# Patient Record
Sex: FEMALE | Race: WHITE | NOT HISPANIC OR LATINO
[De-identification: names, ages, dates, MRNs, and addresses within clinical notes are randomized per-mention and may not be internally consistent; named-entity substitution may affect disease eponyms.]

---

## 2017-02-01 ENCOUNTER — APPOINTMENT (OUTPATIENT)
Dept: UROGYNECOLOGY | Facility: CLINIC | Age: 82
End: 2017-02-01

## 2017-02-22 ENCOUNTER — APPOINTMENT (OUTPATIENT)
Dept: UROGYNECOLOGY | Facility: CLINIC | Age: 82
End: 2017-02-22

## 2017-03-09 ENCOUNTER — APPOINTMENT (OUTPATIENT)
Dept: OTOLARYNGOLOGY | Facility: CLINIC | Age: 82
End: 2017-03-09

## 2017-03-09 VITALS
BODY MASS INDEX: 29.27 KG/M2 | DIASTOLIC BLOOD PRESSURE: 62 MMHG | HEART RATE: 68 BPM | HEIGHT: 61 IN | WEIGHT: 155 LBS | SYSTOLIC BLOOD PRESSURE: 136 MMHG

## 2017-04-05 ENCOUNTER — RESULT REVIEW (OUTPATIENT)
Age: 82
End: 2017-04-05

## 2017-04-25 ENCOUNTER — APPOINTMENT (OUTPATIENT)
Dept: UROGYNECOLOGY | Facility: CLINIC | Age: 82
End: 2017-04-25

## 2017-06-20 ENCOUNTER — APPOINTMENT (OUTPATIENT)
Dept: UROGYNECOLOGY | Facility: CLINIC | Age: 82
End: 2017-06-20

## 2017-08-01 ENCOUNTER — APPOINTMENT (OUTPATIENT)
Dept: UROGYNECOLOGY | Facility: CLINIC | Age: 82
End: 2017-08-01
Payer: MEDICARE

## 2017-08-01 PROCEDURE — 99213 OFFICE O/P EST LOW 20 MIN: CPT

## 2017-09-05 ENCOUNTER — APPOINTMENT (OUTPATIENT)
Dept: UROGYNECOLOGY | Facility: CLINIC | Age: 82
End: 2017-09-05
Payer: MEDICARE

## 2017-09-05 ENCOUNTER — MESSAGE (OUTPATIENT)
Age: 82
End: 2017-09-05

## 2017-09-05 PROCEDURE — 99213 OFFICE O/P EST LOW 20 MIN: CPT

## 2017-09-07 ENCOUNTER — APPOINTMENT (OUTPATIENT)
Dept: UROGYNECOLOGY | Facility: CLINIC | Age: 82
End: 2017-09-07
Payer: MEDICARE

## 2017-09-07 PROCEDURE — A4562: CPT

## 2017-09-07 PROCEDURE — 99213 OFFICE O/P EST LOW 20 MIN: CPT

## 2017-09-18 ENCOUNTER — APPOINTMENT (OUTPATIENT)
Dept: UROGYNECOLOGY | Facility: CLINIC | Age: 82
End: 2017-09-18

## 2017-09-26 ENCOUNTER — APPOINTMENT (OUTPATIENT)
Dept: UROGYNECOLOGY | Facility: CLINIC | Age: 82
End: 2017-09-26

## 2017-10-17 ENCOUNTER — APPOINTMENT (OUTPATIENT)
Dept: UROGYNECOLOGY | Facility: CLINIC | Age: 82
End: 2017-10-17
Payer: MEDICARE

## 2017-10-17 PROCEDURE — 99213 OFFICE O/P EST LOW 20 MIN: CPT

## 2017-10-17 RX ORDER — ESTRADIOL 0.1 MG/G
0.1 CREAM VAGINAL
Qty: 1 | Refills: 3 | Status: ACTIVE | COMMUNITY
Start: 2017-10-17 | End: 1900-01-01

## 2017-10-18 ENCOUNTER — APPOINTMENT (OUTPATIENT)
Dept: OTOLARYNGOLOGY | Facility: CLINIC | Age: 82
End: 2017-10-18
Payer: MEDICARE

## 2017-10-18 VITALS
BODY MASS INDEX: 29.27 KG/M2 | HEART RATE: 71 BPM | WEIGHT: 155 LBS | DIASTOLIC BLOOD PRESSURE: 42 MMHG | SYSTOLIC BLOOD PRESSURE: 108 MMHG | HEIGHT: 61 IN

## 2017-10-18 PROCEDURE — 69210 REMOVE IMPACTED EAR WAX UNI: CPT

## 2017-10-18 PROCEDURE — 99213 OFFICE O/P EST LOW 20 MIN: CPT | Mod: 25

## 2017-10-30 ENCOUNTER — APPOINTMENT (OUTPATIENT)
Dept: UROGYNECOLOGY | Facility: CLINIC | Age: 82
End: 2017-10-30
Payer: MEDICARE

## 2017-10-30 PROCEDURE — 99213 OFFICE O/P EST LOW 20 MIN: CPT

## 2017-12-11 ENCOUNTER — APPOINTMENT (OUTPATIENT)
Dept: UROGYNECOLOGY | Facility: CLINIC | Age: 82
End: 2017-12-11
Payer: MEDICARE

## 2017-12-11 PROCEDURE — 99213 OFFICE O/P EST LOW 20 MIN: CPT

## 2018-01-25 ENCOUNTER — APPOINTMENT (OUTPATIENT)
Dept: UROGYNECOLOGY | Facility: CLINIC | Age: 83
End: 2018-01-25
Payer: MEDICARE

## 2018-01-25 PROCEDURE — 99213 OFFICE O/P EST LOW 20 MIN: CPT

## 2018-02-12 ENCOUNTER — APPOINTMENT (OUTPATIENT)
Dept: PHARMACY | Facility: CLINIC | Age: 83
End: 2018-02-12
Payer: SELF-PAY

## 2018-02-12 ENCOUNTER — APPOINTMENT (OUTPATIENT)
Dept: OTOLARYNGOLOGY | Facility: CLINIC | Age: 83
End: 2018-02-12
Payer: MEDICARE

## 2018-02-12 VITALS
DIASTOLIC BLOOD PRESSURE: 49 MMHG | SYSTOLIC BLOOD PRESSURE: 124 MMHG | HEART RATE: 64 BPM | BODY MASS INDEX: 29.27 KG/M2 | HEIGHT: 61 IN | WEIGHT: 155 LBS

## 2018-02-12 PROCEDURE — V5255G: CUSTOM | Mod: LT

## 2018-02-12 PROCEDURE — 69210 REMOVE IMPACTED EAR WAX UNI: CPT

## 2018-02-12 PROCEDURE — 99213 OFFICE O/P EST LOW 20 MIN: CPT | Mod: 25

## 2018-02-27 ENCOUNTER — APPOINTMENT (OUTPATIENT)
Dept: PHARMACY | Facility: CLINIC | Age: 83
End: 2018-02-27
Payer: SELF-PAY

## 2018-02-27 PROCEDURE — V5241: CPT | Mod: LT,NC

## 2018-03-05 ENCOUNTER — APPOINTMENT (OUTPATIENT)
Dept: PHARMACY | Facility: CLINIC | Age: 83
End: 2018-03-05
Payer: SELF-PAY

## 2018-03-05 PROCEDURE — V5299A: CUSTOM | Mod: NC

## 2018-03-23 ENCOUNTER — APPOINTMENT (OUTPATIENT)
Dept: UROGYNECOLOGY | Facility: CLINIC | Age: 83
End: 2018-03-23
Payer: MEDICARE

## 2018-03-23 PROCEDURE — 99213 OFFICE O/P EST LOW 20 MIN: CPT

## 2018-04-10 ENCOUNTER — APPOINTMENT (OUTPATIENT)
Dept: PHARMACY | Facility: CLINIC | Age: 83
End: 2018-04-10
Payer: SELF-PAY

## 2018-04-10 PROCEDURE — V5299A: CUSTOM | Mod: NC

## 2018-04-27 ENCOUNTER — APPOINTMENT (OUTPATIENT)
Dept: PHARMACY | Facility: CLINIC | Age: 83
End: 2018-04-27

## 2018-05-01 ENCOUNTER — APPOINTMENT (OUTPATIENT)
Dept: PHARMACY | Facility: CLINIC | Age: 83
End: 2018-05-01
Payer: SELF-PAY

## 2018-05-01 PROCEDURE — V5299A: CUSTOM | Mod: NC

## 2018-05-04 ENCOUNTER — APPOINTMENT (OUTPATIENT)
Dept: UROGYNECOLOGY | Facility: CLINIC | Age: 83
End: 2018-05-04
Payer: MEDICARE

## 2018-05-04 PROCEDURE — 99213 OFFICE O/P EST LOW 20 MIN: CPT

## 2018-06-15 ENCOUNTER — APPOINTMENT (OUTPATIENT)
Dept: UROGYNECOLOGY | Facility: CLINIC | Age: 83
End: 2018-06-15
Payer: MEDICARE

## 2018-06-15 PROCEDURE — 99213 OFFICE O/P EST LOW 20 MIN: CPT | Mod: GC

## 2018-07-05 ENCOUNTER — APPOINTMENT (OUTPATIENT)
Dept: UROGYNECOLOGY | Facility: CLINIC | Age: 83
End: 2018-07-05
Payer: MEDICARE

## 2018-07-05 PROCEDURE — 99213 OFFICE O/P EST LOW 20 MIN: CPT

## 2018-08-10 ENCOUNTER — APPOINTMENT (OUTPATIENT)
Dept: UROGYNECOLOGY | Facility: CLINIC | Age: 83
End: 2018-08-10
Payer: MEDICARE

## 2018-08-10 PROCEDURE — 99213 OFFICE O/P EST LOW 20 MIN: CPT

## 2018-09-07 ENCOUNTER — APPOINTMENT (OUTPATIENT)
Dept: UROGYNECOLOGY | Facility: CLINIC | Age: 83
End: 2018-09-07
Payer: MEDICARE

## 2018-09-07 PROCEDURE — 99213 OFFICE O/P EST LOW 20 MIN: CPT

## 2018-10-12 ENCOUNTER — APPOINTMENT (OUTPATIENT)
Dept: UROGYNECOLOGY | Facility: CLINIC | Age: 83
End: 2018-10-12
Payer: MEDICARE

## 2018-10-12 PROCEDURE — 99213 OFFICE O/P EST LOW 20 MIN: CPT

## 2018-11-16 ENCOUNTER — APPOINTMENT (OUTPATIENT)
Dept: UROGYNECOLOGY | Facility: CLINIC | Age: 83
End: 2018-11-16
Payer: MEDICARE

## 2018-11-16 PROCEDURE — 99213 OFFICE O/P EST LOW 20 MIN: CPT

## 2018-12-27 ENCOUNTER — APPOINTMENT (OUTPATIENT)
Dept: UROGYNECOLOGY | Facility: CLINIC | Age: 83
End: 2018-12-27
Payer: MEDICARE

## 2018-12-27 PROCEDURE — 99213 OFFICE O/P EST LOW 20 MIN: CPT

## 2019-02-11 ENCOUNTER — APPOINTMENT (OUTPATIENT)
Dept: UROGYNECOLOGY | Facility: CLINIC | Age: 84
End: 2019-02-11
Payer: MEDICARE

## 2019-02-11 PROCEDURE — 99213 OFFICE O/P EST LOW 20 MIN: CPT

## 2019-02-11 NOTE — DISCUSSION/SUMMARY
[FreeTextEntry1] : Pt will RTO in 4 weeks or sooner if needed. Pt agrees to call office with any problems/concerns.

## 2019-02-11 NOTE — PROCEDURE
[Erythema] : erythema noted [Discharge] : there is vaginal discharge [Pessary Washed] : washed [H2O2] : H2O2 [Mild] : mild bleeding [Resolved w/pressure] : was resolved by applying pressure [Refit] : refit is not needed [Erosion] : no evidence of erosion [Infection] : no evidence of infection [FreeTextEntry1] : GH 3 1/2 and donut #5 [de-identified] : prolapse noted past pessary.  Tandem pessary flipped with stem leaning against posterior wall [de-identified] : no evidence of fistula or erosions. [de-identified] : posterior vaginal wall [de-identified] : copious amount of pink stained leukorrhea [de-identified] : with removal [FreeTextEntry3] : Estrace applied at visit. continue Estrace at home [FreeTextEntry8] : routine samm-care

## 2019-02-11 NOTE — PHYSICAL EXAM
[No Acute Distress] : in no acute distress [Well developed] : well developed [Well Nourished] : ~L well nourished [Good Hygeine] : demonstrates good hygeine [Oriented x3] : oriented to person, place, and time [Normal Memory] : ~T memory was ~L unimpaired [Normal Mood/Affect] : mood and affect are normal [Warm and Dry] : was warm and dry to touch [Labia Majora] : were normal [Labia Minora] : were normal [Normal Appearance] : general appearance was normal [Atrophy] : atrophy [Normal] : normal [Anxiety] : patient is not anxious [Tenderness] : ~T no ~M abdominal tenderness observed [Distended] : not distended [de-identified] : ambulates with walker [FreeTextEntry4] : no evidence of fistula

## 2019-02-21 ENCOUNTER — APPOINTMENT (OUTPATIENT)
Dept: OTOLARYNGOLOGY | Facility: CLINIC | Age: 84
End: 2019-02-21
Payer: MEDICARE

## 2019-02-21 VITALS
DIASTOLIC BLOOD PRESSURE: 57 MMHG | HEART RATE: 64 BPM | HEIGHT: 61 IN | SYSTOLIC BLOOD PRESSURE: 95 MMHG | WEIGHT: 155 LBS | BODY MASS INDEX: 29.27 KG/M2

## 2019-02-21 DIAGNOSIS — H65.493 OTHER CHRONIC NONSUPPURATIVE OTITIS MEDIA, BILATERAL: ICD-10-CM

## 2019-02-21 PROCEDURE — 99214 OFFICE O/P EST MOD 30 MIN: CPT | Mod: 25

## 2019-02-21 PROCEDURE — 69210 REMOVE IMPACTED EAR WAX UNI: CPT

## 2019-02-21 RX ORDER — MIRTAZAPINE 15 MG/1
15 TABLET, FILM COATED ORAL
Qty: 30 | Refills: 0 | Status: ACTIVE | COMMUNITY
Start: 2018-11-15

## 2019-02-21 RX ORDER — SIMVASTATIN 10 MG/1
10 TABLET, FILM COATED ORAL
Qty: 30 | Refills: 0 | Status: ACTIVE | COMMUNITY
Start: 2018-10-13

## 2019-02-21 RX ORDER — OFLOXACIN OTIC 3 MG/ML
0.3 SOLUTION AURICULAR (OTIC) TWICE DAILY
Qty: 1 | Refills: 5 | Status: ACTIVE | COMMUNITY
Start: 2019-02-21 | End: 1900-01-01

## 2019-02-21 RX ORDER — ISOSORBIDE MONONITRATE 60 MG/1
60 TABLET, EXTENDED RELEASE ORAL
Qty: 90 | Refills: 0 | Status: ACTIVE | COMMUNITY
Start: 2018-10-29

## 2019-02-21 RX ORDER — DOXYCYCLINE HYCLATE 100 MG/1
100 TABLET ORAL
Qty: 14 | Refills: 0 | Status: ACTIVE | COMMUNITY
Start: 2018-11-29

## 2019-02-21 RX ORDER — OMEPRAZOLE 20 MG/1
20 CAPSULE, DELAYED RELEASE ORAL
Qty: 90 | Refills: 0 | Status: ACTIVE | COMMUNITY
Start: 2018-10-01

## 2019-02-21 RX ORDER — LIDOCAINE 5% 700 MG/1
5 PATCH TOPICAL
Qty: 30 | Refills: 0 | Status: ACTIVE | COMMUNITY
Start: 2018-11-09

## 2019-02-21 RX ORDER — DICLOFENAC SODIUM 10 MG/G
1 GEL TOPICAL
Qty: 100 | Refills: 0 | Status: ACTIVE | COMMUNITY
Start: 2018-11-07

## 2019-02-21 NOTE — ASSESSMENT
[FreeTextEntry1] : 89 y/o female w/ bilateral cerumen- routine debridement and L ear drainage\par Drops were applied in her L ear\par \par Will continue drops at home for days and do not use q-tips\par \par bilateral sensorineural hearing loss-cleared for hearing aids\par \par Will f/u 4-6 months or PRN

## 2019-02-21 NOTE — REASON FOR VISIT
[Subsequent Evaluation] : a subsequent evaluation for [Hearing Loss] : hearing loss [Family Member] : family member [FreeTextEntry2] : ear issues

## 2019-02-21 NOTE — PHYSICAL EXAM
[] : septum deviated bilaterally [Midline] : trachea located in midline position [de-identified] : wax in b/l [de-identified] : L TM inflammed, small perforation in b/l ears [Normal] : orientation to person, place, and time: normal

## 2019-02-21 NOTE — HISTORY OF PRESENT ILLNESS
[No] : patient does not have a  history of radiation therapy [Presbycusis] : presbycusis [Eustachian Tube Dysfunction] : eustachian tube dysfunction [Hearing Loss] : hearing loss [Otorrhea] : otorrhea [None] : No risk factors have been identified. [de-identified] : 89 y/o female w/ hx of chronic bilateral hearing loss which requires a hearing aid she is here today for L ear drainage that started this morning. She states pus is coming out of the L ear. She is using hearing aides b/l. Pt has no ear pain, hearing loss, tinnitus, vertigo, nasal congestion, nasal discharge, epistaxis, sinus infections, facial pain, facial pressure, throat pain, dysphagia or fevers\par \par  [Tinnitus] : no tinnitus [Anxiety] : no anxiety [Dizziness] : no dizziness [Headache] : no headache [Otalgia] : no otalgia [Vertigo] : no vertigo [Recurrent Otitis Media] : no recurrent otitis media [Otitis Media with Effusion] : no otitis media with effusion [Congenital Ear Malformation] : no congenital ear malformation [Meniere Disease] : no Meniere disease [Otosclerosis] : no otosclerosis [Perilymphatic Fistula] : no perilymphatic fistula [Hypertension] : no hypertension [Loud Noise Exposure] : no history of loud noise exposure [Smoking] : no smoking [Early Onset Hearing Loss] : no early onset hearing loss [Stroke] : no stroke [Facial Pain] : no facial pain [Facial Pressure] : no facial pressure [Nasal Congestion] : no nasal congestion [Ear Pain] : no ear pain [Ear Pressure] : no ear pressure [Diplopia] : no diplopia [Ear Fullness] : no ear fullness [Allergic Rhinitis] : no allergic rhinitis [Environmental Allergies] : no environmental allergies [Seasonal Allergies] : no seasonal allergies [Environmental Allergens] : no environmental allergens [Adenoidectomy] : no adenoidectomy [Allergies] : no allergies [Asthma] : no asthma [Neck Mass] : no neck mass [Neck Pain] : no neck pain [Chills] : no chills [Cold Intolerance] : no cold intolerance [Cough] : no cough [Fatigue] : no fatigue [Heat Intolerance] : no heat intolerance [Hyperthyroidism] : no hyperthyroidism [Sialadenitis] : no sialadenitis [Hodgkin Disease] : no hodgkin disease [Non-Hodgkin Lymphoma] : no non-hodgkin lymphoma [Tobacco Use] : no tobacco use [Alcohol Use] : no alcohol use [Graves Disease] : no graves disease [Thyroid Cancer] : no thyroid cancer

## 2019-03-14 ENCOUNTER — APPOINTMENT (OUTPATIENT)
Dept: UROGYNECOLOGY | Facility: CLINIC | Age: 84
End: 2019-03-14
Payer: MEDICARE

## 2019-03-14 PROCEDURE — 99213 OFFICE O/P EST LOW 20 MIN: CPT

## 2019-03-19 NOTE — PHYSICAL EXAM
[No Acute Distress] : in no acute distress [Well developed] : well developed [Well Nourished] : ~L well nourished [Good Hygeine] : demonstrates good hygeine [Oriented x3] : oriented to person, place, and time [Normal Memory] : ~T memory was ~L unimpaired [Normal Mood/Affect] : mood and affect are normal [Warm and Dry] : was warm and dry to touch [Labia Majora] : were normal [Labia Minora] : were normal [Normal Appearance] : general appearance was normal [Atrophy] : atrophy [Normal] : normal [Tenderness] : ~T no ~M abdominal tenderness observed [Anxiety] : patient is not anxious [Distended] : not distended [FreeTextEntry4] : no evidence of fistula [de-identified] : ambulates with walker

## 2019-03-19 NOTE — HISTORY OF PRESENT ILLNESS
[FreeTextEntry1] : Pt presents to office for f/u of prolapse. reports feeling prolapse past pessary.

## 2019-03-19 NOTE — PROCEDURE
[Erythema] : erythema noted [Discharge] : there is vaginal discharge [Pessary Washed] : washed [H2O2] : H2O2 [Mild] : mild bleeding [Resolved w/pressure] : was resolved by applying pressure [Refit] : refit is not needed [Infection] : no evidence of infection [Erosion] : no evidence of erosion [de-identified] : no evidence of fistula or erosions. [FreeTextEntry1] : GH 3 1/2 and donut #5 [de-identified] : prolapse noted past pessary.  Tandem pessary flipped with stem leaning against posterior wall [de-identified] : posterior vaginal wall [de-identified] : copious amount of pink stained leukorrhea [de-identified] : with removal [FreeTextEntry8] : routine samm-care [FreeTextEntry3] : Estrace applied at visit. continue Estrace at home

## 2019-04-12 ENCOUNTER — APPOINTMENT (OUTPATIENT)
Dept: UROGYNECOLOGY | Facility: CLINIC | Age: 84
End: 2019-04-12
Payer: MEDICARE

## 2019-04-12 PROCEDURE — 99213 OFFICE O/P EST LOW 20 MIN: CPT

## 2019-04-12 NOTE — PHYSICAL EXAM
[Well developed] : well developed [No Acute Distress] : in no acute distress [Well Nourished] : ~L well nourished [Normal Mood/Affect] : mood and affect are normal [Normal Memory] : ~T memory was ~L unimpaired [Good Hygeine] : demonstrates good hygeine [Oriented x3] : oriented to person, place, and time [Anxiety] : patient is not anxious [Distended] : not distended [Tenderness] : ~T no ~M abdominal tenderness observed [Warm and Dry] : was warm and dry to touch [Labia Majora] : were normal [Labia Minora] : were normal [Normal Appearance] : general appearance was normal [Atrophy] : atrophy [Normal] : normal [de-identified] : ambulates with walker [FreeTextEntry4] : no evidence of fistula

## 2019-04-12 NOTE — PROCEDURE
[Refit] : refit is not needed [Erosion] : no evidence of erosion [Erythema] : erythema noted [Discharge] : there is vaginal discharge [Infection] : no evidence of infection [Pessary Washed] : washed [H2O2] : H2O2 [Mild] : mild bleeding [Resolved w/pressure] : was resolved by applying pressure [FreeTextEntry1] : GH 3 1/2 and donut #5 [de-identified] : prolapse noted past pessary.  Tandem pessary flipped with stem leaning against posterior wall [de-identified] : no evidence of fistula or erosions. [de-identified] : posterior vaginal wall [de-identified] : copious amount of pink stained leukorrhea [de-identified] : with removal [FreeTextEntry3] : Estrace applied at visit. continue Estrace at home [FreeTextEntry8] : routine samm-care

## 2019-05-17 ENCOUNTER — APPOINTMENT (OUTPATIENT)
Dept: UROGYNECOLOGY | Facility: CLINIC | Age: 84
End: 2019-05-17
Payer: MEDICARE

## 2019-05-17 DIAGNOSIS — N89.9 NONINFLAMMATORY DISORDER OF VAGINA, UNSPECIFIED: ICD-10-CM

## 2019-05-17 PROCEDURE — 99213 OFFICE O/P EST LOW 20 MIN: CPT

## 2019-05-23 NOTE — PROCEDURE
[Erythema] : erythema noted [Discharge] : there is vaginal discharge [Pessary Washed] : washed [H2O2] : H2O2 [Mild] : mild bleeding [Resolved w/pressure] : was resolved by applying pressure [Refit] : refit is not needed [Erosion] : no evidence of erosion [Infection] : no evidence of infection [FreeTextEntry1] : GH 3 1/2 and donut #5 [de-identified] : prolapse noted past pessary.  Tandem pessary flipped with stem leaning against posterior wall [de-identified] : no evidence of fistula or erosions. [de-identified] : posterior vaginal wall [de-identified] : copious amount of pink stained leukorrhea [de-identified] : with removal [FreeTextEntry3] : Estrace applied at visit. continue Estrace at home [FreeTextEntry8] : routine samm-care

## 2019-05-23 NOTE — PHYSICAL EXAM
[No Acute Distress] : in no acute distress [Well developed] : well developed [Well Nourished] : ~L well nourished [Good Hygeine] : demonstrates good hygeine [Oriented x3] : oriented to person, place, and time [Normal Memory] : ~T memory was ~L unimpaired [Normal Mood/Affect] : mood and affect are normal [Warm and Dry] : was warm and dry to touch [Labia Majora] : were normal [Labia Minora] : were normal [Normal Appearance] : general appearance was normal [Atrophy] : atrophy [Normal] : normal [Anxiety] : patient is not anxious [Tenderness] : ~T no ~M abdominal tenderness observed [Distended] : not distended [de-identified] : ambulates with walker [FreeTextEntry4] : no evidence of fistula

## 2019-06-21 ENCOUNTER — APPOINTMENT (OUTPATIENT)
Dept: UROGYNECOLOGY | Facility: CLINIC | Age: 84
End: 2019-06-21
Payer: MEDICARE

## 2019-06-21 PROCEDURE — 99213 OFFICE O/P EST LOW 20 MIN: CPT

## 2019-06-27 NOTE — PHYSICAL EXAM
[No Acute Distress] : in no acute distress [Well developed] : well developed [Well Nourished] : ~L well nourished [Good Hygeine] : demonstrates good hygeine [Normal Memory] : ~T memory was ~L unimpaired [Oriented x3] : oriented to person, place, and time [Normal Mood/Affect] : mood and affect are normal [Warm and Dry] : was warm and dry to touch [Labia Majora] : were normal [Labia Minora] : were normal [Normal Appearance] : general appearance was normal [Atrophy] : atrophy [Normal] : normal [Tenderness] : ~T no ~M abdominal tenderness observed [Anxiety] : patient is not anxious [Distended] : not distended [de-identified] : ambulates with walker [FreeTextEntry4] : no evidence of fistula

## 2019-06-27 NOTE — PROCEDURE
[Discharge] : there is vaginal discharge [Erythema] : erythema noted [Pessary Washed] : washed [H2O2] : H2O2 [Mild] : mild bleeding [Resolved w/pressure] : was resolved by applying pressure [Erosion] : no evidence of erosion [Refit] : refit is not needed [FreeTextEntry1] : GH 3 1/2 and donut #5 [Infection] : no evidence of infection [de-identified] : posterior vaginal wall [de-identified] : no evidence of fistula or erosions. [de-identified] : prolapse noted past pessary.  Tandem pessary flipped with stem leaning against posterior wall [de-identified] : with removal [FreeTextEntry3] : Estrace applied at visit. continue Estrace at home [de-identified] : copious amount of pink stained leukorrhea [FreeTextEntry8] : routine samm-care

## 2019-08-02 ENCOUNTER — APPOINTMENT (OUTPATIENT)
Dept: UROGYNECOLOGY | Facility: CLINIC | Age: 84
End: 2019-08-02
Payer: MEDICARE

## 2019-08-02 PROCEDURE — 99213 OFFICE O/P EST LOW 20 MIN: CPT

## 2019-08-02 NOTE — PHYSICAL EXAM
[No Acute Distress] : in no acute distress [Well developed] : well developed [Well Nourished] : ~L well nourished [Good Hygeine] : demonstrates good hygeine [Oriented x3] : oriented to person, place, and time [Normal Memory] : ~T memory was ~L unimpaired [Normal Mood/Affect] : mood and affect are normal [Warm and Dry] : was warm and dry to touch [Labia Majora] : were normal [Labia Minora] : were normal [Normal Appearance] : general appearance was normal [Atrophy] : atrophy [Normal] : normal [Anxiety] : patient is not anxious [Tenderness] : ~T no ~M abdominal tenderness observed [Distended] : not distended [FreeTextEntry4] : no evidence of fistula [de-identified] : ambulates with walker

## 2019-08-02 NOTE — PROCEDURE
[Discharge] : there is vaginal discharge [Erythema] : erythema noted [Pessary Washed] : washed [H2O2] : H2O2 [Resolved w/pressure] : was resolved by applying pressure [Mild] : mild bleeding [Refit] : refit is not needed [Erosion] : no evidence of erosion [FreeTextEntry1] : GH 3 1/2 and donut #5 [Infection] : no evidence of infection [de-identified] : posterior vaginal wall [de-identified] : no evidence of fistula or erosions. [de-identified] : prolapse noted past pessary.  Tandem pessary flipped with stem leaning against posterior wall [de-identified] : copious amount of pink stained leukorrhea [de-identified] : with removal [FreeTextEntry3] : Estrace applied at visit. continue Estrace at home [FreeTextEntry8] : routine samm-care

## 2019-09-06 ENCOUNTER — APPOINTMENT (OUTPATIENT)
Dept: UROGYNECOLOGY | Facility: CLINIC | Age: 84
End: 2019-09-06
Payer: MEDICARE

## 2019-09-06 PROCEDURE — 99213 OFFICE O/P EST LOW 20 MIN: CPT

## 2019-09-10 NOTE — PROCEDURE
[Erythema] : erythema noted [Discharge] : there is vaginal discharge [Pessary Washed] : washed [H2O2] : H2O2 [Mild] : mild bleeding [Resolved w/pressure] : was resolved by applying pressure [Refit] : refit is not needed [Erosion] : no evidence of erosion [Infection] : no evidence of infection [FreeTextEntry1] : GH 3 1/2 and donut #5 [de-identified] : prolapse noted past pessary.  Tandem pessary flipped with stem leaning against posterior wall [de-identified] : no evidence of fistula or erosions. [de-identified] : copious amount of pink stained leukorrhea [de-identified] : posterior vaginal wall [de-identified] : with removal [FreeTextEntry3] : Estrace applied at visit. continue Estrace at home [FreeTextEntry8] : routine samm-care

## 2019-10-04 ENCOUNTER — APPOINTMENT (OUTPATIENT)
Dept: UROGYNECOLOGY | Facility: CLINIC | Age: 84
End: 2019-10-04
Payer: MEDICARE

## 2019-10-04 DIAGNOSIS — N39.3 STRESS INCONTINENCE (FEMALE) (MALE): ICD-10-CM

## 2019-10-04 PROCEDURE — A4562: CPT

## 2019-10-04 PROCEDURE — 99213 OFFICE O/P EST LOW 20 MIN: CPT

## 2019-11-06 NOTE — PHYSICAL EXAM
[No Acute Distress] : in no acute distress [Well developed] : well developed [Well Nourished] : ~L well nourished [Good Hygeine] : demonstrates good hygeine [Oriented x3] : oriented to person, place, and time [Normal Memory] : ~T memory was ~L unimpaired [Normal Mood/Affect] : mood and affect are normal [Warm and Dry] : was warm and dry to touch [Labia Majora] : were normal [Labia Minora] : were normal [Normal Appearance] : general appearance was normal [Normal] : normal [Atrophy] : atrophy [Anxiety] : patient is not anxious [Tenderness] : ~T no ~M abdominal tenderness observed [Distended] : not distended [de-identified] : ambulates with walker [FreeTextEntry4] : no evidence of fistula

## 2019-11-06 NOTE — HISTORY OF PRESENT ILLNESS
[FreeTextEntry1] : Pt presents to office for f/u of prolapse.  Denies any issues with pessary.   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

## 2019-11-06 NOTE — PROCEDURE
[Erythema] : erythema noted [Discharge] : there is vaginal discharge [Pessary Washed] : washed [H2O2] : H2O2 [Mild] : mild bleeding [Resolved w/pressure] : was resolved by applying pressure [Refit] : refit is not needed [Erosion] : no evidence of erosion [Infection] : no evidence of infection [FreeTextEntry1] : GH 3 1/2 and donut #4 [de-identified] : prolapse noted past pessary.  Tandem pessary flipped with stem leaning against posterior wall [de-identified] : no evidence of fistula or erosions. [de-identified] : posterior vaginal wall [de-identified] : copious amount of pink stained leukorrhea [de-identified] : with removal [FreeTextEntry3] : Estrace applied at visit. continue Estrace at home [FreeTextEntry8] : routine samm-care

## 2019-11-08 ENCOUNTER — APPOINTMENT (OUTPATIENT)
Dept: UROGYNECOLOGY | Facility: CLINIC | Age: 84
End: 2019-11-08
Payer: MEDICARE

## 2019-11-08 PROCEDURE — 99213 OFFICE O/P EST LOW 20 MIN: CPT

## 2019-11-12 NOTE — PHYSICAL EXAM
[Well developed] : well developed [No Acute Distress] : in no acute distress [Well Nourished] : ~L well nourished [Oriented x3] : oriented to person, place, and time [Normal Memory] : ~T memory was ~L unimpaired [Good Hygeine] : demonstrates good hygeine [Normal Mood/Affect] : mood and affect are normal [Warm and Dry] : was warm and dry to touch [Labia Majora] : were normal [Normal Appearance] : general appearance was normal [Labia Minora] : were normal [Atrophy] : atrophy [Normal] : normal [Anxiety] : patient is not anxious [de-identified] : ambulates with walker [Distended] : not distended [Tenderness] : ~T no ~M abdominal tenderness observed [FreeTextEntry4] : no evidence of fistula

## 2019-11-12 NOTE — PROCEDURE
[Discharge] : there is vaginal discharge [Erythema] : erythema noted [Pessary Washed] : washed [H2O2] : H2O2 [Mild] : mild bleeding [Resolved w/pressure] : was resolved by applying pressure [Erosion] : no evidence of erosion [Refit] : refit is not needed [Infection] : no evidence of infection [FreeTextEntry1] : GH 3 1/2 and donut #4 [de-identified] : prolapse noted past pessary.  Tandem pessary flipped with stem leaning against posterior wall [de-identified] : no evidence of fistula or erosions. [de-identified] : posterior vaginal wall [de-identified] : with removal [de-identified] : copious amount of pink stained leukorrhea [FreeTextEntry8] : routine samm-care [FreeTextEntry3] : Estrace applied at visit. continue Estrace at home

## 2019-12-13 ENCOUNTER — APPOINTMENT (OUTPATIENT)
Dept: UROGYNECOLOGY | Facility: CLINIC | Age: 84
End: 2019-12-13
Payer: MEDICARE

## 2019-12-13 PROCEDURE — 99213 OFFICE O/P EST LOW 20 MIN: CPT

## 2019-12-16 NOTE — PROCEDURE
[Discharge] : there is vaginal discharge [Erythema] : erythema noted [Pessary Washed] : washed [Resolved w/pressure] : was resolved by applying pressure [H2O2] : H2O2 [Mild] : mild bleeding [Refit] : refit is not needed [Erosion] : no evidence of erosion [FreeTextEntry1] : GH 3 1/2 and donut #4 [Infection] : no evidence of infection [de-identified] : prolapse noted past pessary.  Tandem pessary flipped with stem leaning against posterior wall [de-identified] : no evidence of fistula or erosions. [de-identified] : with removal [de-identified] : posterior vaginal wall [de-identified] : copious amount of pink stained leukorrhea [FreeTextEntry3] : Estrace applied at visit. continue Estrace at home [FreeTextEntry8] : routine samm-care

## 2019-12-16 NOTE — PHYSICAL EXAM
[No Acute Distress] : in no acute distress [Well Nourished] : ~L well nourished [Well developed] : well developed [Good Hygeine] : demonstrates good hygeine [Oriented x3] : oriented to person, place, and time [Normal Memory] : ~T memory was ~L unimpaired [Normal Mood/Affect] : mood and affect are normal [Warm and Dry] : was warm and dry to touch [Labia Majora] : were normal [Labia Minora] : were normal [Normal Appearance] : general appearance was normal [Atrophy] : atrophy [Normal] : normal [Tenderness] : ~T no ~M abdominal tenderness observed [Anxiety] : patient is not anxious [Distended] : not distended [de-identified] : ambulates with walker [FreeTextEntry4] : no evidence of fistula

## 2020-01-16 ENCOUNTER — APPOINTMENT (OUTPATIENT)
Dept: UROGYNECOLOGY | Facility: CLINIC | Age: 85
End: 2020-01-16
Payer: MEDICARE

## 2020-01-16 PROCEDURE — 99213 OFFICE O/P EST LOW 20 MIN: CPT

## 2020-01-18 NOTE — PHYSICAL EXAM
[No Acute Distress] : in no acute distress [Well developed] : well developed [Good Hygeine] : demonstrates good hygeine [Well Nourished] : ~L well nourished [Oriented x3] : oriented to person, place, and time [Normal Memory] : ~T memory was ~L unimpaired [Normal Mood/Affect] : mood and affect are normal [Warm and Dry] : was warm and dry to touch [Labia Majora] : were normal [Labia Minora] : were normal [Normal Appearance] : general appearance was normal [Atrophy] : atrophy [Normal] : normal [Anxiety] : patient is not anxious [Tenderness] : ~T no ~M abdominal tenderness observed [Distended] : not distended [de-identified] : ambulates with walker [FreeTextEntry4] : no evidence of fistula

## 2020-02-06 ENCOUNTER — APPOINTMENT (OUTPATIENT)
Dept: OPHTHALMOLOGY | Facility: CLINIC | Age: 85
End: 2020-02-06
Payer: MEDICARE

## 2020-02-06 ENCOUNTER — NON-APPOINTMENT (OUTPATIENT)
Age: 85
End: 2020-02-06

## 2020-02-06 PROCEDURE — 92004 COMPRE OPH EXAM NEW PT 1/>: CPT

## 2020-02-06 PROCEDURE — 92286 ANT SGM IMG I&R SPECLR MIC: CPT

## 2020-02-06 PROCEDURE — 92015 DETERMINE REFRACTIVE STATE: CPT

## 2020-02-20 ENCOUNTER — APPOINTMENT (OUTPATIENT)
Dept: UROGYNECOLOGY | Facility: CLINIC | Age: 85
End: 2020-02-20

## 2020-02-27 ENCOUNTER — APPOINTMENT (OUTPATIENT)
Dept: UROGYNECOLOGY | Facility: CLINIC | Age: 85
End: 2020-02-27
Payer: MEDICARE

## 2020-02-27 PROCEDURE — 99213 OFFICE O/P EST LOW 20 MIN: CPT

## 2020-03-02 NOTE — PHYSICAL EXAM
[No Acute Distress] : in no acute distress [Well developed] : well developed [Well Nourished] : ~L well nourished [Good Hygeine] : demonstrates good hygeine [Oriented x3] : oriented to person, place, and time [Normal Memory] : ~T memory was ~L unimpaired [Normal Mood/Affect] : mood and affect are normal [Warm and Dry] : was warm and dry to touch [Labia Majora] : were normal [Labia Minora] : were normal [Normal Appearance] : general appearance was normal [Normal] : normal [Atrophy] : atrophy [Anxiety] : patient is not anxious [Tenderness] : ~T no ~M abdominal tenderness observed [de-identified] : ambulates with walker [Distended] : not distended [FreeTextEntry4] : no evidence of fistula

## 2020-03-02 NOTE — DISCUSSION/SUMMARY
[FreeTextEntry1] : PVR today was 120 ml.  Pessary reinserted with improved support of cystocele.  She was advised to stop Flomax as retention was likely due to bladder outlet obstruction.  Pt advised to call office if she has urinary retention again.  Otherwise she will RTO in 1 month for f/u of prolapse or sooner if needed.  Pt and daughter agree to call office with any problems/concerns.

## 2020-03-02 NOTE — HISTORY OF PRESENT ILLNESS
[FreeTextEntry1] : Pt presents to office for f/u of prolapse. She reported urinary retention during the day yesterday but at night she was able to void a large amount.  Pt has a tandem pessary in, which usually has some prolapse past pessary.    However they were not able to RTO 2 weeks ago as scheduled due to hospitalization for rectal bleeding.  When she was hospitalized she also had moments of urinary retention, so she was placed on flomax.  She is currently taking the flomax. \par \par

## 2020-03-02 NOTE — PROCEDURE
[Erythema] : erythema noted [Discharge] : there is vaginal discharge [Pessary Washed] : washed [H2O2] : H2O2 [Mild] : mild bleeding [Resolved w/pressure] : was resolved by applying pressure [Refit] : refit is not needed [Erosion] : no evidence of erosion [Infection] : no evidence of infection [FreeTextEntry1] : GH 3 1/2 and donut #4 [de-identified] : no evidence of fistula or erosions. [de-identified] : prolapse noted past pessary.  Tandem pessary flipped with stem leaning against posterior wall [de-identified] : posterior vaginal wall [de-identified] : with removal [de-identified] : copious amount of pink stained leukorrhea [FreeTextEntry8] : routine samm-care [FreeTextEntry3] : Estrace applied at visit. continue Estrace at home

## 2020-03-27 ENCOUNTER — APPOINTMENT (OUTPATIENT)
Dept: UROGYNECOLOGY | Facility: CLINIC | Age: 85
End: 2020-03-27

## 2020-04-20 ENCOUNTER — APPOINTMENT (OUTPATIENT)
Dept: UROGYNECOLOGY | Facility: CLINIC | Age: 85
End: 2020-04-20
Payer: MEDICARE

## 2020-04-20 DIAGNOSIS — R30.0 DYSURIA: ICD-10-CM

## 2020-04-20 PROCEDURE — 99213 OFFICE O/P EST LOW 20 MIN: CPT

## 2020-04-20 NOTE — PHYSICAL EXAM
[No Acute Distress] : in no acute distress [Well developed] : well developed [Well Nourished] : ~L well nourished [Good Hygeine] : demonstrates good hygeine [Oriented x3] : oriented to person, place, and time [Normal Memory] : ~T memory was ~L unimpaired [Normal Mood/Affect] : mood and affect are normal [Warm and Dry] : was warm and dry to touch [Labia Majora] : were normal [Labia Minora] : were normal [Normal Appearance] : general appearance was normal [Atrophy] : atrophy [Normal] : normal [Anxiety] : patient is not anxious [Tenderness] : ~T no ~M abdominal tenderness observed [Distended] : not distended [de-identified] : ambulates with walker [FreeTextEntry4] : no evidence of fistula

## 2020-04-20 NOTE — PROCEDURE
[Erythema] : erythema noted [Discharge] : there is vaginal discharge [Pessary Washed] : washed [H2O2] : H2O2 [Mild] : mild bleeding [Resolved w/pressure] : was resolved by applying pressure [Erosion] : no evidence of erosion [Refit] : refit is not needed [Infection] : no evidence of infection [FreeTextEntry1] : GH 3 1/2 and donut #4 [de-identified] : no evidence of fistula or erosions. [de-identified] : posterior vaginal wall [de-identified] : prolapse noted past pessary.  Tandem pessary flipped with stem leaning against posterior wall [FreeTextEntry3] : Estrace applied at visit. continue Estrace at home [de-identified] : copious amount of pink stained leukorrhea [FreeTextEntry8] : routine samm-care [de-identified] : with removal

## 2020-06-04 ENCOUNTER — APPOINTMENT (OUTPATIENT)
Dept: UROGYNECOLOGY | Facility: CLINIC | Age: 85
End: 2020-06-04
Payer: MEDICARE

## 2020-06-04 VITALS — TEMPERATURE: 97.6 F

## 2020-06-04 PROCEDURE — 99213 OFFICE O/P EST LOW 20 MIN: CPT

## 2020-06-04 NOTE — PHYSICAL EXAM
[No Acute Distress] : in no acute distress [Well developed] : well developed [Well Nourished] : ~L well nourished [Good Hygeine] : demonstrates good hygeine [Oriented x3] : oriented to person, place, and time [Normal Memory] : ~T memory was ~L unimpaired [Normal Mood/Affect] : mood and affect are normal [Anxiety] : patient is not anxious [Tenderness] : ~T no ~M abdominal tenderness observed [Distended] : not distended [Warm and Dry] : was warm and dry to touch [Labia Majora] : were normal [Labia Minora] : were normal [Normal Appearance] : general appearance was normal [Atrophy] : atrophy [Normal] : normal [de-identified] : ambulates with walker [FreeTextEntry4] : no evidence of fistula

## 2020-06-04 NOTE — PROCEDURE
[Refit] : refit is not needed [Erosion] : no evidence of erosion [Erythema] : erythema noted [Discharge] : there is vaginal discharge [Infection] : no evidence of infection [Pessary Washed] : washed [H2O2] : H2O2 [Mild] : mild bleeding [Resolved w/pressure] : was resolved by applying pressure [FreeTextEntry1] : GH 3 1/2 and donut #4 [de-identified] : no evidence of fistula or erosions. [de-identified] : posterior vaginal wall [de-identified] : prolapse noted past pessary.  Tandem pessary flipped with stem leaning against posterior wall [de-identified] : copious amount of pink stained leukorrhea [de-identified] : with removal [FreeTextEntry3] : Estrace applied at visit. continue Estrace at home [FreeTextEntry8] : routine samm-care

## 2020-07-14 ENCOUNTER — APPOINTMENT (OUTPATIENT)
Dept: PHARMACY | Facility: CLINIC | Age: 85
End: 2020-07-14
Payer: SELF-PAY

## 2020-07-14 PROCEDURE — V5267D: CUSTOM

## 2020-08-06 ENCOUNTER — APPOINTMENT (OUTPATIENT)
Dept: UROGYNECOLOGY | Facility: CLINIC | Age: 85
End: 2020-08-06
Payer: MEDICARE

## 2020-08-06 VITALS — HEART RATE: 65 BPM | DIASTOLIC BLOOD PRESSURE: 60 MMHG | TEMPERATURE: 97.2 F | SYSTOLIC BLOOD PRESSURE: 100 MMHG

## 2020-08-06 PROCEDURE — 99213 OFFICE O/P EST LOW 20 MIN: CPT

## 2020-08-10 NOTE — HISTORY OF PRESENT ILLNESS
[FreeTextEntry1] : Pt presents to office for f/u of prolapse.  Denies any issues with pessary.   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  \par \par Reports recent hospitalization due to hyponatremia. concurrently she was treated for UTI, but asymptomatic.

## 2020-08-10 NOTE — PHYSICAL EXAM
[Well developed] : well developed [No Acute Distress] : in no acute distress [Well Nourished] : ~L well nourished [Good Hygeine] : demonstrates good hygeine [Oriented x3] : oriented to person, place, and time [Normal Memory] : ~T memory was ~L unimpaired [Normal Mood/Affect] : mood and affect are normal [Warm and Dry] : was warm and dry to touch [Labia Majora] : were normal [Labia Minora] : were normal [Normal Appearance] : general appearance was normal [Atrophy] : atrophy [Normal] : normal [Anxiety] : patient is not anxious [Tenderness] : ~T no ~M abdominal tenderness observed [Distended] : not distended [de-identified] : ambulates with walker [FreeTextEntry4] : no evidence of fistula

## 2020-08-10 NOTE — PROCEDURE
[Erythema] : erythema noted [Discharge] : there is vaginal discharge [Pessary Washed] : washed [Mild] : mild bleeding [H2O2] : H2O2 [Resolved w/pressure] : was resolved by applying pressure [Refit] : refit is not needed [Infection] : no evidence of infection [Erosion] : no evidence of erosion [FreeTextEntry1] : GH 3 1/2 and donut #4 [de-identified] : posterior vaginal wall [de-identified] : prolapse noted past pessary.  Tandem pessary flipped with stem leaning against posterior wall [de-identified] : no evidence of fistula or erosions. [FreeTextEntry3] : Estrace applied at visit. continue Estrace at home [de-identified] : with removal [de-identified] : copious amount of pink stained leukorrhea [FreeTextEntry8] : routine samm-care

## 2020-08-10 NOTE — DISCUSSION/SUMMARY
[FreeTextEntry1] : Regarding UTI, unsure if the clean catch urine sample was a contaminated sample as she is not a good candidate for a clean catch.  Since she is asymptomatic at this time, there is no further workup needed.  \par \par Pt will RTO in 4 weeks or sooner if needed. Pt agrees to call office with any problems/concerns.

## 2020-09-15 ENCOUNTER — APPOINTMENT (OUTPATIENT)
Dept: UROGYNECOLOGY | Facility: CLINIC | Age: 85
End: 2020-09-15
Payer: MEDICARE

## 2020-09-15 DIAGNOSIS — N81.2 INCOMPLETE UTEROVAGINAL PROLAPSE: ICD-10-CM

## 2020-09-15 PROCEDURE — 99213 OFFICE O/P EST LOW 20 MIN: CPT

## 2020-09-15 PROCEDURE — A4562: CPT

## 2020-09-16 ENCOUNTER — APPOINTMENT (OUTPATIENT)
Dept: OTOLARYNGOLOGY | Facility: CLINIC | Age: 85
End: 2020-09-16
Payer: MEDICARE

## 2020-09-16 VITALS — HEART RATE: 81 BPM

## 2020-09-16 VITALS
HEIGHT: 61 IN | DIASTOLIC BLOOD PRESSURE: 82 MMHG | WEIGHT: 155 LBS | HEART RATE: 82 BPM | SYSTOLIC BLOOD PRESSURE: 176 MMHG | TEMPERATURE: 97.5 F | BODY MASS INDEX: 29.27 KG/M2

## 2020-09-16 DIAGNOSIS — H72.03 CENTRAL PERFORATION OF TYMPANIC MEMBRANE, BILATERAL: ICD-10-CM

## 2020-09-16 DIAGNOSIS — H61.23 IMPACTED CERUMEN, BILATERAL: ICD-10-CM

## 2020-09-16 DIAGNOSIS — H90.3 SENSORINEURAL HEARING LOSS, BILATERAL: ICD-10-CM

## 2020-09-16 PROCEDURE — 99213 OFFICE O/P EST LOW 20 MIN: CPT | Mod: 25

## 2020-09-16 PROCEDURE — 69210 REMOVE IMPACTED EAR WAX UNI: CPT

## 2020-09-16 RX ORDER — OMEPRAZOLE 40 MG/1
40 CAPSULE, DELAYED RELEASE ORAL
Qty: 30 | Refills: 0 | Status: ACTIVE | COMMUNITY
Start: 2020-09-07

## 2020-09-16 RX ORDER — FUROSEMIDE 20 MG/1
20 TABLET ORAL
Qty: 3 | Refills: 0 | Status: ACTIVE | COMMUNITY
Start: 2020-08-21

## 2020-09-16 RX ORDER — AMMONIUM LACTATE 12 %
12 CREAM (GRAM) TOPICAL
Qty: 385 | Refills: 0 | Status: ACTIVE | COMMUNITY
Start: 2020-09-04

## 2020-09-16 NOTE — PHYSICAL EXAM
[] : septum deviated bilaterally [Midline] : trachea located in midline position [de-identified] : wax in b/l [Normal] : no masses and lesions seen, face is symmetric [de-identified] : left TM perf

## 2020-09-16 NOTE — HISTORY OF PRESENT ILLNESS
[No] : patient does not have a  history of radiation therapy [Hearing Loss] : hearing loss [Otorrhea] : otorrhea [Presbycusis] : presbycusis [Eustachian Tube Dysfunction] : eustachian tube dysfunction [None] : No risk factors have been identified. 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 Otherwise feeling well. She is using hearing aides b/l. Pt has no ear pain, hearing loss, tinnitus, vertigo, nasal congestion, nasal discharge, epistaxis, sinus infections, facial pain, facial pressure, throat pain, dysphagia or fevers\par \par  [Tinnitus] : no tinnitus [Headache] : no headache [Anxiety] : no anxiety [Dizziness] : no dizziness [Recurrent Otitis Media] : no recurrent otitis media [Otalgia] : no otalgia [Vertigo] : no vertigo [Otitis Media with Effusion] : no otitis media with effusion [Congenital Ear Malformation] : no congenital ear malformation [Meniere Disease] : no Meniere disease [Otosclerosis] : no otosclerosis [Perilymphatic Fistula] : no perilymphatic fistula [Hypertension] : no hypertension [Loud Noise Exposure] : no history of loud noise exposure [Smoking] : no smoking [Stroke] : no stroke [Early Onset Hearing Loss] : no early onset hearing loss [Facial Pain] : no facial pain [Facial Pressure] : no facial pressure [Ear Pain] : no ear pain [Nasal Congestion] : no nasal congestion [Ear Pressure] : no ear pressure [Ear Fullness] : no ear fullness [Diplopia] : no diplopia [Allergic Rhinitis] : no allergic rhinitis [Environmental Allergies] : no environmental allergies [Seasonal Allergies] : no seasonal allergies [Environmental Allergens] : no environmental allergens [Allergies] : no allergies [Asthma] : no asthma [Adenoidectomy] : no adenoidectomy [Neck Pain] : no neck pain [Neck Mass] : no neck mass [Chills] : no chills [Cold Intolerance] : no cold intolerance [Cough] : no cough [Fatigue] : no fatigue [Heat Intolerance] : no heat intolerance [Sialadenitis] : no sialadenitis [Hyperthyroidism] : no hyperthyroidism [Non-Hodgkin Lymphoma] : no non-hodgkin lymphoma [Hodgkin Disease] : no hodgkin disease [Tobacco Use] : no tobacco use [Alcohol Use] : no alcohol use [Thyroid Cancer] : no thyroid cancer [Graves Disease] : no graves disease

## 2020-09-16 NOTE — ASSESSMENT
[FreeTextEntry1] : Wax:\par -Cerumen is removed from the right and left  ear canal with a curette and suction.\par \par -Routine debridement suggested to keep the ears free of wax.\par \par Bilateral SNHL:\par -cleared for hearing aids\par \par f/u 6 months or prn

## 2020-09-18 NOTE — PROCEDURE
[Erythema] : erythema noted [Discharge] : there is vaginal discharge [Pessary Inserted] : inserted [H2O2] : H2O2 [Mild] : mild bleeding [Resolved w/pressure] : was resolved by applying pressure [Refit] : refit is not needed [Erosion] : no evidence of erosion [Infection] : no evidence of infection [FreeTextEntry1] : GH 3 1/2 and donut #4 [de-identified] : prolapse noted past pessary.  Tandem pessary flipped with stem leaning against posterior wall [de-identified] : no evidence of fistula or erosions. [de-identified] : posterior vaginal wall [de-identified] : copious amount of pink stained leukorrhea [de-identified] : with removal [FreeTextEntry3] : Estrace applied at visit. continue Estrace at home [FreeTextEntry8] : routine samm-care

## 2020-09-18 NOTE — HISTORY OF PRESENT ILLNESS
[FreeTextEntry1] : Pt presents to office for f/u of prolapse.  Denies any issues with pessary.   Reports h/o KAMLA, which is unchanged from last visit.

## 2020-09-18 NOTE — PHYSICAL EXAM
[No Acute Distress] : in no acute distress [Well developed] : well developed [Well Nourished] : ~L well nourished [Good Hygeine] : demonstrates good hygeine [Oriented x3] : oriented to person, place, and time [Normal Memory] : ~T memory was ~L unimpaired [Normal Mood/Affect] : mood and affect are normal [Warm and Dry] : was warm and dry to touch [Labia Majora] : were normal [Labia Minora] : were normal [Normal Appearance] : general appearance was normal [Atrophy] : atrophy [Normal] : normal [Anxiety] : patient is not anxious [Tenderness] : ~T no ~M abdominal tenderness observed [Distended] : not distended [de-identified] : ambulates with walker [FreeTextEntry4] : no evidence of fistula

## 2020-09-18 NOTE — DISCUSSION/SUMMARY
[FreeTextEntry1] : Pt doing well with pessary.\par \par Pt will RTO in 4 weeks or sooner if needed. Pt agrees to call office with any problems/concerns.

## 2020-10-21 ENCOUNTER — NON-APPOINTMENT (OUTPATIENT)
Age: 85
End: 2020-10-21

## 2020-10-23 ENCOUNTER — APPOINTMENT (OUTPATIENT)
Dept: UROGYNECOLOGY | Facility: CLINIC | Age: 85
End: 2020-10-23

## 2020-10-28 ENCOUNTER — NON-APPOINTMENT (OUTPATIENT)
Age: 85
End: 2020-10-28

## 2020-10-30 ENCOUNTER — NON-APPOINTMENT (OUTPATIENT)
Age: 85
End: 2020-10-30

## 2020-11-11 ENCOUNTER — APPOINTMENT (OUTPATIENT)
Dept: UROGYNECOLOGY | Facility: CLINIC | Age: 85
End: 2020-11-11
Payer: MEDICARE

## 2020-11-11 DIAGNOSIS — N89.8 OTHER SPECIFIED NONINFLAMMATORY DISORDERS OF VAGINA: ICD-10-CM

## 2020-11-11 DIAGNOSIS — N95.2 POSTMENOPAUSAL ATROPHIC VAGINITIS: ICD-10-CM

## 2020-11-11 DIAGNOSIS — N81.11 CYSTOCELE, MIDLINE: ICD-10-CM

## 2020-11-11 DIAGNOSIS — R32 UNSPECIFIED URINARY INCONTINENCE: ICD-10-CM

## 2020-11-11 PROCEDURE — 99213 OFFICE O/P EST LOW 20 MIN: CPT | Mod: 25

## 2020-11-11 NOTE — HISTORY OF PRESENT ILLNESS
[FreeTextEntry1] : PT haven't been to the office since 9/15/2020.  \par Pt was hospitalized in late September for urinary retention and had harding catheter placed.\par She was retaining about 800mL.  Treated for urinary infection.\par Pt in rehab for ambulation.  Was seen by urologist and have harding catheter removed.  She is wetting her adult briefs daily.  Unsure if still have urinary retention.  She remains abx at this time (unsure what abx).\par Pessary have not been changed or cleaned.  Denies any issues with the pessary but concern as pt have hx. of irritations.  Moving BM with some fecal incontinence.  Using Estradiol but not recently as pt is still in rehab.

## 2020-11-11 NOTE — PHYSICAL EXAM
[No Acute Distress] : in no acute distress [Well developed] : well developed [Well Nourished] : ~L well nourished [Good Hygeine] : demonstrates good hygeine [Oriented x3] : oriented to person, place, and time [Normal Memory] : ~T memory was ~L unimpaired [Normal Mood/Affect] : mood and affect are normal [Soft, Nontender] : the abdomen was soft and nontender [Warm and Dry] : was warm and dry to touch [Vulvar Atrophy] : vulvar atrophy [Atrophy] : atrophy [Cystocele] : a cystocele [Uterine Prolapse] : uterine prolapse [Discharge] : a  ~M vaginal discharge was present [Moderate] : moderate [White] : white [No Bleeding] : there was no active vaginal bleeding [Soft] :  the cervix was soft [de-identified] : Gait slow but steady with rolling walker. [de-identified] : bulge visible at vaginal opening.

## 2020-11-11 NOTE — PROCEDURE
[Straight Catheterization] : insertion of a straight catheter [Urinary Retention] : urinary retention [Patient] : the patient [Other: ___] : [unfilled] [___ Fr Straight Tip] : a [unfilled] in [place] straight tip catheter [Clear] : clear [No Complications] : no complications [Tolerated Well] : the patient tolerated the procedure well [Good Fit] : fits well [Discharge] : there is vaginal discharge [Pessary Inserted] : inserted [Pessary Washed] : washed [H2O] : H2O [None] : no bleeding [Medication Review] : Medicaiton Review: Patient verbalizes understanding of risks and benefits [Erythema] : no erythema [Infection] : no evidence of infection [de-identified] : PVR 30mL [FreeTextEntry9] : Urethra cleared, catheter passed.  No CVAT. [FreeTextEntry1] : Tandem Gellhorn LS # 3 1/2. and donut # 4. [FreeTextEntry3] : Estradiol cream [FreeTextEntry8] : Reinforced daily pericare.

## 2020-11-11 NOTE — DISCUSSION/SUMMARY
[FreeTextEntry1] : PVR via cath = 30mL.\par PT will finish course of abx she was Rx by her provider.\par Follow up in 4 weeks for pessary care/pelvic prolapse.\par If pt have any problems/concern to call office.  PT and daughter aware and agrees.

## 2021-02-13 ENCOUNTER — INPATIENT (INPATIENT)
Facility: HOSPITAL | Age: 86
LOS: 17 days | Discharge: NURSING, REHAB OR LONG-TERM CARE | DRG: 871 | End: 2021-03-03
Attending: HOSPITALIST | Admitting: HOSPITALIST
Payer: MEDICARE

## 2021-02-13 VITALS — RESPIRATION RATE: 19 BRPM | HEART RATE: 58 BPM | OXYGEN SATURATION: 97 % | TEMPERATURE: 92 F | WEIGHT: 130.07 LBS

## 2021-02-13 LAB
ALBUMIN SERPL ELPH-MCNC: 3.8 G/DL — SIGNIFICANT CHANGE UP (ref 3.3–5)
ALP SERPL-CCNC: 93 U/L — SIGNIFICANT CHANGE UP (ref 40–120)
ALT FLD-CCNC: 11 U/L — SIGNIFICANT CHANGE UP (ref 10–45)
ANION GAP SERPL CALC-SCNC: 19 MMOL/L — HIGH (ref 5–17)
ANION GAP SERPL CALC-SCNC: 19 MMOL/L — HIGH (ref 5–17)
APTT BLD: 19.7 SEC — LOW (ref 27.5–35.5)
AST SERPL-CCNC: 59 U/L — HIGH (ref 10–40)
BASOPHILS # BLD AUTO: 0.03 K/UL — SIGNIFICANT CHANGE UP (ref 0–0.2)
BASOPHILS # BLD AUTO: 0.04 K/UL — SIGNIFICANT CHANGE UP (ref 0–0.2)
BASOPHILS NFR BLD AUTO: 0.2 % — SIGNIFICANT CHANGE UP (ref 0–2)
BASOPHILS NFR BLD AUTO: 0.2 % — SIGNIFICANT CHANGE UP (ref 0–2)
BILIRUB SERPL-MCNC: 0.3 MG/DL — SIGNIFICANT CHANGE UP (ref 0.2–1.2)
BLD GP AB SCN SERPL QL: NEGATIVE — SIGNIFICANT CHANGE UP
BUN SERPL-MCNC: 18 MG/DL — SIGNIFICANT CHANGE UP (ref 7–23)
BUN SERPL-MCNC: 22 MG/DL — SIGNIFICANT CHANGE UP (ref 7–23)
CALCIUM SERPL-MCNC: 8.9 MG/DL — SIGNIFICANT CHANGE UP (ref 8.4–10.5)
CALCIUM SERPL-MCNC: 9.4 MG/DL — SIGNIFICANT CHANGE UP (ref 8.4–10.5)
CHLORIDE SERPL-SCNC: 96 MMOL/L — SIGNIFICANT CHANGE UP (ref 96–108)
CHLORIDE SERPL-SCNC: 97 MMOL/L — SIGNIFICANT CHANGE UP (ref 96–108)
CO2 SERPL-SCNC: 18 MMOL/L — LOW (ref 22–31)
CO2 SERPL-SCNC: 20 MMOL/L — LOW (ref 22–31)
CREAT SERPL-MCNC: 1.25 MG/DL — SIGNIFICANT CHANGE UP (ref 0.5–1.3)
CREAT SERPL-MCNC: 1.7 MG/DL — HIGH (ref 0.5–1.3)
EOSINOPHIL # BLD AUTO: 0.01 K/UL — SIGNIFICANT CHANGE UP (ref 0–0.5)
EOSINOPHIL # BLD AUTO: 0.02 K/UL — SIGNIFICANT CHANGE UP (ref 0–0.5)
EOSINOPHIL NFR BLD AUTO: 0 % — SIGNIFICANT CHANGE UP (ref 0–6)
EOSINOPHIL NFR BLD AUTO: 0.1 % — SIGNIFICANT CHANGE UP (ref 0–6)
GAS PNL BLDV: SIGNIFICANT CHANGE UP
GAS PNL BLDV: SIGNIFICANT CHANGE UP
GLUCOSE SERPL-MCNC: 166 MG/DL — HIGH (ref 70–99)
GLUCOSE SERPL-MCNC: 208 MG/DL — HIGH (ref 70–99)
HCT VFR BLD CALC: 36.7 % — SIGNIFICANT CHANGE UP (ref 34.5–45)
HCT VFR BLD CALC: 41.8 % — SIGNIFICANT CHANGE UP (ref 34.5–45)
HGB BLD-MCNC: 11.9 G/DL — SIGNIFICANT CHANGE UP (ref 11.5–15.5)
HGB BLD-MCNC: 13.6 G/DL — SIGNIFICANT CHANGE UP (ref 11.5–15.5)
IMM GRANULOCYTES NFR BLD AUTO: 0.5 % — SIGNIFICANT CHANGE UP (ref 0–1.5)
IMM GRANULOCYTES NFR BLD AUTO: 0.6 % — SIGNIFICANT CHANGE UP (ref 0–1.5)
INR BLD: 1.01 RATIO — SIGNIFICANT CHANGE UP (ref 0.88–1.16)
LYMPHOCYTES # BLD AUTO: 1.05 K/UL — SIGNIFICANT CHANGE UP (ref 1–3.3)
LYMPHOCYTES # BLD AUTO: 12 % — LOW (ref 13–44)
LYMPHOCYTES # BLD AUTO: 2.02 K/UL — SIGNIFICANT CHANGE UP (ref 1–3.3)
LYMPHOCYTES # BLD AUTO: 4.2 % — LOW (ref 13–44)
MAGNESIUM SERPL-MCNC: 2.1 MG/DL — SIGNIFICANT CHANGE UP (ref 1.6–2.6)
MCHC RBC-ENTMCNC: 32.4 GM/DL — SIGNIFICANT CHANGE UP (ref 32–36)
MCHC RBC-ENTMCNC: 32.5 GM/DL — SIGNIFICANT CHANGE UP (ref 32–36)
MCHC RBC-ENTMCNC: 32.5 PG — SIGNIFICANT CHANGE UP (ref 27–34)
MCHC RBC-ENTMCNC: 32.5 PG — SIGNIFICANT CHANGE UP (ref 27–34)
MCV RBC AUTO: 100.3 FL — HIGH (ref 80–100)
MCV RBC AUTO: 99.8 FL — SIGNIFICANT CHANGE UP (ref 80–100)
MONOCYTES # BLD AUTO: 0.68 K/UL — SIGNIFICANT CHANGE UP (ref 0–0.9)
MONOCYTES # BLD AUTO: 1.41 K/UL — HIGH (ref 0–0.9)
MONOCYTES NFR BLD AUTO: 4 % — SIGNIFICANT CHANGE UP (ref 2–14)
MONOCYTES NFR BLD AUTO: 5.7 % — SIGNIFICANT CHANGE UP (ref 2–14)
NEUTROPHILS # BLD AUTO: 13.96 K/UL — HIGH (ref 1.8–7.4)
NEUTROPHILS # BLD AUTO: 22.06 K/UL — HIGH (ref 1.8–7.4)
NEUTROPHILS NFR BLD AUTO: 83.2 % — HIGH (ref 43–77)
NEUTROPHILS NFR BLD AUTO: 89.3 % — HIGH (ref 43–77)
NRBC # BLD: 0 /100 WBCS — SIGNIFICANT CHANGE UP (ref 0–0)
NRBC # BLD: 0 /100 WBCS — SIGNIFICANT CHANGE UP (ref 0–0)
OB PNL STL: POSITIVE
PHOSPHATE SERPL-MCNC: 3.9 MG/DL — SIGNIFICANT CHANGE UP (ref 2.5–4.5)
PLATELET # BLD AUTO: 246 K/UL — SIGNIFICANT CHANGE UP (ref 150–400)
PLATELET # BLD AUTO: 303 K/UL — SIGNIFICANT CHANGE UP (ref 150–400)
POTASSIUM SERPL-MCNC: 3.5 MMOL/L — SIGNIFICANT CHANGE UP (ref 3.5–5.3)
POTASSIUM SERPL-MCNC: 5.9 MMOL/L — HIGH (ref 3.5–5.3)
POTASSIUM SERPL-SCNC: 3.5 MMOL/L — SIGNIFICANT CHANGE UP (ref 3.5–5.3)
POTASSIUM SERPL-SCNC: 5.9 MMOL/L — HIGH (ref 3.5–5.3)
PROT SERPL-MCNC: 8 G/DL — SIGNIFICANT CHANGE UP (ref 6–8.3)
PROTHROM AB SERPL-ACNC: 12.1 SEC — SIGNIFICANT CHANGE UP (ref 10.6–13.6)
RBC # BLD: 3.66 M/UL — LOW (ref 3.8–5.2)
RBC # BLD: 4.19 M/UL — SIGNIFICANT CHANGE UP (ref 3.8–5.2)
RBC # FLD: 14 % — SIGNIFICANT CHANGE UP (ref 10.3–14.5)
RBC # FLD: 14.4 % — SIGNIFICANT CHANGE UP (ref 10.3–14.5)
RH IG SCN BLD-IMP: NEGATIVE — SIGNIFICANT CHANGE UP
SODIUM SERPL-SCNC: 134 MMOL/L — LOW (ref 135–145)
SODIUM SERPL-SCNC: 135 MMOL/L — SIGNIFICANT CHANGE UP (ref 135–145)
WBC # BLD: 16.8 K/UL — HIGH (ref 3.8–10.5)
WBC # BLD: 24.73 K/UL — HIGH (ref 3.8–10.5)
WBC # FLD AUTO: 16.8 K/UL — HIGH (ref 3.8–10.5)
WBC # FLD AUTO: 24.73 K/UL — HIGH (ref 3.8–10.5)

## 2021-02-13 PROCEDURE — 74177 CT ABD & PELVIS W/CONTRAST: CPT | Mod: 26,MA

## 2021-02-13 PROCEDURE — 93010 ELECTROCARDIOGRAM REPORT: CPT

## 2021-02-13 PROCEDURE — 99285 EMERGENCY DEPT VISIT HI MDM: CPT | Mod: CS,GC

## 2021-02-13 RX ORDER — PANTOPRAZOLE SODIUM 20 MG/1
80 TABLET, DELAYED RELEASE ORAL ONCE
Refills: 0 | Status: COMPLETED | OUTPATIENT
Start: 2021-02-13 | End: 2021-02-13

## 2021-02-13 RX ORDER — SODIUM CHLORIDE 9 MG/ML
500 INJECTION INTRAMUSCULAR; INTRAVENOUS; SUBCUTANEOUS ONCE
Refills: 0 | Status: COMPLETED | OUTPATIENT
Start: 2021-02-13 | End: 2021-02-13

## 2021-02-13 RX ADMIN — SODIUM CHLORIDE 500 MILLILITER(S): 9 INJECTION INTRAMUSCULAR; INTRAVENOUS; SUBCUTANEOUS at 23:00

## 2021-02-13 RX ADMIN — PANTOPRAZOLE SODIUM 80 MILLIGRAM(S): 20 TABLET, DELAYED RELEASE ORAL at 20:14

## 2021-02-13 NOTE — ED PROVIDER NOTE - PHYSICAL EXAMINATION
Gen: NAD, AOx3, able to make needs known, non-toxic  Head: NCAT  HEENT: EOMI, oral mucosa moist, normal conjunctiva  Lung: CTAB, no respiratory distress, no wheezes/rhonchi/rales B/L, speaking in full sentences  CV: RRR, no murmurs  Abd: non distended, soft, mildly diffusely tender  MSK: no visible deformities  Neuro: Appears non focal  Skin: Warm, well perfused  Psych: normal affect

## 2021-02-13 NOTE — ED PROVIDER NOTE - ATTENDING CONTRIBUTION TO CARE
89 yo female transfer from [facility] 2/2 GI bleed, hypotensive en route.  Here high 90s over 40s, HR 60s, no acute distress, rectal with normal colored stool and small amount of watery blood.  On protonix and ASA baseline per records.  Will check labs, type and screen, admit for lower GI bleeding eval.

## 2021-02-13 NOTE — ED ADULT NURSE NOTE - NSIMPLEMENTINTERV_GEN_ALL_ED
Implemented All Fall with Harm Risk Interventions:  [place] to call system. Call bell, personal items and telephone within reach. Instruct patient to call for assistance. Room bathroom lighting operational. Non-slip footwear when patient is off stretcher. Physically safe environment: no spills, clutter or unnecessary equipment. Stretcher in lowest position, wheels locked, appropriate side rails in place. Provide visual cue, wrist band, yellow gown, etc. Monitor gait and stability. Monitor for mental status changes and reorient to person, place, and time. Review medications for side effects contributing to fall risk. Reinforce activity limits and safety measures with patient and family. Provide visual clues: red socks.

## 2021-02-13 NOTE — ED PROVIDER NOTE - INTERPRETATION
EKG reviewed for rate, rhythm, axis, intervals and segments, including QRS morphology, P wave appearance T wave appearance, VT interval, and QT interval.  I find the EKG to be unremarkable in all of these regards except as follows: sinus antonio, RBBB

## 2021-02-13 NOTE — ED PROVIDER NOTE - OBJECTIVE STATEMENT
89 y/o F w/ PMH HTN, HLD, depression, chronic UTIs, encephalopathy, recent C. diff (completed course of abx) presenting w/ GI bleed. Green room C. Pt brought in from [facility]. HPI obtained from pt and EMS. Per EMS, they were called as pt had episode of bloody bowel movement and vomiting. Pt reportedly was hypotensive to 70s on EMS arrival, received 1L IVF by EMS. Staff reported pt normally alert and conversive, but appeared more confused today. Pt does endorse blood in stool, vomiting, and abd pain. Denies fevers, chills, headache, dizziness, blurred vision, chest pain, cough, shortness of breath, urinary symptoms, MSK pain, rash.

## 2021-02-13 NOTE — ED PROVIDER NOTE - PROGRESS NOTE DETAILS
Dr. Edvin Oconnor, PGY-3: rpt lactate not clearing. WBC uptrending. Spoke w/ surgery to eval for ischemic colitis. Abx ordered. Will email GI to see pt in AM. Dr. Edvin Oconnor, PGY-3: seen by surg, no indication for surg at this time and family reportedly not interested in surgery. Accepted to hospitalist.

## 2021-02-13 NOTE — ED ADULT NURSE NOTE - OBJECTIVE STATEMENT
89yr old female w/ pmhx of GERD on protonix, metabolic encephalopathy, enterocolitis,, CAD (on ASA), and anemia BIBEMS from [facility] c/o GI bleed and hypotension. Per EMS staff at [facility] center stated pt was displaying AMS and had a GI bleed, pt is usually talkative at baseline, but at [facility] pt was awake but not answering them,. Per EMS while in route to [facility] pt had 2 episodes of emesis and hypotensive  received 4mg of zofran. Upon arrival pt is A&oX2 and c/o abd pain upon palpation. Pts BP is 99/43, she is sating at 99% on RA with a HR of 56. She is hypothermic with a rectal temp of 95.3. warm blankets applied on pt. watery red tinged blood noted upon rectal exam.  Pt assessed by MD, bed lowered and locked. will reassess

## 2021-02-14 DIAGNOSIS — A41.9 SEPSIS, UNSPECIFIED ORGANISM: ICD-10-CM

## 2021-02-14 DIAGNOSIS — F33.9 MAJOR DEPRESSIVE DISORDER, RECURRENT, UNSPECIFIED: ICD-10-CM

## 2021-02-14 DIAGNOSIS — K92.2 GASTROINTESTINAL HEMORRHAGE, UNSPECIFIED: ICD-10-CM

## 2021-02-14 DIAGNOSIS — M79.89 OTHER SPECIFIED SOFT TISSUE DISORDERS: ICD-10-CM

## 2021-02-14 DIAGNOSIS — K52.9 NONINFECTIVE GASTROENTERITIS AND COLITIS, UNSPECIFIED: ICD-10-CM

## 2021-02-14 DIAGNOSIS — Z98.890 OTHER SPECIFIED POSTPROCEDURAL STATES: Chronic | ICD-10-CM

## 2021-02-14 DIAGNOSIS — U07.1 COVID-19: ICD-10-CM

## 2021-02-14 DIAGNOSIS — R33.9 RETENTION OF URINE, UNSPECIFIED: ICD-10-CM

## 2021-02-14 DIAGNOSIS — G93.41 METABOLIC ENCEPHALOPATHY: ICD-10-CM

## 2021-02-14 DIAGNOSIS — N17.9 ACUTE KIDNEY FAILURE, UNSPECIFIED: ICD-10-CM

## 2021-02-14 DIAGNOSIS — I25.10 ATHEROSCLEROTIC HEART DISEASE OF NATIVE CORONARY ARTERY WITHOUT ANGINA PECTORIS: ICD-10-CM

## 2021-02-14 DIAGNOSIS — Z29.9 ENCOUNTER FOR PROPHYLACTIC MEASURES, UNSPECIFIED: ICD-10-CM

## 2021-02-14 LAB
A1C WITH ESTIMATED AVERAGE GLUCOSE RESULT: 5.2 % — SIGNIFICANT CHANGE UP (ref 4–5.6)
ALBUMIN SERPL ELPH-MCNC: 3.3 G/DL — SIGNIFICANT CHANGE UP (ref 3.3–5)
ALP SERPL-CCNC: 75 U/L — SIGNIFICANT CHANGE UP (ref 40–120)
ALT FLD-CCNC: 10 U/L — SIGNIFICANT CHANGE UP (ref 10–45)
ANION GAP SERPL CALC-SCNC: 18 MMOL/L — HIGH (ref 5–17)
ANION GAP SERPL CALC-SCNC: 21 MMOL/L — HIGH (ref 5–17)
APPEARANCE UR: ABNORMAL
AST SERPL-CCNC: 23 U/L — SIGNIFICANT CHANGE UP (ref 10–40)
BACTERIA # UR AUTO: ABNORMAL
BASE EXCESS BLDV CALC-SCNC: -4.5 MMOL/L — LOW (ref -2–2)
BASE EXCESS BLDV CALC-SCNC: -5.2 MMOL/L — LOW (ref -2–2)
BASE EXCESS BLDV CALC-SCNC: -5.4 MMOL/L — LOW (ref -2–2)
BILIRUB SERPL-MCNC: 0.4 MG/DL — SIGNIFICANT CHANGE UP (ref 0.2–1.2)
BILIRUB UR-MCNC: ABNORMAL
BUN SERPL-MCNC: 27 MG/DL — HIGH (ref 7–23)
BUN SERPL-MCNC: 29 MG/DL — HIGH (ref 7–23)
C DIFF GDH STL QL: NEGATIVE — SIGNIFICANT CHANGE UP
C DIFF GDH STL QL: SIGNIFICANT CHANGE UP
CA-I SERPL-SCNC: 1.18 MMOL/L — SIGNIFICANT CHANGE UP (ref 1.12–1.3)
CA-I SERPL-SCNC: 1.23 MMOL/L — SIGNIFICANT CHANGE UP (ref 1.12–1.3)
CALCIUM SERPL-MCNC: 9.3 MG/DL — SIGNIFICANT CHANGE UP (ref 8.4–10.5)
CALCIUM SERPL-MCNC: 9.4 MG/DL — SIGNIFICANT CHANGE UP (ref 8.4–10.5)
CHLORIDE BLDV-SCNC: 102 MMOL/L — SIGNIFICANT CHANGE UP (ref 96–108)
CHLORIDE BLDV-SCNC: 104 MMOL/L — SIGNIFICANT CHANGE UP (ref 96–108)
CHLORIDE SERPL-SCNC: 98 MMOL/L — SIGNIFICANT CHANGE UP (ref 96–108)
CHLORIDE SERPL-SCNC: 99 MMOL/L — SIGNIFICANT CHANGE UP (ref 96–108)
CO2 BLDV-SCNC: 21 MMOL/L — LOW (ref 22–30)
CO2 BLDV-SCNC: 23 MMOL/L — SIGNIFICANT CHANGE UP (ref 22–30)
CO2 BLDV-SCNC: 23 MMOL/L — SIGNIFICANT CHANGE UP (ref 22–30)
CO2 SERPL-SCNC: 18 MMOL/L — LOW (ref 22–31)
CO2 SERPL-SCNC: 18 MMOL/L — LOW (ref 22–31)
COLOR SPEC: ABNORMAL
CREAT ?TM UR-MCNC: 58 MG/DL — SIGNIFICANT CHANGE UP
CREAT SERPL-MCNC: 2.48 MG/DL — HIGH (ref 0.5–1.3)
CREAT SERPL-MCNC: 2.59 MG/DL — HIGH (ref 0.5–1.3)
DIFF PNL FLD: ABNORMAL
EPI CELLS # UR: 6 /HPF — HIGH
ESTIMATED AVERAGE GLUCOSE: 103 MG/DL — SIGNIFICANT CHANGE UP (ref 68–114)
GAS PNL BLDV: 136 MMOL/L — SIGNIFICANT CHANGE UP (ref 135–145)
GAS PNL BLDV: 136 MMOL/L — SIGNIFICANT CHANGE UP (ref 135–145)
GAS PNL BLDV: SIGNIFICANT CHANGE UP
GLUCOSE BLDV-MCNC: 138 MG/DL — HIGH (ref 70–99)
GLUCOSE BLDV-MCNC: 146 MG/DL — HIGH (ref 70–99)
GLUCOSE SERPL-MCNC: 135 MG/DL — HIGH (ref 70–99)
GLUCOSE SERPL-MCNC: 143 MG/DL — HIGH (ref 70–99)
GLUCOSE UR QL: NEGATIVE — SIGNIFICANT CHANGE UP
HCO3 BLDV-SCNC: 20 MMOL/L — LOW (ref 21–29)
HCO3 BLDV-SCNC: 22 MMOL/L — SIGNIFICANT CHANGE UP (ref 21–29)
HCO3 BLDV-SCNC: 22 MMOL/L — SIGNIFICANT CHANGE UP (ref 21–29)
HCT VFR BLD CALC: 39.9 % — SIGNIFICANT CHANGE UP (ref 34.5–45)
HCT VFR BLD CALC: 40.3 % — SIGNIFICANT CHANGE UP (ref 34.5–45)
HCT VFR BLDA CALC: 40 % — SIGNIFICANT CHANGE UP (ref 39–50)
HCT VFR BLDA CALC: 41 % — SIGNIFICANT CHANGE UP (ref 39–50)
HGB BLD CALC-MCNC: 13.1 G/DL — SIGNIFICANT CHANGE UP (ref 11.5–15.5)
HGB BLD CALC-MCNC: 13.3 G/DL — SIGNIFICANT CHANGE UP (ref 11.5–15.5)
HGB BLD-MCNC: 12.6 G/DL — SIGNIFICANT CHANGE UP (ref 11.5–15.5)
HGB BLD-MCNC: 12.9 G/DL — SIGNIFICANT CHANGE UP (ref 11.5–15.5)
HYALINE CASTS # UR AUTO: 9 /LPF — HIGH (ref 0–2)
KETONES UR-MCNC: NEGATIVE — SIGNIFICANT CHANGE UP
LACTATE BLDV-MCNC: 5.2 MMOL/L — CRITICAL HIGH (ref 0.7–2)
LACTATE BLDV-MCNC: 5.2 MMOL/L — CRITICAL HIGH (ref 0.7–2)
LACTATE BLDV-MCNC: 5.6 MMOL/L — CRITICAL HIGH (ref 0.7–2)
LACTATE BLDV-MCNC: 7.6 MMOL/L — CRITICAL HIGH (ref 0.7–2)
LEUKOCYTE ESTERASE UR-ACNC: ABNORMAL
MAGNESIUM SERPL-MCNC: 1.8 MG/DL — SIGNIFICANT CHANGE UP (ref 1.6–2.6)
MAGNESIUM SERPL-MCNC: 1.9 MG/DL — SIGNIFICANT CHANGE UP (ref 1.6–2.6)
MCHC RBC-ENTMCNC: 31.3 GM/DL — LOW (ref 32–36)
MCHC RBC-ENTMCNC: 31.7 PG — SIGNIFICANT CHANGE UP (ref 27–34)
MCHC RBC-ENTMCNC: 32.3 GM/DL — SIGNIFICANT CHANGE UP (ref 32–36)
MCHC RBC-ENTMCNC: 32.3 PG — SIGNIFICANT CHANGE UP (ref 27–34)
MCV RBC AUTO: 101.5 FL — HIGH (ref 80–100)
MCV RBC AUTO: 99.8 FL — SIGNIFICANT CHANGE UP (ref 80–100)
NITRITE UR-MCNC: POSITIVE
NRBC # BLD: 0 /100 WBCS — SIGNIFICANT CHANGE UP (ref 0–0)
NRBC # BLD: 0 /100 WBCS — SIGNIFICANT CHANGE UP (ref 0–0)
OTHER CELLS CSF MANUAL: 4 ML/DL — LOW (ref 18–22)
OTHER CELLS CSF MANUAL: 7 ML/DL — LOW (ref 18–22)
PCO2 BLDV: 40 MMHG — SIGNIFICANT CHANGE UP (ref 35–50)
PCO2 BLDV: 47 MMHG — SIGNIFICANT CHANGE UP (ref 35–50)
PCO2 BLDV: 50 MMHG — SIGNIFICANT CHANGE UP (ref 35–50)
PH BLDV: 7.26 — LOW (ref 7.35–7.45)
PH BLDV: 7.29 — LOW (ref 7.35–7.45)
PH BLDV: 7.32 — LOW (ref 7.35–7.45)
PH UR: 6.5 — SIGNIFICANT CHANGE UP (ref 5–8)
PHOSPHATE SERPL-MCNC: 3.4 MG/DL — SIGNIFICANT CHANGE UP (ref 2.5–4.5)
PHOSPHATE SERPL-MCNC: 3.9 MG/DL — SIGNIFICANT CHANGE UP (ref 2.5–4.5)
PLATELET # BLD AUTO: 313 K/UL — SIGNIFICANT CHANGE UP (ref 150–400)
PLATELET # BLD AUTO: 326 K/UL — SIGNIFICANT CHANGE UP (ref 150–400)
PO2 BLDV: 21 MMHG — LOW (ref 25–45)
PO2 BLDV: 22 MMHG — LOW (ref 25–45)
PO2 BLDV: 28 MMHG — SIGNIFICANT CHANGE UP (ref 25–45)
POTASSIUM BLDV-SCNC: 3.5 MMOL/L — SIGNIFICANT CHANGE UP (ref 3.5–5.3)
POTASSIUM BLDV-SCNC: 4.2 MMOL/L — SIGNIFICANT CHANGE UP (ref 3.5–5.3)
POTASSIUM SERPL-MCNC: 3.7 MMOL/L — SIGNIFICANT CHANGE UP (ref 3.5–5.3)
POTASSIUM SERPL-MCNC: 4.5 MMOL/L — SIGNIFICANT CHANGE UP (ref 3.5–5.3)
POTASSIUM SERPL-SCNC: 3.7 MMOL/L — SIGNIFICANT CHANGE UP (ref 3.5–5.3)
POTASSIUM SERPL-SCNC: 4.5 MMOL/L — SIGNIFICANT CHANGE UP (ref 3.5–5.3)
PROT SERPL-MCNC: 6.5 G/DL — SIGNIFICANT CHANGE UP (ref 6–8.3)
PROT UR-MCNC: 100 — SIGNIFICANT CHANGE UP
RBC # BLD: 3.97 M/UL — SIGNIFICANT CHANGE UP (ref 3.8–5.2)
RBC # BLD: 4 M/UL — SIGNIFICANT CHANGE UP (ref 3.8–5.2)
RBC # FLD: 14.6 % — HIGH (ref 10.3–14.5)
RBC # FLD: 14.6 % — HIGH (ref 10.3–14.5)
RBC CASTS # UR COMP ASSIST: 9 /HPF — HIGH (ref 0–4)
SAO2 % BLDV: 22 % — LOW (ref 67–88)
SAO2 % BLDV: 24 % — LOW (ref 67–88)
SAO2 % BLDV: 39 % — LOW (ref 67–88)
SARS-COV-2 IGG SERPL QL IA: POSITIVE
SARS-COV-2 IGM SERPL IA-ACNC: 6.92 INDEX — HIGH
SARS-COV-2 RNA SPEC QL NAA+PROBE: DETECTED
SODIUM SERPL-SCNC: 135 MMOL/L — SIGNIFICANT CHANGE UP (ref 135–145)
SODIUM SERPL-SCNC: 137 MMOL/L — SIGNIFICANT CHANGE UP (ref 135–145)
SODIUM UR-SCNC: <35 MMOL/L — SIGNIFICANT CHANGE UP
SP GR SPEC: >1.05 — SIGNIFICANT CHANGE UP (ref 1.01–1.02)
TROPONIN T, HIGH SENSITIVITY RESULT: 45 NG/L — SIGNIFICANT CHANGE UP (ref 0–51)
UROBILINOGEN FLD QL: ABNORMAL
WBC # BLD: 26.64 K/UL — HIGH (ref 3.8–10.5)
WBC # BLD: 32.04 K/UL — HIGH (ref 3.8–10.5)
WBC # FLD AUTO: 26.64 K/UL — HIGH (ref 3.8–10.5)
WBC # FLD AUTO: 32.04 K/UL — HIGH (ref 3.8–10.5)
WBC UR QL: 622 /HPF — HIGH (ref 0–5)

## 2021-02-14 PROCEDURE — 99223 1ST HOSP IP/OBS HIGH 75: CPT | Mod: CS,GC

## 2021-02-14 PROCEDURE — 74018 RADEX ABDOMEN 1 VIEW: CPT | Mod: 26

## 2021-02-14 PROCEDURE — 71045 X-RAY EXAM CHEST 1 VIEW: CPT | Mod: 26

## 2021-02-14 PROCEDURE — 99223 1ST HOSP IP/OBS HIGH 75: CPT | Mod: GC

## 2021-02-14 PROCEDURE — 99221 1ST HOSP IP/OBS SF/LOW 40: CPT

## 2021-02-14 RX ORDER — METRONIDAZOLE 500 MG
500 TABLET ORAL EVERY 8 HOURS
Refills: 0 | Status: DISCONTINUED | OUTPATIENT
Start: 2021-02-14 | End: 2021-02-19

## 2021-02-14 RX ORDER — CHOLECALCIFEROL (VITAMIN D3) 125 MCG
1 CAPSULE ORAL
Qty: 0 | Refills: 0 | DISCHARGE

## 2021-02-14 RX ORDER — ASPIRIN/CALCIUM CARB/MAGNESIUM 324 MG
1 TABLET ORAL
Qty: 0 | Refills: 0 | DISCHARGE

## 2021-02-14 RX ORDER — VANCOMYCIN HCL 1 G
125 VIAL (EA) INTRAVENOUS EVERY 6 HOURS
Refills: 0 | Status: DISCONTINUED | OUTPATIENT
Start: 2021-02-14 | End: 2021-02-14

## 2021-02-14 RX ORDER — PIPERACILLIN AND TAZOBACTAM 4; .5 G/20ML; G/20ML
3.38 INJECTION, POWDER, LYOPHILIZED, FOR SOLUTION INTRAVENOUS EVERY 12 HOURS
Refills: 0 | Status: DISCONTINUED | OUTPATIENT
Start: 2021-02-14 | End: 2021-02-19

## 2021-02-14 RX ORDER — BETHANECHOL CHLORIDE 25 MG
1 TABLET ORAL
Qty: 0 | Refills: 0 | DISCHARGE

## 2021-02-14 RX ORDER — MIRTAZAPINE 45 MG/1
15 TABLET, ORALLY DISINTEGRATING ORAL AT BEDTIME
Refills: 0 | Status: DISCONTINUED | OUTPATIENT
Start: 2021-02-14 | End: 2021-03-03

## 2021-02-14 RX ORDER — SODIUM CHLORIDE 9 MG/ML
1000 INJECTION, SOLUTION INTRAVENOUS
Refills: 0 | Status: DISCONTINUED | OUTPATIENT
Start: 2021-02-14 | End: 2021-02-15

## 2021-02-14 RX ORDER — ACETAMINOPHEN 500 MG
2 TABLET ORAL
Qty: 0 | Refills: 0 | DISCHARGE

## 2021-02-14 RX ORDER — MIRTAZAPINE 45 MG/1
1 TABLET, ORALLY DISINTEGRATING ORAL
Qty: 0 | Refills: 0 | DISCHARGE

## 2021-02-14 RX ORDER — PANTOPRAZOLE SODIUM 20 MG/1
1 TABLET, DELAYED RELEASE ORAL
Qty: 0 | Refills: 0 | DISCHARGE

## 2021-02-14 RX ORDER — LACTOBACILLUS ACIDOPHILUS 100MM CELL
0 CAPSULE ORAL
Qty: 0 | Refills: 0 | DISCHARGE

## 2021-02-14 RX ORDER — METRONIDAZOLE 500 MG
500 TABLET ORAL ONCE
Refills: 0 | Status: COMPLETED | OUTPATIENT
Start: 2021-02-14 | End: 2021-02-14

## 2021-02-14 RX ORDER — ONDANSETRON 8 MG/1
4 TABLET, FILM COATED ORAL ONCE
Refills: 0 | Status: COMPLETED | OUTPATIENT
Start: 2021-02-14 | End: 2021-02-14

## 2021-02-14 RX ORDER — ASPIRIN/CALCIUM CARB/MAGNESIUM 324 MG
81 TABLET ORAL DAILY
Refills: 0 | Status: DISCONTINUED | OUTPATIENT
Start: 2021-02-14 | End: 2021-02-14

## 2021-02-14 RX ORDER — PREGABALIN 225 MG/1
1 CAPSULE ORAL
Qty: 0 | Refills: 0 | DISCHARGE

## 2021-02-14 RX ORDER — ONDANSETRON 8 MG/1
1 TABLET, FILM COATED ORAL
Qty: 0 | Refills: 0 | DISCHARGE

## 2021-02-14 RX ORDER — PREGABALIN 225 MG/1
1000 CAPSULE ORAL DAILY
Refills: 0 | Status: DISCONTINUED | OUTPATIENT
Start: 2021-02-14 | End: 2021-03-03

## 2021-02-14 RX ORDER — SODIUM CHLORIDE 9 MG/ML
500 INJECTION INTRAMUSCULAR; INTRAVENOUS; SUBCUTANEOUS ONCE
Refills: 0 | Status: COMPLETED | OUTPATIENT
Start: 2021-02-14 | End: 2021-02-14

## 2021-02-14 RX ORDER — PANTOPRAZOLE SODIUM 20 MG/1
40 TABLET, DELAYED RELEASE ORAL
Refills: 0 | Status: DISCONTINUED | OUTPATIENT
Start: 2021-02-14 | End: 2021-02-16

## 2021-02-14 RX ORDER — ACETAMINOPHEN 500 MG
1000 TABLET ORAL ONCE
Refills: 0 | Status: COMPLETED | OUTPATIENT
Start: 2021-02-14 | End: 2021-02-14

## 2021-02-14 RX ORDER — BETHANECHOL CHLORIDE 25 MG
10 TABLET ORAL THREE TIMES A DAY
Refills: 0 | Status: DISCONTINUED | OUTPATIENT
Start: 2021-02-14 | End: 2021-03-03

## 2021-02-14 RX ORDER — VANCOMYCIN HCL 1 G
500 VIAL (EA) INTRAVENOUS EVERY 6 HOURS
Refills: 0 | Status: DISCONTINUED | OUTPATIENT
Start: 2021-02-14 | End: 2021-02-19

## 2021-02-14 RX ORDER — ACETAMINOPHEN 500 MG
650 TABLET ORAL EVERY 12 HOURS
Refills: 0 | Status: DISCONTINUED | OUTPATIENT
Start: 2021-02-14 | End: 2021-02-23

## 2021-02-14 RX ORDER — PHENAZOPYRIDINE HCL 100 MG
1 TABLET ORAL
Qty: 0 | Refills: 0 | DISCHARGE

## 2021-02-14 RX ORDER — CHOLECALCIFEROL (VITAMIN D3) 125 MCG
1000 CAPSULE ORAL DAILY
Refills: 0 | Status: DISCONTINUED | OUTPATIENT
Start: 2021-02-14 | End: 2021-03-03

## 2021-02-14 RX ORDER — SODIUM CHLORIDE 9 MG/ML
1000 INJECTION INTRAMUSCULAR; INTRAVENOUS; SUBCUTANEOUS ONCE
Refills: 0 | Status: DISCONTINUED | OUTPATIENT
Start: 2021-02-14 | End: 2021-02-14

## 2021-02-14 RX ORDER — PIPERACILLIN AND TAZOBACTAM 4; .5 G/20ML; G/20ML
3.38 INJECTION, POWDER, LYOPHILIZED, FOR SOLUTION INTRAVENOUS ONCE
Refills: 0 | Status: COMPLETED | OUTPATIENT
Start: 2021-02-14 | End: 2021-02-14

## 2021-02-14 RX ORDER — SODIUM CHLORIDE 9 MG/ML
1000 INJECTION, SOLUTION INTRAVENOUS ONCE
Refills: 0 | Status: COMPLETED | OUTPATIENT
Start: 2021-02-14 | End: 2021-02-14

## 2021-02-14 RX ORDER — ONDANSETRON 8 MG/1
4 TABLET, FILM COATED ORAL EVERY 8 HOURS
Refills: 0 | Status: DISCONTINUED | OUTPATIENT
Start: 2021-02-14 | End: 2021-03-03

## 2021-02-14 RX ADMIN — ONDANSETRON 4 MILLIGRAM(S): 8 TABLET, FILM COATED ORAL at 04:29

## 2021-02-14 RX ADMIN — SODIUM CHLORIDE 60 MILLILITER(S): 9 INJECTION, SOLUTION INTRAVENOUS at 17:40

## 2021-02-14 RX ADMIN — Medication 125 MILLIGRAM(S): at 14:26

## 2021-02-14 RX ADMIN — SODIUM CHLORIDE 60 MILLILITER(S): 9 INJECTION, SOLUTION INTRAVENOUS at 23:00

## 2021-02-14 RX ADMIN — Medication 10 MILLIGRAM(S): at 22:47

## 2021-02-14 RX ADMIN — PANTOPRAZOLE SODIUM 40 MILLIGRAM(S): 20 TABLET, DELAYED RELEASE ORAL at 17:54

## 2021-02-14 RX ADMIN — Medication 400 MILLIGRAM(S): at 04:29

## 2021-02-14 RX ADMIN — SODIUM CHLORIDE 75 MILLILITER(S): 9 INJECTION, SOLUTION INTRAVENOUS at 14:26

## 2021-02-14 RX ADMIN — Medication 500 MILLIGRAM(S): at 18:21

## 2021-02-14 RX ADMIN — Medication 100 MILLIGRAM(S): at 02:47

## 2021-02-14 RX ADMIN — Medication 500 MILLIGRAM(S): at 22:47

## 2021-02-14 RX ADMIN — PIPERACILLIN AND TAZOBACTAM 25 GRAM(S): 4; .5 INJECTION, POWDER, LYOPHILIZED, FOR SOLUTION INTRAVENOUS at 17:54

## 2021-02-14 RX ADMIN — PIPERACILLIN AND TAZOBACTAM 25 GRAM(S): 4; .5 INJECTION, POWDER, LYOPHILIZED, FOR SOLUTION INTRAVENOUS at 09:41

## 2021-02-14 RX ADMIN — Medication 100 MILLIGRAM(S): at 22:44

## 2021-02-14 RX ADMIN — SODIUM CHLORIDE 1000 MILLILITER(S): 9 INJECTION, SOLUTION INTRAVENOUS at 14:26

## 2021-02-14 RX ADMIN — Medication 100 MILLIGRAM(S): at 14:25

## 2021-02-14 RX ADMIN — MIRTAZAPINE 15 MILLIGRAM(S): 45 TABLET, ORALLY DISINTEGRATING ORAL at 22:49

## 2021-02-14 RX ADMIN — SODIUM CHLORIDE 2000 MILLILITER(S): 9 INJECTION INTRAMUSCULAR; INTRAVENOUS; SUBCUTANEOUS at 03:19

## 2021-02-14 RX ADMIN — PIPERACILLIN AND TAZOBACTAM 200 GRAM(S): 4; .5 INJECTION, POWDER, LYOPHILIZED, FOR SOLUTION INTRAVENOUS at 01:49

## 2021-02-14 NOTE — H&P ADULT - PROBLEM SELECTOR PLAN 4
Likely d/t sepsis/infection Likely d/t sepsis/infection  - Continue to monitor  - Bedside swallow once okay to start diet Likely d/t sepsis/infection  - Continue to monitor  - Bedside swallow, start diet once cleared by GI

## 2021-02-14 NOTE — GOALS OF CARE CONVERSATION - ADVANCED CARE PLANNING - CONVERSATION DETAILS
Discussion regarding goals of care was done with health care proxy, daughter Stephanie Palm.  HCP wishes for code status to be DNR/DNI. HCP does not wish for aggressive measures including vasopressors for hypotension and blood transfusions.  Discussion completed by medicine team: Ramses Fofana, PGY1, and Alan Al, PGY2.

## 2021-02-14 NOTE — H&P ADULT - NSICDXPASTMEDICALHX_GEN_ALL_CORE_FT
PAST MEDICAL HISTORY:  CAD in native artery     COVID-19     Depression     Dyslipidemia     Hypertension     Hyponatremia     S/P Hip Replacement     Urinary tract infection      PAST MEDICAL HISTORY:  CAD in native artery     COVID-19     Depression     Dyslipidemia     Hypertension     Hyponatremia     Urinary tract infection

## 2021-02-14 NOTE — RAPID RESPONSE TEAM SUMMARY - NSSITUATIONBACKGROUNDRRT_GEN_ALL_CORE
89 yo F with extensive comorbid admitted for severe sepsis 2/2 colitis- unclear if cdiff colitis as she was recently completed treatment for Cdiff colitis. RRT called for hypotension, BP 80/50s patient with noticeable lethargy. Pt s/p 3L IVF boluses prior to RRT. My concern was for septic shock, UTI vs Colitis. UA sent, lactate noted to be 5.3 (after 3L). An additional 1 L NS bolus administered. BP improved to systolic 100's with improvement in mental status. KO2 placed to facilitate administration of oral vancomycin. Code status/GOC clarified with HCP- DNR/DNI no pressors. RRT ended as vitals stabilized. Recommend ID consult to tailor abx. Consider palliative consult for symptom management if condition continues to worsen.

## 2021-02-14 NOTE — H&P ADULT - PROBLEM SELECTOR PLAN 3
Cr 1.70, unclear baseline. Likely pre-renal secondary to septic shock  - s/p 2L IVF. Give another 1L bolus NS  - check FENa Cr 1.70, unclear baseline. Likely pre-renal, ATN secondary to septic shock  - s/p 2L IVF. Give another 1L bolus NS  - check FENa  - bladder scan and renal u/s to r/o post-renal

## 2021-02-14 NOTE — H&P ADULT - PROBLEM SELECTOR PLAN 7
Hx of urinary retention and chronic UTI  - c/w home bethanechol, pyridium  - bladder scan q8h Hx of urinary retention and chronic UTI  - c/w home bethanechol. Hold pyridium  - bladder scan q8h

## 2021-02-14 NOTE — H&P ADULT - ATTENDING COMMENTS
pt seen and examined.  above plan discussed on rounds today.  In addition,    89F w/ PMH of CAD s/p PCI, Chronic UTI's, MDD, HTN, HLD, [place] (hearing aids), dysphagia, COVID19 infection (2 months ago), and recent hospitalization for C. diff colitis (~2/7/21) presenting from [facility] after episode of vomiting and bloody bowel movement on 2/13. She is a/w Severe sepsis present on admission, JERONIMO with ATN, Anion-gap metabolic acidosis due to lactic acidosis, Metabolic encephalopathy, Acute blood loss anemia due to presumed lower GI bleeding in the setting of Colitis.    Severe sepsis present on admission  - due to presumed colitis  - start Zosyn for now  - given h/o C.Diff will need to rule that out as well. Can c/w Fagyl IV but once able to take PO would switch over to PO Vanco.    JERONIMO with ATN, Anion-gap metabolic acidosis due to lactic acidosis   -Due to sepsis and hypotension  - c/w IV fluids   - monitor i/os    Metabolic encephalopathy  - due to sepsis and ongoing infective process    Acute blood loss anemia due to presumed lower GI bleeding in the setting of Colitis  - likely due to lower GI bleed  - will empirically continue Protonix IV for now  - GI eval appreciated

## 2021-02-14 NOTE — H&P ADULT - NSHPREVIEWOFSYSTEMS_GEN_ALL_CORE
Unable to obtain ROS  Per chart review, negative for f/c, HA, dizziness, blurred vision, CP, cough, SOB, urinary symptoms, MSK pain, rash. Unable to obtain ROS due to cognitive impairment from acute illness  Per chart review, negative for f/c, HA, dizziness, blurred vision, CP, cough, SOB, urinary symptoms, MSK pain, rash.

## 2021-02-14 NOTE — H&P ADULT - NSHPPHYSICALEXAM_GEN_ALL_CORE
Vital Signs Last 24 Hrs  T(C): 36.5 (14 Feb 2021 05:48), Max: 38.3 (14 Feb 2021 05:16)  T(F): 97.7 (14 Feb 2021 05:48), Max: 101 (14 Feb 2021 05:16)  HR: 110 (14 Feb 2021 05:48) (56 - 120)  BP: 106/71 (14 Feb 2021 05:48) (85/48 - 129/89)  BP(mean): 55 (13 Feb 2021 21:19) (55 - 61)  RR: 18 (14 Feb 2021 05:48) (12 - 32)  SpO2: 96% (14 Feb 2021 05:48) (96% - 100%)    Physical Exam:  Gen: Lethargic, awakens to verbal/tactile stimuli, non-verbal, well-developed, NAD  HEENT: NCAT, PERRL, EOMI, clear conjunctiva, no scleral icterus  Neck: Supple, no JVD, no LAD  CV: tachycardic, S1S2, no m/r/g  Resp: CTAB, normal respiratory effort  Abd: Soft, NT, ND, normal bowel sounds  Ext: mild R leg swelling, no clubbing or cyanosis  Neuro: responds to some questions with head nodding/shaking, follows some commands, difficult to assess orientation, AOx0, CN2-12 grossly intact, POLK  Skin: warm, perfused Vital Signs Last 24 Hrs  T(C): 36.5 (14 Feb 2021 05:48), Max: 38.3 (14 Feb 2021 05:16)  T(F): 97.7 (14 Feb 2021 05:48), Max: 101 (14 Feb 2021 05:16)  HR: 110 (14 Feb 2021 05:48) (56 - 120)  BP: 106/71 (14 Feb 2021 05:48) (85/48 - 129/89)  BP(mean): 55 (13 Feb 2021 21:19) (55 - 61)  RR: 18 (14 Feb 2021 05:48) (12 - 32)  SpO2: 96% (14 Feb 2021 05:48) (96% - 100%)    Physical Exam:  Gen: Lethargic, awakens to verbal/tactile stimuli, non-verbal, well-developed, NAD  HEENT: NCAT, PERRL, EOMI, clear conjunctiva, no scleral icterus  Neck: Supple, no JVD, no LAD  CV: tachycardic, S1S2, no m/r/g  Resp: CTAB, normal respiratory effort  Abd: Soft, tender in RLQ, ND  Ext: mild R leg swelling, no clubbing or cyanosis  Neuro: responds to some questions with head nodding/shaking, follows some commands, difficult to assess orientation, AOx0, CN2-12 grossly intact, POLK  Skin: warm, perfused

## 2021-02-14 NOTE — H&P ADULT - PROBLEM SELECTOR PLAN 2
Likely secondary to colitis iso recent C. diff vs. UTI vs. COVID.   - Check C diff, GI PCR, UA  - F/u BCx  - C/w Zosyn, Flagyl  - s/p total 2L IVF  - F/u repeat VBG lactate Likely secondary to colitis iso recent C. diff vs. UTI vs. COVID. Lactate elevated to 5.7.  - Check C diff, GI PCR, UA  - F/u BCx  - C/w Zosyn, Flagyl  - s/p total 2L IVF. Give another 1L NS bolus and trend lactate. Likely secondary to colitis iso recent C. diff vs. COVID. HAGMA with elevated lactate to 5.7.  - Check C diff, GI PCR, UA  - F/u BCx  - C/w Zosyn, Flagyl  - s/p total 2L IVF. Give another 1L LR bolus and maintenance LR, trend lactate. Likely secondary to colitis iso recent C. diff vs. COVID. HAGMA with elevated lactate to 5.7.  - Check C diff, GI PCR, UA  - F/u BCx  - C/w Zosyn to cover GNR, anaerobes (GI pathogen)  - C/w Flagyl IV 500mg BID for now for possible recurrent C diff colitis; transition to PO vancomycin once bedside swallow is done  - s/p total 2L IVF. Give another 1L LR bolus and maintenance LR, trend lactate.

## 2021-02-14 NOTE — PROVIDER CONTACT NOTE (CRITICAL VALUE NOTIFICATION) - RECOMMENDATIONS
continue iv fluids LR at 60 ml/hr and iv antibiotics
started on iv zosyn,iv flagyl
will continue monitor

## 2021-02-14 NOTE — H&P ADULT - NSHPLABSRESULTS_GEN_ALL_CORE
LABS: Personally reviewed labs, imaging, and ECG                          11.9   24.73 )-----------( 303      ( 13 Feb 2021 22:45 )             36.7       02-13    135  |  96  |  22  ----------------------------<  208<H>  3.5   |  20<L>  |  1.70<H>    Ca    9.4      13 Feb 2021 22:45  Phos  3.9     02-13  Mg     2.1     02-13    TPro  8.0  /  Alb  3.8  /  TBili  0.3  /  DBili  x   /  AST  59<H>  /  ALT  11  /  AlkPhos  93  02-13       LIVER FUNCTIONS - ( 13 Feb 2021 20:16 )  Alb: 3.8 g/dL / Pro: 8.0 g/dL / ALK PHOS: 93 U/L / ALT: 11 U/L / AST: 59 U/L / GGT: x                        PT/INR - ( 13 Feb 2021 20:16 )   PT: 12.1 sec;   INR: 1.01 ratio         PTT - ( 13 Feb 2021 20:16 )  PTT:19.7 sec    Lactate Trend    Blood Gas Profile - Venous (02.14.21 @ 01:01)   pH, Venous: 7.28   pCO2, Venous: 51 mmHg   pO2, Venous: 49 mmHg   HCO3, Venous: 23 mmol/L   Base Excess, Venous: -3.5 mmol/L   Oxygen Saturation, Venous: 75 %   Total CO2, Venous: 25 mmol/L   Blood Gas Source Venous: Venous    Blood Gas Venous - Lactate: 4.3 mmoL/L (02.14.21 @ 01:01)       RADIOLOGY & ADDITIONAL TESTS:   < from: CT Abdomen and Pelvis w/ IV Cont (02.13.21 @ 22:34) >    IMPRESSION:    1. No evidence of active GI bleed noting that portion of the distal rectum is not included in the field-of-view and cannot be evaluated.    2. Suspected diffuse colonic wall thickening, most pronounced in the descending and rectosigmoid colon, suspicious for colitis.    3. Focus of air within the bladder. Recommend correlation for recent interpretation.    < end of copied text >

## 2021-02-14 NOTE — H&P ADULT - PROBLEM SELECTOR PLAN 1
Likely LGIB given normal colored stool with blood-tinged water. Possibly diverticulosis as noted on CT a/p vs. ischemic colitis given hx of CAD vs. infectious colitis iso recent C diff colitis.  - S/p Pantoprazole 80mg IVP, c/w ? given possibility of brisk UGIB  - GI consulted, f/u recs  - Hgb stable although downtrending. Monitor CBC.  - Active T&S. Obtain consent for blood  - NPO for now Likely LGIB given normal colored stool with blood-tinged water. Possibly diverticulosis as noted on CT a/p vs. ischemic colitis given hx of CAD vs. infectious colitis iso recent C diff colitis.  - S/p Pantoprazole 80mg IVP, c/w 40mg IV BID given possibility of brisk UGIB  - GI consulted, f/u recs  - Hgb stable although downtrending. Monitor CBC.  - Active T&S. Obtain consent for blood  - NPO for now  - Check C diff, GI PCR

## 2021-02-14 NOTE — ED ADULT NURSE REASSESSMENT NOTE - NS ED NURSE REASSESS COMMENT FT1
Pt turned, changed and repositioned, some skin breakdown noted on the inner groin regions, barrier cream applied to affected areas. Pt resting comfortably in bed vahe warmer still in use, VSS, Pt in NAD.

## 2021-02-14 NOTE — H&P ADULT - PROBLEM SELECTOR PLAN 10
DVT ppx: on hold iso GIB  Diet: NPO for now  Code: DNR/DNI per HCP, MOLST to be filled out DVT ppx: on hold iso GIB  Diet: NPO for now  Code: DNR/DNI per HCP, MOLST to be filled out/obtained from rehab center

## 2021-02-14 NOTE — CONSULT NOTE ADULT - SUBJECTIVE AND OBJECTIVE BOX
Chief Complaint:  Patient is a 89y old  Female who presents with a chief complaint of     HPI: 89F w/ PMH HTN, HLD, depression, chronic UTIs, recent C. diff (completed course of abx) presents with abdominal pain. Per chart review, daughter states she had recently finished a course of oral vancomycin for c-diff after hospital stay and that earlier today she had an episode of emesis and blood in the stool. Patient reportedly hypotensive to 70s with EMS however normotensive on arrival.     Allergies:  flu vaccine (Anaphylaxis)  No Known Drug Allergies      Home Medications:    Hospital Medications:      PMHX/PSHX:  S/P Hip Replacement    Dyslipidemia    Depression    Hyponatremia    Hypertension        Family history:      Social History:     ROS:     General:  No weight loss, fevers, chills, night sweats, fatigue  Eyes:  No vision changes, no yellowing of eyes   ENT:  No throat pain, runny nose  CV:  No chest pain, palpitations  Resp:  No SOB, cough, wheezing  GI:  See HPI  :  No burning with urination, no hematuria   Muscle:  No muscle pain, weakness  Neuro:  No numbness/tingling, memory problems  Psych:  No fatigue, insomnia, mood problems  Heme:  No easy bruisability  Skin:  No rash, itching       PHYSICAL EXAM:     GENERAL:  Appears stated age, well-groomed, well-nourished, no distress  HEENT:  NC/AT,  conjunctivae clear and pink,  no JVD  CHEST:  Full & symmetric excursion, no increased effort, breath sounds clear  HEART:  Regular rhythm, S1, S2, no murmur/rub/S3/S4, no abdominal bruit, no edema  ABDOMEN:  Soft, non-tender, non-distended, normoactive bowel sounds,  no masses ,  EXTREMITIES:  no cyanosis,clubbing or edema  SKIN:  No rash/erythema/ecchymoses/petechiae/wounds/abscess/warm/dry  NEURO:  Alert, oriented    Vital Signs:  Vital Signs Last 24 Hrs  T(C): 36.5 (14 Feb 2021 05:48), Max: 38.3 (14 Feb 2021 05:16)  T(F): 97.7 (14 Feb 2021 05:48), Max: 101 (14 Feb 2021 05:16)  HR: 110 (14 Feb 2021 05:48) (56 - 120)  BP: 106/71 (14 Feb 2021 05:48) (85/48 - 129/89)  BP(mean): 55 (13 Feb 2021 21:19) (55 - 61)  RR: 18 (14 Feb 2021 05:48) (12 - 32)  SpO2: 96% (14 Feb 2021 05:48) (96% - 100%)  Daily     Daily     LABS:                        11.9   24.73 )-----------( 303      ( 13 Feb 2021 22:45 )             36.7     02-13    135  |  96  |  22  ----------------------------<  208<H>  3.5   |  20<L>  |  1.70<H>    Ca    9.4      13 Feb 2021 22:45  Phos  3.9     02-13  Mg     2.1     02-13    TPro  8.0  /  Alb  3.8  /  TBili  0.3  /  DBili  x   /  AST  59<H>  /  ALT  11  /  AlkPhos  93  02-13    LIVER FUNCTIONS - ( 13 Feb 2021 20:16 )  Alb: 3.8 g/dL / Pro: 8.0 g/dL / ALK PHOS: 93 U/L / ALT: 11 U/L / AST: 59 U/L / GGT: x           PT/INR - ( 13 Feb 2021 20:16 )   PT: 12.1 sec;   INR: 1.01 ratio         PTT - ( 13 Feb 2021 20:16 )  PTT:19.7 sec        Imaging:  < from: CT Abdomen and Pelvis w/ IV Cont (02.13.21 @ 22:34) >  FINDINGS:  Evaluation of solid organs is limited without intravenous contrast.    LOWER CHEST: Bibasilar dependent atelectasis. Calcifications of the aortic valve and mitral annulus. Atherosclerotic calcifications of the coronary arteries.    LIVER: Calcified granuloma in the dome of the liver.  BILE DUCTS: Normal caliber.  GALLBLADDER: Within normal limits.  SPLEEN: Within normal limits for age.  PANCREAS: Within normal limits.  ADRENALS: Within normal limits.  KIDNEYS/URETERS: No renal stones or hydronephrosis. Exophytic left renal cyst measures 1.3 cm. Bilateral subcentimeter hypodensities too small to characterize.    BLADDER: Minimally distended. Trace air in the bladder.  REPRODUCTIVE ORGANS: Pessary ring. Fibroid uterus.    BOWEL: No bowel obstruction. Appendix is normal. Mild colonic diverticulosis. Suspected diffuse colonic wall thickening, most pronounced in the descending and rectosigmoid region.  PERITONEUM: No ascites.  VESSELS: Atherosclerotic changes. No extravasation of contrast identified to suggest active GI bleed noting that a small portion of the distal rectum is not included in the field of view.  RETROPERITONEUM/LYMPH NODES: No lymphadenopathy.  ABDOMINAL WALL: Small fat-containing umbilical hernia.  BONES: Degenerative changes. 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 L3 vertebral body hemangioma.    IMPRESSION:    1. No evidence of active GI bleed noting that portion of the distal rectum is not included in the field-of-view and cannot be evaluated.    2. Suspected diffuse colonic wall thickening, most pronounced in the descending and rectosigmoid colon, suspicious for colitis.    3. Focus of air within the bladder. Recommend correlation for recent interpretation.    < end of copied text >             Chief Complaint:  Patient is a 89y old  Female who presents with a chief complaint of     HPI: 89F w/ PMH HTN, HLD, depression, chronic UTIs, recent C. diff (completed course of abx) presents with abdominal pain. Per chart review, daughter states she had recently finished a course of oral vancomycin for c-diff after hospital stay at [facility] (completed 12/10) and that earlier on day of presentation she had an episode of emesis (nonbloody) and blood in the stool. Patient reportedly hypotensive to 70s with EMS however normotensive on arrival.     Patient opens eyes to her name and verbal stimuli however immediately closes them again and does not respond to questions.    Allergies:  flu vaccine (Anaphylaxis)  No Known Drug Allergies      Home Medications:    Hospital Medications:      PMHX/PSHX:  S/P Hip Replacement    Dyslipidemia    Depression    Hyponatremia    Hypertension        Family history:      Social History: admitted from living facility    ROS: unable to obtain      PHYSICAL EXAM:     GENERAL: appears chronically ill, no distress  HEENT:  NC/AT,  conjunctivae clear and pink  CHEST:  Full & symmetric excursion, no increased effort  HEART:  Regular rate  ABDOMEN:  Soft, nondistended, appears tender most on left side of abdomen and center, appears nontender on right, no guarding  EXTREMITIES:  no cyanosis,clubbing or edema  SKIN:  No rash/erythema  NEURO:  AOx0    Vital Signs:  Vital Signs Last 24 Hrs  T(C): 36.5 (14 Feb 2021 05:48), Max: 38.3 (14 Feb 2021 05:16)  T(F): 97.7 (14 Feb 2021 05:48), Max: 101 (14 Feb 2021 05:16)  HR: 110 (14 Feb 2021 05:48) (56 - 120)  BP: 106/71 (14 Feb 2021 05:48) (85/48 - 129/89)  BP(mean): 55 (13 Feb 2021 21:19) (55 - 61)  RR: 18 (14 Feb 2021 05:48) (12 - 32)  SpO2: 96% (14 Feb 2021 05:48) (96% - 100%)  Daily     Daily     LABS:                        11.9   24.73 )-----------( 303      ( 13 Feb 2021 22:45 )             36.7     02-13    135  |  96  |  22  ----------------------------<  208<H>  3.5   |  20<L>  |  1.70<H>    Ca    9.4      13 Feb 2021 22:45  Phos  3.9     02-13  Mg     2.1     02-13    TPro  8.0  /  Alb  3.8  /  TBili  0.3  /  DBili  x   /  AST  59<H>  /  ALT  11  /  AlkPhos  93  02-13    LIVER FUNCTIONS - ( 13 Feb 2021 20:16 )  Alb: 3.8 g/dL / Pro: 8.0 g/dL / ALK PHOS: 93 U/L / ALT: 11 U/L / AST: 59 U/L / GGT: x           PT/INR - ( 13 Feb 2021 20:16 )   PT: 12.1 sec;   INR: 1.01 ratio         PTT - ( 13 Feb 2021 20:16 )  PTT:19.7 sec        Imaging:  < from: CT Abdomen and Pelvis w/ IV Cont (02.13.21 @ 22:34) >  FINDINGS:  Evaluation of solid organs is limited without intravenous contrast.    LOWER CHEST: Bibasilar dependent atelectasis. Calcifications of the aortic valve and mitral annulus. Atherosclerotic calcifications of the coronary arteries.    LIVER: Calcified granuloma in the dome of the liver.  BILE DUCTS: Normal caliber.  GALLBLADDER: Within normal limits.  SPLEEN: Within normal limits for age.  PANCREAS: Within normal limits.  ADRENALS: Within normal limits.  KIDNEYS/URETERS: No renal stones or hydronephrosis. Exophytic left renal cyst measures 1.3 cm. Bilateral subcentimeter hypodensities too small to characterize.    BLADDER: Minimally distended. Trace air in the bladder.  REPRODUCTIVE ORGANS: Pessary ring. Fibroid uterus.    BOWEL: No bowel obstruction. Appendix is normal. Mild colonic diverticulosis. Suspected diffuse colonic wall thickening, most pronounced in the descending and rectosigmoid region.  PERITONEUM: No ascites.  VESSELS: Atherosclerotic changes. No extravasation of contrast identified to suggest active GI bleed noting that a small portion of the distal rectum is not included in the field of view.  RETROPERITONEUM/LYMPH NODES: No lymphadenopathy.  ABDOMINAL WALL: Small fat-containing umbilical hernia.  BONES: Degenerative changes. 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 L3 vertebral body hemangioma.    IMPRESSION:    1. No evidence of active GI bleed noting that portion of the distal rectum is not included in the field-of-view and cannot be evaluated.    2. Suspected diffuse colonic wall thickening, most pronounced in the descending and rectosigmoid colon, suspicious for colitis.    3. Focus of air within the bladder. Recommend correlation for recent interpretation.    < end of copied text >

## 2021-02-14 NOTE — H&P ADULT - PROBLEM SELECTOR PLAN 8
CLINICAL PHARMACY: STATIN THERAPY REVIEW    Identified care gap per [employer] statin use in persons with diabetes and adherence     Per [facility]    Medication: Simvastatin 10mg  Last 3 fill dates: 2019  Day supply: 30  Refills remainin months  Directions: 1 tab po hs  Insurance billed: Jose  Prescribing Provider: Alberto Cabral NP    Medication: Metformin 500mg  Last 3 fill dates: 2020  Day supply: 90  Refills remainin  Directions: 1 tab bid with food  Insurance billed: [employer]  Prescribing Provider: Alberto Cabral NP      Attempting to reach patient. Left message asking for return call to toll free [phone] option 7. Calling to determine if patient is still to be on statin therapy. Patient last filled and picked up statin 2019. There is a new Rx on file at pharmacy that was sent on 2020 and I have requested them to process refill for the patient to .        [facility], toll free: [phone], option 7 Hx of multiple CAD s/p 1 stent  - c/w ASA 81mg daily  - c/w Isosorbide ER 60mg daily Hx of multiple CAD s/p 1 stent  - hold ASA 81mg daily and Isosorbide ER 60mg daily iso GIB and hypotension  - hold antihypertensive enalapril iso hypotesions and JERONIMO

## 2021-02-14 NOTE — PROVIDER CONTACT NOTE (OTHER) - RECOMMENDATIONS
Pt has a hx of COVID-19 in January, readmitted with no symptoms of COVID.  COVID-19 Isolation discontinued as per guidelines.

## 2021-02-14 NOTE — H&P ADULT - PROBLEM SELECTOR PLAN 5
Previous infection 2 months ago.  - Monitor on continuous pulse ox Previous infection 2 months ago.  - CTM

## 2021-02-14 NOTE — H&P ADULT - ASSESSMENT
89F w/ PMH of CAD s/p PCI, Chronic UTI's, MDD, HTN, HLD, [place] (hearing aids), dysphagia, prior COVID19 infection, and recent hospitalization for C. diff colitis (~2/7/21) presenting from rehab center for bloody bowel movement, likely LGIB secondary to diverticulosis vs. ischemic colitis.  Also found to be in septic shock, likely secondary to colitis iso recent C. diff vs. UTI.  COVID +, asymptomatic and without hypoxia. 89F w/ PMH of CAD s/p PCI, Chronic UTI's, MDD, HTN, HLD, [place] (hearing aids), dysphagia, prior COVID19 infection, and recent hospitalization for C. diff colitis (~2/7/21) presenting from rehab center for bloody bowel movement, likely LGIB secondary to diverticulosis vs. infectious colitis vs. ischemic colitis.  Also found to be in septic shock, likely secondary to colitis iso recent C. diff.  COVID +, asymptomatic and without hypoxia.

## 2021-02-14 NOTE — CONSULT NOTE ADULT - SUBJECTIVE AND OBJECTIVE BOX
[facility] General Surgery Consultation     Patient is a 89y old  Female who presents with a chief complaint of abdominal pain    HPI:  89 y/o F w/ PMH HTN, HLD, depression, chronic UTIs, encephalopathy, recent C. diff (completed course of abx) presents with abdominal pain of unknown etiology and timeline. The patient is currently unable to discuss the current situation or answer questions directly regarding symptoms She did report that she has the urge to defecate and also has abdominal pain. She otherwise cannot describe any positive or negative symptoms including fever/chills,nausea/vomiting, change in bowel habits. Her daughter was reached by telephone but does not live with her as she lives in a AL facility. She did mention, however, that she had recently finished a course of oral vancomycin for c-diff after hospital stay.    PAST MEDICAL & SURGICAL HISTORY:  S/P Hip Replacement  Dyslipidemia  Depression  Hyponatremia  Hypertension    FAMILY HISTORY:  Not able to be assessed as patient unable to participate in a history    SOCIAL HISTORY:  Not able to be assessed as patient unable to participate in a history    Allergies  flu vaccine (Anaphylaxis)    Intolerances        Vital Signs Last 24 Hrs  T(C): 37 (14 Feb 2021 02:24), Max: 37 (14 Feb 2021 02:24)  T(F): 98.6 (14 Feb 2021 02:24), Max: 98.6 (14 Feb 2021 02:24)  HR: 102 (14 Feb 2021 02:24) (56 - 102)  BP: 114/61 (14 Feb 2021 02:24) (85/48 - 115/46)  BP(mean): 55 (13 Feb 2021 21:19) (55 - 61)  RR: 19 (14 Feb 2021 02:24) (12 - 24)  SpO2: 98% (14 Feb 2021 02:24) (96% - 100%)  Daily     Daily     Examination  General: lying in bed, in mild distress, confused and repeating words; not oriented  Head: Atraumatic, EOMI  Resp: Breathing comfortably on RA  CV: Normal sinus rhythm  Abd: soft, focal tenderness to palpation in the LLQ, nontender elsewhere, no rebound, no guarding, no masses  Ext: ROMIx4, motor strength intact x 4                          11.9   24.73 )-----------( 303      ( 13 Feb 2021 22:45 )             36.7     02-13    135  |  96  |  22  ----------------------------<  208<H>  3.5   |  20<L>  |  1.70<H>    Ca    9.4      13 Feb 2021 22:45  Phos  3.9     02-13  Mg     2.1     02-13    TPro  8.0  /  Alb  3.8  /  TBili  0.3  /  DBili  x   /  AST  59<H>  /  ALT  11  /  AlkPhos  93  02-13    PT/INR - ( 13 Feb 2021 20:16 )   PT: 12.1 sec;   INR: 1.01 ratio         PTT - ( 13 Feb 2021 20:16 )  PTT:19.7 sec      Radiographic Findings:   < from: CT Abdomen and Pelvis w/ IV Cont (02.13.21 @ 22:34) >  FINDINGS:  Evaluation of solid organs is limited without intravenous contrast.    LOWER CHEST: Bibasilar dependent atelectasis. Calcifications of the aortic valve and mitral annulus. Atherosclerotic calcifications of the coronary arteries.    LIVER: Calcified granuloma in the dome of the liver.  BILE DUCTS: Normal caliber.  GALLBLADDER: Within normal limits.  SPLEEN: Within normal limits for age.  PANCREAS: Within normal limits.  ADRENALS: Within normal limits.  KIDNEYS/URETERS: No renal stones or hydronephrosis. Exophytic left renal cyst measures 1.3 cm. Bilateral subcentimeter hypodensities too small to characterize.    BLADDER: Minimally distended. Trace air in the bladder.  REPRODUCTIVE ORGANS: Pessary ring. Fibroid uterus.    BOWEL: No bowel obstruction. Appendix is normal. Mild colonic diverticulosis. Suspected diffuse colonic wall thickening, most pronounced in the descending and rectosigmoid region.  PERITONEUM: No ascites.  VESSELS: Atherosclerotic changes. No extravasation of contrast identified to suggest active GI bleed noting that a small portion of the distal rectum is not included in the field of view.  RETROPERITONEUM/LYMPH NODES: No lymphadenopathy.  ABDOMINAL WALL: Small fat-containing umbilical hernia.  BONES: Degenerative changes. 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 L3 vertebral body hemangioma.    IMPRESSION:    1. No evidence of active GI bleed noting that portion of the distal rectum is not included in the field-of-view and cannot be evaluated.    2. Suspected diffuse colonic wall thickening, most pronounced in the descending and rectosigmoid colon, suspicious for colitis.    3. Focus of air within the bladder. Recommend correlation for recent interpretation.      < end of copied text >      Assessment:   89 y/o F w/ PMH HTN, HLD, depression, chronic UTIs, encephalopathy, recent C. diff (completed course of abx) presents with leukocytosis and abdominal pain, with signs of sepsis and a CT significant for diffuse colitis of unknown origin, especially in the rectosigmoid region.    Plan:  - patient unable to engage in meaningful conversation about the problem or possible solutions; daughter was called to clarify GOC and discuss potential options  - at this time after extensive discussion about the risks and benefits of surgery,  the daughter does not want to pursue surgery as an option, but would like to continue maximal medical therapy  - recommend medicine and GI consultation for management of colitis, possibly infectious given recent bout of c-dif (now off abx), also possible ischemic colitis-> continue supportive care      Discussed with Dr. Maurice Montes  ACS surgery  [phone]             [facility] General Surgery Consultation     Patient is a 89y old  Female who presents with a chief complaint of abdominal pain    HPI:  89 y/o F w/ PMH HTN, HLD, depression, chronic UTIs, encephalopathy, recent C. diff (completed course of abx) presents with abdominal pain of unknown etiology and timeline. The patient is currently unable to discuss the current situation or answer questions directly regarding symptoms She did report that she has the urge to defecate and also has abdominal pain. She otherwise cannot describe any positive or negative symptoms including fever/chills,nausea/vomiting, change in bowel habits. Her daughter was reached by telephone but does not live with her as she lives in a AL facility. She did mention, however, that she had recently finished a course of oral vancomycin for c-diff after hospital stay and that earlier today she had an episode of emesis and blood in the stool.    PAST MEDICAL & SURGICAL HISTORY:  S/P Hip Replacement  Dyslipidemia  Depression  Hyponatremia  Hypertension    FAMILY HISTORY:  Not able to be assessed as patient unable to participate in a history    SOCIAL HISTORY:  Not able to be assessed as patient unable to participate in a history    Allergies  flu vaccine (Anaphylaxis)    Intolerances        Vital Signs Last 24 Hrs  T(C): 37 (14 Feb 2021 02:24), Max: 37 (14 Feb 2021 02:24)  T(F): 98.6 (14 Feb 2021 02:24), Max: 98.6 (14 Feb 2021 02:24)  HR: 102 (14 Feb 2021 02:24) (56 - 102)  BP: 114/61 (14 Feb 2021 02:24) (85/48 - 115/46)  BP(mean): 55 (13 Feb 2021 21:19) (55 - 61)  RR: 19 (14 Feb 2021 02:24) (12 - 24)  SpO2: 98% (14 Feb 2021 02:24) (96% - 100%)  Daily     Daily     Examination  General: lying in bed, in mild distress, confused and repeating words; not oriented  Head: Atraumatic, EOMI  Resp: Breathing comfortably on RA  CV: Normal sinus rhythm  Abd: soft, focal tenderness to palpation in the LLQ, nontender elsewhere, no rebound, no guarding, no masses  Ext: ROMIx4, motor strength intact x 4                          11.9   24.73 )-----------( 303      ( 13 Feb 2021 22:45 )             36.7     02-13    135  |  96  |  22  ----------------------------<  208<H>  3.5   |  20<L>  |  1.70<H>    Ca    9.4      13 Feb 2021 22:45  Phos  3.9     02-13  Mg     2.1     02-13    TPro  8.0  /  Alb  3.8  /  TBili  0.3  /  DBili  x   /  AST  59<H>  /  ALT  11  /  AlkPhos  93  02-13    PT/INR - ( 13 Feb 2021 20:16 )   PT: 12.1 sec;   INR: 1.01 ratio         PTT - ( 13 Feb 2021 20:16 )  PTT:19.7 sec      Radiographic Findings:   < from: CT Abdomen and Pelvis w/ IV Cont (02.13.21 @ 22:34) >  FINDINGS:  Evaluation of solid organs is limited without intravenous contrast.    LOWER CHEST: Bibasilar dependent atelectasis. Calcifications of the aortic valve and mitral annulus. Atherosclerotic calcifications of the coronary arteries.    LIVER: Calcified granuloma in the dome of the liver.  BILE DUCTS: Normal caliber.  GALLBLADDER: Within normal limits.  SPLEEN: Within normal limits for age.  PANCREAS: Within normal limits.  ADRENALS: Within normal limits.  KIDNEYS/URETERS: No renal stones or hydronephrosis. Exophytic left renal cyst measures 1.3 cm. Bilateral subcentimeter hypodensities too small to characterize.    BLADDER: Minimally distended. Trace air in the bladder.  REPRODUCTIVE ORGANS: Pessary ring. Fibroid uterus.    BOWEL: No bowel obstruction. Appendix is normal. Mild colonic diverticulosis. Suspected diffuse colonic wall thickening, most pronounced in the descending and rectosigmoid region.  PERITONEUM: No ascites.  VESSELS: Atherosclerotic changes. No extravasation of contrast identified to suggest active GI bleed noting that a small portion of the distal rectum is not included in the field of view.  RETROPERITONEUM/LYMPH NODES: No lymphadenopathy.  ABDOMINAL WALL: Small fat-containing umbilical hernia.  BONES: Degenerative changes. 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 L3 vertebral body hemangioma.    IMPRESSION:    1. No evidence of active GI bleed noting that portion of the distal rectum is not included in the field-of-view and cannot be evaluated.    2. Suspected diffuse colonic wall thickening, most pronounced in the descending and rectosigmoid colon, suspicious for colitis.    3. Focus of air within the bladder. Recommend correlation for recent interpretation.      < end of copied text >      Assessment:   89 y/o F w/ PMH HTN, HLD, depression, chronic UTIs, encephalopathy, recent C. diff (completed course of abx) presents with leukocytosis and abdominal pain, with signs of sepsis and a CT significant for diffuse colitis of unknown origin, especially in the rectosigmoid region.    Plan:  - patient unable to engage in meaningful conversation about the problem or possible solutions; daughter was called to clarify GOC and discuss potential options  - at this time after extensive discussion about the risks and benefits of surgery,  the daughter does not want to pursue surgery as an option, but would like to continue maximal medical therapy  - recommend medicine and GI consultation for management of colitis, possibly infectious given recent bout of c-dif (now off abx), also possible ischemic colitis-> continue supportive care      Discussed with Dr. Maurice Montes  ACS surgery  [phone]

## 2021-02-14 NOTE — CONSULT NOTE ADULT - ASSESSMENT
89F w/ PMH of CAD s/p PCI, Chronic UTI's, MDD, HTN, HLD, [place] (hearing aids), dysphagia, COVID19 infection (2 months ago, s/p dexa, remdesivir, convalescent plasma, not intubated), and recent hospitalization for C. diff colitis (~2/7/21) presenting from [facility] after episode of vomiting and bloody bowel movement on 2/13.     #PENDING RECS. PLEASE WAIT FOR FINAL RECS AFTER DISCUSSION WITH ATTENDING#      #Sepsis, fever, leukocytosis:  - DDx: severe C.diff vs UTI vs bacteremia  - Follow up BCx  - c/w Zosyn    #Severe c.diff  - Increase PO vancomycin to 500mg q6h  - Can DC Flagyl as Zosyn can cover    #COVID-19:  - Positive PCR again  - Follow up COVID IgG  - Isolation per [facility] protocol  - no more treatment for covid 89F w/ PMH of CAD s/p PCI, Chronic UTI's, MDD, HTN, HLD, [place] (hearing aids), dysphagia, COVID19 infection (2 months ago, s/p dexa, remdesivir, convalescent plasma, not intubated), and recent hospitalization for C. diff colitis (~2/7/21) presenting from [facility] after episode of vomiting and bloody bowel movement on 2/13.     #PENDING RECS. PLEASE WAIT FOR FINAL RECS AFTER DISCUSSION WITH ATTENDING#      #Sepsis, fever, leukocytosis:  - DDx: severe C.diff vs UTI vs bacteremia  - Follow up BCx  - c/w Zosyn    #Severe c.diff  - However, C.diff here is negative, I am not sure if this is a good sample so I will repeat C.diff stool again  - Could this due to treatment already?  - Increase PO vancomycin to 500mg q6h  - Can DC Flagyl as Zosyn is already on board    #COVID-19:  - Positive PCR again  - Follow up COVID IgG  - Isolation per [facility] protocol  - no more treatment for covid 89F w/ PMH of CAD s/p PCI, Chronic UTI's, MDD, HTN, HLD, [place] (hearing aids), dysphagia, COVID19 infection (2 months ago, s/p dexa, remdesivir, convalescent plasma, not intubated), and recent hospitalization for C. diff colitis (~2/7/21) presenting from [facility] after episode of vomiting and bloody bowel movement on 2/13.     #PENDING RECS. PLEASE WAIT FOR FINAL RECS AFTER DISCUSSION WITH ATTENDING#    #Sepsis, fever, leukocytosis:  - DDx: severe C.diff vs UTI vs bacteremia  - Follow up BCx  - Send UCx  - c/w Zosyn    #Severe c.diff  - However, C.diff here is negative, I am not sure if this is a good sample so I will repeat C.diff stool again  - Could this due to treatment already?  - On exam and CT, not consistent with toxic megacolon  - Increase PO vancomycin to 500mg q6h  - Can DC Flagyl as Zosyn is already on board    #COVID-19:  - Positive PCR again  - Follow up COVID IgG  - Isolation per [facility] protocol  - no more treatment for covid    Corona Avery MD, PGY4   ID fellow  Pager: [phone]  After 5pm/weekends call [phone]   89F w/ PMH of CAD s/p PCI, Chronic UTI's, MDD, HTN, HLD, [place] (hearing aids), dysphagia, COVID19 infection (2 months ago, s/p dexa, remdesivir, convalescent plasma, not intubated), and recent hospitalization for C. diff colitis (~2/7/21) presenting from [facility] after episode of vomiting and bloody bowel movement on 2/13.     #Sepsis, fever, leukocytosis:   - DDx: severe C.diff vs UTI vs bacteremia  - Follow up BCx  - Send UCx  - c/w Zosyn  - Repeat lactate, IVF    #Severe c.diff  - However, C.diff here is negative, I am not sure if this is a good sample so I will repeat C.diff stool again  - Could this due to treatment already?  - On exam and CT, not consistent with toxic megacolon  - Increase PO vancomycin to 500mg q6h  - c/w Flagyl IV  - If C.diff neg, decrease PO vanc back to 125mg and can dc flagyl  - She should move to isolation room for C.diff    #COVID-19: likely remnant, not a new infection  - Positive PCR again  - Follow up COVID IgG, if COVID IgG positive, stop airbone isolation    Corona Avery MD, PGY4   ID fellow  Pager: [phone]  After 5pm/weekends call [phone]    d/w Dr. Ventura  Recs conveyed to primary team

## 2021-02-14 NOTE — CONSULT NOTE ADULT - ASSESSMENT
89F w/ PMH HTN, HLD, depression, chronic UTIs, recent C. diff (completed course of abx) presents with abdominal pain, episode of bloody Bm found to have left sided colitis on imaging.     Impression:  #Colitis - left sided on imaging. ddx includes ischemic vs infectious. Less likely inflammatory. Recently treated for cdiff. Also noted to be hypotensive with EMS.   #Hematochezia - with colitis as above. other ddx for lgib includes diverticulosis vs angioectasia vs malignancy    Recommendation:  - GI PCR  - supportive care with IVF and avoid hypotension  - cdiff PCR likely to still be positive, tx per primary team  - hgb stable, no need for endoscopic evaluation at this time 89F w/ PMH HTN, HLD, depression, chronic UTIs, recent C. diff (completed course of abx) presents with abdominal pain, episode of bloody Bm found to have left sided colitis on imaging.     Impression:  #Colitis - left sided on imaging. ddx includes ischemic vs infectious. Less likely inflammatory. Recently treated for cdiff. Also noted to be hypotensive with EMS.   #Hematochezia - with colitis as above. other ddx for lgib includes diverticulosis vs angioectasia vs malignancy    Recommendation:  - GI PCR  - supportive care with IVF and avoid hypotension  - cdiff PCR likely to still be positive, tx per primary team  - hgb stable, no need for endoscopic evaluation at this time  - rest of care per primary team      Ena Bolaños PGY-4  Gastroenterology Fellow  Pager #[phone] (SULMA) or [phone] (NS)  Available on [employer] Teams.  Please contact on-call GI fellow via answering service ([phone]) after 5pm and before 8am, and on weekends.            Cimzia Pregnancy And Lactation Text: This medication crosses the placenta but can be considered safe in certain situations. Cimzia may be excreted in breast milk.

## 2021-02-14 NOTE — H&P ADULT - HISTORY OF PRESENT ILLNESS
89F w/ PMH of CAD s/p PCI, Chronic UTI's, MDD, HTN, HLD, [place] (hearing aids), dysphagia, COVID19 infection (2 months ago), and recent hospitalization for C. diff colitis (~2/7/21) presenting from [facility] after episode of vomiting and bloody bowel movement on 2/13.  HPI obtained from chart and from daughter (HCP) as patient does not respond verbally. Staff also reported patient appeared confused on 2/13, when normally more alert and conversive.  Upon EMS arrival, SBP 70s and received 1L IVF.  Per daughter, Patient was hospitalized around 2/7/21 for C. diff colitis at [facility] and given IV vancomycin x 6 days, then transitioned to PO vancomycin upon discharge to rehab (last dose was 2/10/21).  Per daughter, no prior history of GI bleed.  Regarding her prior COVID19 infection, daughter reports she was diagnosed with "mild" case of COVID and hospitalized ~ 2 months ago and was treated with remdesivir, dexamethasone, convalescent plasma, and AC. Did not require ventilation. She tested negative twice prior to discharge to rehab at the end of January.    In ED: Initial BP was 90s/40s, received 500mL NS x2. BP now 100/70s. Hypothermic 95.3 rectally, then later febrile to 101, s/p 1g of IV tylenol.  HR 50s -> 120s, now 110s. RR 19-32, sating % on RA. S/p Pantoprazole 80mg IVP, Zosyn x1, Metronidazole 500mg IV x1, Ondansetron 4mg. Rectal exam normal colored stool with small amount of watery blood.

## 2021-02-15 DIAGNOSIS — A41.9 SEPSIS, UNSPECIFIED ORGANISM: ICD-10-CM

## 2021-02-15 LAB
ANION GAP SERPL CALC-SCNC: 17 MMOL/L — SIGNIFICANT CHANGE UP (ref 5–17)
BASE EXCESS BLDV CALC-SCNC: -2.6 MMOL/L — LOW (ref -2–2)
BUN SERPL-MCNC: 34 MG/DL — HIGH (ref 7–23)
CALCIUM SERPL-MCNC: 9.1 MG/DL — SIGNIFICANT CHANGE UP (ref 8.4–10.5)
CHLORIDE SERPL-SCNC: 100 MMOL/L — SIGNIFICANT CHANGE UP (ref 96–108)
CO2 BLDV-SCNC: 24 MMOL/L — SIGNIFICANT CHANGE UP (ref 22–30)
CO2 SERPL-SCNC: 19 MMOL/L — LOW (ref 22–31)
CREAT SERPL-MCNC: 2.47 MG/DL — HIGH (ref 0.5–1.3)
CULTURE RESULTS: SIGNIFICANT CHANGE UP
GAS PNL BLDV: SIGNIFICANT CHANGE UP
GLUCOSE SERPL-MCNC: 67 MG/DL — LOW (ref 70–99)
HCO3 BLDV-SCNC: 23 MMOL/L — SIGNIFICANT CHANGE UP (ref 21–29)
HCT VFR BLD CALC: 33.1 % — LOW (ref 34.5–45)
HGB BLD-MCNC: 10.5 G/DL — LOW (ref 11.5–15.5)
LACTATE BLDV-MCNC: 3.1 MMOL/L — HIGH (ref 0.7–2)
MAGNESIUM SERPL-MCNC: 1.8 MG/DL — SIGNIFICANT CHANGE UP (ref 1.6–2.6)
MCHC RBC-ENTMCNC: 31.7 GM/DL — LOW (ref 32–36)
MCHC RBC-ENTMCNC: 32.1 PG — SIGNIFICANT CHANGE UP (ref 27–34)
MCV RBC AUTO: 101.2 FL — HIGH (ref 80–100)
NRBC # BLD: 0 /100 WBCS — SIGNIFICANT CHANGE UP (ref 0–0)
PCO2 BLDV: 45 MMHG — SIGNIFICANT CHANGE UP (ref 35–50)
PH BLDV: 7.33 — LOW (ref 7.35–7.45)
PHOSPHATE SERPL-MCNC: 4.4 MG/DL — SIGNIFICANT CHANGE UP (ref 2.5–4.5)
PLATELET # BLD AUTO: 251 K/UL — SIGNIFICANT CHANGE UP (ref 150–400)
PO2 BLDV: 41 MMHG — SIGNIFICANT CHANGE UP (ref 25–45)
POTASSIUM SERPL-MCNC: 4.6 MMOL/L — SIGNIFICANT CHANGE UP (ref 3.5–5.3)
POTASSIUM SERPL-SCNC: 4.6 MMOL/L — SIGNIFICANT CHANGE UP (ref 3.5–5.3)
RBC # BLD: 3.27 M/UL — LOW (ref 3.8–5.2)
RBC # FLD: 15.1 % — HIGH (ref 10.3–14.5)
SAO2 % BLDV: 68 % — SIGNIFICANT CHANGE UP (ref 67–88)
SODIUM SERPL-SCNC: 136 MMOL/L — SIGNIFICANT CHANGE UP (ref 135–145)
SPECIMEN SOURCE: SIGNIFICANT CHANGE UP
WBC # BLD: 31.37 K/UL — HIGH (ref 3.8–10.5)
WBC # FLD AUTO: 31.37 K/UL — HIGH (ref 3.8–10.5)

## 2021-02-15 PROCEDURE — 99233 SBSQ HOSP IP/OBS HIGH 50: CPT | Mod: GC

## 2021-02-15 PROCEDURE — 99232 SBSQ HOSP IP/OBS MODERATE 35: CPT | Mod: CS

## 2021-02-15 RX ORDER — NYSTATIN CREAM 100000 [USP'U]/G
1 CREAM TOPICAL
Refills: 0 | Status: DISCONTINUED | OUTPATIENT
Start: 2021-02-15 | End: 2021-03-03

## 2021-02-15 RX ORDER — SODIUM CHLORIDE 9 MG/ML
1000 INJECTION, SOLUTION INTRAVENOUS
Refills: 0 | Status: DISCONTINUED | OUTPATIENT
Start: 2021-02-15 | End: 2021-02-15

## 2021-02-15 RX ORDER — ACETAMINOPHEN 500 MG
650 TABLET ORAL ONCE
Refills: 0 | Status: COMPLETED | OUTPATIENT
Start: 2021-02-15 | End: 2021-02-15

## 2021-02-15 RX ADMIN — PREGABALIN 1000 MICROGRAM(S): 225 CAPSULE ORAL at 12:32

## 2021-02-15 RX ADMIN — PANTOPRAZOLE SODIUM 40 MILLIGRAM(S): 20 TABLET, DELAYED RELEASE ORAL at 04:29

## 2021-02-15 RX ADMIN — Medication 100 MILLIGRAM(S): at 13:03

## 2021-02-15 RX ADMIN — Medication 100 MILLIGRAM(S): at 04:27

## 2021-02-15 RX ADMIN — PIPERACILLIN AND TAZOBACTAM 25 GRAM(S): 4; .5 INJECTION, POWDER, LYOPHILIZED, FOR SOLUTION INTRAVENOUS at 18:15

## 2021-02-15 RX ADMIN — Medication 500 MILLIGRAM(S): at 22:46

## 2021-02-15 RX ADMIN — PANTOPRAZOLE SODIUM 40 MILLIGRAM(S): 20 TABLET, DELAYED RELEASE ORAL at 18:16

## 2021-02-15 RX ADMIN — Medication 500 MILLIGRAM(S): at 04:29

## 2021-02-15 RX ADMIN — Medication 500 MILLIGRAM(S): at 12:33

## 2021-02-15 RX ADMIN — Medication 10 MILLIGRAM(S): at 12:33

## 2021-02-15 RX ADMIN — Medication 100 MILLIGRAM(S): at 22:01

## 2021-02-15 RX ADMIN — Medication 1000 UNIT(S): at 12:32

## 2021-02-15 RX ADMIN — NYSTATIN CREAM 1 APPLICATION(S): 100000 CREAM TOPICAL at 20:57

## 2021-02-15 RX ADMIN — SODIUM CHLORIDE 60 MILLILITER(S): 9 INJECTION, SOLUTION INTRAVENOUS at 18:16

## 2021-02-15 RX ADMIN — Medication 500 MILLIGRAM(S): at 18:16

## 2021-02-15 RX ADMIN — MIRTAZAPINE 15 MILLIGRAM(S): 45 TABLET, ORALLY DISINTEGRATING ORAL at 20:57

## 2021-02-15 RX ADMIN — PIPERACILLIN AND TAZOBACTAM 25 GRAM(S): 4; .5 INJECTION, POWDER, LYOPHILIZED, FOR SOLUTION INTRAVENOUS at 04:27

## 2021-02-15 RX ADMIN — Medication 650 MILLIGRAM(S): at 12:32

## 2021-02-15 RX ADMIN — Medication 10 MILLIGRAM(S): at 04:29

## 2021-02-15 RX ADMIN — Medication 10 MILLIGRAM(S): at 20:57

## 2021-02-15 NOTE — PROGRESS NOTE ADULT - PROBLEM SELECTOR PLAN 2
Likely secondary to colitis iso recent C. diff vs. COVID. HAGMA with elevated lactate to 5.7.  - Check C diff, GI PCR, UA  - F/u BCx  - C/w Zosyn to cover GNR, anaerobes (GI pathogen)  - C/w Flagyl IV 500mg BID for now for possible recurrent C diff colitis; transition to PO vancomycin once bedside swallow is done  - s/p total 2L IVF. Give another 1L LR bolus and maintenance LR, trend lactate. Likely LGIB given normal colored stool with blood-tinged water. Possibly diverticulosis as noted on CT a/p vs. ischemic colitis given hx of CAD vs. infectious colitis iso recent C diff colitis.  - S/p Pantoprazole 80mg IVP, c/w 40mg IV BID given possibility of brisk UGIB  - GI consulted, f/u recs  - Hgb stable although downtrending. Monitor CBC.  - Active T&S. Obtain consent for blood  - NPO for now  - Check C diff, GI PCR Cr 1.70, unclear baseline. Likely pre-renal, ATN secondary to septic shock  - FENa c/w pre-renal  - bladder scan and renal u/s to r/o post-renal Likely pre-renal, ATN secondary to septic shock. Unclear baseline Cr.   - FENa c/w pre-renal  - renal u/s to r/o post-renal

## 2021-02-15 NOTE — PROVIDER CONTACT NOTE (OTHER) - ACTION/TREATMENT ORDERED:
MD aware, continue to monitor, straight cth, no rectal temp at this time, send GIPCR to lab. MD aware, continue to monitor, straight cth pt, no rectal temp at this time since hx of bloody stools, send GIPCR to lab.

## 2021-02-15 NOTE — PROGRESS NOTE ADULT - PROBLEM SELECTOR PLAN 5
Previous infection 2 months ago.  - CTM Likely LGIB given normal colored stool with blood-tinged water. Possibly diverticulosis as noted on CT a/p vs. ischemic colitis given hx of CAD vs. infectious colitis iso recent C diff colitis.  - no further episodes  - c/w 40mg IV BID given possibility of brisk UGIB  - Hgb stable although downtrending. Monitor CBC.  - no blood transfuison

## 2021-02-15 NOTE — PROGRESS NOTE ADULT - PROBLEM SELECTOR PLAN 9
- c/w mirtazapine 15mg QHS DVT ppx: on hold iso GIB  Diet: NPO for now, NGT placed  Code: DNR/DNI per HCP, MOLST to be filled out/obtained from rehab center

## 2021-02-15 NOTE — PROGRESS NOTE ADULT - PROBLEM SELECTOR PLAN 6
R leg swelling compared to L  - RLE duplex ordered  - DVT ppx on hold iso GIB Previous infection 2 months ago.  - per ID, likely remnant of previous infection  - Sating well on RA

## 2021-02-15 NOTE — PROGRESS NOTE ADULT - ASSESSMENT
89F w/ PMH of CAD s/p PCI, Chronic UTI's, MDD, HTN, HLD, [place] (hearing aids), dysphagia, prior COVID19 infection, and recent hospitalization for C. diff colitis (~2/7/21) presenting from rehab center for bloody bowel movement, likely LGIB secondary to diverticulosis vs. infectious colitis vs. ischemic colitis.  Also found to be in septic shock, likely secondary to colitis iso recent C. diff.  COVID +, asymptomatic and without hypoxia.

## 2021-02-15 NOTE — PROVIDER CONTACT NOTE (OTHER) - RECOMMENDATIONS
Notify MD, continue to monitor, straight cth, rectal temp, send GI PCR? Notify MD, continue to monitor, straight cth, rectal temp, send GI PCR or provider to ?

## 2021-02-15 NOTE — PROGRESS NOTE ADULT - PROBLEM SELECTOR PLAN 7
Hx of urinary retention and chronic UTI  - c/w home bethanechol. Hold pyridium  - bladder scan q8h Hx of multiple CAD s/p 1 stent  - hold ASA 81mg daily and Isosorbide ER 60mg daily iso GIB and hypotension  - hold antihypertensive enalapril iso hypotesions and JERONIMO

## 2021-02-15 NOTE — PROGRESS NOTE ADULT - PROBLEM SELECTOR PLAN 8
Hx of multiple CAD s/p 1 stent  - hold ASA 81mg daily and Isosorbide ER 60mg daily iso GIB and hypotension  - hold antihypertensive enalapril iso hypotesions and JERONIMO - c/w mirtazapine 15mg QHS

## 2021-02-15 NOTE — PROGRESS NOTE ADULT - PROBLEM SELECTOR PLAN 1
Likely LGIB given normal colored stool with blood-tinged water. Possibly diverticulosis as noted on CT a/p vs. ischemic colitis given hx of CAD vs. infectious colitis iso recent C diff colitis.  - S/p Pantoprazole 80mg IVP, c/w 40mg IV BID given possibility of brisk UGIB  - GI consulted, f/u recs  - Hgb stable although downtrending. Monitor CBC.  - Active T&S. Obtain consent for blood  - NPO for now  - Check C diff, GI PCR Likely secondary to colitis iso recent C. diff vs. COVID. HAGMA with elevated lactate to 5.7.  - Check C diff, GI PCR, UA  - F/u BCx  - C/w Zosyn to cover GNR, anaerobes (GI pathogen)  - C/w Flagyl IV 500mg BID for now for possible recurrent C diff colitis; transition to PO vancomycin once bedside swallow is done  - s/p total 2L IVF. Give another 1L LR bolus and maintenance LR, trend lactate. Likely secondary to colitis possibly C. diff vs. UTI. Lactate improving to 3.1. Metabolic acidosis improved.  - 1st C. diff negative, but high suspicion per ID. F/u 2nd C. diff sent. GI PCR negative.  - UA positive. F/u UCx.  - C/w Vancomycin 500mg q6h for suspected C diff. C/w Zosyn, Flagyl IV 500mg BID  - switch to D5 + NS  - trend lactate Likely secondary to colitis possibly C. diff vs. UTI. Lactate improving to 3.1. Metabolic acidosis improved. Continues to have watery diarrhea.  - 1st C. diff negative, but high suspicion per ID. F/u 2nd C. diff sent. GI PCR negative.  - UA positive. F/u UCx.  - C/w Vancomycin 500mg q6h for suspected C diff. C/w Zosyn, Flagyl IV 500mg BID  - switch to D5 + NS  - trend lactate  - F/u ID recs Likely secondary to colitis possibly C. diff vs. UTI. Lactate improving to 3.1. Metabolic acidosis improved. Continues to have watery diarrhea. BP more stable.  - 1st C. diff negative, but high suspicion per ID. F/u 2nd C. diff sent. GI PCR negative.  - UA positive. F/u UCx.  - C/w Vancomycin 500mg q6h for suspected C diff. C/w Zosyn, Flagyl IV 500mg BID  - switch to D5 + NS  - trend lactate  - F/u ID recs

## 2021-02-15 NOTE — PROGRESS NOTE ADULT - SUBJECTIVE AND OBJECTIVE BOX
Ramses Fofana, PGY1  Pager [phone]    INCOMPLETE NOTE - IN PROGRESS    Patient is a 89y old  Female who presents with a chief complaint of GI Bleed, Vomiting (14 Feb 2021 15:20)      SUBJECTIVE/INTERVAL EVENTS: Patient seen and examined at bedside.    MEDICATIONS  (STANDING):  bethanechol 10 milliGRAM(s) Oral three times a day  cholecalciferol 1000 Unit(s) Oral daily  cyanocobalamin 1000 MICROGram(s) Oral daily  lactated ringers. 1000 milliLiter(s) (75 mL/Hr) IV Continuous <Continuous>  lactated ringers. 1000 milliLiter(s) (60 mL/Hr) IV Continuous <Continuous>  metroNIDAZOLE  IVPB 500 milliGRAM(s) IV Intermittent every 8 hours  mirtazapine 15 milliGRAM(s) Oral at bedtime  pantoprazole  Injectable 40 milliGRAM(s) IV Push two times a day  piperacillin/tazobactam IVPB.. 3.375 Gram(s) IV Intermittent every 12 hours  vancomycin    Solution 500 milliGRAM(s) Oral every 6 hours    MEDICATIONS  (PRN):  acetaminophen   Tablet .. 650 milliGRAM(s) Oral every 12 hours PRN Mild Pain (1 - 3), Moderate Pain (4 - 6)  ondansetron    Tablet 4 milliGRAM(s) Oral every 8 hours PRN Nausea and/or Vomiting      VITAL SIGNS:  T(F): 99.9 (02-15-21 @ 04:22), Max: 99.9 (02-15-21 @ 04:22)  HR: 107 (02-15-21 @ 04:22) (107 - 120)  BP: 116/70 (02-15-21 @ 04:22) (70/50 - 127/76)  RR: 18 (02-15-21 @ 04:22) (18 - 19)  SpO2: 100% (02-15-21 @ 04:22) (95% - 100%)    I&O's Summary    14 Feb 2021 07:01  -  15 Feb 2021 06:58  --------------------------------------------------------  IN: 2200 mL / OUT: 550 mL / NET: 1650 mL      Daily     Daily     PHYSICAL EXAM:  Gen: Alert, NAD  HEENT: NCAT, conjunctiva clear, sclera anicteric, no erythema or exudates in the oropharynx, mmm  Neck: Supple, no JVD  CV: RRR, S1S2, no m/r/g  Resp: CTAB, normal respiratory effort  Abd: Soft, nontender, nondistended, normal bowel sounds  Ext: no edema, no clubbing or cyanosis  Neuro: AOx3, CN2-12 grossly intact, POLK  SKIN: warm, perfused    LABS:                        12.6   32.04 )-----------( 313      ( 14 Feb 2021 13:51 )             40.3     Hgb Trend: 12.6<--, 12.9<--, 11.9<--, 13.6<--  02-14    135  |  99  |  29<H>  ----------------------------<  143<H>  4.5   |  18<L>  |  2.59<H>    Ca    9.3      14 Feb 2021 13:59  Phos  3.9     02-14  Mg     1.9     02-14    TPro  6.5  /  Alb  3.3  /  TBili  0.4  /  DBili  x   /  AST  23  /  ALT  10  /  AlkPhos  75  02-14    Creatinine Trend: 2.59<--, 2.48<--, 1.70<--, 1.25<--  LIVER FUNCTIONS - ( 14 Feb 2021 13:59 )  Alb: 3.3 g/dL / Pro: 6.5 g/dL / ALK PHOS: 75 U/L / ALT: 10 U/L / AST: 23 U/L / GGT: x           PT/INR - ( 13 Feb 2021 20:16 )   PT: 12.1 sec;   INR: 1.01 ratio         PTT - ( 13 Feb 2021 20:16 )  PTT:19.7 sec      Urinalysis Basic - ( 14 Feb 2021 13:51 )    Color: Dark Yellow / Appearance: Slightly Turbid / SG: >1.050 / pH: x  Gluc: x / Ketone: Negative  / Bili: Small / Urobili: 3 mg/dL   Blood: x / Protein: 100 / Nitrite: Positive   Leuk Esterase: Large / RBC: 9 /hpf /  /HPF   Sq Epi: x / Non Sq Epi: 6 /hpf / Bacteria: Few        CAPILLARY BLOOD GLUCOSE      POCT Blood Glucose.: 122 mg/dL (14 Feb 2021 13:28)      RADIOLOGY & ADDITIONAL TESTS: Reviewed    Imaging Personally Reviewed:    Consultant(s) Notes Reviewed:      Care Discussed with Consultants/Other Providers:   Ramses Pinedaon, PGY1  Pager [phone]      Patient is a 89y old  Female who presents with a chief complaint of GI Bleed, Vomiting (14 Feb 2021 15:20)      SUBJECTIVE/INTERVAL EVENTS:  - Had further episodes of diarrhea.  Stool sample sent. No further bleeding.  - Mental status improved, more conversive  - retaining urine this AM, required straight cath - 600cc output    MEDICATIONS  (STANDING):  bethanechol 10 milliGRAM(s) Oral three times a day  cholecalciferol 1000 Unit(s) Oral daily  cyanocobalamin 1000 MICROGram(s) Oral daily  lactated ringers. 1000 milliLiter(s) (75 mL/Hr) IV Continuous <Continuous>  lactated ringers. 1000 milliLiter(s) (60 mL/Hr) IV Continuous <Continuous>  metroNIDAZOLE  IVPB 500 milliGRAM(s) IV Intermittent every 8 hours  mirtazapine 15 milliGRAM(s) Oral at bedtime  pantoprazole  Injectable 40 milliGRAM(s) IV Push two times a day  piperacillin/tazobactam IVPB.. 3.375 Gram(s) IV Intermittent every 12 hours  vancomycin    Solution 500 milliGRAM(s) Oral every 6 hours    MEDICATIONS  (PRN):  acetaminophen   Tablet .. 650 milliGRAM(s) Oral every 12 hours PRN Mild Pain (1 - 3), Moderate Pain (4 - 6)  ondansetron    Tablet 4 milliGRAM(s) Oral every 8 hours PRN Nausea and/or Vomiting      VITAL SIGNS:  T(F): 99.9 (02-15-21 @ 04:22), Max: 99.9 (02-15-21 @ 04:22)  HR: 107 (02-15-21 @ 04:22) (107 - 120)  BP: 116/70 (02-15-21 @ 04:22) (70/50 - 127/76)  RR: 18 (02-15-21 @ 04:22) (18 - 19)  SpO2: 100% (02-15-21 @ 04:22) (95% - 100%)    I&O's Summary    14 Feb 2021 07:01  -  15 Feb 2021 06:58  --------------------------------------------------------  IN: 2200 mL / OUT: 550 mL / NET: 1650 mL      Daily     Daily     PHYSICAL EXAM:  Gen: Lethargic, awake, verbal, well-developed, NAD  HEENT: NCAT, PERRL, EOMI, clear conjunctiva, no scleral icterus  Neck: Supple, no JVD, no LAD  CV: tachycardic, S1S2, no m/r/g  Resp: CTAB, normal respiratory effort  Abd: Soft, tender on Left side and LLQ, ND  Ext: mild R leg swelling, no clubbing or cyanosis  Neuro: responds to questions, AOx2, CN2-12 grossly intact, POLK  Skin: warm, perfused    LABS:                        12.6   32.04 )-----------( 313      ( 14 Feb 2021 13:51 )             40.3     Hgb Trend: 12.6<--, 12.9<--, 11.9<--, 13.6<--  02-14    135  |  99  |  29<H>  ----------------------------<  143<H>  4.5   |  18<L>  |  2.59<H>    Ca    9.3      14 Feb 2021 13:59  Phos  3.9     02-14  Mg     1.9     02-14    TPro  6.5  /  Alb  3.3  /  TBili  0.4  /  DBili  x   /  AST  23  /  ALT  10  /  AlkPhos  75  02-14    Creatinine Trend: 2.59<--, 2.48<--, 1.70<--, 1.25<--  LIVER FUNCTIONS - ( 14 Feb 2021 13:59 )  Alb: 3.3 g/dL / Pro: 6.5 g/dL / ALK PHOS: 75 U/L / ALT: 10 U/L / AST: 23 U/L / GGT: x           PT/INR - ( 13 Feb 2021 20:16 )   PT: 12.1 sec;   INR: 1.01 ratio         PTT - ( 13 Feb 2021 20:16 )  PTT:19.7 sec      Urinalysis Basic - ( 14 Feb 2021 13:51 )    Color: Dark Yellow / Appearance: Slightly Turbid / SG: >1.050 / pH: x  Gluc: x / Ketone: Negative  / Bili: Small / Urobili: 3 mg/dL   Blood: x / Protein: 100 / Nitrite: Positive   Leuk Esterase: Large / RBC: 9 /hpf /  /HPF   Sq Epi: x / Non Sq Epi: 6 /hpf / Bacteria: Few        CAPILLARY BLOOD GLUCOSE      POCT Blood Glucose.: 122 mg/dL (14 Feb 2021 13:28)      RADIOLOGY & ADDITIONAL TESTS: Reviewed    Imaging Personally Reviewed:    Consultant(s) Notes Reviewed:      Care Discussed with Consultants/Other Providers:   Ramses Pinedaon, PGY1  Pager [phone]      Patient is a 89y old  Female who presents with a chief complaint of GI Bleed, Vomiting (14 Feb 2021 15:20)      SUBJECTIVE/INTERVAL EVENTS:  - Had further episodes of diarrhea.  Stool sample sent. No further bleeding.  - Mental status improved, more conversive  - retaining urine this AM, required straight cath - 600cc output    MEDICATIONS  (STANDING):  bethanechol 10 milliGRAM(s) Oral three times a day  cholecalciferol 1000 Unit(s) Oral daily  cyanocobalamin 1000 MICROGram(s) Oral daily  lactated ringers. 1000 milliLiter(s) (75 mL/Hr) IV Continuous <Continuous>  lactated ringers. 1000 milliLiter(s) (60 mL/Hr) IV Continuous <Continuous>  metroNIDAZOLE  IVPB 500 milliGRAM(s) IV Intermittent every 8 hours  mirtazapine 15 milliGRAM(s) Oral at bedtime  pantoprazole  Injectable 40 milliGRAM(s) IV Push two times a day  piperacillin/tazobactam IVPB.. 3.375 Gram(s) IV Intermittent every 12 hours  vancomycin    Solution 500 milliGRAM(s) Oral every 6 hours    MEDICATIONS  (PRN):  acetaminophen   Tablet .. 650 milliGRAM(s) Oral every 12 hours PRN Mild Pain (1 - 3), Moderate Pain (4 - 6)  ondansetron    Tablet 4 milliGRAM(s) Oral every 8 hours PRN Nausea and/or Vomiting      VITAL SIGNS:  T(F): 99.9 (02-15-21 @ 04:22), Max: 99.9 (02-15-21 @ 04:22)  HR: 107 (02-15-21 @ 04:22) (107 - 120)  BP: 116/70 (02-15-21 @ 04:22) (70/50 - 127/76)  RR: 18 (02-15-21 @ 04:22) (18 - 19)  SpO2: 100% (02-15-21 @ 04:22) (95% - 100%)    I&O's Summary    14 Feb 2021 07:01  -  15 Feb 2021 06:58  --------------------------------------------------------  IN: 2200 mL / OUT: 550 mL / NET: 1650 mL      Daily     Daily     PHYSICAL EXAM:  Gen: Lethargic, awake, verbal, well-developed, NAD  HEENT: NCAT, PERRL, EOMI, clear conjunctiva, no scleral icterus  Neck: Supple, no JVD, no LAD  CV: tachycardic, S1S2, no m/r/g  Resp: CTAB, normal respiratory effort  Abd: Soft, tender on Left side and LLQ, ND  Ext: no leg swelling, no clubbing or cyanosis  Neuro: responds to questions, AOx2, CN2-12 grossly intact, POLK  Skin: warm, perfused    LABS:                        12.6   32.04 )-----------( 313      ( 14 Feb 2021 13:51 )             40.3     Hgb Trend: 12.6<--, 12.9<--, 11.9<--, 13.6<--  02-14    135  |  99  |  29<H>  ----------------------------<  143<H>  4.5   |  18<L>  |  2.59<H>    Ca    9.3      14 Feb 2021 13:59  Phos  3.9     02-14  Mg     1.9     02-14    TPro  6.5  /  Alb  3.3  /  TBili  0.4  /  DBili  x   /  AST  23  /  ALT  10  /  AlkPhos  75  02-14    Creatinine Trend: 2.59<--, 2.48<--, 1.70<--, 1.25<--  LIVER FUNCTIONS - ( 14 Feb 2021 13:59 )  Alb: 3.3 g/dL / Pro: 6.5 g/dL / ALK PHOS: 75 U/L / ALT: 10 U/L / AST: 23 U/L / GGT: x           PT/INR - ( 13 Feb 2021 20:16 )   PT: 12.1 sec;   INR: 1.01 ratio         PTT - ( 13 Feb 2021 20:16 )  PTT:19.7 sec      Urinalysis Basic - ( 14 Feb 2021 13:51 )    Color: Dark Yellow / Appearance: Slightly Turbid / SG: >1.050 / pH: x  Gluc: x / Ketone: Negative  / Bili: Small / Urobili: 3 mg/dL   Blood: x / Protein: 100 / Nitrite: Positive   Leuk Esterase: Large / RBC: 9 /hpf /  /HPF   Sq Epi: x / Non Sq Epi: 6 /hpf / Bacteria: Few        CAPILLARY BLOOD GLUCOSE      POCT Blood Glucose.: 122 mg/dL (14 Feb 2021 13:28)      RADIOLOGY & ADDITIONAL TESTS: Reviewed    Imaging Personally Reviewed:    Consultant(s) Notes Reviewed:      Care Discussed with Consultants/Other Providers:

## 2021-02-15 NOTE — PROGRESS NOTE ADULT - PROBLEM SELECTOR PLAN 3
Cr 1.70, unclear baseline. Likely pre-renal, ATN secondary to septic shock  - s/p 2L IVF. Give another 1L bolus NS  - check FENa  - bladder scan and renal u/s to r/o post-renal Likely d/t sepsis/infection  - Continue to monitor  - Bedside swallow, start diet once cleared by GI Likely d/t sepsis/infection  - improving  - NGT placed 2/14  - attempt bedside swallow today

## 2021-02-15 NOTE — PROVIDER CONTACT NOTE (OTHER) - ASSESSMENT
AO0 nonverbal lethargic, 'no urine output, temp 99.9 axillary, 1 BM overnight pinkish brown liquid  GIPCR sent with what could be collected AO0 nonverbal lethargic, no urine output bladder scan showed 347ml retained, temp 99.9 axillary, 1 BM overnight pinkish brown liquid no formed stool  GIPCR sent with what could be collected

## 2021-02-15 NOTE — PROGRESS NOTE ADULT - ASSESSMENT
89F w/ PMH of CAD s/p PCI, Chronic UTI's, MDD, HTN, HLD, [place] (hearing aids), dysphagia, COVID19 infection (2 months ago, s/p dexa, remdesivir, convalescent plasma, not intubated), and recent hospitalization for C. diff colitis (~2/7/21) presenting from [facility] after episode of vomiting and bloody bowel movement on 2/13.     unclear if this still c diff vs ischemic bowel  vs microperforation vs other   pt seems to have a UTI as well  Pt was seen by surgery    would treat for c diff, UTI and bowel event  likely NOT acute covid  ab positve , Will d/w infection prevention to see if we can d/c isolation    follow lactate  await cultures .   BP is better today

## 2021-02-15 NOTE — PROGRESS NOTE ADULT - SUBJECTIVE AND OBJECTIVE BOX
Patient is a 89y old  Female who presents with a chief complaint of GI Bleed, Vomiting (15 Feb 2021 06:58)    Being followed by ID for        Interval history:  pt unable to give history  No other acute events      ROS:  No cough,SOB,CP  No N/V/D  No abd pain  No urinary complaints  No HA  No joint or limb pain  No other complaints    PAST MEDICAL & SURGICAL HISTORY:  COVID-19    Urinary tract infection    CAD in native artery    Dyslipidemia    Depression    Hyponatremia    Hypertension    History of repair of hip fracture      Allergies    flu vaccine (Anaphylaxis)  No Known Drug Allergies    Intolerances      Antimicrobials:    metroNIDAZOLE  IVPB 500 milliGRAM(s) IV Intermittent every 8 hours  piperacillin/tazobactam IVPB.. 3.375 Gram(s) IV Intermittent every 12 hours  vancomycin    Solution 500 milliGRAM(s) Oral every 6 hours    MEDICATIONS  (STANDING):  acetaminophen   Tablet .. 650 milliGRAM(s) Oral once  bethanechol 10 milliGRAM(s) Oral three times a day  cholecalciferol 1000 Unit(s) Oral daily  cyanocobalamin 1000 MICROGram(s) Oral daily  dextrose 5% + lactated ringers. 1000 milliLiter(s) (60 mL/Hr) IV Continuous <Continuous>  metroNIDAZOLE  IVPB 500 milliGRAM(s) IV Intermittent every 8 hours  mirtazapine 15 milliGRAM(s) Oral at bedtime  pantoprazole  Injectable 40 milliGRAM(s) IV Push two times a day  piperacillin/tazobactam IVPB.. 3.375 Gram(s) IV Intermittent every 12 hours  vancomycin    Solution 500 milliGRAM(s) Oral every 6 hours    MEDICATIONS  (PRN):  acetaminophen   Tablet .. 650 milliGRAM(s) Oral every 12 hours PRN Mild Pain (1 - 3), Moderate Pain (4 - 6)  ondansetron    Tablet 4 milliGRAM(s) Oral every 8 hours PRN Nausea and/or Vomiting      Vital Signs Last 24 Hrs  T(C): 37.7 (02-15-21 @ 04:22), Max: 37.7 (02-15-21 @ 04:22)  T(F): 99.9 (02-15-21 @ 04:22), Max: 99.9 (02-15-21 @ 04:22)  HR: 107 (02-15-21 @ 04:22) (107 - 120)  BP: 116/70 (02-15-21 @ 04:22) (70/50 - 127/76)  BP(mean): --  RR: 18 (02-15-21 @ 04:22) (18 - 19)  SpO2: 100% (02-15-21 @ 04:22) (97% - 100%)    Physical Exam:    Constitutional well preserved,comfortable,pleasant    HEENT PERRLA EOMI,No pallor or icterus    No oral exudate or erythema    Neck supple no JVD or LN    Chest Good AE,CTA    CVS RRR S1 S2 WNl No murmur or rub or gallop    Abd soft BS normal No tenderness no masses    Ext No cyanosis clubbing or edema    IV site no erythema tenderness or discharge    Joints no swelling or LOM    CNS AAO X 3 no focal    Lab Data:                          12.6   32.04 )-----------( 313      ( 14 Feb 2021 13:51 )             40.3       02-15    136  |  100  |  34<H>  ----------------------------<  67<L>  4.6   |  19<L>  |  2.47<H>    Ca    9.1      15 Feb 2021 06:40  Phos  4.4     02-15  Mg     1.8     02-15    TPro  6.5  /  Alb  3.3  /  TBili  0.4  /  DBili  x   /  AST  23  /  ALT  10  /  AlkPhos  75  02-14      Urinalysis Basic - ( 14 Feb 2021 13:51 )    Color: Dark Yellow / Appearance: Slightly Turbid / SG: >1.050 / pH: x  Gluc: x / Ketone: Negative  / Bili: Small / Urobili: 3 mg/dL   Blood: x / Protein: 100 / Nitrite: Positive   Leuk Esterase: Large / RBC: 9 /hpf /  /HPF   Sq Epi: x / Non Sq Epi: 6 /hpf / Bacteria: Few        .Blood Blood-Peripheral  02-14-21   No growth to date.  --  --          Clostridium difficile GDH Interpretation: Negative for toxigenic C. Difficile.  This specimen is negative for C.  Difficile glutamate dehydrogenase (GDH) antigen and negative for C.  Difficile Toxins A & B, by EIA.  GDH is a highly sensitive screening  marker for C. Difficile that is produced in large amounts by all C.  Difficile strains, both toxigenic and nontoxigenic.  This assay has not  been validated as a test of cure.  Repeat testing during the same episode  of diarrhea is of limited value and is discouraged.  The results of this  assay should always be interpreted in conjunction with pateint's clinical  history. (02-14-21 @ 12:35)          WBC Count: 32.04 (02-14-21 @ 13:51)  WBC Count: 26.64 (02-14-21 @ 11:11)  WBC Count: 24.73 (02-13-21 @ 22:45)  WBC Count: 16.80 (02-13-21 @ 20:15)                  Ordinal Scale: score :   1) Death  2) Hospitalized, on invasive mechanical ventilation or ECMO  3) Hospitalized, on non-invasive ventilation or high flow oxygen devices  4) Hospitalized, requiring low flow(<11l/min) supplemental oxygen  5) Hospitalized, not requiring supplemental oxygen - requiring ongoing medical care(COVID-19 related or otherwise)  6) Hospitalized, not requiring supplemental oxygen - no longer requires ongoing medical care(other than per protocol RDV administration)  7) Not hospitalized     Patient is a 89y old  Female who presents with a chief complaint of GI Bleed, Vomiting (15 Feb 2021 06:58)    Being followed by ID for        Interval history:  pt unable to give history as non verbal  had bowel movement , not bloody today  No other acute events        PAST MEDICAL & SURGICAL HISTORY:  COVID-19    Urinary tract infection    CAD in native artery    Dyslipidemia    Depression    Hyponatremia    Hypertension    History of repair of hip fracture      Allergies    flu vaccine (Anaphylaxis)  No Known Drug Allergies    Intolerances      Antimicrobials:    metroNIDAZOLE  IVPB 500 milliGRAM(s) IV Intermittent every 8 hours  piperacillin/tazobactam IVPB.. 3.375 Gram(s) IV Intermittent every 12 hours  vancomycin    Solution 500 milliGRAM(s) Oral every 6 hours    MEDICATIONS  (STANDING):  acetaminophen   Tablet .. 650 milliGRAM(s) Oral once  bethanechol 10 milliGRAM(s) Oral three times a day  cholecalciferol 1000 Unit(s) Oral daily  cyanocobalamin 1000 MICROGram(s) Oral daily  dextrose 5% + lactated ringers. 1000 milliLiter(s) (60 mL/Hr) IV Continuous <Continuous>  metroNIDAZOLE  IVPB 500 milliGRAM(s) IV Intermittent every 8 hours  mirtazapine 15 milliGRAM(s) Oral at bedtime  pantoprazole  Injectable 40 milliGRAM(s) IV Push two times a day  piperacillin/tazobactam IVPB.. 3.375 Gram(s) IV Intermittent every 12 hours  vancomycin    Solution 500 milliGRAM(s) Oral every 6 hours    MEDICATIONS  (PRN):  acetaminophen   Tablet .. 650 milliGRAM(s) Oral every 12 hours PRN Mild Pain (1 - 3), Moderate Pain (4 - 6)  ondansetron    Tablet 4 milliGRAM(s) Oral every 8 hours PRN Nausea and/or Vomiting      Vital Signs Last 24 Hrs  T(C): 37.7 (02-15-21 @ 04:22), Max: 37.7 (02-15-21 @ 04:22)  T(F): 99.9 (02-15-21 @ 04:22), Max: 99.9 (02-15-21 @ 04:22)  HR: 107 (02-15-21 @ 04:22) (107 - 120)  BP: 116/70 (02-15-21 @ 04:22) (70/50 - 127/76)  BP(mean): --  RR: 18 (02-15-21 @ 04:22) (18 - 19)  SpO2: 100% (02-15-21 @ 04:22) (97% - 100%)    Physical Exam:    Constitutional  resting quietly    HEENT PERRLA EOMI,No pallor or icterus    No oral exudate or erythema    Neck supple no JVD or LN    Chest Good AE,CTA    CVS  S1 S2     Abd soft  tender , but not as much as yesterday    Ext No cyanosis clubbing or edema    IV site no erythema tenderness or discharge      Lab Data:                          12.6   32.04 )-----------( 313      ( 14 Feb 2021 13:51 )             40.3       02-15    136  |  100  |  34<H>  ----------------------------<  67<L>  4.6   |  19<L>  |  2.47<H>    Ca    9.1      15 Feb 2021 06:40  Phos  4.4     02-15  Mg     1.8     02-15    TPro  6.5  /  Alb  3.3  /  TBili  0.4  /  DBili  x   /  AST  23  /  ALT  10  /  AlkPhos  75  02-14      Urinalysis Basic - ( 14 Feb 2021 13:51 )    Color: Dark Yellow / Appearance: Slightly Turbid / SG: >1.050 / pH: x  Gluc: x / Ketone: Negative  / Bili: Small / Urobili: 3 mg/dL   Blood: x / Protein: 100 / Nitrite: Positive   Leuk Esterase: Large / RBC: 9 /hpf /  /HPF   Sq Epi: x / Non Sq Epi: 6 /hpf / Bacteria: Few        .Blood Blood-Peripheral  02-14-21   No growth to date.  --  --          Clostridium difficile GDH Interpretation: Negative for toxigenic C. Difficile.  This specimen is negative for C.  Difficile glutamate dehydrogenase (GDH) antigen and negative for C.  Difficile Toxins A & B, by EIA.  GDH is a highly sensitive screening  marker for C. Difficile that is produced in large amounts by all C.  Difficile strains, both toxigenic and nontoxigenic.  This assay has not  been validated as a test of cure.  Repeat testing during the same episode  of diarrhea is of limited value and is discouraged.  The results of this  assay should always be interpreted in conjunction with pateint's clinical  history. (02-14-21 @ 12:35)      COVID-19  Antibody - for prior infection screening (02.14.21 @ 13:34)    COVID-19 IgG Antibody Index: 6.92: Abbott CMIA  Measures Nucleocapsid  Negative Result    <= 1.39 Index  Positive Result      >= 1.40 Index Index    COVID-19 IgG Antibody Interpretation: Positive: This test has not been reviewed by the FDA by the standard review  process. It has been authorized for emergency use by the FDA. [employer] has validated this test to be accurate.  Negative results do not rule out SARS-CoV-2 infection, particularly in  those who have been in recent contact with the virus. Follow-up testing  with a molecular diagnostic test should be considered to rule out  infection in these individuals.  Results from antibody testing should not be used as thesole basis to  diagnose or exclude SARS-CoV-2 infection, or to inform infection status.  Positive results may rarely be due to past or present infection with  non-SARS-CoV-2 coronavirus strains, such as coronavirus HKU1, NL63, OC43,  or 229E. [employer], through extensive validation  testing, has confirmed that this risk is minimal with this test.        WBC Count: 32.04 (02-14-21 @ 13:51)  WBC Count: 26.64 (02-14-21 @ 11:11)  WBC Count: 24.73 (02-13-21 @ 22:45)  WBC Count: 16.80 (02-13-21 @ 20:15)        < from: CT Abdomen and Pelvis w/ IV Cont (02.13.21 @ 22:34) >    EXAM:  CT ABDOMEN AND PELVIS IC                            PROCEDURE DATE:  02/13/2021            INTERPRETATION:  CLINICAL INFORMATION: Rectal bleeding.    COMPARISON: CT abdomen pelvis 5/6/2011    PROCEDURE:  CT of the Abdomen and Pelvis was performed with and without intravenous contrast.  Precontrast, Arterial and Delayed phases were performed.  Intravenous contrast: 90 ml Omnipaque 350. 10 ml discarded.  Oral contrast: None.  Sagittal and coronal reformats were performed.    FINDINGS:  Evaluation of solid organs is limited without intravenous contrast.    LOWER CHEST: Bibasilar dependent atelectasis. Calcifications of the aortic valve and mitral annulus. Atherosclerotic calcifications of the coronary arteries.    LIVER: Calcified granuloma in the dome of the liver.  BILE DUCTS: Normal caliber.  GALLBLADDER: Within normal limits.  SPLEEN: Within normal limits for age.  PANCREAS: Within normal limits.  ADRENALS: Within normal limits.  KIDNEYS/URETERS: No renal stones or hydronephrosis. Exophytic left renal cyst measures 1.3 cm. Bilateral subcentimeter hypodensities too small to characterize.    BLADDER: Minimally distended. Trace air in the bladder.  REPRODUCTIVE ORGANS: Pessary ring. Fibroid uterus.    BOWEL: No bowel obstruction. Appendix is normal. Mild colonic diverticulosis. Suspected diffuse colonic wall thickening, most pronounced in the descending and rectosigmoid region.  PERITONEUM: No ascites.  VESSELS: Atherosclerotic changes. No extravasation of contrast identified to suggest active GI bleed noting that a small portion of the distal rectum is not included in the field of view.  RETROPERITONEUM/LYMPH NODES: No lymphadenopathy.  ABDOMINAL WALL: Small fat-containing umbilical hernia.  BONES: Degenerative changes. 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 L3 vertebral body hemangioma.    IMPRESSION:    1. No evidence of active GI bleed noting that portion of the distal rectum is not included in the field-of-view and cannot be evaluated.    2. Suspected diffuse colonic wall thickening, most pronounced in the descending and rectosigmoid colon, suspicious for colitis.    3. Focus of air within the bladder. Recommend correlation for recent interpretation.            < end of copied text >

## 2021-02-15 NOTE — PROGRESS NOTE ADULT - PROBLEM SELECTOR PLAN 4
Likely d/t sepsis/infection  - Continue to monitor  - Bedside swallow, start diet once cleared by GI Likely LGIB given normal colored stool with blood-tinged water. Possibly diverticulosis as noted on CT a/p vs. ischemic colitis given hx of CAD vs. infectious colitis iso recent C diff colitis.  - S/p Pantoprazole 80mg IVP, c/w 40mg IV BID given possibility of brisk UGIB  - GI consulted, f/u recs  - Hgb stable although downtrending. Monitor CBC.  - Active T&S. Obtain consent for blood  - NPO for now  - Check C diff, GI PCR Hx of urinary retention and chronic UTI  - retaining. PVD 400s on repeat bladder scan. Loera placed.  - c/w home bethanechol. Hold pyridium

## 2021-02-15 NOTE — PROGRESS NOTE ADULT - PROBLEM SELECTOR PLAN 10
DVT ppx: on hold iso GIB  Diet: NPO for now  Code: DNR/DNI per HCP, MOLST to be filled out/obtained from rehab center

## 2021-02-16 ENCOUNTER — TRANSCRIPTION ENCOUNTER (OUTPATIENT)
Age: 86
End: 2021-02-16

## 2021-02-16 DIAGNOSIS — I82.4Z1 ACUTE EMBOLISM AND THROMBOSIS OF UNSPECIFIED DEEP VEINS OF RIGHT DISTAL LOWER EXTREMITY: ICD-10-CM

## 2021-02-16 DIAGNOSIS — K92.2 GASTROINTESTINAL HEMORRHAGE, UNSPECIFIED: ICD-10-CM

## 2021-02-16 DIAGNOSIS — Z51.5 ENCOUNTER FOR PALLIATIVE CARE: ICD-10-CM

## 2021-02-16 DIAGNOSIS — R13.10 DYSPHAGIA, UNSPECIFIED: ICD-10-CM

## 2021-02-16 DIAGNOSIS — A41.9 SEPSIS, UNSPECIFIED ORGANISM: ICD-10-CM

## 2021-02-16 DIAGNOSIS — Z71.89 OTHER SPECIFIED COUNSELING: ICD-10-CM

## 2021-02-16 LAB
ANION GAP SERPL CALC-SCNC: 15 MMOL/L — SIGNIFICANT CHANGE UP (ref 5–17)
BASE EXCESS BLDV CALC-SCNC: -2.6 MMOL/L — LOW (ref -2–2)
BUN SERPL-MCNC: 32 MG/DL — HIGH (ref 7–23)
CALCIUM SERPL-MCNC: 8.5 MG/DL — SIGNIFICANT CHANGE UP (ref 8.4–10.5)
CHLORIDE SERPL-SCNC: 100 MMOL/L — SIGNIFICANT CHANGE UP (ref 96–108)
CO2 BLDV-SCNC: 24 MMOL/L — SIGNIFICANT CHANGE UP (ref 22–30)
CO2 SERPL-SCNC: 20 MMOL/L — LOW (ref 22–31)
CREAT SERPL-MCNC: 2.03 MG/DL — HIGH (ref 0.5–1.3)
CULTURE RESULTS: SIGNIFICANT CHANGE UP
GAS PNL BLDV: SIGNIFICANT CHANGE UP
GLUCOSE SERPL-MCNC: 61 MG/DL — LOW (ref 70–99)
HCO3 BLDV-SCNC: 23 MMOL/L — SIGNIFICANT CHANGE UP (ref 21–29)
HCT VFR BLD CALC: 31.7 % — LOW (ref 34.5–45)
HGB BLD-MCNC: 10.2 G/DL — LOW (ref 11.5–15.5)
LACTATE BLDV-MCNC: 3.5 MMOL/L — HIGH (ref 0.7–2)
MAGNESIUM SERPL-MCNC: 1.8 MG/DL — SIGNIFICANT CHANGE UP (ref 1.6–2.6)
MCHC RBC-ENTMCNC: 32.2 GM/DL — SIGNIFICANT CHANGE UP (ref 32–36)
MCHC RBC-ENTMCNC: 32.4 PG — SIGNIFICANT CHANGE UP (ref 27–34)
MCV RBC AUTO: 100.6 FL — HIGH (ref 80–100)
NRBC # BLD: 0 /100 WBCS — SIGNIFICANT CHANGE UP (ref 0–0)
PCO2 BLDV: 43 MMHG — SIGNIFICANT CHANGE UP (ref 35–50)
PH BLDV: 7.34 — LOW (ref 7.35–7.45)
PHOSPHATE SERPL-MCNC: 3.2 MG/DL — SIGNIFICANT CHANGE UP (ref 2.5–4.5)
PLATELET # BLD AUTO: 230 K/UL — SIGNIFICANT CHANGE UP (ref 150–400)
PO2 BLDV: 54 MMHG — HIGH (ref 25–45)
POTASSIUM SERPL-MCNC: 3.7 MMOL/L — SIGNIFICANT CHANGE UP (ref 3.5–5.3)
POTASSIUM SERPL-SCNC: 3.7 MMOL/L — SIGNIFICANT CHANGE UP (ref 3.5–5.3)
RBC # BLD: 3.15 M/UL — LOW (ref 3.8–5.2)
RBC # FLD: 15.2 % — HIGH (ref 10.3–14.5)
SAO2 % BLDV: 81 % — SIGNIFICANT CHANGE UP (ref 67–88)
SODIUM SERPL-SCNC: 135 MMOL/L — SIGNIFICANT CHANGE UP (ref 135–145)
SPECIMEN SOURCE: SIGNIFICANT CHANGE UP
WBC # BLD: 24.71 K/UL — HIGH (ref 3.8–10.5)
WBC # FLD AUTO: 24.71 K/UL — HIGH (ref 3.8–10.5)

## 2021-02-16 PROCEDURE — 93970 EXTREMITY STUDY: CPT | Mod: 26

## 2021-02-16 PROCEDURE — 99233 SBSQ HOSP IP/OBS HIGH 50: CPT | Mod: GC

## 2021-02-16 PROCEDURE — 99232 SBSQ HOSP IP/OBS MODERATE 35: CPT | Mod: CS

## 2021-02-16 PROCEDURE — 99223 1ST HOSP IP/OBS HIGH 75: CPT

## 2021-02-16 PROCEDURE — 76770 US EXAM ABDO BACK WALL COMP: CPT | Mod: 26

## 2021-02-16 RX ORDER — ASCORBIC ACID 60 MG
500 TABLET,CHEWABLE ORAL DAILY
Refills: 0 | Status: DISCONTINUED | OUTPATIENT
Start: 2021-02-16 | End: 2021-03-03

## 2021-02-16 RX ORDER — SODIUM CHLORIDE 9 MG/ML
1000 INJECTION, SOLUTION INTRAVENOUS
Refills: 0 | Status: DISCONTINUED | OUTPATIENT
Start: 2021-02-16 | End: 2021-02-19

## 2021-02-16 RX ORDER — HEPARIN SODIUM 5000 [USP'U]/ML
5000 INJECTION INTRAVENOUS; SUBCUTANEOUS EVERY 8 HOURS
Refills: 0 | Status: DISCONTINUED | OUTPATIENT
Start: 2021-02-16 | End: 2021-03-03

## 2021-02-16 RX ORDER — SODIUM CHLORIDE 9 MG/ML
1000 INJECTION INTRAMUSCULAR; INTRAVENOUS; SUBCUTANEOUS
Refills: 0 | Status: DISCONTINUED | OUTPATIENT
Start: 2021-02-16 | End: 2021-02-16

## 2021-02-16 RX ADMIN — HEPARIN SODIUM 5000 UNIT(S): 5000 INJECTION INTRAVENOUS; SUBCUTANEOUS at 21:46

## 2021-02-16 RX ADMIN — Medication 500 MILLIGRAM(S): at 22:00

## 2021-02-16 RX ADMIN — Medication 500 MILLIGRAM(S): at 12:53

## 2021-02-16 RX ADMIN — Medication 100 MILLIGRAM(S): at 21:46

## 2021-02-16 RX ADMIN — MIRTAZAPINE 15 MILLIGRAM(S): 45 TABLET, ORALLY DISINTEGRATING ORAL at 21:46

## 2021-02-16 RX ADMIN — HEPARIN SODIUM 5000 UNIT(S): 5000 INJECTION INTRAVENOUS; SUBCUTANEOUS at 16:48

## 2021-02-16 RX ADMIN — Medication 1000 UNIT(S): at 12:54

## 2021-02-16 RX ADMIN — PANTOPRAZOLE SODIUM 40 MILLIGRAM(S): 20 TABLET, DELAYED RELEASE ORAL at 05:04

## 2021-02-16 RX ADMIN — Medication 100 MILLIGRAM(S): at 12:51

## 2021-02-16 RX ADMIN — Medication 100 MILLIGRAM(S): at 05:03

## 2021-02-16 RX ADMIN — Medication 500 MILLIGRAM(S): at 05:04

## 2021-02-16 RX ADMIN — Medication 500 MILLIGRAM(S): at 18:10

## 2021-02-16 RX ADMIN — Medication 10 MILLIGRAM(S): at 05:04

## 2021-02-16 RX ADMIN — PIPERACILLIN AND TAZOBACTAM 25 GRAM(S): 4; .5 INJECTION, POWDER, LYOPHILIZED, FOR SOLUTION INTRAVENOUS at 05:03

## 2021-02-16 RX ADMIN — NYSTATIN CREAM 1 APPLICATION(S): 100000 CREAM TOPICAL at 05:04

## 2021-02-16 RX ADMIN — PREGABALIN 1000 MICROGRAM(S): 225 CAPSULE ORAL at 12:54

## 2021-02-16 RX ADMIN — PIPERACILLIN AND TAZOBACTAM 25 GRAM(S): 4; .5 INJECTION, POWDER, LYOPHILIZED, FOR SOLUTION INTRAVENOUS at 17:43

## 2021-02-16 RX ADMIN — Medication 10 MILLIGRAM(S): at 12:54

## 2021-02-16 RX ADMIN — Medication 10 MILLIGRAM(S): at 21:47

## 2021-02-16 RX ADMIN — NYSTATIN CREAM 1 APPLICATION(S): 100000 CREAM TOPICAL at 16:47

## 2021-02-16 NOTE — PROGRESS NOTE ADULT - PROBLEM SELECTOR PLAN 9
DVT ppx: on hold iso GIB  Diet: NPO for now, NGT placed  Code: DNR/DNI per HCP, MOLST to be filled out/obtained from rehab center DVT ppx: on hold iso GIB  Diet: dysphagia diet for now, pending s/s eval  Code: DNR/DNI, no pressors, no blood transfusions per HCP    Palliative medicine consulted, appreciate recs DVT ppx: on hold iso GIB  Diet: dysphagia 2 diet with thin liquids for now, lactose restricted. Pending s/s eval  Code: DNR/DNI, no pressors, no blood transfusions per HCP    Palliative medicine consulted, appreciate recs DVT ppx: restart SC heparin given no further GIB  Diet: dysphagia 2 diet with thin liquids for now, lactose restricted. Pending s/s eval  Code: DNR/DNI, no pressors, no blood transfusions per HCP    Palliative medicine consulted, appreciate recs - c/w mirtazapine 15mg QHS

## 2021-02-16 NOTE — DIETITIAN INITIAL EVALUATION ADULT. - PROBLEM SELECTOR PLAN 4
Likely d/t sepsis/infection  - Continue to monitor  - Bedside swallow, start diet once cleared by GI

## 2021-02-16 NOTE — DIETITIAN INITIAL EVALUATION ADULT. - OTHER INFO
Intake : >75% as per nurse  nausea/vomit :?  Denies difficulty chewing /swallow :?  Denies diarrhea/constipation:?  Last BM : 2/16 fecal incontinence-pt with C-DIFF  NKFA  IBW +/- 10%= 100pounds  Ht: 60"  Ht taken from pt/EMR  Dosing ht: 152.4cm  Usual Weight PTA:  120pounds  Dosing wt: 62.7kg  BMI: 28.6  BMI calculated using wt from flow sheet  BMI calculated using ht from pt  wt used to calculate needs: dosing  Education Provided : pt was educated on the importance of supplements to increase calorie and protein intake in light of RD's nutritional findings.   pressure injury: sacrum stage 1, left heel; stage 1, right heel stage 1  edema: none  pt eats Regular texture at home as per nurse. Pt lethargic, she has improved. nurse preformed bedside swallow which pt passed. pt very [place] and does not have her hearing aids.   Intake : >75% as per nurse  nausea/vomit :?  Denies difficulty chewing /swallow :?  Denies diarrhea/constipation:?  Last BM : 2/16 fecal incontinence-pt with C-DIFF  NKFA  IBW +/- 10%= 100pounds  Ht: 60"  Ht taken from pt/EMR  Dosing ht: 152.4cm  Usual Weight PTA:  120pounds  Dosing wt: 62.7kg  BMI: 28.6  BMI calculated using wt from flow sheet  BMI calculated using ht from pt  wt used to calculate needs: dosing  Education Provided : pt was educated on the importance of supplements to increase calorie and protein intake in light of RD's nutritional findings.   pressure injury: sacrum stage 1, left heel; stage 1, right heel stage 1  edema: none  pt eats Regular texture at home as per nurse. Pt lethargic, she has improved. nurse preformed bedside swallow which pt passed. RD discussed with T5 to change diet to SOFT.

## 2021-02-16 NOTE — DISCHARGE NOTE PROVIDER - NSDCMRMEDTOKEN_GEN_ALL_CORE_FT
Aspirin Enteric Coated 81 mg oral delayed release tablet: 1 tab(s) orally once a day  bethanechol 10 mg oral tablet: 1 tab(s) orally 3 times a day  Cranberry oral capsule: 425 milligram(s) orally once a day  enalapril 5 mg oral tablet: 1 tab(s) orally once a day  isosorbide mononitrate 60 mg oral tablet, extended release: 1 tab(s) orally once a day (in the morning)  lactobacillus acidophilus oral capsule: orally 2 times a day  mirtazapine 15 mg oral tablet: 1 tab(s) orally once a day (at bedtime)  Protonix 40 mg oral delayed release tablet: 1 tab(s) orally once a day  Pyridium 100 mg oral tablet: 1 tab(s) orally 3 times a day  Tylenol 325 mg oral tablet: 2 tab(s) orally every 12 hours, As Needed  Vitamin B12 1000 mcg oral tablet: 1 tab(s) orally once a day  Vitamin D3 1000 intl units (25 mcg) oral tablet: 1 cap(s) orally once a day  Zofran 4 mg oral tablet: 1 tab(s) orally once a day, As Needed   Aspirin Enteric Coated 81 mg oral delayed release tablet: 1 tab(s) orally once a day  bethanechol 10 mg oral tablet: 1 tab(s) orally 3 times a day  C-500-Gr oral tablet: 1 tab(s) orally once a day  Cranberry oral capsule: 425 milligram(s) orally once a day  enalapril 5 mg oral tablet: 1 tab(s) orally once a day  heparin:   isosorbide mononitrate 60 mg oral tablet, extended release: 1 tab(s) orally once a day (in the morning)  lactobacillus acidophilus oral capsule: orally 2 times a day  mirtazapine 15 mg oral tablet: 1 tab(s) orally once a day (at bedtime)  multivitamin:   nystatin 100,000 units/g topical powder: 1 application topically 2 times a day  PHOS-NaK oral powder for reconstitution: 1 packet(s) orally 3 times a day (with meals)  Protonix 40 mg oral delayed release tablet: 1 tab(s) orally once a day  psyllium 3.4 g/7 g oral powder for reconstitution:  orally   Pyridium 100 mg oral tablet: 1 tab(s) orally 3 times a day  Sodium Chloride 1 g oral tablet: 3 tab(s) orally 3 times a day  Tylenol 325 mg oral tablet: 2 tab(s) orally every 12 hours, As Needed  Vitamin B12 1000 mcg oral tablet: 1 tab(s) orally once a day  Vitamin D3 1000 intl units (25 mcg) oral tablet: 1 cap(s) orally once a day  Zofran 4 mg oral tablet: 1 tab(s) orally once a day, As Needed   Aspirin Enteric Coated 81 mg oral delayed release tablet: 1 tab(s) orally once a day  bethanechol 10 mg oral tablet: 1 tab(s) orally 3 times a day  C-500-Gr oral tablet: 1 tab(s) orally once a day  Cranberry oral capsule: 425 milligram(s) orally once a day  enalapril 5 mg oral tablet: 1 tab(s) orally once a day  heparin:   isosorbide mononitrate 60 mg oral tablet, extended release: 1 tab(s) orally once a day (in the morning)  lactobacillus acidophilus oral capsule: orally 2 times a day  mirtazapine 15 mg oral tablet: 1 tab(s) orally once a day (at bedtime)  multivitamin:   mupirocin 2% topical ointment: 1 application topically 2 times a day  nystatin 100,000 units/g topical powder: 1 application topically 2 times a day  PHOS-NaK oral powder for reconstitution: 1 packet(s) orally 3 times a day (with meals)  Protonix 40 mg oral delayed release tablet: 1 tab(s) orally once a day  psyllium 3.4 g/7 g oral powder for reconstitution:  orally   Pyridium 100 mg oral tablet: 1 tab(s) orally 3 times a day  Sodium Chloride 1 g oral tablet: 3 tab(s) orally 3 times a day  Tylenol 325 mg oral tablet: 2 tab(s) orally every 12 hours, As Needed  vancomycin 125 mg oral capsule: 1 cap(s) orally 2 times a day x 4 days then  1 cap orally once a day x 7 days then  1 cap orally every 2 days x 14 days    Vitamin B12 1000 mcg oral tablet: 1 tab(s) orally once a day  Vitamin D3 1000 intl units (25 mcg) oral tablet: 1 cap(s) orally once a day  Zofran 4 mg oral tablet: 1 tab(s) orally once a day, As Needed   Aspirin Enteric Coated 81 mg oral delayed release tablet: 1 tab(s) orally once a day  bethanechol 10 mg oral tablet: 1 tab(s) orally 3 times a day  C-500-Gr oral tablet: 1 tab(s) orally once a day  Cranberry oral capsule: 425 milligram(s) orally once a day  enalapril 5 mg oral tablet: 1 tab(s) orally once a day  heparin:   lactobacillus acidophilus oral capsule: orally 2 times a day  mirtazapine 15 mg oral tablet: 1 tab(s) orally once a day (at bedtime)  multivitamin:   mupirocin 2% topical ointment: 1 application topically 2 times a day  nystatin 100,000 units/g topical powder: 1 application topically 2 times a day  PHOS-NaK oral powder for reconstitution: 1 packet(s) orally 3 times a day (with meals)  Protonix 40 mg oral delayed release tablet: 1 tab(s) orally once a day  psyllium 3.4 g/7 g oral powder for reconstitution:  orally   Pyridium 100 mg oral tablet: 1 tab(s) orally 3 times a day  Sodium Chloride 1 g oral tablet: 3 tab(s) orally 3 times a day  Tylenol 325 mg oral tablet: 2 tab(s) orally every 12 hours, As Needed  vancomycin 125 mg oral capsule: 1 cap(s) orally 2 times a day x 4 days then  1 cap orally once a day x 7 days then  1 cap orally every 2 days x 14 days    Vitamin B12 1000 mcg oral tablet: 1 tab(s) orally once a day  Vitamin D3 1000 intl units (25 mcg) oral tablet: 1 cap(s) orally once a day  Zofran 4 mg oral tablet: 1 tab(s) orally once a day, As Needed

## 2021-02-16 NOTE — CONSULT NOTE ADULT - PROBLEM SELECTOR RECOMMENDATION 9
-Potentially due to weakness in the setting of sepsis and hospitalization  -may benefit from official S/S eval  -Dietician recs appreciated

## 2021-02-16 NOTE — PROGRESS NOTE ADULT - PROBLEM SELECTOR PLAN 1
Likely secondary to colitis possibly C. diff vs. UTI. Lactate improving to 3.1. Metabolic acidosis improved. Continues to have watery diarrhea. BP more stable.  - 1st C. diff negative, but high suspicion per ID. F/u 2nd C. diff sent. GI PCR negative.  - UA positive. F/u UCx.  - C/w Vancomycin 500mg q6h for suspected C diff. C/w Zosyn, Flagyl IV 500mg BID  - switch to D5 + NS  - trend lactate  - F/u ID recs Likely secondary to colitis possibly C. diff vs. UTI. Lactate still elevated at 3.5. Metabolic acidosis improved. Continues to have watery diarrhea. BP more stable.  - 1st C. diff negative, but high suspicion. F/u 2nd C. diff  - UA positive. F/u UCx  - C/w Vancomycin 500mg q6h (2/14-), Zosyn (2/14-), Flagyl IV 500mg BID (2/14-)  - NS + D5 100cc/hr x 24 hours  - trend lactate  - F/u ID recs Likely secondary to colitis possibly C. diff vs. UTI. Lactate still elevated at 3.5. Metabolic acidosis improved. Continues to have watery diarrhea. BP more stable.  - 1st C. diff negative, but high suspicion. F/u 2nd C. diff  - UA positive. F/u UCx  - C/w Vancomycin 500mg q6h (2/14-), Zosyn (2/14-), Flagyl IV 500mg BID (2/14-)  - NS + D5 100cc/hr x 24 hours  - trend lactate  - F/u ID recs  - change diet to lactose-restricted

## 2021-02-16 NOTE — DISCHARGE NOTE PROVIDER - NSDCFUADDAPPT_GEN_ALL_CORE_FT
Please follow up with the gastroenterologist at Dr. [facility] practice and with your urogynecologist Dr. Hernandez.  Please follow up with your primary care doctor to repeat a duplex or imaging for your right calf blood clot.   Please follow up with the gastroenterologist at Dr. [facility] practice and with your urogynecologist Dr. Hernandez.  Please follow up with your primary care doctor.

## 2021-02-16 NOTE — CONSULT NOTE ADULT - PROBLEM SELECTOR RECOMMENDATION 2
-Potentially from diverticulosis vs infectious colitis given CT findings  -c/w protonix BID  -Appears to have resolved

## 2021-02-16 NOTE — PROGRESS NOTE ADULT - PROBLEM SELECTOR PLAN 6
Previous infection 2 months ago.  - per ID, likely remnant of previous infection  - Sating well on RA Likely LGIB given normal colored stool with blood-tinged water. Possibly diverticulosis as noted on CT a/p vs. ischemic colitis given hx of CAD vs. infectious colitis iso recent C diff colitis.  - no further episodes since day of admission  - d/c PPI given low likelihood of UGIB

## 2021-02-16 NOTE — DISCHARGE NOTE PROVIDER - NSDCFUADDINST_GEN_ALL_CORE_FT
Your blood pressure medications: isosorbide mononitrate and enalapril were held in the hospital, because you blood pressure was too low. Please restart enalapril in the rehab. If you blood pressure remains normal, subsequently start isosorbide mononitrate as well.

## 2021-02-16 NOTE — DIETITIAN INITIAL EVALUATION ADULT. - ADD RECOMMEND
change Diet to KOSHER, SOFT, low sodium, no concentrated no concentrated K+, no concentrated phosphorous. continue to monitor labs and consider liberalizing no concentrated no concentrated K+ and no concentrated phosphorous. RD placed pending verification to add Suplena 2 x daily. recommend Nephro-Margarita and Vit C daily.

## 2021-02-16 NOTE — DISCHARGE NOTE PROVIDER - PROVIDER TOKENS
PROVIDER:[[id number]],FOLLOWUP:[2 weeks],ESTABLISHEDPATIENT:[T]] PROVIDER:[TOKEN:[[id number]],FOLLOWUP:[2 weeks],ESTABLISHEDPATIENT:[T]],PROVIDER:[[id number]],ESTABLISHEDPATIENT:[T]]

## 2021-02-16 NOTE — PROGRESS NOTE ADULT - PROBLEM SELECTOR PROBLEM 5
Lower GI bleed Deep vein thrombosis (DVT) of distal vein of right lower extremity, unspecified chronicity

## 2021-02-16 NOTE — CONSULT NOTE ADULT - SUBJECTIVE AND OBJECTIVE BOX
HPI:  89F w/ PMH of CAD s/p PCI, Chronic UTI's, MDD, HTN, HLD, [place] (hearing aids), dysphagia, COVID19 infection (2 months ago), and recent hospitalization for C. diff colitis (~2/7/21) [facility] episode of vomiting and bloody bowel movement on 2/13.  HPI obtained from chart and from daughter (HCP) as patient does not respond verbally. Staff also reported patient appeared confused on 2/13, when normally more alert and conversive.  Upon EMS arrival, SBP 70s and received 1L IVF.  Per daughter, Patient was hospitalized around 2/7/21 for C. diff colitis at [facility] and given IV vancomycin x 6 days, then transitioned to PO vancomycin upon discharge to rehab (last dose was 2/10/21).  Per daughter, no prior history of GI bleed.  Regarding her prior COVID19 infection, daughter reports she was diagnosed with "mild" case of COVID and hospitalized ~ 2 months ago and was treated with remdesivir, dexamethasone, convalescent plasma, and AC. Did not require ventilation. She tested negative twice prior to discharge to rehab at the end of January.    In ED: Initial BP was 90s/40s, received 500mL NS x2. BP now 100/70s. Hypothermic 95.3 rectally, then later febrile to 101, s/p 1g of IV tylenol.  HR 50s -> 120s, now 110s. RR 19-32, sating % on RA. S/p Pantoprazole 80mg IVP, Zosyn x1, Metronidazole 500mg IV x1, Ondansetron 4mg. Rectal exam normal colored stool with small amount of watery blood. (14 Feb 2021 07:19)    PERTINENT PM/SXH:   COVID-19  Urinary tract infection  CAD in native artery  S/P Hip Replacement  Dyslipidemia  Depression  Hyponatremia  Hypertension    History of repair of hip fracture      FAMILY HISTORY:  No pertinent family history in first degree relatives      ITEMS NOT CHECKED ARE NOT PRESENT    SOCIAL HISTORY:   Significant other/partner[X ]  Children[ ]  Jehovah's witness/Spirituality:  Substance hx:  [ ]   Tobacco hx:  [ ]   Alcohol hx: [ ]   Home Opioid hx:  [X ] I-Stop Reference No: [id number]  Living Situation: [ ]Home  [ ]Long term care  [X ]Rehab [ ]Other    ADVANCE DIRECTIVES:    DNR  Yes      MOLST  [ X]  Living Will  [ ]   DECISION MAKER(s):  [ ] Health Care Proxy(s)  [ ] Surrogate(s)  [ ] Guardian           Name(s): Phone Number(s): Batsheva Palm [phone]    BASELINE (I)ADL(s) (prior to admission):  [place]: [ ]Total  [X ] Moderate [ ]Dependent    Allergies    flu vaccine (Anaphylaxis)  No Known Drug Allergies    Intolerances    MEDICATIONS  (STANDING):  bethanechol 10 milliGRAM(s) Oral three times a day  cholecalciferol 1000 Unit(s) Oral daily  cyanocobalamin 1000 MICROGram(s) Oral daily  dextrose 5% + sodium chloride 0.9%. 1000 milliLiter(s) (100 mL/Hr) IV Continuous <Continuous>  metroNIDAZOLE  IVPB 500 milliGRAM(s) IV Intermittent every 8 hours  mirtazapine 15 milliGRAM(s) Oral at bedtime  nystatin Powder 1 Application(s) Topical two times a day  pantoprazole  Injectable 40 milliGRAM(s) IV Push two times a day  piperacillin/tazobactam IVPB.. 3.375 Gram(s) IV Intermittent every 12 hours  vancomycin    Solution 500 milliGRAM(s) Oral every 6 hours    MEDICATIONS  (PRN):  acetaminophen   Tablet .. 650 milliGRAM(s) Oral every 12 hours PRN Mild Pain (1 - 3), Moderate Pain (4 - 6)  ondansetron    Tablet 4 milliGRAM(s) Oral every 8 hours PRN Nausea and/or Vomiting    PRESENT SYMPTOMS: [ ]Unable to obtain due to poor mentation   Source if other than patient:  [ ]Family   [ ]Team     Pain: [ ]yes [X ]no  QOL impact -   Location -                    Aggravating factors -  Quality -  Radiation -  Timing-  Severity (0-10 scale):  Minimal acceptable level (0-10 scale):     CPOT:    https://www.sccm.org/getattachment/rmv78e11-6m1l-[email or web address]/Critical-Care-Pain-Observation-Tool-(CPOT)      PAIN AD Score:     http://geriatrictoolkit.[email or web address]/cog/painad.pdf (press ctrl +  left click to view)    Dyspnea:                           [ ]Mild [ ]Moderate [ ]Severe  Anxiety:                             [ ]Mild [ ]Moderate [ ]Severe  Fatigue:                             [ ]Mild [ ]Moderate [ ]Severe  Nausea:                             [ ]Mild [ ]Moderate [ ]Severe  Loss of appetite:              [ ]Mild [ ]Moderate [ ]Severe  Constipation:                    [ ]Mild [ ]Moderate [ ]Severe    Other Symptoms:  [ ]All other review of systems negative     Palliative Performance Status Version 2:  40%    http://[facility].org/files/news/palliative_performance_scale_ppsv2.pdf    PHYSICAL EXAM:  Vital Signs Last 24 Hrs  T(C): 36.9 (16 Feb 2021 12:15), Max: 37.1 (16 Feb 2021 04:42)  T(F): 98.5 (16 Feb 2021 12:15), Max: 98.8 (16 Feb 2021 04:42)  HR: 109 (16 Feb 2021 12:15) (109 - 114)  BP: 119/69 (16 Feb 2021 12:15) (117/66 - 135/73)  RR: 18 (16 Feb 2021 12:15) (18 - 20)  SpO2: 95% (16 Feb 2021 12:15) (93% - 95%) I&O's Summary    15 Feb 2021 07:01  -  16 Feb 2021 07:00  --------------------------------------------------------  IN: 1370 mL / OUT: 900 mL / NET: 470 mL    16 Feb 2021 07:01  -  16 Feb 2021 12:46  --------------------------------------------------------  IN: 0 mL / OUT: 200 mL / NET: -200 mL      GENERAL:  [ X]Alert  [X ]Oriented x  2 to self and place [ ]Lethargic  [ ]Cachexia  [ ]Unarousable  [ X]Verbal  [ ]Non-Verbal  Behavioral:   [ ] Anxiety  [ ] Delirium [ ] Agitation [ ] Other  HEENT:  [X ]Normal   [ ]Dry mouth   [ ]ET Tube/Trach  [ ]Oral lesions  PULMONARY:   [X ]Clear [ ]Tachypnea  [ ]Audible excessive secretions   [ ]Rhonchi        [ ]Right [ ]Left [ ]Bilateral  [ ]Crackles        [ ]Right [ ]Left [ ]Bilateral  [ ]Wheezing     [ ]Right [ ]Left [ ]Bilateral  [ ]Diminished breath sounds [ ]right [ ]left [ ]bilateral  CARDIOVASCULAR:    [ X]Regular [ ]Irregular [ ]Tachy  [ ]Jamie [ ]Murmur [ ]Other  GASTROINTESTINAL:  [X ]Soft  [ ]Distended   [ ]+BS  [ ]Non tender [X ]Tender  [ ]PEG [ ]OGT/ NGT  Last BM: 02/16/2021    GENITOURINARY:  [ ]Normal [ X] Incontinent   [ ]Oliguria/Anuria   [ X]Loera  MUSCULOSKELETAL:   [ ]Normal   [X ]Weakness  [ ]Bed/Wheelchair bound [ ]Edema  NEUROLOGIC:   []No focal deficits  [ ]Cognitive impairment  [X ]Dysphagia [ ]Dysarthria [ ]Paresis [ ]Other   SKIN:   [ X]Normal    [ ]Rash  [ ]Pressure ulcer(s)       Present on admission [ ]y [ ]n    CRITICAL CARE:  [ ] Shock Present  [ ]Septic [ ]Cardiogenic [ ]Neurologic [ ]Hypovolemic  [ ]  Vasopressors [ ]  Inotropes   [ ]Respiratory failure present [ ]Mechanical ventilation [ ]Non-invasive ventilatory support [ ]High flow    [ ]Acute  [ ]Chronic [ ]Hypoxic  [ ]Hypercarbic [ ]Other  [ ]Other organ failure     LABS:                        10.2   24.71 )-----------( 230      ( 16 Feb 2021 06:45 )             31.7   02-16    135  |  100  |  32<H>  ----------------------------<  61<L>  3.7   |  20<L>  |  2.03<H>    Ca    8.5      16 Feb 2021 06:45  Phos  3.2     02-16  Mg     1.8     02-16    TPro  6.5  /  Alb  3.3  /  TBili  0.4  /  DBili  x   /  AST  23  /  ALT  10  /  AlkPhos  75  02-14      Urinalysis Basic - ( 14 Feb 2021 13:51 )    Color: Dark Yellow / Appearance: Slightly Turbid / SG: >1.050 / pH: x  Gluc: x / Ketone: Negative  / Bili: Small / Urobili: 3 mg/dL   Blood: x / Protein: 100 / Nitrite: Positive   Leuk Esterase: Large / RBC: 9 /hpf /  /HPF   Sq Epi: x / Non Sq Epi: 6 /hpf / Bacteria: Few      RADIOLOGY & ADDITIONAL STUDIES:  < from: Xray Abdomen 1 View PORTABLE -Urgent (Xray Abdomen 1 View PORTABLE -Urgent .) (02.14.21 @ 14:14) >  IMPRESSION:  Nonobstructive bowel gas pattern.      < from: CT Abdomen and Pelvis w/ IV Cont (02.13.21 @ 22:34) >    IMPRESSION:  1. No evidence of active GI bleed noting that portion of the distal rectum is not included in the field-of-view and cannot be evaluated.  2. Suspected diffuse colonic wall thickening, most pronounced in the descending and rectosigmoid colon, suspicious for colitis.  3. Focus of air within the bladder. Recommend correlation for recent interpretation.      PROTEIN CALORIE MALNUTRITION PRESENT: [X]mild [ ]moderate [ ]severe [ ]underweight [ ]morbid obesity  https://www.andeal.org/vault/2440/web/files/ONC/Table_Clinical%20Characteristics%20to%20Document%20Malnutrition-White%20JV%20et%20al%638541.pdf    Height (cm): 152.4 (02-14-21 @ 05:48)  Weight (kg): 62.7 (02-14-21 @ 05:48)  BMI (kg/m2): 27 (02-14-21 @ 05:48)    [ ]PPSV2 < or = to 30% [ ]significant weight loss  [X ]poor nutritional intake  [ ]anasarca     Albumin, Serum: 3.3 g/dL (02-14-21 @ 13:59)   [ ]Artificial Nutrition      REFERRALS:   [ ]Chaplaincy  [ ]Hospice  [ ]Child Life  [ ]Social Work  [ ]Case management [ ]Holistic Therapy     Goals of Care Document: EMILY Fofana (02-14-21 @ 14:14)  Goals of Care Conversation:   Participants:  · Participants  Family; Staff  · Child(domingo)  Batsheva Palm (daughter)  · Provider  Ramses Fofana MD,  Alan Al MD.    Advance Directives:  · Does Patient Have a Surrogate  Yes   · Surrogate's Name  Batsheva Palm  · Surrogate's Phone Number  [phone]    Conversation Discussion:  · Conversation  MOLST Discussed  · Conversation Details  Discussion regarding goals of care was done with health care proxy, daughter Stephanie Palm.  HCP wishes for code status to be DNR/DNI. HCP does not wish for aggressive measures including vasopressors for hypotension and blood transfusions.  Discussion completed by medicine team: Ramses Fofana, PGY1, and Alan Al, PGY2.    Personal Advance Directives Treatment Guidelines:   Treatment Guidelines:  · Treatment Guidelines  DNR Order  · Treatment Guideline Comments  No pressors and no blood transfusions.    MOLST:  · Completed  14-Feb-2021    Location of Discussion:   Duration of Advanced Care Planning Meeting:  · Time spent (in minutes)  20     Location of Discussion:  · Location of discussion  Telephone       Electronic Signatures:  Ramses Fofana Ae)  (Signed 14-Feb-2021 14:21)  	Authored: Goals of Care Conversation, Personal Advance Directives Treatment Guidelines, Location of Discussion      Last Updated: 14-Feb-2021 14:21 by Ramses Fofana Ae)     HPI:  89F w/ PMH of CAD s/p PCI, Chronic UTI's, MDD, HTN, HLD, [place] (hearing aids), dysphagia, COVID19 infection (2 months ago), and recent hospitalization for C. diff colitis (~2/7/21) [facility] episode of vomiting and bloody bowel movement on 2/13.  HPI obtained from chart and from daughter (HCP) as patient does not respond verbally. Staff also reported patient appeared confused on 2/13, when normally more alert and conversive.  Upon EMS arrival, SBP 70s and received 1L IVF.  Per daughter, Patient was hospitalized around 2/7/21 for C. diff colitis at [facility] and given IV vancomycin x 6 days, then transitioned to PO vancomycin upon discharge to rehab (last dose was 2/10/21).  Per daughter, no prior history of GI bleed.  Regarding her prior COVID19 infection, daughter reports she was diagnosed with "mild" case of COVID and hospitalized ~ 2 months ago and was treated with remdesivir, dexamethasone, convalescent plasma, and AC. Did not require ventilation. She tested negative twice prior to discharge to rehab at the end of January.    In ED: Initial BP was 90s/40s, received 500mL NS x2. BP now 100/70s. Hypothermic 95.3 rectally, then later febrile to 101, s/p 1g of IV tylenol.  HR 50s -> 120s, now 110s. RR 19-32, sating % on RA. S/p Pantoprazole 80mg IVP, Zosyn x1, Metronidazole 500mg IV x1, Ondansetron 4mg. Rectal exam normal colored stool with small amount of watery blood. (14 Feb 2021 07:19)    PERTINENT PM/SXH:   COVID-19  Urinary tract infection  CAD in native artery  S/P Hip Replacement  Dyslipidemia  Depression  Hyponatremia  Hypertension    History of repair of hip fracture      FAMILY HISTORY:  No pertinent family history in first degree relatives      ITEMS NOT CHECKED ARE NOT PRESENT    SOCIAL HISTORY:   Significant other/partner[X ]  Children: Santiago Mensah is son in law [ ]  Buddhist/Spirituality:  Substance hx:  [ ]   Tobacco hx:  [ ]   Alcohol hx: [ ]   Home Opioid hx:  [X ] I-Stop Reference No: [id number]  Living Situation: [ ]Home  [ ]Long term care  [X ]Rehab [ ]Other    ADVANCE DIRECTIVES:    DNR  Yes      MOLST  [ X]  Living Will  [ ]   DECISION MAKER(s):  [X ] Health Care Proxy(s)  [ ] Surrogate(s)  [ ] Guardian           Name(s): Phone Number(s): Batsheva Palm [phone]    BASELINE (I)ADL(s) (prior to admission):  [place]: [ ]Total  [X ] Moderate [ ]Dependent    Allergies    flu vaccine (Anaphylaxis)  No Known Drug Allergies    Intolerances    MEDICATIONS  (STANDING):  bethanechol 10 milliGRAM(s) Oral three times a day  cholecalciferol 1000 Unit(s) Oral daily  cyanocobalamin 1000 MICROGram(s) Oral daily  dextrose 5% + sodium chloride 0.9%. 1000 milliLiter(s) (100 mL/Hr) IV Continuous <Continuous>  metroNIDAZOLE  IVPB 500 milliGRAM(s) IV Intermittent every 8 hours  mirtazapine 15 milliGRAM(s) Oral at bedtime  nystatin Powder 1 Application(s) Topical two times a day  pantoprazole  Injectable 40 milliGRAM(s) IV Push two times a day  piperacillin/tazobactam IVPB.. 3.375 Gram(s) IV Intermittent every 12 hours  vancomycin    Solution 500 milliGRAM(s) Oral every 6 hours    MEDICATIONS  (PRN):  acetaminophen   Tablet .. 650 milliGRAM(s) Oral every 12 hours PRN Mild Pain (1 - 3), Moderate Pain (4 - 6)  ondansetron    Tablet 4 milliGRAM(s) Oral every 8 hours PRN Nausea and/or Vomiting    PRESENT SYMPTOMS: [ ]Unable to obtain due to poor mentation   Source if other than patient:  [ ]Family   [ ]Team     Pain: [ ]yes [X ]no  QOL impact -   Location -                    Aggravating factors -  Quality -  Radiation -  Timing-  Severity (0-10 scale):  Minimal acceptable level (0-10 scale):     CPOT:    https://www.sccm.org/getattachment/zcy06p69-4r8e-5n5p-6q3z-4684m1198i8a/Critical-Care-Pain-Observation-Tool-(CPOT)      PAIN AD Score:     http://geriatrictoolkit.[place].[facility]/cog/painad.pdf (press ctrl +  left click to view)    Dyspnea:                           [ ]Mild [ ]Moderate [ ]Severe  Anxiety:                             [ ]Mild [ ]Moderate [ ]Severe  Fatigue:                             [ ]Mild [ ]Moderate [ ]Severe  Nausea:                             [ ]Mild [ ]Moderate [ ]Severe  Loss of appetite:              [ ]Mild [ ]Moderate [ ]Severe  Constipation:                    [ ]Mild [ ]Moderate [ ]Severe    Other Symptoms:  [ ]All other review of systems negative     Palliative Performance Status Version 2:  40%    http://[facility].org/files/news/palliative_performance_scale_ppsv2.pdf    PHYSICAL EXAM:  Vital Signs Last 24 Hrs  T(C): 36.9 (16 Feb 2021 12:15), Max: 37.1 (16 Feb 2021 04:42)  T(F): 98.5 (16 Feb 2021 12:15), Max: 98.8 (16 Feb 2021 04:42)  HR: 109 (16 Feb 2021 12:15) (109 - 114)  BP: 119/69 (16 Feb 2021 12:15) (117/66 - 135/73)  RR: 18 (16 Feb 2021 12:15) (18 - 20)  SpO2: 95% (16 Feb 2021 12:15) (93% - 95%) I&O's Summary    15 Feb 2021 07:01  -  16 Feb 2021 07:00  --------------------------------------------------------  IN: 1370 mL / OUT: 900 mL / NET: 470 mL    16 Feb 2021 07:01  -  16 Feb 2021 12:46  --------------------------------------------------------  IN: 0 mL / OUT: 200 mL / NET: -200 mL      GENERAL:  [ X]Alert  [X ]Oriented x  2 to self and place [ ]Lethargic  [ ]Cachexia  [ ]Unarousable  [ X]Verbal  [ ]Non-Verbal  Behavioral:   [ ] Anxiety  [ ] Delirium [ ] Agitation [ ] Other  HEENT:  [X ]Normal   [ ]Dry mouth   [ ]ET Tube/Trach  [ ]Oral lesions  PULMONARY:   [X ]Clear [ ]Tachypnea  [ ]Audible excessive secretions   [ ]Rhonchi        [ ]Right [ ]Left [ ]Bilateral  [ ]Crackles        [ ]Right [ ]Left [ ]Bilateral  [ ]Wheezing     [ ]Right [ ]Left [ ]Bilateral  [ ]Diminished breath sounds [ ]right [ ]left [ ]bilateral  CARDIOVASCULAR:    [ X]Regular [ ]Irregular [ ]Tachy  [ ]Jamie [ ]Murmur [ ]Other  GASTROINTESTINAL:  [X ]Soft  [ ]Distended   [ ]+BS  [ ]Non tender [X ]Tender  [ ]PEG [ ]OGT/ NGT  Last BM: 02/16/2021    GENITOURINARY:  [ ]Normal [ X] Incontinent   [ ]Oliguria/Anuria   [ X]Loera  MUSCULOSKELETAL:   [ ]Normal   [X ]Weakness  [ ]Bed/Wheelchair bound [ ]Edema  NEUROLOGIC:   []No focal deficits  [ ]Cognitive impairment  [X ]Dysphagia [ ]Dysarthria [ ]Paresis [ ]Other   SKIN:   [ X]Normal    [ ]Rash  [ ]Pressure ulcer(s)       Present on admission [ ]y [ ]n    CRITICAL CARE:  [ ] Shock Present  [ ]Septic [ ]Cardiogenic [ ]Neurologic [ ]Hypovolemic  [ ]  Vasopressors [ ]  Inotropes   [ ]Respiratory failure present [ ]Mechanical ventilation [ ]Non-invasive ventilatory support [ ]High flow    [ ]Acute  [ ]Chronic [ ]Hypoxic  [ ]Hypercarbic [ ]Other  [ ]Other organ failure     LABS:                        10.2   24.71 )-----------( 230      ( 16 Feb 2021 06:45 )             31.7   02-16    135  |  100  |  32<H>  ----------------------------<  61<L>  3.7   |  20<L>  |  2.03<H>    Ca    8.5      16 Feb 2021 06:45  Phos  3.2     02-16  Mg     1.8     02-16    TPro  6.5  /  Alb  3.3  /  TBili  0.4  /  DBili  x   /  AST  23  /  ALT  10  /  AlkPhos  75  02-14      Urinalysis Basic - ( 14 Feb 2021 13:51 )    Color: Dark Yellow / Appearance: Slightly Turbid / SG: >1.050 / pH: x  Gluc: x / Ketone: Negative  / Bili: Small / Urobili: 3 mg/dL   Blood: x / Protein: 100 / Nitrite: Positive   Leuk Esterase: Large / RBC: 9 /hpf /  /HPF   Sq Epi: x / Non Sq Epi: 6 /hpf / Bacteria: Few      RADIOLOGY & ADDITIONAL STUDIES:  < from: Xray Abdomen 1 View PORTABLE -Urgent (Xray Abdomen 1 View PORTABLE -Urgent .) (02.14.21 @ 14:14) >  IMPRESSION:  Nonobstructive bowel gas pattern.      < from: CT Abdomen and Pelvis w/ IV Cont (02.13.21 @ 22:34) >    IMPRESSION:  1. No evidence of active GI bleed noting that portion of the distal rectum is not included in the field-of-view and cannot be evaluated.  2. Suspected diffuse colonic wall thickening, most pronounced in the descending and rectosigmoid colon, suspicious for colitis.  3. Focus of air within the bladder. Recommend correlation for recent interpretation.      PROTEIN CALORIE MALNUTRITION PRESENT: [X]mild [ ]moderate [ ]severe [ ]underweight [ ]morbid obesity  https://www.andeal.org/vault/2440/web/files/ONC/Table_Clinical%20Characteristics%20to%20Document%20Malnutrition-White%20JV%20et%20al%931635.pdf    Height (cm): 152.4 (02-14-21 @ 05:48)  Weight (kg): 62.7 (02-14-21 @ 05:48)  BMI (kg/m2): 27 (02-14-21 @ 05:48)    [ ]PPSV2 < or = to 30% [ ]significant weight loss  [X ]poor nutritional intake  [ ]anasarca     Albumin, Serum: 3.3 g/dL (02-14-21 @ 13:59)   [ ]Artificial Nutrition      REFERRALS:   [ ]Chaplaincy  [ ]Hospice  [ ]Child Life  [ ]Social Work  [ ]Case management [ ]Holistic Therapy     Goals of Care Document: EMILY Fofana (02-14-21 @ 14:14)  Goals of Care Conversation:   Participants:  · Participants  Family; Staff  · Child(domingo)  Batsheva Palm (daughter)  · Provider  Ramses Fofana MD,  Alan Al MD.    Advance Directives:  · Does Patient Have a Surrogate  Yes   · Surrogate's Name  Batsheva Palm  · Surrogate's Phone Number  [phone]    Conversation Discussion:  · Conversation  MOLST Discussed  · Conversation Details  Discussion regarding goals of care was done with health care proxy, daughter Stephanie Palm.  HCP wishes for code status to be DNR/DNI. HCP does not wish for aggressive measures including vasopressors for hypotension and blood transfusions.  Discussion completed by medicine team: Ramses Fofana, PGY1, and Alan Al, PGY2.    Personal Advance Directives Treatment Guidelines:   Treatment Guidelines:  · Treatment Guidelines  DNR Order  · Treatment Guideline Comments  No pressors and no blood transfusions.    MOLST:  · Completed  14-Feb-2021    Location of Discussion:   Duration of Advanced Care Planning Meeting:  · Time spent (in minutes)  20     Location of Discussion:  · Location of discussion  Telephone       Electronic Signatures:  Ramses Fofana Ae)  (Signed 14-Feb-2021 14:21)  	Authored: Goals of Care Conversation, Personal Advance Directives Treatment Guidelines, Location of Discussion      Last Updated: 14-Feb-2021 14:21 by Ramses Fofana Ae)     HPI:  89F w/ PMH of CAD s/p PCI, Chronic UTI's, MDD, HTN, HLD, [place] (hearing aids), dysphagia, COVID19 infection (2 months ago), and recent hospitalization for C. diff colitis (~2/7/21) [facility] of vomiting and bloody bowel movement on 2/13.  HPI obtained from chart and from daughter (HCP) as patient does not respond verbally. Staff also reported patient appeared confused on 2/13, when normally more alert and conversive.  Upon EMS arrival, SBP 70s and received 1L IVF.  Per daughter, Patient was hospitalized around 2/7/21 for C. diff colitis at [facility] and given IV vancomycin x 6 days, then transitioned to PO vancomycin upon discharge to rehab (last dose was 2/10/21).  Per daughter, no prior history of GI bleed.  Regarding her prior COVID19 infection, daughter reports she was diagnosed with "mild" case of COVID and hospitalized ~ 2 months ago and was treated with remdesivir, dexamethasone, convalescent plasma, and AC. Did not require ventilation. She tested negative twice prior to discharge to rehab at the end of January.    In ED: Initial BP was 90s/40s, received 500mL NS x2. BP now 100/70s. Hypothermic 95.3 rectally, then later febrile to 101, s/p 1g of IV tylenol.  HR 50s -> 120s, now 110s. RR 19-32, sating % on RA. S/p Pantoprazole 80mg IVP, Zosyn x1, Metronidazole 500mg IV x1, Ondansetron 4mg. Rectal exam normal colored stool with small amount of watery blood. (14 Feb 2021 07:19)    PERTINENT PM/SXH:   COVID-19  Urinary tract infection  CAD in native artery  S/P Hip Replacement  Dyslipidemia  Depression  Hyponatremia  Hypertension    History of repair of hip fracture      FAMILY HISTORY:  No pertinent family history in first degree relatives      ITEMS NOT CHECKED ARE NOT PRESENT    SOCIAL HISTORY:   Significant other/partner[X ]  Children: Santiago Mensah is son in law [ ]  Mormonism/Spirituality:  Substance hx:  [ ]   Tobacco hx:  [ ]   Alcohol hx: [ ]   Home Opioid hx:  [X ] I-Stop Reference No: [id number]  Living Situation: [ ]Home  [ ]Long term care  [X ]Rehab [ ]Other    ADVANCE DIRECTIVES:    DNR  Yes      MOLST  [ X]  Living Will  [ ]   DECISION MAKER(s):  [X ] Health Care Proxy(s)  [ ] Surrogate(s)  [ ] Guardian           Name(s): Phone Number(s): Batsheva Palm [phone]    BASELINE (I)ADL(s) (prior to admission):  [place]: [ ]Total  [X ] Moderate [ ]Dependent    Allergies    flu vaccine (Anaphylaxis)  No Known Drug Allergies    Intolerances    MEDICATIONS  (STANDING):  bethanechol 10 milliGRAM(s) Oral three times a day  cholecalciferol 1000 Unit(s) Oral daily  cyanocobalamin 1000 MICROGram(s) Oral daily  dextrose 5% + sodium chloride 0.9%. 1000 milliLiter(s) (100 mL/Hr) IV Continuous <Continuous>  metroNIDAZOLE  IVPB 500 milliGRAM(s) IV Intermittent every 8 hours  mirtazapine 15 milliGRAM(s) Oral at bedtime  nystatin Powder 1 Application(s) Topical two times a day  pantoprazole  Injectable 40 milliGRAM(s) IV Push two times a day  piperacillin/tazobactam IVPB.. 3.375 Gram(s) IV Intermittent every 12 hours  vancomycin    Solution 500 milliGRAM(s) Oral every 6 hours    MEDICATIONS  (PRN):  acetaminophen   Tablet .. 650 milliGRAM(s) Oral every 12 hours PRN Mild Pain (1 - 3), Moderate Pain (4 - 6)  ondansetron    Tablet 4 milliGRAM(s) Oral every 8 hours PRN Nausea and/or Vomiting    PRESENT SYMPTOMS: [ ]Unable to obtain due to poor mentation   Source if other than patient:  [ ]Family   [ ]Team     Pain: [ ]yes [X ]no  QOL impact -   Location -                    Aggravating factors -  Quality -  Radiation -  Timing-  Severity (0-10 scale):  Minimal acceptable level (0-10 scale):     CPOT:    https://www.sccm.org/getattachment/jps27j72-3f6g-3r8z-7q9o-1869u9910i0z/Critical-Care-Pain-Observation-Tool-(CPOT)      PAIN AD Score:     http://geriatrictoolkit.[place].[facility]/cog/painad.pdf (press ctrl +  left click to view)    Dyspnea:                           [ ]Mild [ ]Moderate [ ]Severe  Anxiety:                             [ ]Mild [ ]Moderate [ ]Severe  Fatigue:                             [ ]Mild [ ]Moderate [ ]Severe  Nausea:                             [ ]Mild [ ]Moderate [ ]Severe  Loss of appetite:              [ ]Mild [ ]Moderate [ ]Severe  Constipation:                    [ ]Mild [ ]Moderate [ ]Severe    Other Symptoms:  [ ]All other review of systems negative     Palliative Performance Status Version 2:  40%    http://[facility].org/files/news/palliative_performance_scale_[email or web address].pdf    PHYSICAL EXAM:  Vital Signs Last 24 Hrs  T(C): 36.9 (16 Feb 2021 12:15), Max: 37.1 (16 Feb 2021 04:42)  T(F): 98.5 (16 Feb 2021 12:15), Max: 98.8 (16 Feb 2021 04:42)  HR: 109 (16 Feb 2021 12:15) (109 - 114)  BP: 119/69 (16 Feb 2021 12:15) (117/66 - 135/73)  RR: 18 (16 Feb 2021 12:15) (18 - 20)  SpO2: 95% (16 Feb 2021 12:15) (93% - 95%) I&O's Summary    15 Feb 2021 07:01  -  16 Feb 2021 07:00  --------------------------------------------------------  IN: 1370 mL / OUT: 900 mL / NET: 470 mL    16 Feb 2021 07:01  -  16 Feb 2021 12:46  --------------------------------------------------------  IN: 0 mL / OUT: 200 mL / NET: -200 mL      GENERAL:  [ X]Alert  [X ]Oriented x  2 to self and place [ ]Lethargic  [ ]Cachexia  [ ]Unarousable  [ X]Verbal  [ ]Non-Verbal  Behavioral:   [ ] Anxiety  [ ] Delirium [ ] Agitation [ ] Other  HEENT:  [X ]Normal   [ ]Dry mouth   [ ]ET Tube/Trach  [ ]Oral lesions  PULMONARY:   [X ]Clear [ ]Tachypnea  [ ]Audible excessive secretions   [ ]Rhonchi        [ ]Right [ ]Left [ ]Bilateral  [ ]Crackles        [ ]Right [ ]Left [ ]Bilateral  [ ]Wheezing     [ ]Right [ ]Left [ ]Bilateral  [ ]Diminished breath sounds [ ]right [ ]left [ ]bilateral  CARDIOVASCULAR:    [ X]Regular [ ]Irregular [ ]Tachy  [ ]Jamie [ ]Murmur [ ]Other  GASTROINTESTINAL:  [X ]Soft  [ ]Distended   [ ]+BS  [ ]Non tender [X ]Tender  [ ]PEG [ ]OGT/ NGT  Last BM: 02/16/2021    GENITOURINARY:  [ ]Normal [ X] Incontinent   [ ]Oliguria/Anuria   [ X]Loera  MUSCULOSKELETAL:   [ ]Normal   [X ]Weakness  [ ]Bed/Wheelchair bound [ ]Edema  NEUROLOGIC:   []No focal deficits  [ ]Cognitive impairment  [X ]Dysphagia [ ]Dysarthria [ ]Paresis [ ]Other   SKIN:   [ X]Normal    [ ]Rash  [ ]Pressure ulcer(s)       Present on admission [ ]y [ ]n    CRITICAL CARE:  [ ] Shock Present  [ ]Septic [ ]Cardiogenic [ ]Neurologic [ ]Hypovolemic  [ ]  Vasopressors [ ]  Inotropes   [ ]Respiratory failure present [ ]Mechanical ventilation [ ]Non-invasive ventilatory support [ ]High flow    [ ]Acute  [ ]Chronic [ ]Hypoxic  [ ]Hypercarbic [ ]Other  [ ]Other organ failure     LABS: reviewed                        10.2   24.71 )-----------( 230      ( 16 Feb 2021 06:45 )             31.7   02-16    135  |  100  |  32<H>  ----------------------------<  61<L>  3.7   |  20<L>  |  2.03<H>    Ca    8.5      16 Feb 2021 06:45  Phos  3.2     02-16  Mg     1.8     02-16    TPro  6.5  /  Alb  3.3  /  TBili  0.4  /  DBili  x   /  AST  23  /  ALT  10  /  AlkPhos  75  02-14      Urinalysis Basic - ( 14 Feb 2021 13:51 )    Color: Dark Yellow / Appearance: Slightly Turbid / SG: >1.050 / pH: x  Gluc: x / Ketone: Negative  / Bili: Small / Urobili: 3 mg/dL   Blood: x / Protein: 100 / Nitrite: Positive   Leuk Esterase: Large / RBC: 9 /hpf /  /HPF   Sq Epi: x / Non Sq Epi: 6 /hpf / Bacteria: Few      RADIOLOGY & ADDITIONAL STUDIES:  < from: Xray Abdomen 1 View PORTABLE -Urgent (Xray Abdomen 1 View PORTABLE -Urgent .) (02.14.21 @ 14:14) >  IMPRESSION:  Nonobstructive bowel gas pattern.      < from: CT Abdomen and Pelvis w/ IV Cont (02.13.21 @ 22:34) >    IMPRESSION:  1. No evidence of active GI bleed noting that portion of the distal rectum is not included in the field-of-view and cannot be evaluated.  2. Suspected diffuse colonic wall thickening, most pronounced in the descending and rectosigmoid colon, suspicious for colitis.  3. Focus of air within the bladder. Recommend correlation for recent interpretation.      PROTEIN CALORIE MALNUTRITION PRESENT: [X]mild [ ]moderate [ ]severe [ ]underweight [ ]morbid obesity  https://www.andeal.org/vault/2440/web/files/ONC/Table_Clinical%20Characteristics%20to%20Document%20Malnutrition-White%20JV%20et%20al%932324.pdf    Height (cm): 152.4 (02-14-21 @ 05:48)  Weight (kg): 62.7 (02-14-21 @ 05:48)  BMI (kg/m2): 27 (02-14-21 @ 05:48)    [ ]PPSV2 < or = to 30% [ ]significant weight loss  [X ]poor nutritional intake  [ ]anasarca     Albumin, Serum: 3.3 g/dL (02-14-21 @ 13:59)   [ ]Artificial Nutrition      REFERRALS:   [ ]Chaplaincy  [ ]Hospice  [ ]Child Life  [ ]Social Work  [ ]Case management [ ]Holistic Therapy     Goals of Care Document: EMILY Fofana (02-14-21 @ 14:14)  Goals of Care Conversation:   Participants:  · Participants  Family; Staff  · Child(domingo)  Batsheva Palm (daughter)  · Provider  Ramses Fofana MD,  Alan Al MD.    Advance Directives:  · Does Patient Have a Surrogate  Yes   · Surrogate's Name  Batsheva Palm  · Surrogate's Phone Number  [phone]    Conversation Discussion:  · Conversation  MOLST Discussed  · Conversation Details  Discussion regarding goals of care was done with health care proxy, daughter Stephanie Palm.  HCP wishes for code status to be DNR/DNI. HCP does not wish for aggressive measures including vasopressors for hypotension and blood transfusions.  Discussion completed by medicine team: Ramses Fofana, PGY1, and Alan Al, PGY2.    Personal Advance Directives Treatment Guidelines:   Treatment Guidelines:  · Treatment Guidelines  DNR Order  · Treatment Guideline Comments  No pressors and no blood transfusions.    MOLST:  · Completed  14-Feb-2021    Location of Discussion:   Duration of Advanced Care Planning Meeting:  · Time spent (in minutes)  20     Location of Discussion:  · Location of discussion  Telephone       Electronic Signatures:  Ramses Fofana Ae)  (Signed 14-Feb-2021 14:21)  	Authored: Goals of Care Conversation, Personal Advance Directives Treatment Guidelines, Location of Discussion      Last Updated: 14-Feb-2021 14:21 by Ramses Fofana Ae)

## 2021-02-16 NOTE — PROGRESS NOTE ADULT - PROBLEM SELECTOR PLAN 10
DVT ppx: restart SC heparin given no further GIB  Diet: dysphagia 2 diet with thin liquids for now, lactose restricted. Pending s/s eval  Code: DNR/DNI, no pressors, no blood transfusions per HCP    Palliative medicine consulted, appreciate recs

## 2021-02-16 NOTE — DIETITIAN INITIAL EVALUATION ADULT. - PROBLEM SELECTOR PLAN 1
Likely LGIB given normal colored stool with blood-tinged water. Possibly diverticulosis as noted on CT a/p vs. ischemic colitis given hx of CAD vs. infectious colitis iso recent C diff colitis.  - S/p Pantoprazole 80mg IVP, c/w 40mg IV BID given possibility of brisk UGIB  - GI consulted, f/u recs  - Hgb stable although downtrending. Monitor CBC.  - Active T&S. Obtain consent for blood  - NPO for now  - Check C diff, GI PCR

## 2021-02-16 NOTE — CONSULT NOTE ADULT - ASSESSMENT
89 year old woman w/ PMH of CAD s/p PCI, Chronic UTI's, MDD, HTN, HLD, [place] (hearing aids), dysphagia, COVID19 infection (2 months ago), and recent hospitalization for C. diff colitis (~2/7/21) [facility] [place] after episode of vomiting and bloody bowel movement with c/b UTI. Palliative consulted for GOC.

## 2021-02-16 NOTE — PROGRESS NOTE ADULT - PROBLEM SELECTOR PLAN 8
- c/w mirtazapine 15mg QHS Hx of multiple CAD s/p 1 stent  - holding ASA 81mg daily and Isosorbide ER 60mg daily  - holding enalapril iso JERONIMO

## 2021-02-16 NOTE — DISCHARGE NOTE PROVIDER - CARE PROVIDERS DIRECT ADDRESSES
[email or web address]

## 2021-02-16 NOTE — DIETITIAN INITIAL EVALUATION ADULT. - PROBLEM SELECTOR PLAN 8
Hx of multiple CAD s/p 1 stent  - hold ASA 81mg daily and Isosorbide ER 60mg daily iso GIB and hypotension  - hold antihypertensive enalapril iso hypotesions and JERONIMO

## 2021-02-16 NOTE — DIETITIAN INITIAL EVALUATION ADULT. - PROBLEM SELECTOR PLAN 3
Cr 1.70, unclear baseline. Likely pre-renal, ATN secondary to septic shock  - s/p 2L IVF. Give another 1L bolus NS  - check FENa  - bladder scan and renal u/s to r/o post-renal

## 2021-02-16 NOTE — DISCHARGE NOTE PROVIDER - HOSPITAL COURSE
HPI:  89F w/ PMH of CAD s/p PCI, Chronic UTI's, MDD, HTN, HLD, [place] (hearing aids), dysphagia, COVID19 infection (2 months ago), and recent hospitalization for C. diff colitis (~2/7/21) [facility] [place] after episode of vomiting and bloody bowel movement on 2/13.  HPI obtained from chart and from daughter (HCP) as patient does not respond verbally. Staff also reported patient appeared confused on 2/13, when normally more alert and conversive.  Upon EMS arrival, SBP 70s and received 1L IVF.  Per daughter, Patient was hospitalized around 2/7/21 for C. diff colitis at [facility] and given IV vancomycin x 6 days, then transitioned to PO vancomycin upon discharge to rehab (last dose was 2/10/21).  Per daughter, no prior history of GI bleed.  Regarding her prior COVID19 infection, daughter reports she was diagnosed with "mild" case of COVID and hospitalized ~ 2 months ago and was treated with remdesivir, dexamethasone, convalescent plasma, and AC. Did not require ventilation. She tested negative twice prior to discharge to rehab at the end of January.    In ED: Initial BP was 90s/40s, received 500mL NS x2. BP now 100/70s. Hypothermic 95.3 rectally, then later febrile to 101, s/p 1g of IV tylenol.  HR 50s -> 120s, now 110s. RR 19-32, sating % on RA. S/p Pantoprazole 80mg IVP, Zosyn x1, Metronidazole 500mg IV x1, Ondansetron 4mg. Rectal exam normal colored stool with small amount of watery blood. (14 Feb 2021 07:19)   89F w/ PMH of CAD s/p PCI, Chronic UTI's, has vaginal pessary, MDD, HTN, HLD, [place] (hearing aids), dysphagia, prior COVID19 infection, and recent hospitalization for C. diff colitis (~2/7/21) presenting from rehab center for bloody bowel movement, likely LGIB secondary to ischemic colitis.  Found to be in septic shock with pancolitis on CT a/p (most significant on descending and rectosigmoid), likely secondary to ischemic colitis. Home aspirin and antihypertensives (Imdur, Enalapril) was held iso bleed and hypotension.  Initially high suspicion for recurrent C diff colitis, however C diff negative x 2.  Thus, clinical picture was more consistent with ischemic colitis leading to GIB and sepsis. UTI also found on admission, UCx growing 3+ organism and Candida albicans (unclear significance).  Sepsis improved s/p 6 days of PO Vanc 500mg q6h, IV Metronidazole, IV Zosyn. After second C diff negative and clinical improvement, ID rec’d d/c IV abx and continue PO Vanc 125mg q6h x 5 days (2/19-23) for empiric C diff treatment, then plan for taper.  Private GI group was consulted and recommended probiotics.  Daughter called urogynecologist and had vaginal pessary cleaned and replaced. Urinary retention, failed TOV, required multiple straight caths, harding placed again by urology, and the plan was for likely discharge to rehab with harding.  Hospital course also complicated by below the knee R soleus DVT found on duplex; will need repeat duplex in 2 weeks to monitor.  Discharged to [facility] with plans for follow-up with GI and urogynecology. 89F w/ PMH of CAD s/p PCI, Chronic UTI's, has vaginal pessary, MDD, HTN, HLD, [place] (hearing aids), dysphagia, prior COVID19 infection, and recent hospitalization for C. diff colitis (~2/7/21) presenting from rehab center for bloody bowel movement, likely LGIB secondary to ischemic colitis.  Found to be in septic shock with pancolitis on CT a/p (most significant on descending and rectosigmoid), likely secondary to ischemic colitis. Home aspirin and antihypertensives (Imdur, Enalapril) was held iso bleed and hypotension.  Initially high suspicion for recurrent C diff colitis, however C diff negative x 2.  Thus, clinical picture was more consistent with ischemic colitis leading to GIB and sepsis. UTI also found on admission, UCx growing 3+ organism and Candida albicans (unclear significance).  Sepsis improved s/p 6 days of PO Vanc 500mg q6h, IV Metronidazole, IV Zosyn. After second C diff negative and clinical improvement, ID rec’d d/c IV abx and continue PO Vanc 125mg q6h x 5 days (2/19-23) for empiric C diff treatment, then plan for taper.  Private GI group was consulted and recommended probiotics.  Daughter called urogynecologist and had vaginal pessary cleaned and replaced. Urinary retention, failed TOV, required multiple straight caths, harding placed again by urology, and the plan was for likely discharge to rehab with harding.  Hospital course also complicated by below the knee R soleus DVT found on duplex, repeat duplex did not show any evidence of worsening progression of the pre-existing clot or new clot. RRT was called on 2/23 for new onset tachycardia, found to be in afib, fever, and hypotension. Patient responded appropriately to IV fluids, not requiring vasopressor support. Decision was made to hold full anti-coagulation at this time and discussion with family. Found to have a UTI growing citrobacter and completed at 7 day course of meropenem. Repeat CT abdomen/pelvis showed proctocolitis; plan is to continue vancomycin taper as scheduled. Of note, patient was also hyponatremic likely in the setting of decreased solute intake with no acute change in mental status, was started on salt tabs, increased to 3mg TID with appropriate response.  Patient is hemodyanmically stable and medically optimizedto be [facility] with plans for follow-up with GI and urogynecology.

## 2021-02-16 NOTE — DISCHARGE NOTE PROVIDER - NSDCCPCAREPLAN_GEN_ALL_CORE_FT
PRINCIPAL DISCHARGE DIAGNOSIS  Diagnosis: Colitis  Assessment and Plan of Treatment:       SECONDARY DISCHARGE DIAGNOSES  Diagnosis: COVID-19  Assessment and Plan of Treatment:     Diagnosis: GI bleed  Assessment and Plan of Treatment:      PRINCIPAL DISCHARGE DIAGNOSIS  Diagnosis: Colitis  Assessment and Plan of Treatment: You came to the hospital after vomiting and having bloody stool. You had a severe infectious reaction and low blood pressures when you came to the hospital.  We think this was all caused by ischemic colitis.  The cause of ischemic colitis is not always clear.  But several factors can increase your risk of ischemic colitis, such as buildup of fatty deposits on the walls of an artery (atherosclerosis) which you have.  We were initially concerned that your colitis was due to a recurrence of your C. difficile infection, but you tested negative for C difficile twice.  We still treated you for C diff with oral vancomycin and IV metronidazole and treated you for other infections that can be caused by gut bacteria in the setting of ischemic colitis with IV zosyn. You got better with 6 days of these antibiotics - your blood pressure improved, mental status improved, you stopped having blood in your stool, and you had improvement in your diarrhea. You were then transitioned to a lower dose of oral vancomycin with a plan to continue small doses for a prolonged duration to treat for C. diff in case it played a contributing role to your hospitalization. You should continue taking vancomycin as instructed on your prescription. You were seen by the gastroenterologist at the hospital and then also gastroenterologist at Dr. Hale's practice.  We started you on a probiotic as it may help prevent C. difficile re-infection. Since the cause of ischemic colitis is not always clear, there's no certain way to prevent the disorder. Make sure to stay adequately hydrated.  You had some remaining tenderness in your abdomen, which we expect to improve slowly.  If you have any acute worsening of the abdominal pain or inability to eat food due to the pain, any new bleeding in your stool, or changes in your mental status, please return to the emergency room. You will need GI follow-up at Dr. Hale's clinic within 2 weeks of your discharge.      SECONDARY DISCHARGE DIAGNOSES  Diagnosis: Urinary tract infection  Assessment and Plan of Treatment: You were also found to have a urinary tract infection. Your urine culture grew more than 3 bacteria and a fungus.  Your clinical status improved with IV zosyn.  You were having issues with urinary retention throughout your hospital course.  We had to re-place to the harding catheter into your bladder after we tried to take it out.  Urinary retetnion is a nidus for urinary tract infection.  Your pessary was taken out, cleaned, and replaced by an associate of your urogynecologist, Dr. Hernandez.  You will need to go to rehab with the harding and you will need to follow-up with your urogynecologist to look into having your harding removed.    Diagnosis: COVID-19  Assessment and Plan of Treatment: You initially tested positive on the COVID swab but the infectious disease doctors thought this is likely a remnant of your previous infection.  Your COVID antibiodies were positive and you tested negative on the COVID swab on subsequent testing.  You had no issues with breathing throughout your hospital stay.    Diagnosis: Deep vein thrombosis (DVT) of distal vein of right lower extremity, unspecified chronicity  Assessment and Plan of Treatment: You were found to have a blood clot in your right calf which was below the knee level.  We typically do not treat blood clots below the knee and monitor.  You will need  a repeat imaging for your blood clot in 2 weeks (3/2/21).  Please follow-up with your primary care doctor to get a referral for repeat imaging.     PRINCIPAL DISCHARGE DIAGNOSIS  Diagnosis: Colitis  Assessment and Plan of Treatment: You came to the hospital after vomiting and having bloody stool. You had a severe infectious reaction and low blood pressures when you came to the hospital.  We think this was all caused by ischemic colitis.  The cause of ischemic colitis is not always clear.  But several factors can increase your risk of ischemic colitis, such as buildup of fatty deposits on the walls of an artery (atherosclerosis) which you have.  We were initially concerned that your colitis was due to a recurrence of your C. difficile infection, but you tested negative for C difficile twice.  We still treated you for C diff with oral vancomycin and IV metronidazole and treated you for other infections that can be caused by gut bacteria in the setting of ischemic colitis with IV zosyn. You got better with 6 days of these antibiotics - your blood pressure improved, mental status improved, you stopped having blood in your stool, and you had improvement in your diarrhea. You were then transitioned to a lower dose of oral vancomycin with a plan to continue small doses for a prolonged duration to treat for C. diff in case it played a contributing role to your hospitalization. You should continue taking vancomycin as instructed on your prescription. You were seen by the gastroenterologist at the hospital and then also gastroenterologist at Dr. Hale's practice.  We started you on a probiotic as it may help prevent C. difficile re-infection. Since the cause of ischemic colitis is not always clear, there's no certain way to prevent the disorder. Make sure to stay adequately hydrated.  You had some remaining tenderness in your abdomen, which we expect to improve slowly.  If you have any acute worsening of the abdominal pain or inability to eat food due to the pain, any new bleeding in your stool, or changes in your mental status, please return to the emergency room. You will need GI follow-up at Dr. Hale's clinic within 2 weeks of your discharge.      SECONDARY DISCHARGE DIAGNOSES  Diagnosis: Urinary tract infection  Assessment and Plan of Treatment: You were also found to have a urinary tract infection. Your urine culture grew more than 3 bacteria and a fungus.  Your clinical status improved with IV zosyn.  You were having issues with urinary retention throughout your hospital course.  We had to re-place to the harding catheter into your bladder after we tried to take it out.  Urinary retetnion is a nidus for urinary tract infection.  Your pessary was taken out, cleaned, and replaced by an associate of your urogynecologist, Dr. Hernandez.  You will need to go to rehab with the harding and you will need to follow-up with your urogynecologist to look into having your harding removed.    Diagnosis: Deep vein thrombosis (DVT) of distal vein of right lower extremity, unspecified chronicity  Assessment and Plan of Treatment: You were found to have a blood clot in your right calf which was below the knee level.  We typically do not treat blood clots below the knee and monitor.  You will need  a repeat imaging for your blood clot in 2 weeks (3/2/21).  Please follow-up with your primary care doctor to get a referral for repeat imaging.    Diagnosis: COVID-19  Assessment and Plan of Treatment: You initially tested positive on the COVID swab but the infectious disease doctors thought this is likely a remnant of your previous infection.  Your COVID antibiodies were positive and you tested negative on the COVID swab on subsequent testing.  You had no issues with breathing throughout your hospital stay.     PRINCIPAL DISCHARGE DIAGNOSIS  Diagnosis: Colitis  Assessment and Plan of Treatment: You came to the hospital after vomiting and having bloody stool. You had a severe infectious reaction and low blood pressures when you came to the hospital.  We think this was all caused by ischemic colitis.  The cause of ischemic colitis is not always clear.  But several factors can increase your risk of ischemic colitis, such as buildup of fatty deposits on the walls of an artery (atherosclerosis) which you have.  We were initially concerned that your colitis was due to a recurrence of your C. difficile infection, but you tested negative for C difficile twice.  We still treated you for C diff with oral vancomycin and IV metronidazole and treated you for other infections that can be caused by gut bacteria in the setting of ischemic colitis with IV zosyn. You got better with 6 days of these antibiotics - your blood pressure improved, mental status improved, you stopped having blood in your stool, and you had improvement in your diarrhea. You were then transitioned to a lower dose of oral vancomycin with a plan to continue small doses for a prolonged duration to treat for C. diff in case it played a contributing role to your hospitalization. You should continue taking vancomycin as instructed on your prescription. You were seen by the gastroenterologist at the hospital and then also gastroenterologist at Dr. Hale's practice.  We started you on a probiotic as it may help prevent C. difficile re-infection. Since the cause of ischemic colitis is not always clear, there's no certain way to prevent the disorder. Make sure to stay adequately hydrated.  You had some remaining tenderness in your abdomen, which we expect to improve slowly.  If you have any acute worsening of the abdominal pain or inability to eat food due to the pain, any new bleeding in your stool, or changes in your mental status, please return to the emergency room. You will need GI follow-up at Dr. Hale's clinic within 2 weeks of your discharge.      SECONDARY DISCHARGE DIAGNOSES  Diagnosis: Urinary tract infection  Assessment and Plan of Treatment: You were also found to have a urinary tract infection. Your urine culture grew more than 3 bacteria and a fungus.  Your clinical status improved with IV zosyn.  You were having issues with urinary retention throughout your hospital course.  We had to re-place to the harding catheter into your bladder after we tried to take it out.  Urinary retetnion is a nidus for urinary tract infection.  Your pessary was taken out, cleaned, and replaced by an associate of your urogynecologist, Dr. Hernandez.  You were also treated for another UTI caused by a bacteria called citrobacter and given a 7 day course of an IV antibiotic called meropenem. You will need to go to rehab with the harding and you will need to follow-up with your urogynecologist to look into having your harding removed.    Diagnosis: Deep vein thrombosis (DVT) of distal vein of right lower extremity, unspecified chronicity  Assessment and Plan of Treatment: You were found to have a blood clot in your right calf which was below the knee level.  We typically do not treat blood clots below the knee and monitor.  You will need  a repeat imaging for your blood clot in 2 weeks (3/2/21).  Please follow-up with your primary care doctor to get a referral for repeat imaging.    Diagnosis: COVID-19  Assessment and Plan of Treatment: You initially tested positive on the COVID swab but the infectious disease doctors thought this is likely a remnant of your previous infection.  Your COVID antibiodies were positive and you tested negative on the COVID swab on subsequent testing.  You had no issues with breathing throughout your hospital stay.     PRINCIPAL DISCHARGE DIAGNOSIS  Diagnosis: Colitis  Assessment and Plan of Treatment: You came to the hospital after vomiting and having bloody stool. You had a severe infectious reaction and low blood pressures when you came to the hospital.  We think this was all caused by ischemic colitis.  The cause of ischemic colitis is not always clear.  But several factors can increase your risk of ischemic colitis, such as buildup of fatty deposits on the walls of an artery (atherosclerosis) which you have.  We were initially concerned that your colitis was due to a recurrence of your C. difficile infection, but you tested negative for C difficile twice.  We still treated you for C diff with oral vancomycin and IV metronidazole and treated you for other infections that can be caused by gut bacteria in the setting of ischemic colitis with IV zosyn. You got better with 6 days of these antibiotics - your blood pressure improved, mental status improved, you stopped having blood in your stool, and you had improvement in your diarrhea. You were then transitioned to a lower dose of oral vancomycin with a plan to continue small doses for a prolonged duration to treat for C. diff in case it played a contributing role to your hospitalization. You should continue taking vancomycin as instructed on your prescription. You were seen by the gastroenterologist at the hospital and then also gastroenterologist at Dr. Hale's practice.  We started you on a probiotic as it may help prevent C. difficile re-infection. Since the cause of ischemic colitis is not always clear, there's no certain way to prevent the disorder. Make sure to stay adequately hydrated.  You had some remaining tenderness in your abdomen, which we expect to improve slowly.  If you have any acute worsening of the abdominal pain or inability to eat food due to the pain, any new bleeding in your stool, or changes in your mental status, please return to the emergency room. You will need GI follow-up at Dr. Hale's clinic within 2 weeks of your discharge.      SECONDARY DISCHARGE DIAGNOSES  Diagnosis: Urinary tract infection  Assessment and Plan of Treatment: You were also found to have a urinary tract infection. Your urine culture grew more than 3 bacteria and a fungus.  Your clinical status improved with IV zosyn.  You were having issues with urinary retention throughout your hospital course.  We had to re-place to the harding catheter into your bladder after we tried to take it out.  Urinary retetnion is a nidus for urinary tract infection.  Your pessary was taken out, cleaned, and replaced by an associate of your urogynecologist, Dr. Hernandez.  You were also treated for another UTI caused by a bacteria called citrobacter and given a 7 day course of an IV antibiotic called meropenem. You will need to go to rehab with the harding and you will need to follow-up with your urogynecologist to look into having your harding removed.    Diagnosis: Deep vein thrombosis (DVT) of distal vein of right lower extremity, unspecified chronicity  Assessment and Plan of Treatment: You were found to have a blood clot in your right calf which was below the knee level.  We typically do not treat blood clots below the knee and monitor.  Ultrasound of of leg was repeated 2 weeks later in the hospital and did not show increased size of the clot.  Please follow-up with your primary care doctor to get a referral for repeat imaging.    Diagnosis: COVID-19  Assessment and Plan of Treatment: You initially tested positive on the COVID swab but the infectious disease doctors thought this is likely a remnant of your previous infection.  Your COVID antibiodies were positive and you tested negative on the COVID swab on subsequent testing.  You had no issues with breathing throughout your hospital stay.

## 2021-02-16 NOTE — PROGRESS NOTE ADULT - PROBLEM SELECTOR PLAN 3
Likely d/t sepsis/infection  - improving  - NGT placed 2/14  - attempt bedside swallow today Hx of urinary retention and chronic UTI  - On harding. TOV tonight.  - c/w home bethanechol. Hold pyridium

## 2021-02-16 NOTE — PROGRESS NOTE ADULT - SUBJECTIVE AND OBJECTIVE BOX
Ramses Pinedaon, PGY1  Pager [phone]    INCOMPLETE NOTE - IN PROGRESS    Patient is a 89y old  Female who presents with a chief complaint of GI Bleed, Vomiting (15 Feb 2021 10:37)      SUBJECTIVE/INTERVAL EVENTS: Patient seen and examined at bedside.    MEDICATIONS  (STANDING):  bethanechol 10 milliGRAM(s) Oral three times a day  cholecalciferol 1000 Unit(s) Oral daily  cyanocobalamin 1000 MICROGram(s) Oral daily  metroNIDAZOLE  IVPB 500 milliGRAM(s) IV Intermittent every 8 hours  mirtazapine 15 milliGRAM(s) Oral at bedtime  nystatin Powder 1 Application(s) Topical two times a day  pantoprazole  Injectable 40 milliGRAM(s) IV Push two times a day  piperacillin/tazobactam IVPB.. 3.375 Gram(s) IV Intermittent every 12 hours  vancomycin    Solution 500 milliGRAM(s) Oral every 6 hours    MEDICATIONS  (PRN):  acetaminophen   Tablet .. 650 milliGRAM(s) Oral every 12 hours PRN Mild Pain (1 - 3), Moderate Pain (4 - 6)  ondansetron    Tablet 4 milliGRAM(s) Oral every 8 hours PRN Nausea and/or Vomiting      VITAL SIGNS:  T(F): 98.8 (21 @ 04:42), Max: 98.8 (21 @ 04:42)  HR: 109 (21 @ 04:42) (104 - 114)  BP: 117/66 (21 @ 04:42) (117/66 - 135/73)  RR: 20 (21 @ 04:42) (18 - 20)  SpO2: 93% (21 @ 04:42) (93% - 99%)    I&O's Summary    2021 07:01  -  15 Feb 2021 07:00  --------------------------------------------------------  IN: 2200 mL / OUT: 550 mL / NET: 1650 mL    15 Feb 2021 07:01  -  2021 06:58  --------------------------------------------------------  IN: 1370 mL / OUT: 900 mL / NET: 470 mL      Daily     Daily Weight in k.5 (2021 04:42)    PHYSICAL EXAM:  Gen: Alert, NAD  HEENT: NCAT, conjunctiva clear, sclera anicteric, no erythema or exudates in the oropharynx, mmm  Neck: Supple, no JVD  CV: RRR, S1S2, no m/r/g  Resp: CTAB, normal respiratory effort  Abd: Soft, nontender, nondistended, normal bowel sounds  Ext: no edema, no clubbing or cyanosis  Neuro: AOx3, CN2-12 grossly intact, POLK  SKIN: warm, perfused    LABS:                        10.2   24.71 )-----------( 230      ( 2021 06:45 )             31.7     Hgb Trend: 10.2<--, 10.5<--, 12.6<--, 12.9<--, 11.9<--  02-15    136  |  100  |  34<H>  ----------------------------<  67<L>  4.6   |  19<L>  |  2.47<H>    Ca    9.1      15 Feb 2021 06:40  Phos  4.4     02-15  Mg     1.8     -15    TPro  6.5  /  Alb  3.3  /  TBili  0.4  /  DBili  x   /  AST  23  /  ALT  10  /  AlkPhos  75  02-14    Creatinine Trend: 2.47<--, 2.59<--, 2.48<--, 1.70<--, 1.25<--  LIVER FUNCTIONS - ( 2021 13:59 )  Alb: 3.3 g/dL / Pro: 6.5 g/dL / ALK PHOS: 75 U/L / ALT: 10 U/L / AST: 23 U/L / GGT: x                 Urinalysis Basic - ( 2021 13:51 )    Color: Dark Yellow / Appearance: Slightly Turbid / SG: >1.050 / pH: x  Gluc: x / Ketone: Negative  / Bili: Small / Urobili: 3 mg/dL   Blood: x / Protein: 100 / Nitrite: Positive   Leuk Esterase: Large / RBC: 9 /hpf /  /HPF   Sq Epi: x / Non Sq Epi: 6 /hpf / Bacteria: Few        CAPILLARY BLOOD GLUCOSE      POCT Blood Glucose.: 93 mg/dL (15 Feb 2021 09:05)      RADIOLOGY & ADDITIONAL TESTS: Reviewed    Imaging Personally Reviewed:    Consultant(s) Notes Reviewed:      Care Discussed with Consultants/Other Providers:   Ramses Pinedaon, PGY1  Pager [phone]      Patient is a 89y old  Female who presents with a chief complaint of GI Bleed, Vomiting (15 Feb 2021 10:37)      SUBJECTIVE/INTERVAL EVENTS:  - Passed bedside swallow yesterday. NGT removed and dysphagia diet started.  - Improved mental status, more conversive.  Does not offer complaints.      MEDICATIONS  (STANDING):  bethanechol 10 milliGRAM(s) Oral three times a day  cholecalciferol 1000 Unit(s) Oral daily  cyanocobalamin 1000 MICROGram(s) Oral daily  metroNIDAZOLE  IVPB 500 milliGRAM(s) IV Intermittent every 8 hours  mirtazapine 15 milliGRAM(s) Oral at bedtime  nystatin Powder 1 Application(s) Topical two times a day  pantoprazole  Injectable 40 milliGRAM(s) IV Push two times a day  piperacillin/tazobactam IVPB.. 3.375 Gram(s) IV Intermittent every 12 hours  vancomycin    Solution 500 milliGRAM(s) Oral every 6 hours    MEDICATIONS  (PRN):  acetaminophen   Tablet .. 650 milliGRAM(s) Oral every 12 hours PRN Mild Pain (1 - 3), Moderate Pain (4 - 6)  ondansetron    Tablet 4 milliGRAM(s) Oral every 8 hours PRN Nausea and/or Vomiting      VITAL SIGNS:  T(F): 98.8 (21 @ 04:42), Max: 98.8 (21 @ 04:42)  HR: 109 (21 @ 04:42) (104 - 114)  BP: 117/66 (21 @ 04:42) (117/66 - 135/73)  RR: 20 (21 @ 04:42) (18 - 20)  SpO2: 93% (21 @ 04:42) (93% - 99%)    I&O's Summary    2021 07:01  -  15 Feb 2021 07:00  --------------------------------------------------------  IN: 2200 mL / OUT: 550 mL / NET: 1650 mL    15 Feb 2021 07:01  -  2021 06:58  --------------------------------------------------------  IN: 1370 mL / OUT: 900 mL / NET: 470 mL      Daily     Daily Weight in k.5 (2021 04:42)    PHYSICAL EXAM:  Gen: Awake, verbal, well-developed, NAD  HEENT: NCAT, PERRL, EOMI, clear conjunctiva, no scleral icterus  Neck: Supple, no JVD, no LAD  CV: tachycardic, S1S2, no m/r/g  Resp: CTAB, normal respiratory effort  Abd: Soft, tender on Left side and LLQ, ND  Ext: no leg swelling, no clubbing or cyanosis  Neuro: responds to questions, AOx2, CN2-12 grossly intact, POLK  Skin: warm, perfused      LABS:                        10.2   24.71 )-----------( 230      ( 2021 06:45 )             31.7     Hgb Trend: 10.2<--, 10.5<--, 12.6<--, 12.9<--, 11.9<--  02-15    136  |  100  |  34<H>  ----------------------------<  67<L>  4.6   |  19<L>  |  2.47<H>    Ca    9.1      15 Feb 2021 06:40  Phos  4.4     -15  Mg     1.8     -15    TPro  6.5  /  Alb  3.3  /  TBili  0.4  /  DBili  x   /  AST  23  /  ALT  10  /  AlkPhos  75  02-14    Creatinine Trend: 2.47<--, 2.59<--, 2.48<--, 1.70<--, 1.25<--  LIVER FUNCTIONS - ( 2021 13:59 )  Alb: 3.3 g/dL / Pro: 6.5 g/dL / ALK PHOS: 75 U/L / ALT: 10 U/L / AST: 23 U/L / GGT: x                 Urinalysis Basic - ( 2021 13:51 )    Color: Dark Yellow / Appearance: Slightly Turbid / SG: >1.050 / pH: x  Gluc: x / Ketone: Negative  / Bili: Small / Urobili: 3 mg/dL   Blood: x / Protein: 100 / Nitrite: Positive   Leuk Esterase: Large / RBC: 9 /hpf /  /HPF   Sq Epi: x / Non Sq Epi: 6 /hpf / Bacteria: Few        CAPILLARY BLOOD GLUCOSE      POCT Blood Glucose.: 93 mg/dL (15 Feb 2021 09:05)      RADIOLOGY & ADDITIONAL TESTS: Reviewed    Imaging Personally Reviewed:    Consultant(s) Notes Reviewed:      Care Discussed with Consultants/Other Providers:

## 2021-02-16 NOTE — PROGRESS NOTE ADULT - ASSESSMENT
89F w/ PMH of CAD s/p PCI, Chronic UTI's, MDD, HTN, HLD, Hoopa (hearing aids), dysphagia, COVID19 infection (2 months ago, s/p dexa, remdesivir, convalescent plasma, not intubated), and recent hospitalization for C. diff colitis (~2/7/21) presenting from [facility] after episode of vomiting and bloody bowel movement on 2/13.     unclear if this still c diff vs ischemic bowel  vs microperforation vs other   pt seems to have a UTI as well  Pt was seen by surgery     treating for c diff, UTI and bowel event      follow lactate  await cultures . ( urine had greater than three types of organisms)  BP is better today   continue current management, ? ischemic bowel vs  c diff

## 2021-02-16 NOTE — CONSULT NOTE ADULT - PROBLEM SELECTOR RECOMMENDATION 4
-DNR/DNI, CARLEEN in chart. Daughter informed me her mother is would not want resuscitation if her heart were to fail as well as not being placed on a ventilator if her lungs fail. She does not aggressive interventions, such as vasopressor, but is interested in Abx and IVF. At the moment she mentions her mother is functional and lives in a [facility], she is interested in her mother going back to her care home.  -Pending PT ivory -DNR/DNI, CARLEEN in chart. Daughter informed me her mother is would not want resuscitation if her heart were to fail as well as not being placed on a ventilator if her lungs fail. She does not want aggressive interventions, such as vasopressor, but is interested in Abx and IVF. At the moment she mentions her mother is functional and lives in a [facility], she is interested in her mother going back to her [facility].  -Pending PT ivory -DNR/DNI, CARLEEN in chart. Daughter informed me her mother is would not want resuscitation if her heart were to fail as well as not being placed on a ventilator if her lungs fail. She does not want aggressive interventions, such as vasopressor, but is interested in abx and IVF. At the moment she mentions her mother is functional and lives in a [facility], she is interested in her mother going back to her [facility].  -Pending PT ivory

## 2021-02-16 NOTE — PROGRESS NOTE ADULT - ASSESSMENT
89F w/ PMH of CAD s/p PCI, Chronic UTI's, MDD, HTN, HLD, [place] (hearing aids), dysphagia, prior COVID19 infection, and recent hospitalization for C. diff colitis (~2/7/21) presenting from rehab center for bloody bowel movement, likely LGIB secondary to diverticulosis vs. infectious colitis vs. ischemic colitis.  Also found to be in septic shock, likely secondary to colitis iso recent C. diff.  COVID +, asymptomatic and without hypoxia.

## 2021-02-16 NOTE — PROGRESS NOTE ADULT - PROBLEM SELECTOR PLAN 7
Hx of multiple CAD s/p 1 stent  - hold ASA 81mg daily and Isosorbide ER 60mg daily iso GIB and hypotension  - hold antihypertensive enalapril iso hypotesions and JERONIMO Hx of multiple CAD s/p 1 stent  - holding ASA 81mg daily and Isosorbide ER 60mg daily  - holding enalapril iso JERONIMO Previous infection 2 months ago.  - per ID, likely remnant of previous infection  - Sating well on RA

## 2021-02-16 NOTE — PROGRESS NOTE ADULT - PROBLEM SELECTOR PLAN 2
Likely pre-renal, ATN secondary to septic shock. Unclear baseline Cr.   - FENa c/w pre-renal  - renal u/s to r/o post-renal Likely pre-renal, ATN secondary to septic shock. Unclear baseline Cr.   - Cr improving to 2.03  - NS + D5 100cc/hr x 24 hours  - on harding, I&Os  - renal u/s to r/o post-renal

## 2021-02-16 NOTE — DISCHARGE NOTE PROVIDER - CARE PROVIDER_API CALL
Trevon Hale  GASTROENTEROLOGY  [address]  [place], [place] [id number]  Phone: [phone]  Fax: [phone]  Established Patient  Follow Up Time: 2 weeks   Trevon Hale  GASTROENTEROLOGY  [address], [address]  [place], [place] [id number]  Phone: [phone]  Fax: [phone]  Established Patient  Follow Up Time: 2 weeks    Segundo Hernandez)  Female Pelvic MedReconst Surg; Obstetrics and Gynecology  [address]  [place] [address]  Phone: [phone]  Fax: [phone]  Established Patient  Follow Up Time:

## 2021-02-16 NOTE — DIETITIAN INITIAL EVALUATION ADULT. - PROBLEM SELECTOR PLAN 2
Likely secondary to colitis iso recent C. diff vs. COVID. HAGMA with elevated lactate to 5.7.  - Check C diff, GI PCR, UA  - F/u BCx  - C/w Zosyn to cover GNR, anaerobes (GI pathogen)  - C/w Flagyl IV 500mg BID for now for possible recurrent C diff colitis; transition to PO vancomycin once bedside swallow is done  - s/p total 2L IVF. Give another 1L LR bolus and maintenance LR, trend lactate.

## 2021-02-16 NOTE — PROGRESS NOTE ADULT - PROBLEM SELECTOR PLAN 5
Likely LGIB given normal colored stool with blood-tinged water. Possibly diverticulosis as noted on CT a/p vs. ischemic colitis given hx of CAD vs. infectious colitis iso recent C diff colitis.  - no further episodes  - c/w 40mg IV BID given possibility of brisk UGIB  - Hgb stable although downtrending. Monitor CBC.  - no blood transfuison Likely LGIB given normal colored stool with blood-tinged water. Possibly diverticulosis as noted on CT a/p vs. ischemic colitis given hx of CAD vs. infectious colitis iso recent C diff colitis.  - no further episodes since day of admission  - d/c PPI given low likelihood of UGIB R leg swelling  - Duplex + for R soleus DVT, below the knee  - no tx given DVT below the knee

## 2021-02-16 NOTE — PROGRESS NOTE ADULT - SUBJECTIVE AND OBJECTIVE BOX
Patient is a 89y old  Female who presents with a chief complaint of GI Bleed, Vomiting (16 Feb 2021 12:45)    Being followed by ID for        Interval history:  No other acute events      ROS:  No cough,SOB,CP  No N/V/D  No abd pain  No urinary complaints  No HA  No joint or limb pain  No other complaints    PAST MEDICAL & SURGICAL HISTORY:  COVID-19    Urinary tract infection    CAD in native artery    Dyslipidemia    Depression    Hyponatremia    Hypertension    History of repair of hip fracture      Allergies    flu vaccine (Anaphylaxis)  No Known Drug Allergies    Intolerances      Antimicrobials:    metroNIDAZOLE  IVPB 500 milliGRAM(s) IV Intermittent every 8 hours  piperacillin/tazobactam IVPB.. 3.375 Gram(s) IV Intermittent every 12 hours  vancomycin    Solution 500 milliGRAM(s) Oral every 6 hours    MEDICATIONS  (STANDING):  bethanechol 10 milliGRAM(s) Oral three times a day  cholecalciferol 1000 Unit(s) Oral daily  cyanocobalamin 1000 MICROGram(s) Oral daily  dextrose 5% + sodium chloride 0.9%. 1000 milliLiter(s) (100 mL/Hr) IV Continuous <Continuous>  heparin   Injectable 5000 Unit(s) SubCutaneous every 8 hours  metroNIDAZOLE  IVPB 500 milliGRAM(s) IV Intermittent every 8 hours  mirtazapine 15 milliGRAM(s) Oral at bedtime  nystatin Powder 1 Application(s) Topical two times a day  piperacillin/tazobactam IVPB.. 3.375 Gram(s) IV Intermittent every 12 hours  vancomycin    Solution 500 milliGRAM(s) Oral every 6 hours    MEDICATIONS  (PRN):  acetaminophen   Tablet .. 650 milliGRAM(s) Oral every 12 hours PRN Mild Pain (1 - 3), Moderate Pain (4 - 6)  ondansetron    Tablet 4 milliGRAM(s) Oral every 8 hours PRN Nausea and/or Vomiting      Vital Signs Last 24 Hrs  T(C): 36.9 (02-16-21 @ 12:15), Max: 37.1 (02-16-21 @ 04:42)  T(F): 98.5 (02-16-21 @ 12:15), Max: 98.8 (02-16-21 @ 04:42)  HR: 109 (02-16-21 @ 12:15) (109 - 114)  BP: 119/69 (02-16-21 @ 12:15) (117/66 - 135/73)  BP(mean): --  RR: 18 (02-16-21 @ 12:15) (18 - 20)  SpO2: 95% (02-16-21 @ 12:15) (93% - 95%)    Physical Exam:    Constitutional well preserved,comfortable,pleasant    HEENT PERRLA EOMI,No pallor or icterus    No oral exudate or erythema    Neck supple no JVD or LN    Chest Good AE,CTA    CVS RRR S1 S2 WNl No murmur or rub or gallop    Abd soft BS normal No tenderness no masses    Ext No cyanosis clubbing or edema    IV site no erythema tenderness or discharge    Joints no swelling or LOM    CNS AAO X 3 no focal    Lab Data:                          10.2   24.71 )-----------( 230      ( 16 Feb 2021 06:45 )             31.7       02-16    135  |  100  |  32<H>  ----------------------------<  61<L>  3.7   |  20<L>  |  2.03<H>    Ca    8.5      16 Feb 2021 06:45  Phos  3.2     02-16  Mg     1.8     02-16        Blood Gas Venous - Lactate (02.16.21 @ 06:49)    Blood Gas Venous - Lactate: 3.5: Elevated lactate. Consider ordering follow-up lactate to trend. mmoL/L        .Urine Catheterized  02-15-21   >=3 organisms. Probable collection contamination.  --  --      .Stool Feces  02-15-21   GI PCR Results: NOT detected  *******Please Note:*******  GI panel PCR evaluates for:  Campylobacter, Plesiomonas shigelloides, Salmonella,  Vibrio, Yersinia enterocolitica, Enteroaggregative  Escherichia coli (EAEC), Enteropathogenic E.coli (EPEC),  Enterotoxigenic E. coli (ETEC) lt/st, Shiga-like  toxin-producing E. coli (STEC) stx1/stx2,  Shigella/ Enteroinvasive E. coli (EIEC), Cryptosporidium,  Cyclospora cayetanensis, Entamoeba histolytica,  Giardia lamblia, Adenovirus F 40/41, Astrovirus,  Norovirus GI/GII, Rotavirus A, Sapovirus  --  --      .Blood Blood-Peripheral  02-14-21   No growth to date.  --  --        WBC Count: 24.71 (02-16-21 @ 06:45)  WBC Count: 31.37 (02-15-21 @ 11:16)  WBC Count: 32.04 (02-14-21 @ 13:51)  WBC Count: 26.64 (02-14-21 @ 11:11)  WBC Count: 24.73 (02-13-21 @ 22:45)  WBC Count: 16.80 (02-13-21 @ 20:15)                  Ordinal Scale: score :   1) Death  2) Hospitalized, on invasive mechanical ventilation or ECMO  3) Hospitalized, on non-invasive ventilation or high flow oxygen devices  4) Hospitalized, requiring low flow(<11l/min) supplemental oxygen  5) Hospitalized, not requiring supplemental oxygen - requiring ongoing medical care(COVID-19 related or otherwise)  6) Hospitalized, not requiring supplemental oxygen - no longer requires ongoing medical care(other than per protocol RDV administration)  7) Not hospitalized     Patient is a 89y old  Female who presents with a chief complaint of GI Bleed, Vomiting (16 Feb 2021 12:45)    Being followed by ID for        Interval history:  pt seems more comfortable   now on 3 dsu  unable to give history  breathing comfortably  still with diarrhea  No other acute events        PAST MEDICAL & SURGICAL HISTORY:  COVID-19    Urinary tract infection    CAD in native artery    Dyslipidemia    Depression    Hyponatremia    Hypertension    History of repair of hip fracture      Allergies    flu vaccine (Anaphylaxis)  No Known Drug Allergies    Intolerances      Antimicrobials:    metroNIDAZOLE  IVPB 500 milliGRAM(s) IV Intermittent every 8 hours  piperacillin/tazobactam IVPB.. 3.375 Gram(s) IV Intermittent every 12 hours  vancomycin    Solution 500 milliGRAM(s) Oral every 6 hours    MEDICATIONS  (STANDING):  bethanechol 10 milliGRAM(s) Oral three times a day  cholecalciferol 1000 Unit(s) Oral daily  cyanocobalamin 1000 MICROGram(s) Oral daily  dextrose 5% + sodium chloride 0.9%. 1000 milliLiter(s) (100 mL/Hr) IV Continuous <Continuous>  heparin   Injectable 5000 Unit(s) SubCutaneous every 8 hours  metroNIDAZOLE  IVPB 500 milliGRAM(s) IV Intermittent every 8 hours  mirtazapine 15 milliGRAM(s) Oral at bedtime  nystatin Powder 1 Application(s) Topical two times a day  piperacillin/tazobactam IVPB.. 3.375 Gram(s) IV Intermittent every 12 hours  vancomycin    Solution 500 milliGRAM(s) Oral every 6 hours    MEDICATIONS  (PRN):  acetaminophen   Tablet .. 650 milliGRAM(s) Oral every 12 hours PRN Mild Pain (1 - 3), Moderate Pain (4 - 6)  ondansetron    Tablet 4 milliGRAM(s) Oral every 8 hours PRN Nausea and/or Vomiting      Vital Signs Last 24 Hrs  T(C): 36.9 (02-16-21 @ 12:15), Max: 37.1 (02-16-21 @ 04:42)  T(F): 98.5 (02-16-21 @ 12:15), Max: 98.8 (02-16-21 @ 04:42)  HR: 109 (02-16-21 @ 12:15) (109 - 114)  BP: 119/69 (02-16-21 @ 12:15) (117/66 - 135/73)  BP(mean): --  RR: 18 (02-16-21 @ 12:15) (18 - 20)  SpO2: 95% (02-16-21 @ 12:15) (93% - 95%)    Physical Exam:    Constitutional well preserved,comfortable,pleasant    HEENT PERRLA EOMI,No pallor or icterus    No oral exudate or erythema    Neck supple no JVD or LN    Chest Good AE,CTA    CVS S1 S2     Abd soft  mild LLQ tenderness    Ext No cyanosis clubbing or edema    IV site no erythema tenderness or discharge    Joints no swelling or LOM      Lab Data:                          10.2   24.71 )-----------( 230      ( 16 Feb 2021 06:45 )             31.7       02-16    135  |  100  |  32<H>  ----------------------------<  61<L>  3.7   |  20<L>  |  2.03<H>    Ca    8.5      16 Feb 2021 06:45  Phos  3.2     02-16  Mg     1.8     02-16        Blood Gas Venous - Lactate (02.16.21 @ 06:49)    Blood Gas Venous - Lactate: 3.5: Elevated lactate. Consider ordering follow-up lactate to trend. mmoL/L        .Urine Catheterized  02-15-21   >=3 organisms. Probable collection contamination.  --  --      .Stool Feces  02-15-21   GI PCR Results: NOT detected  *******Please Note:*******  GI panel PCR evaluates for:  Campylobacter, Plesiomonas shigelloides, Salmonella,  Vibrio, Yersinia enterocolitica, Enteroaggregative  Escherichia coli (EAEC), Enteropathogenic E.coli (EPEC),  Enterotoxigenic E. coli (ETEC) lt/st, Shiga-like  toxin-producing E. coli (STEC) stx1/stx2,  Shigella/ Enteroinvasive E. coli (EIEC), Cryptosporidium,  Cyclospora cayetanensis, Entamoeba histolytica,  Giardia lamblia, Adenovirus F 40/41, Astrovirus,  Norovirus GI/GII, Rotavirus A, Sapovirus  --  --      .Blood Blood-Peripheral  02-14-21   No growth to date.  --  --        WBC Count: 24.71 (02-16-21 @ 06:45)  WBC Count: 31.37 (02-15-21 @ 11:16)  WBC Count: 32.04 (02-14-21 @ 13:51)  WBC Count: 26.64 (02-14-21 @ 11:11)  WBC Count: 24.73 (02-13-21 @ 22:45)  WBC Count: 16.80 (02-13-21 @ 20:15)        < from: [facility] Duplex Lower Ext Vein Scan, Bilat (02.16.21 @ 16:10) >    IMPRESSION:    Acute, below the knee DVT detected in the right soleal vein.  Dr. Fofana was informed of the findings following the study on 2/16/2021.    < end of copied text >      < from: US Kidney and Bladder (02.16.21 @ 15:40) >    IMPRESSION:    *  No hydronephrosis    < end of copied text >

## 2021-02-16 NOTE — DIETITIAN INITIAL EVALUATION ADULT. - ORAL INTAKE PTA/DIET HISTORY
limited interview -pt seemed to struggle with communication -she was able to report bread for breakfast and eggs for lunch.

## 2021-02-17 DIAGNOSIS — N39.0 URINARY TRACT INFECTION, SITE NOT SPECIFIED: ICD-10-CM

## 2021-02-17 LAB
ANION GAP SERPL CALC-SCNC: 12 MMOL/L — SIGNIFICANT CHANGE UP (ref 5–17)
BASE EXCESS BLDV CALC-SCNC: -3.3 MMOL/L — LOW (ref -2–2)
BUN SERPL-MCNC: 20 MG/DL — SIGNIFICANT CHANGE UP (ref 7–23)
CALCIUM SERPL-MCNC: 8.3 MG/DL — LOW (ref 8.4–10.5)
CHLORIDE SERPL-SCNC: 102 MMOL/L — SIGNIFICANT CHANGE UP (ref 96–108)
CO2 BLDV-SCNC: 24 MMOL/L — SIGNIFICANT CHANGE UP (ref 22–30)
CO2 SERPL-SCNC: 19 MMOL/L — LOW (ref 22–31)
CREAT SERPL-MCNC: 1.37 MG/DL — HIGH (ref 0.5–1.3)
CULTURE RESULTS: SIGNIFICANT CHANGE UP
GAS PNL BLDV: SIGNIFICANT CHANGE UP
GLUCOSE SERPL-MCNC: 97 MG/DL — SIGNIFICANT CHANGE UP (ref 70–99)
HCO3 BLDV-SCNC: 22 MMOL/L — SIGNIFICANT CHANGE UP (ref 21–29)
HCT VFR BLD CALC: 31.3 % — LOW (ref 34.5–45)
HGB BLD-MCNC: 10 G/DL — LOW (ref 11.5–15.5)
LACTATE BLDV-MCNC: 3.5 MMOL/L — HIGH (ref 0.7–2)
MAGNESIUM SERPL-MCNC: 1.6 MG/DL — SIGNIFICANT CHANGE UP (ref 1.6–2.6)
MCHC RBC-ENTMCNC: 31.9 GM/DL — LOW (ref 32–36)
MCHC RBC-ENTMCNC: 32.5 PG — SIGNIFICANT CHANGE UP (ref 27–34)
MCV RBC AUTO: 101.6 FL — HIGH (ref 80–100)
NRBC # BLD: 0 /100 WBCS — SIGNIFICANT CHANGE UP (ref 0–0)
PCO2 BLDV: 45 MMHG — SIGNIFICANT CHANGE UP (ref 35–50)
PH BLDV: 7.32 — LOW (ref 7.35–7.45)
PHOSPHATE SERPL-MCNC: 2.3 MG/DL — LOW (ref 2.5–4.5)
PLATELET # BLD AUTO: 223 K/UL — SIGNIFICANT CHANGE UP (ref 150–400)
PO2 BLDV: 47 MMHG — HIGH (ref 25–45)
POTASSIUM SERPL-MCNC: 3.3 MMOL/L — LOW (ref 3.5–5.3)
POTASSIUM SERPL-SCNC: 3.3 MMOL/L — LOW (ref 3.5–5.3)
RBC # BLD: 3.08 M/UL — LOW (ref 3.8–5.2)
RBC # FLD: 14.9 % — HIGH (ref 10.3–14.5)
SAO2 % BLDV: 75 % — SIGNIFICANT CHANGE UP (ref 67–88)
SODIUM SERPL-SCNC: 133 MMOL/L — LOW (ref 135–145)
SPECIMEN SOURCE: SIGNIFICANT CHANGE UP
WBC # BLD: 23.3 K/UL — HIGH (ref 3.8–10.5)
WBC # FLD AUTO: 23.3 K/UL — HIGH (ref 3.8–10.5)

## 2021-02-17 PROCEDURE — 99233 SBSQ HOSP IP/OBS HIGH 50: CPT | Mod: GC

## 2021-02-17 PROCEDURE — 99223 1ST HOSP IP/OBS HIGH 75: CPT | Mod: CS

## 2021-02-17 RX ORDER — POTASSIUM CHLORIDE 20 MEQ
40 PACKET (EA) ORAL EVERY 4 HOURS
Refills: 0 | Status: COMPLETED | OUTPATIENT
Start: 2021-02-17 | End: 2021-02-17

## 2021-02-17 RX ORDER — SODIUM,POTASSIUM PHOSPHATES 278-250MG
1 POWDER IN PACKET (EA) ORAL
Refills: 0 | Status: DISCONTINUED | OUTPATIENT
Start: 2021-02-17 | End: 2021-03-03

## 2021-02-17 RX ORDER — MAGNESIUM SULFATE 500 MG/ML
1 VIAL (ML) INJECTION ONCE
Refills: 0 | Status: COMPLETED | OUTPATIENT
Start: 2021-02-17 | End: 2021-02-17

## 2021-02-17 RX ADMIN — Medication 100 MILLIGRAM(S): at 22:25

## 2021-02-17 RX ADMIN — HEPARIN SODIUM 5000 UNIT(S): 5000 INJECTION INTRAVENOUS; SUBCUTANEOUS at 22:26

## 2021-02-17 RX ADMIN — MIRTAZAPINE 15 MILLIGRAM(S): 45 TABLET, ORALLY DISINTEGRATING ORAL at 22:26

## 2021-02-17 RX ADMIN — Medication 500 MILLIGRAM(S): at 04:59

## 2021-02-17 RX ADMIN — Medication 100 MILLIGRAM(S): at 05:00

## 2021-02-17 RX ADMIN — Medication 500 MILLIGRAM(S): at 22:25

## 2021-02-17 RX ADMIN — SODIUM CHLORIDE 100 MILLILITER(S): 9 INJECTION, SOLUTION INTRAVENOUS at 22:26

## 2021-02-17 RX ADMIN — Medication 10 MILLIGRAM(S): at 12:20

## 2021-02-17 RX ADMIN — Medication 1 TABLET(S): at 10:44

## 2021-02-17 RX ADMIN — PIPERACILLIN AND TAZOBACTAM 25 GRAM(S): 4; .5 INJECTION, POWDER, LYOPHILIZED, FOR SOLUTION INTRAVENOUS at 04:59

## 2021-02-17 RX ADMIN — PREGABALIN 1000 MICROGRAM(S): 225 CAPSULE ORAL at 10:44

## 2021-02-17 RX ADMIN — Medication 100 MILLIGRAM(S): at 12:20

## 2021-02-17 RX ADMIN — Medication 500 MILLIGRAM(S): at 10:45

## 2021-02-17 RX ADMIN — HEPARIN SODIUM 5000 UNIT(S): 5000 INJECTION INTRAVENOUS; SUBCUTANEOUS at 04:58

## 2021-02-17 RX ADMIN — HEPARIN SODIUM 5000 UNIT(S): 5000 INJECTION INTRAVENOUS; SUBCUTANEOUS at 12:20

## 2021-02-17 RX ADMIN — Medication 1000 UNIT(S): at 10:44

## 2021-02-17 RX ADMIN — Medication 500 MILLIGRAM(S): at 16:15

## 2021-02-17 RX ADMIN — Medication 1 PACKET(S): at 16:15

## 2021-02-17 RX ADMIN — Medication 1 PACKET(S): at 09:29

## 2021-02-17 RX ADMIN — Medication 10 MILLIGRAM(S): at 22:26

## 2021-02-17 RX ADMIN — Medication 1 PACKET(S): at 10:34

## 2021-02-17 RX ADMIN — Medication 40 MILLIEQUIVALENT(S): at 12:20

## 2021-02-17 RX ADMIN — Medication 10 MILLIGRAM(S): at 05:00

## 2021-02-17 RX ADMIN — Medication 40 MILLIEQUIVALENT(S): at 10:33

## 2021-02-17 RX ADMIN — Medication 500 MILLIGRAM(S): at 10:44

## 2021-02-17 RX ADMIN — NYSTATIN CREAM 1 APPLICATION(S): 100000 CREAM TOPICAL at 04:58

## 2021-02-17 RX ADMIN — Medication 100 GRAM(S): at 09:28

## 2021-02-17 RX ADMIN — PIPERACILLIN AND TAZOBACTAM 25 GRAM(S): 4; .5 INJECTION, POWDER, LYOPHILIZED, FOR SOLUTION INTRAVENOUS at 16:15

## 2021-02-17 RX ADMIN — NYSTATIN CREAM 1 APPLICATION(S): 100000 CREAM TOPICAL at 16:10

## 2021-02-17 NOTE — PROGRESS NOTE ADULT - PROBLEM SELECTOR PLAN 2
Likely pre-renal, ATN secondary to septic shock. Unclear baseline Cr.   - Cr improving to 2.03  - NS + D5 100cc/hr x 24 hours  - on harding, I&Os  - renal u/s to r/o post-renal UCx growing Candida albicans, possibly colonization vs. cystitis.  - Consider fluconazole 200mg PO daily x 2 weeks.  F/u ID recs  - Urogynecologist Dr. Hernandez contacted by daughter, plan to take out pessary to clean and replace  - remove harding

## 2021-02-17 NOTE — PROGRESS NOTE ADULT - PROBLEM SELECTOR PLAN 4
Hx of dysphagia  - passed bedside swallow  - s/s eval pending Hx of urinary retention and chronic UTI  - remove harding, TOV today  - c/w home bethanechol. Hold pyridium

## 2021-02-17 NOTE — PROGRESS NOTE ADULT - PROBLEM SELECTOR PLAN 3
Hx of urinary retention and chronic UTI  - On harding. TOV tonight.  - c/w home bethanechol. Hold pyridium Likely pre-renal, ATN secondary to septic shock. Unclear baseline Cr.   - Cr improving to 1.37  - c/w NS + D5 100cc/hr  - on harding, I&Os  - renal u/s to r/o post-renal

## 2021-02-17 NOTE — PROGRESS NOTE ADULT - ASSESSMENT
89F w/ PMH of CAD s/p PCI, Chronic UTI's, MDD, HTN, HLD, [place] (hearing aids), dysphagia, prior COVID19 infection, and recent hospitalization for C. diff colitis (~2/7/21) presenting from rehab center for bloody bowel movement, likely LGIB secondary to diverticulosis vs. infectious colitis vs. ischemic colitis.  Also found to be in septic shock, likely secondary to colitis iso recent C. diff.  COVID +, asymptomatic and without hypoxia. 89F w/ PMH of CAD s/p PCI, Chronic UTI's, MDD, HTN, HLD, [place] (hearing aids), dysphagia, prior COVID19 infection, and recent hospitalization for C. diff colitis (~2/7/21) presenting from rehab center for bloody bowel movement, likely LGIB secondary to infectious colitis vs. ischemic colitis.  Also found to be in septic shock, likely secondary to colitis iso recent C. diff.  COVID +, asymptomatic and without hypoxia.  Sepsis now improving but persistently elevated lactate.

## 2021-02-17 NOTE — SWALLOW BEDSIDE ASSESSMENT ADULT - COMMENTS
Regarding her prior COVID19 infection, daughter reports she was diagnosed with "mild" case of COVID and hospitalized ~ 2 months ago and was treated with remdesivir, dexamethasone, convalescent plasma, and AC. Did not require ventilation. She tested negative twice prior to discharge to rehab at the end of January. Assessment: Likely LGIB secondary to diverticulosis vs. infectious colitis vs. ischemic colitis.  Also found to be in septic shock, likely secondary to colitis iso recent C. diff.  COVID +, asymptomatic and without hypoxia.  ID consulted: unclear if this still c diff vs ischemic bowel  vs microperforation vs other. pt seems to have a UTI as well. would treat for c diff, UTI and bowel event. likely NOT acute covid  ab positve , Will d/w infection prevention to see if we can d/c isolation  2/14: RRT called for hypotension, BP 80/50s patient with noticeable lethargy. Pt s/p 3L IVF boluses prior to RRT. My concern was for septic shock, UTI vs Colitis.An additional 1 L NS bolus administered. BP improved to systolic 100's with improvement in mental status. Code status/GOC clarified with HCP- DNR/DNI no pressors. RRT ended as vitals stabilized.  2/16: Passed bedside swallow yesterday. NGT removed and dysphagia diet started. Palliative consulted: Daughter informed me her mother is would not want resuscitation if her heart were to fail as well as not being placed on a ventilator if her lungs fail. She does not want aggressive interventions, such as vasopressor, but is interested in abx and IVF

## 2021-02-17 NOTE — PROGRESS NOTE ADULT - PROBLEM SELECTOR PLAN 7
Previous infection 2 months ago.  - per ID, likely remnant of previous infection  - Sating well on RA Likely LGIB given normal colored stool with blood-tinged water. Possibly diverticulosis as noted on CT a/p vs. ischemic colitis given hx of CAD vs. infectious colitis iso recent C diff colitis.  - no further episodes since day of admission

## 2021-02-17 NOTE — SWALLOW BEDSIDE ASSESSMENT ADULT - ASPIRATION PRECAUTIONS
Monitor for s/s aspiration/laryngeal penetration. If noted:  D/C p.o. intake, provide non-oral nutrition/hydration/meds, and contact this service @ [id number]

## 2021-02-17 NOTE — SWALLOW BEDSIDE ASSESSMENT ADULT - SWALLOW EVAL: DIAGNOSIS
Pt presents with an oropharyngeal dysphagia, though functional for a soft texture diet. There is prolonged mastication with hard solids. Delayed oral transit with suspected delayed pharyngeal swallow trigger is seen across all consistencies. No overt signs of laryngeal penetration/aspiration observed. 89F w/ PMH of CAD s/p PCI, [place] (hearing aids), dysphagia, prior COVID19 infection, and recent hospitalization for C. diff colitis (~2/7/21) presenting from rehab center for bloody bowel movement, likely LGIB secondary to infectious colitis vs. ischemic colitis. Pt presents with an oropharyngeal dysphagia, though functional for a soft texture diet. There is prolonged mastication with hard solids. Delayed oral transit with suspected delayed pharyngeal swallow trigger is seen across all consistencies. No overt signs of laryngeal penetration/aspiration observed.

## 2021-02-17 NOTE — PROGRESS NOTE ADULT - PROBLEM SELECTOR PLAN 1
Likely secondary to colitis possibly C. diff vs. UTI. Lactate still elevated at 3.5. Metabolic acidosis improved. Continues to have watery diarrhea. BP more stable.  - 1st C. diff negative, but high suspicion. F/u 2nd C. diff  - UA positive. F/u UCx  - C/w Vancomycin 500mg q6h (2/14-), Zosyn (2/14-), Flagyl IV 500mg BID (2/14-)  - NS + D5 100cc/hr x 24 hours  - trend lactate  - F/u ID recs  - change diet to lactose-restricted Pancolitis, most significant in descending and rectosigmoid colon. High suspicion for recurrent C diff although initial test negative and improving with oral vanc and IV metronidazole.  Persistently elevate lactate c/f ischemic colitis  - Will attempt to resend C diff    - c/w PO Vancomycin 500mg q6h (2/14-), Zosyn (2/14-), Flagyl IV 500mg BID (2/14-)  - c/w NS + D5 100cc/hr Pancolitis, most significant in descending and rectosigmoid colon. High suspicion for recurrent C diff although initial test negative and improving with oral vanc and IV metronidazole.  Persistently elevate lactate c/f ischemic colitis  - Will attempt to resend C diff    - c/w PO Vancomycin 500mg q6h (2/14-), Zosyn (2/14-), Flagyl IV 500mg BID (2/14-)  - c/w NS + D5 100cc/hr  - Dr. Hale (gastroenterology) consulted per daughter's request for input re: prevention of recurrence of C diff Pancolitis, most significant in descending and rectosigmoid colon. High suspicion for recurrent C diff although initial test negative and improving with oral vanc and IV metronidazole.  Persistently elevate lactate c/f ischemic colitis  - Will attempt to resend C diff    - c/w PO Vancomycin 500mg q6h (2/14-), Zosyn (2/14-), Flagyl IV 500mg BID (2/14-)  - c/w NS + D5 100cc/hr  - Per GI, no scope given risk of perforation and no further intervention for the possible ischemic colitis.  - Dr. Hale (gastroenterology) consulted per daughter's request for input re: prevention of recurrence of C diff

## 2021-02-17 NOTE — SWALLOW BEDSIDE ASSESSMENT ADULT - SLP PERTINENT HISTORY OF CURRENT PROBLEM
89F w/ PMH of CAD s/p PCI, Chronic UTI's, MDD, HTN, HLD, [place] (hearing aids), dysphagia, COVID19 infection (2 months ago), and recent hospitalization for C. diff colitis (~2/7/21) [facility] after episode of vomiting and bloody bowel movement on 2/13.  HPI obtained from chart and from daughter (HCP) as patient does not respond verbally. Staff also reported patient appeared confused on 2/13, when normally more alert and conversive.  Upon EMS arrival, SBP 70s and received 1L IVF.  Per daughter, Patient was hospitalized around 2/7/21 for C. diff colitis at [facility] and given IV vancomycin x 6 days, then transitioned to PO vancomycin upon discharge to rehab (last dose was 2/10/21).  Per daughter, no prior history of GI bleed.

## 2021-02-17 NOTE — PROGRESS NOTE ADULT - SUBJECTIVE AND OBJECTIVE BOX
Ramses Pinedaon, PGY1  Pager [phone]    INCOMPLETE NOTE - IN PROGRESS    Patient is a 89y old  Female who presents with a chief complaint of GI Bleed, Vomiting (16 Feb 2021 18:12)      SUBJECTIVE/INTERVAL EVENTS: Patient seen and examined at bedside.    MEDICATIONS  (STANDING):  ascorbic acid 500 milliGRAM(s) Oral daily  bethanechol 10 milliGRAM(s) Oral three times a day  cholecalciferol 1000 Unit(s) Oral daily  cyanocobalamin 1000 MICROGram(s) Oral daily  dextrose 5% + sodium chloride 0.9%. 1000 milliLiter(s) (100 mL/Hr) IV Continuous <Continuous>  heparin   Injectable 5000 Unit(s) SubCutaneous every 8 hours  metroNIDAZOLE  IVPB 500 milliGRAM(s) IV Intermittent every 8 hours  mirtazapine 15 milliGRAM(s) Oral at bedtime  Nephro-portia 1 Tablet(s) Oral daily  nystatin Powder 1 Application(s) Topical two times a day  piperacillin/tazobactam IVPB.. 3.375 Gram(s) IV Intermittent every 12 hours  vancomycin    Solution 500 milliGRAM(s) Oral every 6 hours    MEDICATIONS  (PRN):  acetaminophen   Tablet .. 650 milliGRAM(s) Oral every 12 hours PRN Mild Pain (1 - 3), Moderate Pain (4 - 6)  ondansetron    Tablet 4 milliGRAM(s) Oral every 8 hours PRN Nausea and/or Vomiting      VITAL SIGNS:  T(F): 98.1 (02-17-21 @ 04:50), Max: 98.5 (02-16-21 @ 12:15)  HR: 101 (02-17-21 @ 04:50) (101 - 109)  BP: 105/74 (02-17-21 @ 04:50) (105/74 - 121/74)  RR: 18 (02-17-21 @ 04:50) (18 - 18)  SpO2: 93% (02-17-21 @ 04:50) (93% - 96%)    I&O's Summary    16 Feb 2021 07:01  -  17 Feb 2021 07:00  --------------------------------------------------------  IN: 2866 mL / OUT: 700 mL / NET: 2166 mL      Daily     Daily     PHYSICAL EXAM:  Gen: Alert, NAD  HEENT: NCAT, conjunctiva clear, sclera anicteric, no erythema or exudates in the oropharynx, mmm  Neck: Supple, no JVD  CV: RRR, S1S2, no m/r/g  Resp: CTAB, normal respiratory effort  Abd: Soft, nontender, nondistended, normal bowel sounds  Ext: no edema, no clubbing or cyanosis  Neuro: AOx3, CN2-12 grossly intact, POLK  SKIN: warm, perfused    LABS:                        10.2   24.71 )-----------( 230      ( 16 Feb 2021 06:45 )             31.7     Hgb Trend: 10.2<--, 10.5<--, 12.6<--, 12.9<--, 11.9<--  02-16    135  |  100  |  32<H>  ----------------------------<  61<L>  3.7   |  20<L>  |  2.03<H>    Ca    8.5      16 Feb 2021 06:45  Phos  3.2     02-16  Mg     1.8     02-16      Creatinine Trend: 2.03<--, 2.47<--, 2.59<--, 2.48<--, 1.70<--, 1.25<--              CAPILLARY BLOOD GLUCOSE      POCT Blood Glucose.: 96 mg/dL (16 Feb 2021 08:58)      RADIOLOGY & ADDITIONAL TESTS: Reviewed    Imaging Personally Reviewed:    Consultant(s) Notes Reviewed:      Care Discussed with Consultants/Other Providers:   Ramses Pinedaon, PGY1  Pager [phone]      Patient is a 89y old  Female who presents with a chief complaint of GI Bleed, Vomiting (16 Feb 2021 18:12)      SUBJECTIVE/INTERVAL EVENTS:  -     MEDICATIONS  (STANDING):  ascorbic acid 500 milliGRAM(s) Oral daily  bethanechol 10 milliGRAM(s) Oral three times a day  cholecalciferol 1000 Unit(s) Oral daily  cyanocobalamin 1000 MICROGram(s) Oral daily  dextrose 5% + sodium chloride 0.9%. 1000 milliLiter(s) (100 mL/Hr) IV Continuous <Continuous>  heparin   Injectable 5000 Unit(s) SubCutaneous every 8 hours  metroNIDAZOLE  IVPB 500 milliGRAM(s) IV Intermittent every 8 hours  mirtazapine 15 milliGRAM(s) Oral at bedtime  Nephro-portia 1 Tablet(s) Oral daily  nystatin Powder 1 Application(s) Topical two times a day  piperacillin/tazobactam IVPB.. 3.375 Gram(s) IV Intermittent every 12 hours  vancomycin    Solution 500 milliGRAM(s) Oral every 6 hours    MEDICATIONS  (PRN):  acetaminophen   Tablet .. 650 milliGRAM(s) Oral every 12 hours PRN Mild Pain (1 - 3), Moderate Pain (4 - 6)  ondansetron    Tablet 4 milliGRAM(s) Oral every 8 hours PRN Nausea and/or Vomiting      VITAL SIGNS:  T(F): 98.1 (02-17-21 @ 04:50), Max: 98.5 (02-16-21 @ 12:15)  HR: 101 (02-17-21 @ 04:50) (101 - 109)  BP: 105/74 (02-17-21 @ 04:50) (105/74 - 121/74)  RR: 18 (02-17-21 @ 04:50) (18 - 18)  SpO2: 93% (02-17-21 @ 04:50) (93% - 96%)    I&O's Summary    16 Feb 2021 07:01  -  17 Feb 2021 07:00  --------------------------------------------------------  IN: 2866 mL / OUT: 700 mL / NET: 2166 mL      Daily     Daily     PHYSICAL EXAM:  Gen: Alert, NAD  HEENT: NCAT, conjunctiva clear, sclera anicteric, no erythema or exudates in the oropharynx, mmm  Neck: Supple, no JVD  CV: RRR, S1S2, no m/r/g  Resp: CTAB, normal respiratory effort  Abd: Soft, nontender, nondistended, normal bowel sounds  Ext: no edema, no clubbing or cyanosis  Neuro: AOx3, CN2-12 grossly intact, POLK  SKIN: warm, perfused    LABS:                        10.2   24.71 )-----------( 230      ( 16 Feb 2021 06:45 )             31.7     Hgb Trend: 10.2<--, 10.5<--, 12.6<--, 12.9<--, 11.9<--  02-16    135  |  100  |  32<H>  ----------------------------<  61<L>  3.7   |  20<L>  |  2.03<H>    Ca    8.5      16 Feb 2021 06:45  Phos  3.2     02-16  Mg     1.8     02-16      Creatinine Trend: 2.03<--, 2.47<--, 2.59<--, 2.48<--, 1.70<--, 1.25<--              CAPILLARY BLOOD GLUCOSE      POCT Blood Glucose.: 96 mg/dL (16 Feb 2021 08:58)      RADIOLOGY & ADDITIONAL TESTS: Reviewed    Imaging Personally Reviewed:    Consultant(s) Notes Reviewed:      Care Discussed with Consultants/Other Providers:   Ramses Fofana, PGY1  Pager [phone]    INCOMPLETE NOTE - IN PROGRESS    Patient is a 89y old  Female who presents with a chief complaint of GI Bleed, Vomiting (2021 07:00)      SUBJECTIVE/INTERVAL EVENTS:      MEDICATIONS  (STANDING):  ascorbic acid 500 milliGRAM(s) Oral daily  bethanechol 10 milliGRAM(s) Oral three times a day  cholecalciferol 1000 Unit(s) Oral daily  cyanocobalamin 1000 MICROGram(s) Oral daily  dextrose 5% + sodium chloride 0.9%. 1000 milliLiter(s) (100 mL/Hr) IV Continuous <Continuous>  heparin   Injectable 5000 Unit(s) SubCutaneous every 8 hours  metroNIDAZOLE  IVPB 500 milliGRAM(s) IV Intermittent every 8 hours  mirtazapine 15 milliGRAM(s) Oral at bedtime  Nephro-portia 1 Tablet(s) Oral daily  nystatin Powder 1 Application(s) Topical two times a day  piperacillin/tazobactam IVPB.. 3.375 Gram(s) IV Intermittent every 12 hours  potassium phosphate / sodium phosphate Powder (PHOS-NaK) 1 Packet(s) Oral three times a day with meals  vancomycin    Solution 500 milliGRAM(s) Oral every 6 hours    MEDICATIONS  (PRN):  acetaminophen   Tablet .. 650 milliGRAM(s) Oral every 12 hours PRN Mild Pain (1 - 3), Moderate Pain (4 - 6)  ondansetron    Tablet 4 milliGRAM(s) Oral every 8 hours PRN Nausea and/or Vomiting      VITAL SIGNS:  T(F): 98.4 (21 @ 12:26), Max: 98.6 (21 @ 08:44)  HR: 98 (21 @ 12:26) (98 - 106)  BP: 140/80 (21 @ 12:26) (105/74 - 142/87)  RR: 18 (21 @ 12:26) (18 - 18)  SpO2: 93% (21 @ 12:26) (93% - 96%)    I&O's Summary    2021 07:01  -  2021 07:00  --------------------------------------------------------  IN: 2866 mL / OUT: 1100 mL / NET: 1766 mL    2021 07:01  -  2021 13:46  --------------------------------------------------------  IN: 300 mL / OUT: 175 mL / NET: 125 mL      Daily     Daily Weight in k.3 (2021 04:50)    PHYSICAL EXAM:  Gen: Alert, NAD  HEENT: NCAT, conjunctiva clear, sclera anicteric, no erythema or exudates in the oropharynx, mmm  Neck: Supple, no JVD  CV: RRR, S1S2, no m/r/g  Resp: CTAB, normal respiratory effort  Abd: Soft, nontender, nondistended, normal bowel sounds  Ext: no edema, no clubbing or cyanosis  Neuro: AOx3, CN2-12 grossly intact, POLK  SKIN: warm, perfused    LABS:                        10.0   23.30 )-----------( 223      ( 2021 07:01 )             31.3     Hgb Trend: 10.0<--, 10.2<--, 10.5<--, 12.6<--, 12.9<--  02-17    133<L>  |  102  |  20  ----------------------------<  97  3.3<L>   |  19<L>  |  1.37<H>    Ca    8.3<L>      2021 06:56  Phos  2.3     -  Mg     1.6           Creatinine Trend: 1.37<--, 2.03<--, 2.47<--, 2.59<--, 2.48<--, 1.70<--              CAPILLARY BLOOD GLUCOSE          RADIOLOGY & ADDITIONAL TESTS: Reviewed    Imaging Personally Reviewed:    Consultant(s) Notes Reviewed:      Care Discussed with Consultants/Other Providers:   Ramses Pinedaon, PGY1  Pager [phone]    INCOMPLETE NOTE - IN PROGRESS    Patient is a 89y old  Female who presents with a chief complaint of GI Bleed, Vomiting (2021 07:00)      SUBJECTIVE/INTERVAL EVENTS:  - Patient c/o      MEDICATIONS  (STANDING):  ascorbic acid 500 milliGRAM(s) Oral daily  bethanechol 10 milliGRAM(s) Oral three times a day  cholecalciferol 1000 Unit(s) Oral daily  cyanocobalamin 1000 MICROGram(s) Oral daily  dextrose 5% + sodium chloride 0.9%. 1000 milliLiter(s) (100 mL/Hr) IV Continuous <Continuous>  heparin   Injectable 5000 Unit(s) SubCutaneous every 8 hours  metroNIDAZOLE  IVPB 500 milliGRAM(s) IV Intermittent every 8 hours  mirtazapine 15 milliGRAM(s) Oral at bedtime  Nephro-portia 1 Tablet(s) Oral daily  nystatin Powder 1 Application(s) Topical two times a day  piperacillin/tazobactam IVPB.. 3.375 Gram(s) IV Intermittent every 12 hours  potassium phosphate / sodium phosphate Powder (PHOS-NaK) 1 Packet(s) Oral three times a day with meals  vancomycin    Solution 500 milliGRAM(s) Oral every 6 hours    MEDICATIONS  (PRN):  acetaminophen   Tablet .. 650 milliGRAM(s) Oral every 12 hours PRN Mild Pain (1 - 3), Moderate Pain (4 - 6)  ondansetron    Tablet 4 milliGRAM(s) Oral every 8 hours PRN Nausea and/or Vomiting      VITAL SIGNS:  T(F): 98.4 (21 @ 12:26), Max: 98.6 (21 @ 08:44)  HR: 98 (21 @ 12:26) (98 - 106)  BP: 140/80 (21 @ 12:26) (105/74 - 142/87)  RR: 18 (21 @ 12:26) (18 - 18)  SpO2: 93% (21 @ 12:26) (93% - 96%)    I&O's Summary    2021 07:01  -  2021 07:00  --------------------------------------------------------  IN: 2866 mL / OUT: 1100 mL / NET: 1766 mL    2021 07:01  -  2021 13:46  --------------------------------------------------------  IN: 300 mL / OUT: 175 mL / NET: 125 mL      Daily     Daily Weight in k.3 (2021 04:50)    PHYSICAL EXAM:  Gen: Alert, NAD  HEENT: NCAT, conjunctiva clear, sclera anicteric, no erythema or exudates in the oropharynx, mmm  Neck: Supple, no JVD  CV: RRR, S1S2, no m/r/g  Resp: CTAB, normal respiratory effort  Abd: Soft, nontender, nondistended, normal bowel sounds  Ext: no edema, no clubbing or cyanosis  Neuro: AOx3, CN2-12 grossly intact, POLK  SKIN: warm, perfused    LABS:                        10.0   23.30 )-----------( 223      ( 2021 07:01 )             31.3     Hgb Trend: 10.0<--, 10.2<--, 10.5<--, 12.6<--, 12.9<--  02-17    133<L>  |  102  |  20  ----------------------------<  97  3.3<L>   |  19<L>  |  1.37<H>    Ca    8.3<L>      2021 06:56  Phos  2.3     -  Mg     1.6           Creatinine Trend: 1.37<--, 2.03<--, 2.47<--, 2.59<--, 2.48<--, 1.70<--              CAPILLARY BLOOD GLUCOSE          RADIOLOGY & ADDITIONAL TESTS: Reviewed    Imaging Personally Reviewed:    Consultant(s) Notes Reviewed:      Care Discussed with Consultants/Other Providers:   Ramses Fofana, PGY1  Pager [phone]    INCOMPLETE NOTE - IN PROGRESS    Patient is a 89y old  Female who presents with a chief complaint of GI Bleed, Vomiting (2021 07:00)      SUBJECTIVE/INTERVAL EVENTS:  - Patient has mild confusion this AM and c/o constipation x 1 week, requests miralax. Unable to convince patient that she has had diarrhea since admission  - Stool sample unable to be located by lab.  Stool sample resent this AM, but poor sample given watery portion of diarrhea saturated into pad. Will attempt to resend stool sample.  - daughter wants private gastroenterology practice that she is familiar with to be consulted for input regarding prevention of C diff recurrence.      MEDICATIONS  (STANDING):  ascorbic acid 500 milliGRAM(s) Oral daily  bethanechol 10 milliGRAM(s) Oral three times a day  cholecalciferol 1000 Unit(s) Oral daily  cyanocobalamin 1000 MICROGram(s) Oral daily  dextrose 5% + sodium chloride 0.9%. 1000 milliLiter(s) (100 mL/Hr) IV Continuous <Continuous>  heparin   Injectable 5000 Unit(s) SubCutaneous every 8 hours  metroNIDAZOLE  IVPB 500 milliGRAM(s) IV Intermittent every 8 hours  mirtazapine 15 milliGRAM(s) Oral at bedtime  Nephro-portia 1 Tablet(s) Oral daily  nystatin Powder 1 Application(s) Topical two times a day  piperacillin/tazobactam IVPB.. 3.375 Gram(s) IV Intermittent every 12 hours  potassium phosphate / sodium phosphate Powder (PHOS-NaK) 1 Packet(s) Oral three times a day with meals  vancomycin    Solution 500 milliGRAM(s) Oral every 6 hours    MEDICATIONS  (PRN):  acetaminophen   Tablet .. 650 milliGRAM(s) Oral every 12 hours PRN Mild Pain (1 - 3), Moderate Pain (4 - 6)  ondansetron    Tablet 4 milliGRAM(s) Oral every 8 hours PRN Nausea and/or Vomiting      VITAL SIGNS:  T(F): 98.4 (21 @ 12:26), Max: 98.6 (21 @ 08:44)  HR: 98 (21 @ 12:26) (98 - 106)  BP: 140/80 (21 @ 12:26) (105/74 - 142/87)  RR: 18 (21 @ 12:26) (18 - 18)  SpO2: 93% (21 @ 12:26) (93% - 96%)    I&O's Summary    2021 07:01  -  2021 07:00  --------------------------------------------------------  IN: 2866 mL / OUT: 1100 mL / NET: 1766 mL    2021 07:01  -  2021 13:46  --------------------------------------------------------  IN: 300 mL / OUT: 175 mL / NET: 125 mL      Daily     Daily Weight in k.3 (2021 04:50)    PHYSICAL EXAM:  Gen: Awake, verbal, well-developed, NAD  HEENT: NCAT, PERRL, EOMI, clear conjunctiva, no scleral icterus  Neck: Supple, no JVD, no LAD  CV: tachycardic, S1S2, no m/r/g  Resp: CTAB, normal respiratory effort  Abd: Soft, tender on LUQ, ND  Ext: no leg swelling, no clubbing or cyanosis  Neuro: responds to questions, AOx2-3 (name, place, and month), CN2-12 grossly intact, POLK  Skin: warm, perfused      LABS:                        10.0   23.30 )-----------( 223      ( 2021 07:01 )             31.3     Hgb Trend: 10.0<--, 10.2<--, 10.5<--, 12.6<--, 12.9<--  -17    133<L>  |  102  |  20  ----------------------------<  97  3.3<L>   |  19<L>  |  1.37<H>    Ca    8.3<L>      2021 06:56  Phos  2.3       Mg     1.6           Creatinine Trend: 1.37<--, 2.03<--, 2.47<--, 2.59<--, 2.48<--, 1.70<--              CAPILLARY BLOOD GLUCOSE          RADIOLOGY & ADDITIONAL TESTS: Reviewed    Imaging Personally Reviewed:    Consultant(s) Notes Reviewed:      Care Discussed with Consultants/Other Providers:   Ramses Fofana, PGY1  Pager [phone]      Patient is a 89y old  Female who presents with a chief complaint of GI Bleed, Vomiting (2021 07:00)      SUBJECTIVE/INTERVAL EVENTS:  - Patient has mild confusion this AM and c/o constipation x 1 week, requests miralax. Unable to convince patient that she has had diarrhea since admission  - Stool sample unable to be located by lab.  Stool sample resent this AM, but poor sample given watery portion of diarrhea saturated into pad. Will attempt to resend stool sample.  - daughter wants private gastroenterology practice that she is familiar with to be consulted for input regarding prevention of C diff recurrence.      MEDICATIONS  (STANDING):  ascorbic acid 500 milliGRAM(s) Oral daily  bethanechol 10 milliGRAM(s) Oral three times a day  cholecalciferol 1000 Unit(s) Oral daily  cyanocobalamin 1000 MICROGram(s) Oral daily  dextrose 5% + sodium chloride 0.9%. 1000 milliLiter(s) (100 mL/Hr) IV Continuous <Continuous>  heparin   Injectable 5000 Unit(s) SubCutaneous every 8 hours  metroNIDAZOLE  IVPB 500 milliGRAM(s) IV Intermittent every 8 hours  mirtazapine 15 milliGRAM(s) Oral at bedtime  Nephro-portia 1 Tablet(s) Oral daily  nystatin Powder 1 Application(s) Topical two times a day  piperacillin/tazobactam IVPB.. 3.375 Gram(s) IV Intermittent every 12 hours  potassium phosphate / sodium phosphate Powder (PHOS-NaK) 1 Packet(s) Oral three times a day with meals  vancomycin    Solution 500 milliGRAM(s) Oral every 6 hours    MEDICATIONS  (PRN):  acetaminophen   Tablet .. 650 milliGRAM(s) Oral every 12 hours PRN Mild Pain (1 - 3), Moderate Pain (4 - 6)  ondansetron    Tablet 4 milliGRAM(s) Oral every 8 hours PRN Nausea and/or Vomiting      VITAL SIGNS:  T(F): 98.4 (21 @ 12:26), Max: 98.6 (21 @ 08:44)  HR: 98 (21 @ 12:26) (98 - 106)  BP: 140/80 (21 @ 12:26) (105/74 - 142/87)  RR: 18 (21 @ 12:26) (18 - 18)  SpO2: 93% (21 @ 12:26) (93% - 96%)    I&O's Summary    2021 07:01  -  2021 07:00  --------------------------------------------------------  IN: 2866 mL / OUT: 1100 mL / NET: 1766 mL    2021 07:01  -  2021 13:46  --------------------------------------------------------  IN: 300 mL / OUT: 175 mL / NET: 125 mL      Daily     Daily Weight in k.3 (2021 04:50)    PHYSICAL EXAM:  Gen: Awake, verbal, well-developed, NAD  HEENT: NCAT, PERRL, EOMI, clear conjunctiva, no scleral icterus  Neck: Supple, no JVD, no LAD  CV: tachycardic, S1S2, no m/r/g  Resp: CTAB, normal respiratory effort  Abd: Soft, tender on LUQ, ND  Ext: no leg swelling, no clubbing or cyanosis  Neuro: responds to questions, AOx2-3 (name, place, and month), CN2-12 grossly intact, POLK  Skin: warm, perfused      LABS:                        10.0   23.30 )-----------( 223      ( 2021 07:01 )             31.3     Hgb Trend: 10.0<--, 10.2<--, 10.5<--, 12.6<--, 12.9<--  -17    133<L>  |  102  |  20  ----------------------------<  97  3.3<L>   |  19<L>  |  1.37<H>    Ca    8.3<L>      2021 06:56  Phos  2.3       Mg     1.6           Creatinine Trend: 1.37<--, 2.03<--, 2.47<--, 2.59<--, 2.48<--, 1.70<--              CAPILLARY BLOOD GLUCOSE          RADIOLOGY & ADDITIONAL TESTS: Reviewed    Imaging Personally Reviewed:    Consultant(s) Notes Reviewed:      Care Discussed with Consultants/Other Providers:

## 2021-02-17 NOTE — CONSULT NOTE ADULT - ASSESSMENT
89F w/ PMH of CAD s/p PCI, Chronic UTI's, MDD, HTN, HLD, [place] (hearing aids), dysphagia, COVID19 infection (2 months ago), and recent hospitalization for C. diff colitis (~2/7/21) presenting from [facility] after episode of vomiting and bloody bowel movement on 2/13. Found to have left sided colitis on imaging       #Colitis - given clinical picture suspect recurrent C diff (though currently negative on testing this admission) vs Ischemic etiology.  Per family wishes no aggressive measures or interventions/procedures.  DNR/DNI in place.    -Monitor BMs; replete lytes PRN  -agree with sending repeat stool for C Diff   -Abx per ID, currently covering for ischemic as well as infectious etiologies  -Will d/w ID re: option of PO Vanco taper as there appears to be high suspicion of C diff recurrence (unclear duration and rx of initial C diff rx at outside hospital)  -avoid hypotension  -monitor clinical exam  -ok for PO diet per SLP recs  -can give PO probiotics (ok upon discharge)      Discussed with house staff  Will follow with you    Thank you for the courtesy of this consult.    Zain Paris PA-C    [facility]  [phone]  After hours and weekend coverage [phone]

## 2021-02-17 NOTE — PROGRESS NOTE ADULT - PROBLEM SELECTOR PLAN 6
Likely LGIB given normal colored stool with blood-tinged water. Possibly diverticulosis as noted on CT a/p vs. ischemic colitis given hx of CAD vs. infectious colitis iso recent C diff colitis.  - no further episodes since day of admission  - d/c PPI given low likelihood of UGIB R leg swelling  - Duplex + for R soleus DVT, below the knee  - no tx given DVT below the knee. Repeat duplex in 2 weeks.

## 2021-02-17 NOTE — PROVIDER CONTACT NOTE (CRITICAL VALUE NOTIFICATION) - BACKGROUND
Patient was admitted with NSTEMI. A/Ox4. OOB/IND. History of CAD. Pt had cardiac catheter done earlier today.

## 2021-02-17 NOTE — PROGRESS NOTE ADULT - PROBLEM SELECTOR PLAN 5
R leg swelling  - Duplex + for R soleus DVT, below the knee  - no tx given DVT below the knee Hx of dysphagia  - s/s eval pending  - currently on dysphagia 2 diet with thin liquids

## 2021-02-17 NOTE — PROGRESS NOTE ADULT - PROBLEM SELECTOR PLAN 8
Hx of multiple CAD s/p 1 stent  - holding ASA 81mg daily and Isosorbide ER 60mg daily  - holding enalapril iso JERONIMO Previous infection 2 months ago.  - per ID, likely remnant of previous infection  - Sating well on RA

## 2021-02-17 NOTE — PROVIDER CONTACT NOTE (CRITICAL VALUE NOTIFICATION) - ACTION/TREATMENT ORDERED:
will continue monitor
MD Radha James made aware. Will repeat Troponin lab draw q6. Pt safety maintained; will continue to monitor.
patient very difficult stick,rn will repeat trop  trst
will continue  to monitor

## 2021-02-17 NOTE — PHYSICAL THERAPY INITIAL EVALUATION ADULT - PERTINENT HX OF CURRENT PROBLEM, REHAB EVAL
89F w/ PMH of CAD s/p PCI, Chronic UTI's, MDD, HTN, HLD, [place] (hearing aids), dysphagia, prior COVID19 infection, and recent hospitalization for C. diff colitis (~2/7/21) presenting from rehab center for bloody bowel movement, likely LGIB secondary to diverticulosis vs. infectious colitis vs. ischemic colitis.  Also found to be in septic shock, likely secondary to colitis iso recent C. diff.  COVID +, asymptomatic and without hypoxia

## 2021-02-17 NOTE — CONSULT NOTE ADULT - SUBJECTIVE AND OBJECTIVE BOX
Patient is a 89y old  Female who presented with a chief complaint of GI Bleed, Vomiting (17 Feb 2021 07:00)      HPI:  89F w/ PMH of CAD s/p PCI, Chronic UTI's, MDD, HTN, HLD, [place] (hearing aids), dysphagia, COVID19 infection (2 months ago), and recent hospitalization for C. diff colitis (~2/7/21) presenting from [facility] after episode of vomiting and bloody bowel movement on 2/13.  HPI obtained from chart and from daughter (HCP) as patient does not respond verbally. Staff also reported patient appeared confused on 2/13, when normally more alert and conversive.  Upon EMS arrival, SBP 70s and received 1L IVF.  Per daughter, Patient was hospitalized around 2/7/21 for C. diff colitis at [facility] and given IV vancomycin x 6 days, then transitioned to PO vancomycin upon discharge to rehab (last dose was 2/10/21).  Per daughter, no prior history of GI bleed.  Regarding her prior COVID19 infection, daughter reports she was diagnosed with "mild" case of COVID and hospitalized ~ 2 months ago and was treated with remdesivir, dexamethasone, convalescent plasma, and AC. Did not require ventilation. She tested negative twice prior to discharge to rehab at the end of January.    In ED: Initial BP was 90s/40s, received 500mL NS x2. BP now 100/70s. Hypothermic 95.3 rectally, then later febrile to 101, s/p 1g of IV tylenol.  HR 50s -> 120s, now 110s. RR 19-32, sating % on RA. S/p Pantoprazole 80mg IVP, Zosyn x1, Metronidazole 500mg IV x1, Ondansetron 4mg. Rectal exam normal colored stool with small amount of watery blood. (14 Feb 2021 07:19)      Pt. no longer having rectal bleeding - stools remain loose/watery, brown in color  No nausea or vomiting  Currently on Dysphagia diet and appears to be tolerating  Pt. pleasantly confused    DNR/DNI with no invasive interventions per family's wishes.   We are asked to evaluate per family's request as pt known to [facility] previously and family wishing input re: management of diarrhea/suspected C diff infection to help prevent re-admission    no fever or chills  Currently on IV Flagyl, IV Zosyn and PO Vanco, ID following    PAST MEDICAL & SURGICAL HISTORY:  COVID-19  Urinary tract infection  CAD in native artery  Dyslipidemia  Depression  Hyponatremia  Hypertension  History of repair of hip fracture        Allergies  flu vaccine (Anaphylaxis)  No Known Drug Allergies      MEDICATIONS  (STANDING):  ascorbic acid 500 milliGRAM(s) Oral daily  bethanechol 10 milliGRAM(s) Oral three times a day  cholecalciferol 1000 Unit(s) Oral daily  cyanocobalamin 1000 MICROGram(s) Oral daily  dextrose 5% + sodium chloride 0.9%. 1000 milliLiter(s) (100 mL/Hr) IV Continuous <Continuous>  heparin   Injectable 5000 Unit(s) SubCutaneous every 8 hours  metroNIDAZOLE  IVPB 500 milliGRAM(s) IV Intermittent every 8 hours  mirtazapine 15 milliGRAM(s) Oral at bedtime  Nephro-portia 1 Tablet(s) Oral daily  nystatin Powder 1 Application(s) Topical two times a day  piperacillin/tazobactam IVPB.. 3.375 Gram(s) IV Intermittent every 12 hours  potassium phosphate / sodium phosphate Powder (PHOS-NaK) 1 Packet(s) Oral three times a day with meals  vancomycin    Solution 500 milliGRAM(s) Oral every 6 hours    MEDICATIONS  (PRN):  acetaminophen   Tablet .. 650 milliGRAM(s) Oral every 12 hours PRN Mild Pain (1 - 3), Moderate Pain (4 - 6)  ondansetron    Tablet 4 milliGRAM(s) Oral every 8 hours PRN Nausea and/or Vomiting      Social History:  Asst Living resident      Advanced Directives: (     ) None    ( X     ) DNR    (  X   ) DNI    (     ) Health Care Proxy:     Review of Systems:  unable to obtain from pt  History obtained from chart review and discussion with team      Vital Signs Last 24 Hrs  T(C): 36.9 (17 Feb 2021 12:26), Max: 37 (17 Feb 2021 08:44)  T(F): 98.4 (17 Feb 2021 12:26), Max: 98.6 (17 Feb 2021 08:44)  HR: 98 (17 Feb 2021 12:26) (98 - 106)  BP: 140/80 (17 Feb 2021 12:26) (105/74 - 142/87)  BP(mean): --  RR: 18 (17 Feb 2021 12:26) (18 - 18)  SpO2: 93% (17 Feb 2021 12:26) (93% - 96%)    PHYSICAL EXAM:    Constitutional: elderly female. appears comfortable, non toxic appearing sitting up in bed  Neck: No LAD, supple no JVD  Respiratory: grossly clear ant.  Cardiovascular: S1 and S2, RRR  Gastrointestinal: obese soft mild left sided tenderness to moderate palpation without rebound, guarding or rigidity  Extremities: neg clubbing, cyanosis +b/l LE edema  Vascular: 2+ peripheral pulses  Neurological: oriented to self, pleasantly confused.  moves all extremities  Skin: No rashes        LABS:                        10.0   23.30 )-----------( 223      ( 17 Feb 2021 07:01 )             31.3   WBC Count: 24.71 K/uL (02.16.21 @ 06:45)   WBC Count: 31.37 K/uL (02.15.21 @ 11:16)   WBC Count: 32.04 K/uL (02.14.21 @ 13:51)     02-17    133<L>  |  102  |  20  ----------------------------<  97  3.3<L>   |  19<L>  |  1.37<H>    Ca    8.3<L>      17 Feb 2021 06:56  Phos  2.3     02-17  Mg     1.6     02-17      Blood Gas Venous - Lactate (02.17.21 @ 07:21)   Blood Gas Venous - Lactate: 3.5:  Blood Gas Venous - Lactate: 7.6 mmoL/L   Blood Gas Venous - Lactate (02.14.21 @ 13:50)   Culture - Urine (02.16.21 @ 10:17)   Specimen Source: .Urine Catheterized   Culture Results:   10,000 - 49,000 CFU/mL Presumptive Candida albicans   GI PCR Panel, Stool (02.15.21 @ 09:00)   Specimen Source: .Stool Feces   Culture Results:   GI PCR Results: NOT detected   C. difficile GDH & toxins A/B by EIA (02.14.21 @ 12:35)   Clostridium difficile GDH Toxins A&B, EIA:   Negative   Clostridium difficile GDH Interpretation: Negative for toxigenic C. Difficile    RADIOLOGY & ADDITIONAL TESTS:  EXAM:  CT ABDOMEN AND PELVIS IC                        PROCEDURE DATE:  02/13/2021        INTERPRETATION:  CLINICAL INFORMATION: Rectal bleeding.    COMPARISON: CT abdomen pelvis 5/6/2011    PROCEDURE:  CT of the Abdomen and Pelvis was performed with and without intravenous contrast.  Precontrast, Arterial and Delayed phases were performed.  Intravenous contrast: 90 ml Omnipaque 350. 10 ml discarded.  Oral contrast: None.  Sagittal and coronal reformats were performed.    FINDINGS:  Evaluation of solid organs is limited without intravenous contrast.    LOWER CHEST: Bibasilar dependent atelectasis. Calcifications of the aortic valve and mitral annulus. Atherosclerotic calcifications of the coronary arteries.    LIVER: Calcified granuloma in the dome of the liver.  BILE DUCTS: Normal caliber.  GALLBLADDER: Within normal limits.  SPLEEN: Within normal limits for age.  PANCREAS: Within normal limits.  ADRENALS: Within normal limits.  KIDNEYS/URETERS: No renal stones or hydronephrosis. Exophytic left renal cyst measures 1.3 cm. Bilateral subcentimeter hypodensities too small to characterize.    BLADDER: Minimally distended. Trace air in the bladder.  REPRODUCTIVE ORGANS: Pessary ring. Fibroid uterus.    BOWEL: No bowel obstruction. Appendix is normal. Mild colonic diverticulosis. Suspected diffuse colonic wall thickening, most pronounced in the descending and rectosigmoid region.  PERITONEUM: No ascites.  VESSELS: Atherosclerotic changes. No extravasation of contrast identified to suggest active GI bleed noting that a small portion of the distal rectum is not included in the field of view.  RETROPERITONEUM/LYMPH NODES: No lymphadenopathy.  ABDOMINAL WALL: Small fat-containing umbilical hernia.  BONES: Degenerative changes. 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 L3 vertebral body hemangioma.    IMPRESSION:    1. No evidence of active GI bleed noting that portion of the distal rectum is not included in the field-of-view and cannot be evaluated.    2. Suspected diffuse colonic wall thickening, most pronounced in the descending and rectosigmoid colon, suspicious for colitis.    3. Focus of air within the bladder. Recommend correlation for recent interpretation.          EXAM:  XR ABDOMEN PORTABLE URGENT 1V                        PROCEDURE DATE:  02/14/2021      IMPRESSION:    Nonobstructive bowel gas pattern.  No acute findings discussed with Dr. Eugene Be by Dr. Farzana Rouse at 2:20 PM on 2/14/2021.    EXAM:  DUPLEX SCAN EXT VEINS LOWER BI                        PROCEDURE DATE:  02/16/2021        IMPRESSION:    Acute, below the knee DVT detected in the right soleal vein.  Dr. Fofana was informed of the findings following the study on 2/16/2021.

## 2021-02-17 NOTE — PHYSICAL THERAPY INITIAL EVALUATION ADULT - ADDITIONAL COMMENTS
Prior to admission pt was at a subacute rehab, however prior to that she had been able to ambulate with a RW and minimal assistance however due to multiple recent hospitalizaton pt has recently had functional decline. Pt lives in an assisted living facility.

## 2021-02-17 NOTE — PROGRESS NOTE ADULT - PROBLEM SELECTOR PLAN 10
DVT ppx: restart SC heparin given no further GIB  Diet: dysphagia 2 diet with thin liquids for now, lactose restricted. Pending s/s eval  Code: DNR/DNI, no pressors, no blood transfusions per HCP    Palliative medicine consulted, appreciate recs DVT ppx: SC heparin  Diet: dysphagia 2 diet with thin liquids for now, lactose restricted. Pending s/s eval  Code: DNR/DNI, no pressors, no blood transfusions per HCP    Palliative medicine consulted, appreciate recs

## 2021-02-17 NOTE — SWALLOW BEDSIDE ASSESSMENT ADULT - SLP GENERAL OBSERVATIONS
Pt encountered in bed, awake, on room air. A&Ox3 (to self, place, and year). Able to follow commands for purpose of assessment. Vocal quality WFL for age/gender.

## 2021-02-18 DIAGNOSIS — R11.0 NAUSEA: ICD-10-CM

## 2021-02-18 LAB
ANION GAP SERPL CALC-SCNC: 13 MMOL/L — SIGNIFICANT CHANGE UP (ref 5–17)
BASE EXCESS BLDV CALC-SCNC: -4.4 MMOL/L — LOW (ref -2–2)
BUN SERPL-MCNC: 13 MG/DL — SIGNIFICANT CHANGE UP (ref 7–23)
C DIFF GDH STL QL: NEGATIVE — SIGNIFICANT CHANGE UP
C DIFF GDH STL QL: SIGNIFICANT CHANGE UP
CALCIUM SERPL-MCNC: 8.2 MG/DL — LOW (ref 8.4–10.5)
CHLORIDE SERPL-SCNC: 103 MMOL/L — SIGNIFICANT CHANGE UP (ref 96–108)
CO2 BLDV-SCNC: 22 MMOL/L — SIGNIFICANT CHANGE UP (ref 22–30)
CO2 SERPL-SCNC: 18 MMOL/L — LOW (ref 22–31)
CREAT SERPL-MCNC: 1.16 MG/DL — SIGNIFICANT CHANGE UP (ref 0.5–1.3)
GAS PNL BLDV: SIGNIFICANT CHANGE UP
GLUCOSE SERPL-MCNC: 84 MG/DL — SIGNIFICANT CHANGE UP (ref 70–99)
HCO3 BLDV-SCNC: 21 MMOL/L — SIGNIFICANT CHANGE UP (ref 21–29)
HCT VFR BLD CALC: 29.6 % — LOW (ref 34.5–45)
HGB BLD-MCNC: 9.3 G/DL — LOW (ref 11.5–15.5)
LACTATE BLDV-MCNC: 2.6 MMOL/L — HIGH (ref 0.7–2)
MAGNESIUM SERPL-MCNC: 1.7 MG/DL — SIGNIFICANT CHANGE UP (ref 1.6–2.6)
MCHC RBC-ENTMCNC: 31.4 GM/DL — LOW (ref 32–36)
MCHC RBC-ENTMCNC: 31.7 PG — SIGNIFICANT CHANGE UP (ref 27–34)
MCV RBC AUTO: 101 FL — HIGH (ref 80–100)
NRBC # BLD: 0 /100 WBCS — SIGNIFICANT CHANGE UP (ref 0–0)
PCO2 BLDV: 41 MMHG — SIGNIFICANT CHANGE UP (ref 35–50)
PH BLDV: 7.32 — LOW (ref 7.35–7.45)
PHOSPHATE SERPL-MCNC: 2.6 MG/DL — SIGNIFICANT CHANGE UP (ref 2.5–4.5)
PLATELET # BLD AUTO: 218 K/UL — SIGNIFICANT CHANGE UP (ref 150–400)
PO2 BLDV: 51 MMHG — HIGH (ref 25–45)
POTASSIUM SERPL-MCNC: 3.7 MMOL/L — SIGNIFICANT CHANGE UP (ref 3.5–5.3)
POTASSIUM SERPL-SCNC: 3.7 MMOL/L — SIGNIFICANT CHANGE UP (ref 3.5–5.3)
RBC # BLD: 2.93 M/UL — LOW (ref 3.8–5.2)
RBC # FLD: 14.9 % — HIGH (ref 10.3–14.5)
SAO2 % BLDV: 84 % — SIGNIFICANT CHANGE UP (ref 67–88)
SARS-COV-2 RNA SPEC QL NAA+PROBE: SIGNIFICANT CHANGE UP
SODIUM SERPL-SCNC: 134 MMOL/L — LOW (ref 135–145)
WBC # BLD: 15.6 K/UL — HIGH (ref 3.8–10.5)
WBC # FLD AUTO: 15.6 K/UL — HIGH (ref 3.8–10.5)

## 2021-02-18 PROCEDURE — 99232 SBSQ HOSP IP/OBS MODERATE 35: CPT | Mod: CS

## 2021-02-18 PROCEDURE — 99233 SBSQ HOSP IP/OBS HIGH 50: CPT | Mod: CS

## 2021-02-18 PROCEDURE — 99233 SBSQ HOSP IP/OBS HIGH 50: CPT | Mod: GC

## 2021-02-18 PROCEDURE — 99232 SBSQ HOSP IP/OBS MODERATE 35: CPT

## 2021-02-18 RX ORDER — POTASSIUM CHLORIDE 20 MEQ
40 PACKET (EA) ORAL ONCE
Refills: 0 | Status: COMPLETED | OUTPATIENT
Start: 2021-02-18 | End: 2021-02-18

## 2021-02-18 RX ORDER — MAGNESIUM SULFATE 500 MG/ML
1 VIAL (ML) INJECTION ONCE
Refills: 0 | Status: COMPLETED | OUTPATIENT
Start: 2021-02-18 | End: 2021-02-18

## 2021-02-18 RX ADMIN — HEPARIN SODIUM 5000 UNIT(S): 5000 INJECTION INTRAVENOUS; SUBCUTANEOUS at 05:13

## 2021-02-18 RX ADMIN — Medication 500 MILLIGRAM(S): at 23:53

## 2021-02-18 RX ADMIN — Medication 1 PACKET(S): at 16:19

## 2021-02-18 RX ADMIN — Medication 1 TABLET(S): at 12:01

## 2021-02-18 RX ADMIN — Medication 500 MILLIGRAM(S): at 16:19

## 2021-02-18 RX ADMIN — NYSTATIN CREAM 1 APPLICATION(S): 100000 CREAM TOPICAL at 16:19

## 2021-02-18 RX ADMIN — HEPARIN SODIUM 5000 UNIT(S): 5000 INJECTION INTRAVENOUS; SUBCUTANEOUS at 21:54

## 2021-02-18 RX ADMIN — PREGABALIN 1000 MICROGRAM(S): 225 CAPSULE ORAL at 12:00

## 2021-02-18 RX ADMIN — Medication 100 MILLIGRAM(S): at 21:54

## 2021-02-18 RX ADMIN — Medication 1 PACKET(S): at 12:01

## 2021-02-18 RX ADMIN — Medication 100 MILLIGRAM(S): at 12:00

## 2021-02-18 RX ADMIN — Medication 1000 UNIT(S): at 12:00

## 2021-02-18 RX ADMIN — Medication 10 MILLIGRAM(S): at 06:48

## 2021-02-18 RX ADMIN — Medication 1 PACKET(S): at 09:14

## 2021-02-18 RX ADMIN — Medication 10 MILLIGRAM(S): at 21:54

## 2021-02-18 RX ADMIN — SODIUM CHLORIDE 75 MILLILITER(S): 9 INJECTION, SOLUTION INTRAVENOUS at 13:35

## 2021-02-18 RX ADMIN — HEPARIN SODIUM 5000 UNIT(S): 5000 INJECTION INTRAVENOUS; SUBCUTANEOUS at 12:00

## 2021-02-18 RX ADMIN — Medication 500 MILLIGRAM(S): at 05:13

## 2021-02-18 RX ADMIN — Medication 100 GRAM(S): at 09:14

## 2021-02-18 RX ADMIN — Medication 10 MILLIGRAM(S): at 12:00

## 2021-02-18 RX ADMIN — Medication 500 MILLIGRAM(S): at 12:01

## 2021-02-18 RX ADMIN — PIPERACILLIN AND TAZOBACTAM 25 GRAM(S): 4; .5 INJECTION, POWDER, LYOPHILIZED, FOR SOLUTION INTRAVENOUS at 05:14

## 2021-02-18 RX ADMIN — Medication 500 MILLIGRAM(S): at 12:00

## 2021-02-18 RX ADMIN — NYSTATIN CREAM 1 APPLICATION(S): 100000 CREAM TOPICAL at 05:13

## 2021-02-18 RX ADMIN — PIPERACILLIN AND TAZOBACTAM 25 GRAM(S): 4; .5 INJECTION, POWDER, LYOPHILIZED, FOR SOLUTION INTRAVENOUS at 16:19

## 2021-02-18 RX ADMIN — Medication 100 MILLIGRAM(S): at 05:14

## 2021-02-18 RX ADMIN — MIRTAZAPINE 15 MILLIGRAM(S): 45 TABLET, ORALLY DISINTEGRATING ORAL at 21:54

## 2021-02-18 NOTE — PROGRESS NOTE ADULT - SUBJECTIVE AND OBJECTIVE BOX
Patient is a 89y old  Female who presented with a chief complaint of GI Bleed, Vomiting (18 Feb 2021 06:30)      INTERVAL HPI/OVERNIGHT EVENTS:  tolerating PO diet, no nausea or vomiting  still with diarrhea - watery brown, no bleeding  no fever or chills    Rt below knee DVT on duplex yesterday    MEDICATIONS  (STANDING):  ascorbic acid 500 milliGRAM(s) Oral daily  bethanechol 10 milliGRAM(s) Oral three times a day  cholecalciferol 1000 Unit(s) Oral daily  cyanocobalamin 1000 MICROGram(s) Oral daily  dextrose 5% + sodium chloride 0.9%. 1000 milliLiter(s) (100 mL/Hr) IV Continuous <Continuous>  heparin   Injectable 5000 Unit(s) SubCutaneous every 8 hours  metroNIDAZOLE  IVPB 500 milliGRAM(s) IV Intermittent every 8 hours  mirtazapine 15 milliGRAM(s) Oral at bedtime  Nephro-portia 1 Tablet(s) Oral daily  nystatin Powder 1 Application(s) Topical two times a day  piperacillin/tazobactam IVPB.. 3.375 Gram(s) IV Intermittent every 12 hours  potassium phosphate / sodium phosphate Powder (PHOS-NaK) 1 Packet(s) Oral three times a day with meals  vancomycin    Solution 500 milliGRAM(s) Oral every 6 hours    MEDICATIONS  (PRN):  acetaminophen   Tablet .. 650 milliGRAM(s) Oral every 12 hours PRN Mild Pain (1 - 3), Moderate Pain (4 - 6)  ondansetron    Tablet 4 milliGRAM(s) Oral every 8 hours PRN Nausea and/or Vomiting      Allergies  flu vaccine (Anaphylaxis)  No Known Drug Allergies      Review of Systems:  unable to obtain from pt    Vital Signs Last 24 Hrs  T(C): 37.2 (18 Feb 2021 05:29), Max: 37.3 (17 Feb 2021 20:58)  T(F): 99 (18 Feb 2021 05:29), Max: 99.2 (17 Feb 2021 20:58)  HR: 97 (18 Feb 2021 05:29) (97 - 104)  BP: 144/80 (18 Feb 2021 05:29) (119/80 - 144/80)  BP(mean): --  RR: 18 (18 Feb 2021 05:29) (18 - 18)  SpO2: 97% (18 Feb 2021 05:29) (93% - 97%)    PHYSICAL EXAM:  Constitutional: elderly female. appears comfortable, non toxic appearing sitting up in bed  Neck: No LAD, supple no JVD  Respiratory: grossly clear ant.  Cardiovascular: S1 and S2, RRR  Gastrointestinal: obese soft  +left sided and mid lower abdominal tenderness to moderate palpation without rebound, guarding or rigidity  +watery brown stool in bed pad  Extremities: neg clubbing, cyanosis +b/l LE edema  Vascular: 2+ peripheral pulses  Neurological: oriented to self, pleasantly confused.  moves all extremities  Skin: No rashes      LABS:                        9.3    15.60 )-----------( 218      ( 18 Feb 2021 07:04 )             29.6     02-18    134<L>  |  103  |  13  ----------------------------<  84  3.7   |  18<L>  |  1.16    Ca    8.2<L>      18 Feb 2021 07:04  Phos  2.6     02-18  Mg     1.7     02-18    Blood Gas Venous - Lactate: 2.6: (02.18.21 @ 07:12)   Blood Gas Venous - Lactate: 3.5  (02.17.21 @ 07:21)     RADIOLOGY & ADDITIONAL TESTS:

## 2021-02-18 NOTE — PROGRESS NOTE ADULT - PROBLEM SELECTOR PLAN 3
Likely pre-renal, ATN secondary to septic shock. Unclear baseline Cr.   - Cr improving to 1.37  - c/w NS + D5 100cc/hr  - on harding, I&Os  - renal u/s to r/o post-renal Likely pre-renal, ATN secondary to septic shock. Unclear baseline Cr.   - Resolved. Cr 1.16  - c/w NS + D5 100cc/hr  - on harding, I&Os

## 2021-02-18 NOTE — PROGRESS NOTE ADULT - PROBLEM SELECTOR PLAN 2
-Potentially due to weakness in the setting of sepsis and hospitalization  -Dysphagia 3 Soft diet with thin liquids as per S/S

## 2021-02-18 NOTE — PROGRESS NOTE ADULT - PROBLEM SELECTOR PLAN 5
Hx of dysphagia  - s/s eval pending  - currently on dysphagia 2 diet with thin liquids Hx of dysphagia  - soft diet with thin liquids per s/s

## 2021-02-18 NOTE — PROGRESS NOTE ADULT - ASSESSMENT
89 year old woman w/ PMH of CAD s/p PCI, Chronic UTI's, MDD, HTN, HLD, [place] (hearing aids), dysphagia, COVID19 infection (2 months ago), and recent hospitalization for C. diff colitis (~2/7/21) presenting from [facility] after episode of vomiting and bloody bowel movement with c/b UTI. Palliative consulted for GOC.

## 2021-02-18 NOTE — CONSULT NOTE ADULT - SUBJECTIVE AND OBJECTIVE BOX
Urogynecology Consult Note    89y yo with uterovaginal prolapse followed by Dr Hernandez, admitted on 2/14/21 for GI bleed and emesis.  Patient assessed at bedside at request of daughter, for pessary change.  Patient has no complaints, denies bleeding from vagina or issues with the pessary.  She was last seen in office on 11/11/20 for pessary check.  She has both a gellhorn LS 3/12 and a donut #4 pessary in place.    OB/GYN HISTORY:   PAST MEDICAL & SURGICAL HISTORY:  COVID-19  Urinary tract infection  CAD in native artery  Dyslipidemia  Depression  Hyponatremia  Hypertension  History of repair of hip fracture      MEDICATIONS  (STANDING):  ascorbic acid 500 milliGRAM(s) Oral daily  bethanechol 10 milliGRAM(s) Oral three times a day  cholecalciferol 1000 Unit(s) Oral daily  cyanocobalamin 1000 MICROGram(s) Oral daily  dextrose 5% + sodium chloride 0.9%. 1000 milliLiter(s) (100 mL/Hr) IV Continuous <Continuous>  heparin   Injectable 5000 Unit(s) SubCutaneous every 8 hours  metroNIDAZOLE  IVPB 500 milliGRAM(s) IV Intermittent every 8 hours  mirtazapine 15 milliGRAM(s) Oral at bedtime  Nephro-portia 1 Tablet(s) Oral daily  nystatin Powder 1 Application(s) Topical two times a day  piperacillin/tazobactam IVPB.. 3.375 Gram(s) IV Intermittent every 12 hours  potassium phosphate / sodium phosphate Powder (PHOS-NaK) 1 Packet(s) Oral three times a day with meals  vancomycin    Solution 500 milliGRAM(s) Oral every 6 hours    MEDICATIONS  (PRN):  acetaminophen   Tablet .. 650 milliGRAM(s) Oral every 12 hours PRN Mild Pain (1 - 3), Moderate Pain (4 - 6)  ondansetron    Tablet 4 milliGRAM(s) Oral every 8 hours PRN Nausea and/or Vomiting    Allergies    flu vaccine (Anaphylaxis)  No Known Drug Allergies    Intolerances        Exam  Vital Signs Last 24 Hrs  T(C): 37 (18 Feb 2021 11:59), Max: 37.3 (17 Feb 2021 20:58)  T(F): 98.6 (18 Feb 2021 11:59), Max: 99.2 (17 Feb 2021 20:58)  HR: 97 (18 Feb 2021 11:59) (97 - 104)  BP: 142/84 (18 Feb 2021 11:59) (119/80 - 144/80)  BP(mean): --  RR: 18 (18 Feb 2021 11:59) (18 - 18)  SpO2: 93% (18 Feb 2021 11:59) (93% - 97%)  Gen: NAD, A&O  Abd: soft, nontender, nondistended  Pelvic:  Pessary in situ.  Removed and cleaned.  No erosions or abrasions.  Replaced without difficulty  Ext: nontender bilaterally    LABS:                        9.3    15.60 )-----------( 218      ( 18 Feb 2021 07:04 )             29.6     02-18    134<L>  |  103  |  13  ----------------------------<  84  3.7   |  18<L>  |  1.16    Ca    8.2<L>      18 Feb 2021 07:04  Phos  2.6     02-18  Mg     1.7     02-18

## 2021-02-18 NOTE — PROGRESS NOTE ADULT - PROBLEM SELECTOR PLAN 4
Hx of urinary retention and chronic UTI  - remove harding, TOV today  - c/w home bethanechol. Hold pyridium Hx of urinary retention and chronic UTI  - Requiring straight cath x2  - c/w home bethanechol. Hold pyridium.

## 2021-02-18 NOTE — PROGRESS NOTE ADULT - ASSESSMENT
89F w/ PMH of CAD s/p PCI, Chronic UTI's, MDD, HTN, HLD, [place] (hearing aids), dysphagia, prior COVID19 infection, and recent hospitalization for C. diff colitis (~2/7/21) presenting from rehab center for bloody bowel movement, likely LGIB secondary to infectious colitis vs. ischemic colitis.  Also found to be in septic shock, likely secondary to colitis iso recent C. diff.  COVID +, asymptomatic and without hypoxia.  Sepsis now improving but persistently elevated lactate.

## 2021-02-18 NOTE — PROGRESS NOTE ADULT - PROBLEM SELECTOR PLAN 5
DNR/DNI, CARLEEN in chart  -Spoke with daughter to update her, however she mentioned she would like for me to call back another time. Will call tomorrow with SW assistance. We will look into whether her [facility] offers hospice services, to determine if that is an option in the future, given at the moment pt does not have dx that qualities for hospice.  -Will continue to follow for dispo planning.  -Pt has recommend [facility]

## 2021-02-18 NOTE — PROGRESS NOTE ADULT - PROBLEM SELECTOR PLAN 1
Pancolitis, most significant in descending and rectosigmoid colon. High suspicion for recurrent C diff although initial test negative and improving with oral vanc and IV metronidazole.  Persistently elevate lactate c/f ischemic colitis  - Will attempt to resend C diff    - c/w PO Vancomycin 500mg q6h (2/14-), Zosyn (2/14-), Flagyl IV 500mg BID (2/14-)  - c/w NS + D5 100cc/hr  - Per GI, no scope given risk of perforation and no further intervention for the possible ischemic colitis.  - Dr. Hale (gastroenterology) consulted per daughter's request for input re: prevention of recurrence of C diff Pancolitis, most significant in descending and rectosigmoid colon. C diff vs. ischemic colitis.  High suspicion for recurrent C diff although initial test negative. WBC, lactate, and clinical improvement on with oral vanc and IV metronidazole.   - Will attempt to resend C diff  - c/w PO Vancomycin 500mg q6h (2/14-), Zosyn (2/14-), Flagyl IV 500mg BID (2/14-). Day 5  - Lactate downtrending to 2.6.  C/w NS + D5 100cc/hr  - Per GI, ok to start probiotics upon discharge

## 2021-02-18 NOTE — PROGRESS NOTE ADULT - ASSESSMENT
89F w/ PMH of CAD s/p PCI, Chronic UTI's, MDD, HTN, HLD, [place] (hearing aids), dysphagia, COVID19 infection (2 months ago), and recent hospitalization for C. diff colitis (~2/7/21) presenting from [facility] after episode of vomiting and bloody bowel movement on 2/13. Found to have left sided colitis on imaging   Acute Right Below Knee DVT     #Colitis - given clinical picture ?ischemic colitis vs recurrent C diff (though currently negative on testing this admission).  Per family wishes no aggressive measures or interventions/procedures.  DNR/DNI in place.    -Monitor BMs; replete lytes PRN  -agree with sending repeat stool for C Diff   -Abx per ID, currently covering for ischemic as well as infectious etiologies  -d/w ID re: option of PO Vanco taper as there appears to be  suspicion of C diff recurrence (unclear duration and rx of initial C diff rx at outside hospital)  -avoid hypotension  -monitor clinical exam  -ok for PO diet per SLP recs  -can give PO probiotics (ok upon discharge)    Discussed with Medicine team and ID attending  Will follow with you    Zain Paris PA-C    [facility]  [phone]  After hours and weekend coverage [phone]

## 2021-02-18 NOTE — PROGRESS NOTE ADULT - SUBJECTIVE AND OBJECTIVE BOX
Patient is a 89y old  Female who presents with a chief complaint of GI Bleed, Vomiting (18 Feb 2021 12:23)    Being followed by ID for        Interval history:  pt more alert  still with diarrhea  no fever  No other acute events      PAST MEDICAL & SURGICAL HISTORY:  COVID-19    Urinary tract infection    CAD in native artery    Dyslipidemia    Depression    Hyponatremia    Hypertension    History of repair of hip fracture      Allergies    flu vaccine (Anaphylaxis)  No Known Drug Allergies    Intolerances      Antimicrobials:    metroNIDAZOLE  IVPB 500 milliGRAM(s) IV Intermittent every 8 hours  piperacillin/tazobactam IVPB.. 3.375 Gram(s) IV Intermittent every 12 hours  vancomycin    Solution 500 milliGRAM(s) Oral every 6 hours    MEDICATIONS  (STANDING):  ascorbic acid 500 milliGRAM(s) Oral daily  bethanechol 10 milliGRAM(s) Oral three times a day  cholecalciferol 1000 Unit(s) Oral daily  cyanocobalamin 1000 MICROGram(s) Oral daily  dextrose 5% + sodium chloride 0.9%. 1000 milliLiter(s) (75 mL/Hr) IV Continuous <Continuous>  heparin   Injectable 5000 Unit(s) SubCutaneous every 8 hours  metroNIDAZOLE  IVPB 500 milliGRAM(s) IV Intermittent every 8 hours  mirtazapine 15 milliGRAM(s) Oral at bedtime  Nephro-portia 1 Tablet(s) Oral daily  nystatin Powder 1 Application(s) Topical two times a day  piperacillin/tazobactam IVPB.. 3.375 Gram(s) IV Intermittent every 12 hours  potassium phosphate / sodium phosphate Powder (PHOS-NaK) 1 Packet(s) Oral three times a day with meals  vancomycin    Solution 500 milliGRAM(s) Oral every 6 hours    MEDICATIONS  (PRN):  acetaminophen   Tablet .. 650 milliGRAM(s) Oral every 12 hours PRN Mild Pain (1 - 3), Moderate Pain (4 - 6)  ondansetron    Tablet 4 milliGRAM(s) Oral every 8 hours PRN Nausea and/or Vomiting      Vital Signs Last 24 Hrs  T(C): 37 (02-18-21 @ 11:59), Max: 37.3 (02-17-21 @ 20:58)  T(F): 98.6 (02-18-21 @ 11:59), Max: 99.2 (02-17-21 @ 20:58)  HR: 97 (02-18-21 @ 11:59) (97 - 104)  BP: 142/84 (02-18-21 @ 11:59) (128/75 - 144/80)  BP(mean): --  RR: 18 (02-18-21 @ 11:59) (18 - 18)  SpO2: 93% (02-18-21 @ 11:59) (93% - 97%)    Physical Exam:    Constitutional resting quietly    HEENT PERRLA EOMI,No pallor or icterus    No oral exudate or erythema    Neck supple no JVD or LN    Chest Good AE,CTA    CVS RRR S1 S2     Abd soft LLQ tenderness    Ext No cyanosis clubbing or edema    IV site no erythema tenderness or discharge      Lab Data:                          9.3    15.60 )-----------( 218      ( 18 Feb 2021 07:04 )             29.6       02-18    134<L>  |  103  |  13  ----------------------------<  84  3.7   |  18<L>  |  1.16    Ca    8.2<L>      18 Feb 2021 07:04  Phos  2.6     02-18  Mg     1.7     02-18            .Urine Catheterized  02-16-21   10,000 - 49,000 CFU/mL Presumptive Candida albicans  --  --      .Urine Catheterized  02-15-21   >=3 organisms. Probable collection contamination.  --  --      .Stool Feces  02-15-21   GI PCR Results: NOT detected  *******Please Note:*******  GI panel PCR evaluates for:  Campylobacter, Plesiomonas shigelloides, Salmonella,  Vibrio, Yersinia enterocolitica, Enteroaggregative  Escherichia coli (EAEC), Enteropathogenic E.coli (EPEC),  Enterotoxigenic E. coli (ETEC) lt/st, Shiga-like  toxin-producing E. coli (STEC) stx1/stx2,  Shigella/ Enteroinvasive E. coli (EIEC), Cryptosporidium,  Cyclospora cayetanensis, Entamoeba histolytica,  Giardia lamblia, Adenovirus F 40/41, Astrovirus,  Norovirus GI/GII, Rotavirus A, Sapovirus  --  --      .Blood Blood-Peripheral  02-14-21   No growth to date.  --  --        Blood Gas Venous - Lactate (02.18.21 @ 07:12)    Blood Gas Venous - Lactate: 2.6: Elevated lactate. Consider ordering follow-up lactate to trend. mmoL/L      WBC Count: 15.60 (02-18-21 @ 07:04)  WBC Count: 23.30 (02-17-21 @ 07:01)  WBC Count: 24.71 (02-16-21 @ 06:45)  WBC Count: 31.37 (02-15-21 @ 11:16)  WBC Count: 32.04 (02-14-21 @ 13:51)  WBC Count: 26.64 (02-14-21 @ 11:11)  WBC Count: 24.73 (02-13-21 @ 22:45)  WBC Count: 16.80 (02-13-21 @ 20:15)        < from: [facility] Duplex Lower Ext Vein Scan, Bilat (02.16.21 @ 16:10) >  EXAM:  DUPLEX SCAN EXT VEINS LOWER BI                            PROCEDURE DATE:  02/16/2021            INTERPRETATION:  CLINICAL INFORMATION: Bilateral lower extremity swelling.    COMPARISON: None available.    TECHNIQUE: Duplex sonography of theBILATERAL LOWER extremity veins with color and spectral Doppler, with and without compression.    FINDINGS:    There is normal compressibility of the bilateral common femoral, femoral and popliteal veins.  Doppler examination shows normal spontaneousand phasic flow.    No calf vein thrombosis is detected in the left leg.    Right calf thrombosis is detected in the right soleal vein.  .    IMPRESSION:    Acute, below the knee DVT detected in the right soleal vein.  Dr. Fofana was informed of the findings following the study on 2/16/2021.        ASHWIN CHILDRESS MD; Fellow Radiology    < end of copied text >    < from: CT Abdomen and Pelvis w/ IV Cont (02.13.21 @ 22:34) >    EXAM:  CT ABDOMEN AND PELVIS IC                            PROCEDURE DATE:  02/13/2021            INTERPRETATION:  CLINICAL INFORMATION: Rectal bleeding.    COMPARISON: CT abdomen pelvis 5/6/2011    PROCEDURE:  CT of the Abdomen and Pelvis was performed with and without intravenous contrast.  Precontrast, Arterial and Delayed phases were performed.  Intravenous contrast: 90 ml Omnipaque 350. 10 ml discarded.  Oral contrast: None.  Sagittal and coronal reformats were performed.    FINDINGS:  Evaluation of solid organs is limited without intravenous contrast.    LOWER CHEST: Bibasilar dependent atelectasis. Calcifications of the aortic valve and mitral annulus. Atherosclerotic calcifications of the coronary arteries.    LIVER: Calcified granuloma in the dome of the liver.  BILE DUCTS: Normal caliber.  GALLBLADDER: Within normal limits.  SPLEEN: Within normal limits for age.  PANCREAS: Within normal limits.  ADRENALS: Within normal limits.  KIDNEYS/URETERS: No renal stones or hydronephrosis. Exophytic left renal cyst measures 1.3 cm. Bilateral subcentimeter hypodensities too small to characterize.    BLADDER: Minimally distended. Trace air in the bladder.  REPRODUCTIVE ORGANS: Pessary ring. Fibroid uterus.    BOWEL: No bowel obstruction. Appendix is normal. Mild colonic diverticulosis. Suspected diffuse colonic wall thickening, most pronounced in the descending and rectosigmoid region.  PERITONEUM: No ascites.  VESSELS: Atherosclerotic changes. No extravasation of contrast identified to suggest active GI bleed noting that a small portion of the distal rectum is not included in the field of view.  RETROPERITONEUM/LYMPH NODES: No lymphadenopathy.  ABDOMINAL WALL: Small fat-containing umbilical hernia.  BONES: Degenerative changes. 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 L3 vertebral body hemangioma.    IMPRESSION:    1. No evidence of active GI bleed noting that portion of the distal rectum is not included in the field-of-view and cannot be evaluated.    2. Suspected diffuse colonic wall thickening, most pronounced in the descending and rectosigmoid colon, suspicious for colitis.    3. Focus of air within the bladder. Recommend correlation for recent interpretation.              COLBY AMAYA MD; Resident Radiology  This document has been electronically signed.  JANEY BAUER MD; Attending Radiologist  This document has been electronically signed. Feb 14 2021 12:58AM    < end of copied text >

## 2021-02-18 NOTE — PROGRESS NOTE ADULT - PROBLEM SELECTOR PLAN 3
-Potentially from diverticulosis vs infectious colitis given CT findings  -c/w protonix BID  -Appears to have resolved.

## 2021-02-18 NOTE — PROGRESS NOTE ADULT - SUBJECTIVE AND OBJECTIVE BOX
SUBJECTIVE AND OBJECTIVE:  INTERVAL HPI/OVERNIGHT EVENTS:    DNR on chart: Yes    Yes      Allergies    flu vaccine (Anaphylaxis)  No Known Drug Allergies    Intolerances    MEDICATIONS  (STANDING):  ascorbic acid 500 milliGRAM(s) Oral daily  bethanechol 10 milliGRAM(s) Oral three times a day  cholecalciferol 1000 Unit(s) Oral daily  cyanocobalamin 1000 MICROGram(s) Oral daily  dextrose 5% + sodium chloride 0.9%. 1000 milliLiter(s) (100 mL/Hr) IV Continuous <Continuous>  heparin   Injectable 5000 Unit(s) SubCutaneous every 8 hours  metroNIDAZOLE  IVPB 500 milliGRAM(s) IV Intermittent every 8 hours  mirtazapine 15 milliGRAM(s) Oral at bedtime  Nephro-portia 1 Tablet(s) Oral daily  nystatin Powder 1 Application(s) Topical two times a day  piperacillin/tazobactam IVPB.. 3.375 Gram(s) IV Intermittent every 12 hours  potassium phosphate / sodium phosphate Powder (PHOS-NaK) 1 Packet(s) Oral three times a day with meals  vancomycin    Solution 500 milliGRAM(s) Oral every 6 hours    MEDICATIONS  (PRN):  acetaminophen   Tablet .. 650 milliGRAM(s) Oral every 12 hours PRN Mild Pain (1 - 3), Moderate Pain (4 - 6)  ondansetron    Tablet 4 milliGRAM(s) Oral every 8 hours PRN Nausea and/or Vomiting      ITEMS UNCHECKED ARE NOT PRESENT    PRESENT SYMPTOMS: [ ]Unable to obtain due to poor mentation   Source if other than patient:  [ ]Family   [ ]Team     Pain:  [ ]yes [ ]no  QOL impact -   Location -                    Aggravating factors -  Quality -  Radiation -  Timing-  Severity (0-10 scale):  Minimal acceptable level (0-10 scale):     Dyspnea:                           [ ]Mild [ ]Moderate [ ]Severe  Anxiety:                             [ ]Mild [ ]Moderate [ ]Severe  Fatigue:                             [ ]Mild [ ]Moderate [ ]Severe  Nausea:                             [ ]Mild [ ]Moderate [ ]Severe  Loss of appetite:              [ ]Mild [ ]Moderate [ ]Severe  Constipation:                    [ ]Mild [ ]Moderate [ ]Severe    CPOT:    https://www.[facility].org/getattachment/[email or web address]/Critical-Care-Pain-Observation-Tool-(CPOT)    PAIN AD Score:	  http://geriatrictoolkit.[facility]/cog/painad.pdf (Ctrl + left click to view)    Other Symptoms:  [ ]All other review of systems negative     Palliative Performance Status Version 2:         %      http://npcrc.org/files/news/palliative_performance_scale_ppsv2.pdf  PHYSICAL EXAM:  Vital Signs Last 24 Hrs  T(C): 37 (18 Feb 2021 11:59), Max: 37.3 (17 Feb 2021 20:58)  T(F): 98.6 (18 Feb 2021 11:59), Max: 99.2 (17 Feb 2021 20:58)  HR: 97 (18 Feb 2021 11:59) (97 - 104)  BP: 142/84 (18 Feb 2021 11:59) (128/75 - 144/80)  BP(mean): --  RR: 18 (18 Feb 2021 11:59) (18 - 18)  SpO2: 93% (18 Feb 2021 11:59) (93% - 97%) I&O's Summary    17 Feb 2021 07:01  -  18 Feb 2021 07:00  --------------------------------------------------------  IN: 2220 mL / OUT: 1575 mL / NET: 645 mL    18 Feb 2021 07:01  -  18 Feb 2021 12:23  --------------------------------------------------------  IN: 0 mL / OUT: 500 mL / NET: -500 mL       GENERAL:  [ ]Alert  [ ]Oriented x   [ ]Lethargic  [ ]Cachexia  [ ]Unarousable  [ ]Verbal  [ ]Non-Verbal  Behavioral:   [ ]Anxiety  [ ]Delirium [ ]Agitation [ ]Other  HEENT:  [ ]Normal   [ ]Dry mouth   [ ]ET Tube/Trach  [ ]Oral lesions  PULMONARY:   [ ]Clear [ ]Tachypnea  [ ]Audible excessive secretions   [ ]Rhonchi        [ ]Right [ ]Left [ ]Bilateral  [ ]Crackles        [ ]Right [ ]Left [ ]Bilateral  [ ]Wheezing     [ ]Right [ ]Left [ ]Bilateral  [ ]Diminished BS [ ] Right [ ]Left [ ]Bilateral  CARDIOVASCULAR:    [ ]Regular [ ]Irregular [ ]Tachy  [ ]Jamie [ ]Murmur [ ]Other  GASTROINTESTINAL:  [ ]Soft  [ ]Distended   [ ]+BS  [ ]Non tender [ ]Tender  [ ]PEG [ ]OGT/ NGT   Last BM:      GENITOURINARY:  [ ]Normal [ ]Incontinent   [ ]Oliguria/Anuria   [ ]Loera  MUSCULOSKELETAL:   [ ]Normal   [ ]Weakness  [ ]Bed/Wheelchair bound [ ]Edema  NEUROLOGIC:   [ ]No focal deficits  [ ] Cognitive impairment  [ ] Dysphagia [ ]Dysarthria [ ] Paresis [ ]Other   SKIN:   [ ]Normal  [ ]Rash   [ ]Pressure ulcer(s) [ ]y [ ]n present on admission    CRITICAL CARE:  [ ]Shock Present  [ ]Septic [ ]Cardiogenic [ ]Neurologic [ ]Hypovolemic  [ ]Vasopressors [ ]Inotropes  [ ]Respiratory failure present [ ]Mechanical Ventilation [ ]Non-invasive ventilatory support [ ]High-Flow  [ ]Acute  [ ]Chronic [ ]Hypoxic  [ ]Hypercarbic [ ]Other  [ ]Other organ failure     LABS:                        9.3    15.60 )-----------( 218      ( 18 Feb 2021 07:04 )             29.6   02-18    134<L>  |  103  |  13  ----------------------------<  84  3.7   |  18<L>  |  1.16    Ca    8.2<L>      18 Feb 2021 07:04  Phos  2.6     02-18  Mg     1.7     02-18          RADIOLOGY & ADDITIONAL STUDIES:    Protein Calorie Malnutrition Present: [ ]mild [ ]moderate [ ]severe [ ]underweight [ ]morbid obesity  https://www.andeal.org/vault/9471/web/files/ONC/Table_Clinical%20Characteristics%20to%20Document%20Malnutrition-White%20JV%20et%20al%202012.pdf    Height (cm): 152.4 (02-14-21 @ 05:48)  Weight (kg): 62.7 (02-14-21 @ 05:48)  BMI (kg/m2): 27 (02-14-21 @ 05:48)    [ ]PPSV2 < or = 30%  [ ]significant weight loss [ ]poor nutritional intake [ ]anasarca   Albumin, Serum: 3.3 g/dL (02-14-21 @ 13:59)   [ ]Artificial Nutrition    REFERRALS:   [ ]Chaplaincy  [ ]Hospice  [ ]Child Life  [ ]Social Work  [ ]Case management [ ]Holistic Therapy     Goals of Care Document:  [address] (02-14-21 @ 14:14)  Goals of Care Conversation:   Participants:  · Participants  Family; Staff  · Child(domingo)  Batsheva Palm (daughter)  · Provider  Ramses Fofana MD,  Alan Al MD.    Advance Directives:  · Does Patient Have a Surrogate  Yes   · Surrogate's Name  Batsheva Palm  · Surrogate's Phone Number  [phone]    Conversation Discussion:  · Conversation  MOLST Discussed  · Conversation Details  Discussion regarding goals of care was done with health care proxy, daughter Stephanie Palm.  HCP wishes for code status to be DNR/DNI. HCP does not wish for aggressive measures including vasopressors for hypotension and blood transfusions.  Discussion completed by medicine team: Ramses Fofana, PGY1, and Alan Al, PGY2.    Personal Advance Directives Treatment Guidelines:   Treatment Guidelines:  · Treatment Guidelines  DNR Order  · Treatment Guideline Comments  No pressors and no blood transfusions.    MOLST:  · Completed  14-Feb-2021    Location of Discussion:   Duration of Advanced Care Planning Meeting:  · Time spent (in minutes)  20     Location of Discussion:  · Location of discussion  Telephone       Electronic Signatures:  Ramses Fofana Ae)  (Signed 14-Feb-2021 14:21)  	Authored: Goals of Care Conversation, Personal Advance Directives Treatment Guidelines, Location of Discussion      Last Updated: 14-Feb-2021 14:21 by Ramses Fofana Ae)        ______________    Ramirez Covington M.D.  Hospice and Palliative Medicine Fellow  Pager [phone] SUBJECTIVE AND OBJECTIVE:  INTERVAL HPI/OVERNIGHT EVENTS:    DNR on chart: Yes    Yes      Allergies    flu vaccine (Anaphylaxis)  No Known Drug Allergies    Intolerances    MEDICATIONS  (STANDING):  ascorbic acid 500 milliGRAM(s) Oral daily  bethanechol 10 milliGRAM(s) Oral three times a day  cholecalciferol 1000 Unit(s) Oral daily  cyanocobalamin 1000 MICROGram(s) Oral daily  dextrose 5% + sodium chloride 0.9%. 1000 milliLiter(s) (100 mL/Hr) IV Continuous <Continuous>  heparin   Injectable 5000 Unit(s) SubCutaneous every 8 hours  metroNIDAZOLE  IVPB 500 milliGRAM(s) IV Intermittent every 8 hours  mirtazapine 15 milliGRAM(s) Oral at bedtime  Nephro-portia 1 Tablet(s) Oral daily  nystatin Powder 1 Application(s) Topical two times a day  piperacillin/tazobactam IVPB.. 3.375 Gram(s) IV Intermittent every 12 hours  potassium phosphate / sodium phosphate Powder (PHOS-NaK) 1 Packet(s) Oral three times a day with meals  vancomycin    Solution 500 milliGRAM(s) Oral every 6 hours    MEDICATIONS  (PRN):  acetaminophen   Tablet .. 650 milliGRAM(s) Oral every 12 hours PRN Mild Pain (1 - 3), Moderate Pain (4 - 6)  ondansetron    Tablet 4 milliGRAM(s) Oral every 8 hours PRN Nausea and/or Vomiting      ITEMS UNCHECKED ARE NOT PRESENT    PRESENT SYMPTOMS: [ ]Unable to obtain due to poor mentation   Source if other than patient:  [ ]Family   [ ]Team     Pain:  [ ]yes [ ]no  QOL impact -   Location -                    Aggravating factors -  Quality -  Radiation -  Timing-  Severity (0-10 scale):  Minimal acceptable level (0-10 scale):     Dyspnea:                           [ ]Mild [ ]Moderate [ ]Severe  Anxiety:                             [ ]Mild [ ]Moderate [ ]Severe  Fatigue:                             [ ]Mild [ ]Moderate [ ]Severe  Nausea:                             [ ]Mild [ ]Moderate [ ]Severe  Loss of appetite:              [ ]Mild [ ]Moderate [ ]Severe  Constipation:                    [ ]Mild [ ]Moderate [ ]Severe    CPOT:    https://www.[facility].org/getattachment/[email or web address]/Critical-Care-Pain-Observation-Tool-(CPOT)    PAIN AD Score:	  http://geriatrictoolkit.[facility]/cog/painad.pdf (Ctrl + left click to view)    Other Symptoms:  [ ]All other review of systems negative     Palliative Performance Status Version 2:         %      http://npcrc.org/files/news/palliative_performance_scale_ppsv2.pdf  PHYSICAL EXAM:  Vital Signs Last 24 Hrs  T(C): 37 (18 Feb 2021 11:59), Max: 37.3 (17 Feb 2021 20:58)  T(F): 98.6 (18 Feb 2021 11:59), Max: 99.2 (17 Feb 2021 20:58)  HR: 97 (18 Feb 2021 11:59) (97 - 104)  BP: 142/84 (18 Feb 2021 11:59) (128/75 - 144/80)  BP(mean): --  RR: 18 (18 Feb 2021 11:59) (18 - 18)  SpO2: 93% (18 Feb 2021 11:59) (93% - 97%) I&O's Summary    17 Feb 2021 07:01  -  18 Feb 2021 07:00  --------------------------------------------------------  IN: 2220 mL / OUT: 1575 mL / NET: 645 mL    18 Feb 2021 07:01  -  18 Feb 2021 12:23  --------------------------------------------------------  IN: 0 mL / OUT: 500 mL / NET: -500 mL       GENERAL:  [ ]Alert  [ ]Oriented x   [ ]Lethargic  [ ]Cachexia  [ ]Unarousable  [ ]Verbal  [ ]Non-Verbal  Behavioral:   [ ]Anxiety  [ ]Delirium [ ]Agitation [ ]Other  HEENT:  [ ]Normal   [ ]Dry mouth   [ ]ET Tube/Trach  [ ]Oral lesions  PULMONARY:   [ ]Clear [ ]Tachypnea  [ ]Audible excessive secretions   [ ]Rhonchi        [ ]Right [ ]Left [ ]Bilateral  [ ]Crackles        [ ]Right [ ]Left [ ]Bilateral  [ ]Wheezing     [ ]Right [ ]Left [ ]Bilateral  [ ]Diminished BS [ ] Right [ ]Left [ ]Bilateral  CARDIOVASCULAR:    [ ]Regular [ ]Irregular [ ]Tachy  [ ]Jamie [ ]Murmur [ ]Other  GASTROINTESTINAL:  [ ]Soft  [ ]Distended   [ ]+BS  [ ]Non tender [ ]Tender  [ ]PEG [ ]OGT/ NGT   Last BM:      GENITOURINARY:  [ ]Normal [ ]Incontinent   [ ]Oliguria/Anuria   [ ]Loera  MUSCULOSKELETAL:   [ ]Normal   [ ]Weakness  [ ]Bed/Wheelchair bound [ ]Edema  NEUROLOGIC:   [ ]No focal deficits  [ ] Cognitive impairment  [ ] Dysphagia [ ]Dysarthria [ ] Paresis [ ]Other   SKIN:   [ ]Normal  [ ]Rash   [ ]Pressure ulcer(s) [ ]y [ ]n present on admission    CRITICAL CARE:  [ ]Shock Present  [ ]Septic [ ]Cardiogenic [ ]Neurologic [ ]Hypovolemic  [ ]Vasopressors [ ]Inotropes  [ ]Respiratory failure present [ ]Mechanical Ventilation [ ]Non-invasive ventilatory support [ ]High-Flow  [ ]Acute  [ ]Chronic [ ]Hypoxic  [ ]Hypercarbic [ ]Other  [ ]Other organ failure     LABS: reviewed                        9.3    15.60 )-----------( 218      ( 18 Feb 2021 07:04 )             29.6   02-18    134<L>  |  103  |  13  ----------------------------<  84  3.7   |  18<L>  |  1.16    Ca    8.2<L>      18 Feb 2021 07:04  Phos  2.6     02-18  Mg     1.7     02-18          RADIOLOGY & ADDITIONAL STUDIES:    Protein Calorie Malnutrition Present: [ ]mild [ ]moderate [ ]severe [ ]underweight [ ]morbid obesity  https://www.andeal.org/vault/0640/web/files/ONC/Table_Clinical%20Characteristics%20to%20Document%20Malnutrition-White%20JV%20et%20al%202012.pdf    Height (cm): 152.4 (02-14-21 @ 05:48)  Weight (kg): 62.7 (02-14-21 @ 05:48)  BMI (kg/m2): 27 (02-14-21 @ 05:48)    [ ]PPSV2 < or = 30%  [ ]significant weight loss [ ]poor nutritional intake [ ]anasarca   Albumin, Serum: 3.3 g/dL (02-14-21 @ 13:59)   [ ]Artificial Nutrition    REFERRALS:   [ ]Chaplaincy  [ ]Hospice  [ ]Child Life  [ ]Social Work  [ ]Case management [ ]Holistic Therapy     Goals of Care Document:  [address] (02-14-21 @ 14:14)  Goals of Care Conversation:   Participants:  · Participants  Family; Staff  · Child(domingo)  Batsheva Palm (daughter)  · Provider  Ramses Fofana MD,  Alan Al MD.    Advance Directives:  · Does Patient Have a Surrogate  Yes   · Surrogate's Name  Batsheva Palm  · Surrogate's Phone Number  [phone]    Conversation Discussion:  · Conversation  MOLST Discussed  · Conversation Details  Discussion regarding goals of care was done with health care proxy, daughter Stephanie Palm.  HCP wishes for code status to be DNR/DNI. HCP does not wish for aggressive measures including vasopressors for hypotension and blood transfusions.  Discussion completed by medicine team: Ramses Fofana, PGY1, and Alan Al, PGY2.    Personal Advance Directives Treatment Guidelines:   Treatment Guidelines:  · Treatment Guidelines  DNR Order  · Treatment Guideline Comments  No pressors and no blood transfusions.    MOLST:  · Completed  14-Feb-2021    Location of Discussion:   Duration of Advanced Care Planning Meeting:  · Time spent (in minutes)  20     Location of Discussion:  · Location of discussion  Telephone       Electronic Signatures:  Ramses Fofana Ae)  (Signed 14-Feb-2021 14:21)  	Authored: Goals of Care Conversation, Personal Advance Directives Treatment Guidelines, Location of Discussion      Last Updated: 14-Feb-2021 14:21 by Ramses Fofana Ae)        ______________    Ramirez Covington M.D.  Hospice and Palliative Medicine Fellow  Pager [phone] SUBJECTIVE AND OBJECTIVE:  INTERVAL HPI/OVERNIGHT EVENTS: No major overnight events. States she is having some nausea. Denies pain and dyspnea.    DNR on chart: Yes    Yes      Allergies    flu vaccine (Anaphylaxis)  No Known Drug Allergies    Intolerances    MEDICATIONS  (STANDING):  ascorbic acid 500 milliGRAM(s) Oral daily  bethanechol 10 milliGRAM(s) Oral three times a day  cholecalciferol 1000 Unit(s) Oral daily  cyanocobalamin 1000 MICROGram(s) Oral daily  dextrose 5% + sodium chloride 0.9%. 1000 milliLiter(s) (100 mL/Hr) IV Continuous <Continuous>  heparin   Injectable 5000 Unit(s) SubCutaneous every 8 hours  metroNIDAZOLE  IVPB 500 milliGRAM(s) IV Intermittent every 8 hours  mirtazapine 15 milliGRAM(s) Oral at bedtime  Nephro-portia 1 Tablet(s) Oral daily  nystatin Powder 1 Application(s) Topical two times a day  piperacillin/tazobactam IVPB.. 3.375 Gram(s) IV Intermittent every 12 hours  potassium phosphate / sodium phosphate Powder (PHOS-NaK) 1 Packet(s) Oral three times a day with meals  vancomycin    Solution 500 milliGRAM(s) Oral every 6 hours    MEDICATIONS  (PRN):  acetaminophen   Tablet .. 650 milliGRAM(s) Oral every 12 hours PRN Mild Pain (1 - 3), Moderate Pain (4 - 6)  ondansetron    Tablet 4 milliGRAM(s) Oral every 8 hours PRN Nausea and/or Vomiting      ITEMS UNCHECKED ARE NOT PRESENT    PRESENT SYMPTOMS: [ ]Unable to obtain due to poor mentation   Source if other than patient:  [ ]Family   [ ]Team     Pain:  [ ]yes [ X]no  QOL impact -   Location -                    Aggravating factors -  Quality -  Radiation -  Timing-  Severity (0-10 scale):  Minimal acceptable level (0-10 scale):     Dyspnea:                           [ ]Mild [ ]Moderate [ ]Severe  Anxiety:                             [ ]Mild [ ]Moderate [ ]Severe  Fatigue:                             [ ]Mild [ ]Moderate [ ]Severe  Nausea:                             [ ]Mild [ ]Moderate [ ]Severe  Loss of appetite:              [ ]Mild [ ]Moderate [ ]Severe  Constipation:                    [ ]Mild [ ]Moderate [ ]Severe    CPOT:    https://www.[facility].org/getattachment/[email or web address]/Critical-Care-Pain-Observation-Tool-(CPOT)    PAIN AD Score:	  http://geriatrictoolkit.[facility]/cog/painad.pdf (Ctrl + left click to view)    Other Symptoms:  [ ]All other review of systems negative     Palliative Performance Status Version 2:      30%      http://[facility].org/files/news/palliative_performance_scale_ppsv2.pdf  PHYSICAL EXAM:  Vital Signs Last 24 Hrs  T(C): 37 (18 Feb 2021 11:59), Max: 37.3 (17 Feb 2021 20:58)  T(F): 98.6 (18 Feb 2021 11:59), Max: 99.2 (17 Feb 2021 20:58)  HR: 97 (18 Feb 2021 11:59) (97 - 104)  BP: 142/84 (18 Feb 2021 11:59) (128/75 - 144/80)  RR: 18 (18 Feb 2021 11:59) (18 - 18)  SpO2: 93% (18 Feb 2021 11:59) (93% - 97%) I&O's Summary    17 Feb 2021 07:01  -  18 Feb 2021 07:00  --------------------------------------------------------  IN: 2220 mL / OUT: 1575 mL / NET: 645 mL    18 Feb 2021 07:01  -  18 Feb 2021 12:23  --------------------------------------------------------  IN: 0 mL / OUT: 500 mL / NET: -500 mL       GENERAL:  [X ]Alert  [ X]Oriented x 3  [ ]Lethargic  [ ]Cachexia  [ ]Unarousable  [X ]Verbal  [ ]Non-Verbal  Behavioral:   [ ]Anxiety  [ ]Delirium [ ]Agitation [ ]Other  HEENT:  [X ]Normal   [ ]Dry mouth   [ ]ET Tube/Trach  [ ]Oral lesions  PULMONARY:   [X ]Clear [ ]Tachypnea  [ ]Audible excessive secretions   [ ]Rhonchi        [ ]Right [ ]Left [ ]Bilateral  [ ]Crackles        [ ]Right [ ]Left [ ]Bilateral  [ ]Wheezing     [ ]Right [ ]Left [ ]Bilateral  [ ]Diminished BS [ ] Right [ ]Left [ ]Bilateral  CARDIOVASCULAR:    [X ]Regular [ ]Irregular [ ]Tachy  [ ]Jamie [ ]Murmur [ ]Other  GASTROINTESTINAL:  [X]Soft  [ ]Distended   [ ]+BS  [ X]Non tender [ ]Tender  [ ]PEG [ ]OGT/ NGT   Last BM: 2/18/2021     GENITOURINARY:  [ X]Normal [ ]Incontinent   [ ]Oliguria/Anuria   [ ]Loera  MUSCULOSKELETAL:   [ ]Normal   [ X]Weakness  [ ]Bed/Wheelchair bound [ ]Edema  NEUROLOGIC:   [ ]No focal deficits  [ ] Cognitive impairment  [ X] Dysphagia [ ]Dysarthria [ ] Paresis [ ]Other   SKIN:   [ X]Normal  [ ]Rash   [ ]Pressure ulcer(s) [ ]y [ ]n present on admission    CRITICAL CARE:  [ ]Shock Present  [ ]Septic [ ]Cardiogenic [ ]Neurologic [ ]Hypovolemic  [ ]Vasopressors [ ]Inotropes  [ ]Respiratory failure present [ ]Mechanical Ventilation [ ]Non-invasive ventilatory support [ ]High-Flow  [ ]Acute  [ ]Chronic [ ]Hypoxic  [ ]Hypercarbic [ ]Other  [ ]Other organ failure     LABS: reviewed                        9.3    15.60 )-----------( 218      ( 18 Feb 2021 07:04 )             29.6   02-18    134<L>  |  103  |  13  ----------------------------<  84  3.7   |  18<L>  |  1.16    Ca    8.2<L>      18 Feb 2021 07:04  Phos  2.6     02-18  Mg     1.7     02-18          RADIOLOGY & ADDITIONAL STUDIES: No new imaging    Protein Calorie Malnutrition Present: [ ]mild [ ]moderate [ ]severe [ ]underweight [ ]morbid obesity  https://www.andeal.org/vault/3130/web/files/ONC/Table_Clinical%20Characteristics%20to%20Document%20Malnutrition-White%20JV%20et%20al%202012.pdf    Height (cm): 152.4 (02-14-21 @ 05:48)  Weight (kg): 62.7 (02-14-21 @ 05:48)  BMI (kg/m2): 27 (02-14-21 @ 05:48)    [X ]PPSV2 < or = 30%  [ ]significant weight loss [ ]poor nutritional intake [ ]anasarca   Albumin, Serum: 3.3 g/dL (02-14-21 @ 13:59)   [ ]Artificial Nutrition    REFERRALS:   [ ]Chaplaincy  [ ]Hospice  [ ]Child Life  [X]Social Work  [ ]Case management [ ]Holistic Therapy     Goals of Care Document:  [address] (02-14-21 @ 14:14)  Goals of Care Conversation:   Participants:  · Participants  Family; Staff  · Child(dominog)  Batsheva Palm (daughter)  · Provider  Ramses Fofana MD,  Alan Al MD.    Advance Directives:  · Does Patient Have a Surrogate  Yes   · Surrogate's Name  Batsheva Palm  · Surrogate's Phone Number  [phone]    Conversation Discussion:  · Conversation  MOLST Discussed  · Conversation Details  Discussion regarding goals of care was done with health care proxy, daughter Stephanie Palm.  HCP wishes for code status to be DNR/DNI. HCP does not wish for aggressive measures including vasopressors for hypotension and blood transfusions.  Discussion completed by medicine team: Ramses Fofana, PGY1, and Alan Al, PGY2.    Personal Advance Directives Treatment Guidelines:   Treatment Guidelines:  · Treatment Guidelines  DNR Order  · Treatment Guideline Comments  No pressors and no blood transfusions.    MOLST:  · Completed  14-Feb-2021    Location of Discussion:   Duration of Advanced Care Planning Meeting:  · Time spent (in minutes)  20     Location of Discussion:  · Location of discussion  Telephone       Electronic Signatures:  Ramses Fofana Ae)  (Signed 14-Feb-2021 14:21)  	Authored: Goals of Care Conversation, Personal Advance Directives Treatment Guidelines, Location of Discussion      Last Updated: 14-Feb-2021 14:21 by Ramses Fofana Ae)        ______________    Ramirez Covington M.D.  Hospice and Palliative Medicine Fellow  Pager [phone]

## 2021-02-18 NOTE — CONSULT NOTE ADULT - ASSESSMENT
89y yo with uterovaginal prolapse followed by Dr Hernandez, admitted on 2/14/21 for GI bleed and emesis, assessed for pessary care.  Pessary cleaned and replaced without difficulty.  No current complaints, no sign of damage from pessary.  - Pessary to remain in situ  - Follow up in office with Dr Hernandez for further pessary care in 3 months.    D/w Dr Mayr Tavares PGY6

## 2021-02-18 NOTE — PROGRESS NOTE ADULT - PROBLEM SELECTOR PLAN 7
Likely LGIB given normal colored stool with blood-tinged water. Possibly diverticulosis as noted on CT a/p vs. ischemic colitis given hx of CAD vs. infectious colitis iso recent C diff colitis.  - no further episodes since day of admission

## 2021-02-18 NOTE — PROGRESS NOTE ADULT - SUBJECTIVE AND OBJECTIVE BOX
Ramses Pinedaon, PGY1  Pager [phone]    INCOMPLETE NOTE - IN PROGRESS    Patient is a 89y old  Female who presents with a chief complaint of GI Bleed, Vomiting (17 Feb 2021 15:04)      SUBJECTIVE/INTERVAL EVENTS: Patient seen and examined at bedside.    MEDICATIONS  (STANDING):  ascorbic acid 500 milliGRAM(s) Oral daily  bethanechol 10 milliGRAM(s) Oral three times a day  cholecalciferol 1000 Unit(s) Oral daily  cyanocobalamin 1000 MICROGram(s) Oral daily  dextrose 5% + sodium chloride 0.9%. 1000 milliLiter(s) (100 mL/Hr) IV Continuous <Continuous>  heparin   Injectable 5000 Unit(s) SubCutaneous every 8 hours  metroNIDAZOLE  IVPB 500 milliGRAM(s) IV Intermittent every 8 hours  mirtazapine 15 milliGRAM(s) Oral at bedtime  Nephro-portia 1 Tablet(s) Oral daily  nystatin Powder 1 Application(s) Topical two times a day  piperacillin/tazobactam IVPB.. 3.375 Gram(s) IV Intermittent every 12 hours  potassium phosphate / sodium phosphate Powder (PHOS-NaK) 1 Packet(s) Oral three times a day with meals  vancomycin    Solution 500 milliGRAM(s) Oral every 6 hours    MEDICATIONS  (PRN):  acetaminophen   Tablet .. 650 milliGRAM(s) Oral every 12 hours PRN Mild Pain (1 - 3), Moderate Pain (4 - 6)  ondansetron    Tablet 4 milliGRAM(s) Oral every 8 hours PRN Nausea and/or Vomiting      VITAL SIGNS:  T(F): 99 (02-18-21 @ 05:29), Max: 99.2 (02-17-21 @ 20:58)  HR: 97 (02-18-21 @ 05:29) (97 - 104)  BP: 144/80 (02-18-21 @ 05:29) (119/80 - 144/80)  RR: 18 (02-18-21 @ 05:29) (18 - 18)  SpO2: 97% (02-18-21 @ 05:29) (93% - 97%)    I&O's Summary    16 Feb 2021 07:01  -  17 Feb 2021 07:00  --------------------------------------------------------  IN: 2866 mL / OUT: 1100 mL / NET: 1766 mL    17 Feb 2021 07:01  -  18 Feb 2021 06:30  --------------------------------------------------------  IN: 2220 mL / OUT: 1575 mL / NET: 645 mL      Daily     Daily     PHYSICAL EXAM:  Gen: Alert, NAD  HEENT: NCAT, conjunctiva clear, sclera anicteric, no erythema or exudates in the oropharynx, mmm  Neck: Supple, no JVD  CV: RRR, S1S2, no m/r/g  Resp: CTAB, normal respiratory effort  Abd: Soft, nontender, nondistended, normal bowel sounds  Ext: no edema, no clubbing or cyanosis  Neuro: AOx3, CN2-12 grossly intact, POLK  SKIN: warm, perfused    LABS:                        10.0   23.30 )-----------( 223      ( 17 Feb 2021 07:01 )             31.3     Hgb Trend: 10.0<--, 10.2<--, 10.5<--, 12.6<--, 12.9<--  02-17    133<L>  |  102  |  20  ----------------------------<  97  3.3<L>   |  19<L>  |  1.37<H>    Ca    8.3<L>      17 Feb 2021 06:56  Phos  2.3     02-17  Mg     1.6     02-17      Creatinine Trend: 1.37<--, 2.03<--, 2.47<--, 2.59<--, 2.48<--, 1.70<--              CAPILLARY BLOOD GLUCOSE          RADIOLOGY & ADDITIONAL TESTS: Reviewed    Imaging Personally Reviewed:    Consultant(s) Notes Reviewed:      Care Discussed with Consultants/Other Providers:   Ramses Pinedaon, PGY1  Pager [phone]      Patient is a 89y old  Female who presents with a chief complaint of GI Bleed, Vomiting (17 Feb 2021 15:04)      SUBJECTIVE/INTERVAL EVENTS:  - Admits to L sided abdominal pain, no other acute complaints  - Straight cath x 2 since harding removal yesterday  - Still unable to send C diff sample    MEDICATIONS  (STANDING):  ascorbic acid 500 milliGRAM(s) Oral daily  bethanechol 10 milliGRAM(s) Oral three times a day  cholecalciferol 1000 Unit(s) Oral daily  cyanocobalamin 1000 MICROGram(s) Oral daily  dextrose 5% + sodium chloride 0.9%. 1000 milliLiter(s) (100 mL/Hr) IV Continuous <Continuous>  heparin   Injectable 5000 Unit(s) SubCutaneous every 8 hours  metroNIDAZOLE  IVPB 500 milliGRAM(s) IV Intermittent every 8 hours  mirtazapine 15 milliGRAM(s) Oral at bedtime  Nephro-portia 1 Tablet(s) Oral daily  nystatin Powder 1 Application(s) Topical two times a day  piperacillin/tazobactam IVPB.. 3.375 Gram(s) IV Intermittent every 12 hours  potassium phosphate / sodium phosphate Powder (PHOS-NaK) 1 Packet(s) Oral three times a day with meals  vancomycin    Solution 500 milliGRAM(s) Oral every 6 hours    MEDICATIONS  (PRN):  acetaminophen   Tablet .. 650 milliGRAM(s) Oral every 12 hours PRN Mild Pain (1 - 3), Moderate Pain (4 - 6)  ondansetron    Tablet 4 milliGRAM(s) Oral every 8 hours PRN Nausea and/or Vomiting      VITAL SIGNS:  T(F): 99 (02-18-21 @ 05:29), Max: 99.2 (02-17-21 @ 20:58)  HR: 97 (02-18-21 @ 05:29) (97 - 104)  BP: 144/80 (02-18-21 @ 05:29) (119/80 - 144/80)  RR: 18 (02-18-21 @ 05:29) (18 - 18)  SpO2: 97% (02-18-21 @ 05:29) (93% - 97%)    I&O's Summary    16 Feb 2021 07:01  -  17 Feb 2021 07:00  --------------------------------------------------------  IN: 2866 mL / OUT: 1100 mL / NET: 1766 mL    17 Feb 2021 07:01  -  18 Feb 2021 06:30  --------------------------------------------------------  IN: 2220 mL / OUT: 1575 mL / NET: 645 mL      Daily     Daily     PHYSICAL EXAM:  Gen: Awake, verbal, well-developed, NAD  HEENT: NCAT, PERRL, EOMI, clear conjunctiva, no scleral icterus  Neck: Supple, no JVD, no LAD  CV: tachycardic, S1S2, no m/r/g  Resp: CTAB, normal respiratory effort  Abd: Soft, L sided TTP, ND  Ext: no leg swelling, no clubbing or cyanosis  Neuro: responds to questions appropriately, AOx2-3, CN2-12 grossly intact, POLK  Skin: warm, perfused      LABS:                        10.0   23.30 )-----------( 223      ( 17 Feb 2021 07:01 )             31.3     Hgb Trend: 10.0<--, 10.2<--, 10.5<--, 12.6<--, 12.9<--  02-17    133<L>  |  102  |  20  ----------------------------<  97  3.3<L>   |  19<L>  |  1.37<H>    Ca    8.3<L>      17 Feb 2021 06:56  Phos  2.3     02-17  Mg     1.6     02-17      Creatinine Trend: 1.37<--, 2.03<--, 2.47<--, 2.59<--, 2.48<--, 1.70<--              CAPILLARY BLOOD GLUCOSE          RADIOLOGY & ADDITIONAL TESTS: Reviewed    Imaging Personally Reviewed:    Consultant(s) Notes Reviewed:      Care Discussed with Consultants/Other Providers:

## 2021-02-18 NOTE — PROGRESS NOTE ADULT - ASSESSMENT
89F w/ PMH of CAD s/p PCI, Chronic UTI's, MDD, HTN, HLD, [place] (hearing aids), dysphagia, COVID19 infection (2 months ago, s/p dexa, remdesivir, convalescent plasma, not intubated), and recent hospitalization for C. diff colitis (~2/7/21) presenting from [facility] after episode of vomiting and bloody bowel movement on 2/13.     unclear if this still c diff vs ischemic bowel  vs microperforation vs other   pt seems to have a UTI as well  Pt was seen by surgery     treating for c diff, UTI and bowel event      follow lactate  await cultures . ( urine had greater than three types of organisms) and now yeast - of unclear significance   BP is better today   continue current management, ? ischemic bowel vs  c diff  day # 5 abs  will consider d/cing the zosyn and continue po vancomycin and IV flagyl, still quite tender in LLQ  for repeat C diff 89F w/ PMH of CAD s/p PCI, Chronic UTI's, MDD, HTN, HLD, [place] (hearing aids), dysphagia, COVID19 infection (2 months ago, s/p dexa, remdesivir, convalescent plasma, not intubated), and recent hospitalization for C. diff colitis (~2/7/21) presenting from [facility] after episode of vomiting and bloody bowel movement on 2/13.     unclear if this still c diff vs ischemic bowel  vs microperforation vs other   pt seems to have a UTI as well  Pt was seen by surgery     treating for c diff, UTI and bowel event      follow lactate  await cultures . ( urine had greater than three types of organisms) and now yeast - of unclear significance   BP is better today   continue current management, ? ischemic bowel vs  c diff  day # 5 abs  will consider d/cing the zosyn and continue po vancomycin and IV flagyl, still quite tender in LLQ  for repeat C diff  DVT- per team

## 2021-02-18 NOTE — PROGRESS NOTE ADULT - PROBLEM SELECTOR PLAN 6
R leg swelling  - Duplex + for R soleus DVT, below the knee  - no tx given DVT below the knee. Repeat duplex in 2 weeks.

## 2021-02-18 NOTE — PROGRESS NOTE ADULT - PROBLEM SELECTOR PLAN 9
Hx of multiple CAD s/p 1 stent  - holding ASA 81mg daily and Isosorbide ER 60mg daily  - holding enalapril iso JERONIMO

## 2021-02-18 NOTE — PROGRESS NOTE ADULT - PROBLEM SELECTOR PLAN 10
DVT ppx: SC heparin  Diet: dysphagia 2 diet with thin liquids for now, lactose restricted. Pending s/s eval  Code: DNR/DNI, no pressors, no blood transfusions per HCP    Palliative medicine consulted, appreciate recs DVT ppx: SC heparin  Diet: soft diet, lactose restricted.  Code: DNR/DNI, no pressors, no blood transfusions per HCP    Palliative medicine consulted, appreciate recs

## 2021-02-18 NOTE — PROGRESS NOTE ADULT - PROBLEM SELECTOR PLAN 2
UCx growing Candida albicans, possibly colonization vs. cystitis.  - Consider fluconazole 200mg PO daily x 2 weeks.  F/u ID recs  - Urogynecologist Dr. Hernandez contacted by daughter, plan to take out pessary to clean and replace  - remove harding UCx growing Candida albicans, possibly colonization vs. cystitis.  - Per ID, likely no indication for antifungal given already improving clinical picture on above antibiotics  - Urogynecologist Dr. Hernandez contacted by daughter, plan to take out pessary to clean and replace

## 2021-02-18 NOTE — PROGRESS NOTE ADULT - PROBLEM SELECTOR PLAN 8
Previous infection 2 months ago.  - per ID, likely remnant of previous infection  - Sating well on RA

## 2021-02-19 LAB
ANION GAP SERPL CALC-SCNC: 11 MMOL/L — SIGNIFICANT CHANGE UP (ref 5–17)
BUN SERPL-MCNC: 8 MG/DL — SIGNIFICANT CHANGE UP (ref 7–23)
CALCIUM SERPL-MCNC: 8 MG/DL — LOW (ref 8.4–10.5)
CHLORIDE SERPL-SCNC: 99 MMOL/L — SIGNIFICANT CHANGE UP (ref 96–108)
CO2 SERPL-SCNC: 20 MMOL/L — LOW (ref 22–31)
CREAT SERPL-MCNC: 0.99 MG/DL — SIGNIFICANT CHANGE UP (ref 0.5–1.3)
CULTURE RESULTS: SIGNIFICANT CHANGE UP
CULTURE RESULTS: SIGNIFICANT CHANGE UP
GLUCOSE SERPL-MCNC: 96 MG/DL — SIGNIFICANT CHANGE UP (ref 70–99)
HCT VFR BLD CALC: 29.6 % — LOW (ref 34.5–45)
HGB BLD-MCNC: 9.5 G/DL — LOW (ref 11.5–15.5)
MAGNESIUM SERPL-MCNC: 1.5 MG/DL — LOW (ref 1.6–2.6)
MCHC RBC-ENTMCNC: 31.6 PG — SIGNIFICANT CHANGE UP (ref 27–34)
MCHC RBC-ENTMCNC: 32.1 GM/DL — SIGNIFICANT CHANGE UP (ref 32–36)
MCV RBC AUTO: 98.3 FL — SIGNIFICANT CHANGE UP (ref 80–100)
NRBC # BLD: 0 /100 WBCS — SIGNIFICANT CHANGE UP (ref 0–0)
PHOSPHATE SERPL-MCNC: 2.7 MG/DL — SIGNIFICANT CHANGE UP (ref 2.5–4.5)
PLATELET # BLD AUTO: 207 K/UL — SIGNIFICANT CHANGE UP (ref 150–400)
POTASSIUM SERPL-MCNC: 3.4 MMOL/L — LOW (ref 3.5–5.3)
POTASSIUM SERPL-SCNC: 3.4 MMOL/L — LOW (ref 3.5–5.3)
RBC # BLD: 3.01 M/UL — LOW (ref 3.8–5.2)
RBC # FLD: 14.7 % — HIGH (ref 10.3–14.5)
SODIUM SERPL-SCNC: 130 MMOL/L — LOW (ref 135–145)
SPECIMEN SOURCE: SIGNIFICANT CHANGE UP
SPECIMEN SOURCE: SIGNIFICANT CHANGE UP
WBC # BLD: 11.64 K/UL — HIGH (ref 3.8–10.5)
WBC # FLD AUTO: 11.64 K/UL — HIGH (ref 3.8–10.5)

## 2021-02-19 PROCEDURE — 99232 SBSQ HOSP IP/OBS MODERATE 35: CPT | Mod: CS

## 2021-02-19 PROCEDURE — 99233 SBSQ HOSP IP/OBS HIGH 50: CPT | Mod: GC

## 2021-02-19 PROCEDURE — 51702 INSERT TEMP BLADDER CATH: CPT

## 2021-02-19 PROCEDURE — 99232 SBSQ HOSP IP/OBS MODERATE 35: CPT

## 2021-02-19 RX ORDER — VANCOMYCIN HCL 1 G
125 VIAL (EA) INTRAVENOUS EVERY 6 HOURS
Refills: 0 | Status: DISCONTINUED | OUTPATIENT
Start: 2021-02-19 | End: 2021-03-02

## 2021-02-19 RX ORDER — POTASSIUM CHLORIDE 20 MEQ
40 PACKET (EA) ORAL
Refills: 0 | Status: COMPLETED | OUTPATIENT
Start: 2021-02-19 | End: 2021-02-20

## 2021-02-19 RX ORDER — MAGNESIUM SULFATE 500 MG/ML
1 VIAL (ML) INJECTION ONCE
Refills: 0 | Status: COMPLETED | OUTPATIENT
Start: 2021-02-19 | End: 2021-02-19

## 2021-02-19 RX ADMIN — NYSTATIN CREAM 1 APPLICATION(S): 100000 CREAM TOPICAL at 17:22

## 2021-02-19 RX ADMIN — Medication 1 PACKET(S): at 17:22

## 2021-02-19 RX ADMIN — HEPARIN SODIUM 5000 UNIT(S): 5000 INJECTION INTRAVENOUS; SUBCUTANEOUS at 05:06

## 2021-02-19 RX ADMIN — SODIUM CHLORIDE 75 MILLILITER(S): 9 INJECTION, SOLUTION INTRAVENOUS at 06:06

## 2021-02-19 RX ADMIN — Medication 500 MILLIGRAM(S): at 05:05

## 2021-02-19 RX ADMIN — PREGABALIN 1000 MICROGRAM(S): 225 CAPSULE ORAL at 12:10

## 2021-02-19 RX ADMIN — Medication 10 MILLIGRAM(S): at 05:05

## 2021-02-19 RX ADMIN — Medication 125 MILLIGRAM(S): at 12:11

## 2021-02-19 RX ADMIN — Medication 500 MILLIGRAM(S): at 12:10

## 2021-02-19 RX ADMIN — Medication 10 MILLIGRAM(S): at 12:10

## 2021-02-19 RX ADMIN — Medication 100 MILLIGRAM(S): at 05:05

## 2021-02-19 RX ADMIN — Medication 100 GRAM(S): at 09:37

## 2021-02-19 RX ADMIN — NYSTATIN CREAM 1 APPLICATION(S): 100000 CREAM TOPICAL at 05:06

## 2021-02-19 RX ADMIN — Medication 125 MILLIGRAM(S): at 23:07

## 2021-02-19 RX ADMIN — MIRTAZAPINE 15 MILLIGRAM(S): 45 TABLET, ORALLY DISINTEGRATING ORAL at 21:43

## 2021-02-19 RX ADMIN — Medication 10 MILLIGRAM(S): at 21:43

## 2021-02-19 RX ADMIN — Medication 1 TABLET(S): at 12:11

## 2021-02-19 RX ADMIN — Medication 40 MILLIEQUIVALENT(S): at 17:22

## 2021-02-19 RX ADMIN — HEPARIN SODIUM 5000 UNIT(S): 5000 INJECTION INTRAVENOUS; SUBCUTANEOUS at 12:10

## 2021-02-19 RX ADMIN — PIPERACILLIN AND TAZOBACTAM 25 GRAM(S): 4; .5 INJECTION, POWDER, LYOPHILIZED, FOR SOLUTION INTRAVENOUS at 05:05

## 2021-02-19 RX ADMIN — Medication 1 PACKET(S): at 12:11

## 2021-02-19 RX ADMIN — Medication 1000 UNIT(S): at 12:10

## 2021-02-19 RX ADMIN — HEPARIN SODIUM 5000 UNIT(S): 5000 INJECTION INTRAVENOUS; SUBCUTANEOUS at 21:43

## 2021-02-19 RX ADMIN — Medication 125 MILLIGRAM(S): at 17:22

## 2021-02-19 RX ADMIN — Medication 1 PACKET(S): at 09:37

## 2021-02-19 NOTE — PROGRESS NOTE ADULT - SUBJECTIVE AND OBJECTIVE BOX
Patient is a 89y old  Female who presents with a chief complaint of GI Bleed, Vomiting (19 Feb 2021 06:42)    Being followed by ID for        Interval history:  pt resting comfortably  no cough   unable to provide history  No other acute events          PAST MEDICAL & SURGICAL HISTORY:  COVID-19    Urinary tract infection    CAD in native artery    Dyslipidemia    Depression    Hyponatremia    Hypertension    History of repair of hip fracture      Allergies    flu vaccine (Anaphylaxis)  No Known Drug Allergies    Intolerances      Antimicrobials:    metroNIDAZOLE  IVPB 500 milliGRAM(s) IV Intermittent every 8 hours  piperacillin/tazobactam IVPB.. 3.375 Gram(s) IV Intermittent every 12 hours  vancomycin    Solution 500 milliGRAM(s) Oral every 6 hours    MEDICATIONS  (STANDING):  ascorbic acid 500 milliGRAM(s) Oral daily  bethanechol 10 milliGRAM(s) Oral three times a day  cholecalciferol 1000 Unit(s) Oral daily  cyanocobalamin 1000 MICROGram(s) Oral daily  dextrose 5% + sodium chloride 0.9%. 1000 milliLiter(s) (75 mL/Hr) IV Continuous <Continuous>  heparin   Injectable 5000 Unit(s) SubCutaneous every 8 hours  metroNIDAZOLE  IVPB 500 milliGRAM(s) IV Intermittent every 8 hours  mirtazapine 15 milliGRAM(s) Oral at bedtime  Nephro-portia 1 Tablet(s) Oral daily  nystatin Powder 1 Application(s) Topical two times a day  piperacillin/tazobactam IVPB.. 3.375 Gram(s) IV Intermittent every 12 hours  potassium phosphate / sodium phosphate Powder (PHOS-NaK) 1 Packet(s) Oral three times a day with meals  vancomycin    Solution 500 milliGRAM(s) Oral every 6 hours      Vital Signs Last 24 Hrs  T(C): 37.3 (02-19-21 @ 04:50), Max: 37.4 (02-18-21 @ 20:53)  T(F): 99.1 (02-19-21 @ 04:50), Max: 99.4 (02-18-21 @ 20:53)  HR: 103 (02-19-21 @ 04:50) (97 - 106)  BP: 135/74 (02-19-21 @ 04:50) (135/74 - 147/73)  BP(mean): --  RR: 18 (02-19-21 @ 04:50) (18 - 18)  SpO2: 95% (02-19-21 @ 04:50) (93% - 96%)    Physical Exam:    Constitutional reseting quietly  HEENT PERRLA EOMI,No pallor or icterus    No oral exudate or erythema    Neck supple no JVD or LN    Chest Good AE,CTA    CVS  S1 S2     Abd soft  nontender today  Ext No cyanosis clubbing or edema    IV site no erythema tenderness or discharge      Lab Data:                          9.5    11.64 )-----------( 207      ( 19 Feb 2021 07:02 )             29.6       02-19    130<L>  |  99  |  8   ----------------------------<  96  3.4<L>   |  20<L>  |  0.99    Ca    8.0<L>      19 Feb 2021 07:01  Phos  2.7     02-19  Mg     1.5     02-19      C. difficile GDH &amp; toxins A/B by EIA (02.18.21 @ 17:11)    Clostridium difficile GDH Toxins A&amp;B, EIA:   Negative    Clostridium difficile GDH Interpretation: Negative for toxigenic C. Difficile.  This specimen is negative for C.  Difficile glutamate dehydrogenase (GDH) antigen and negative for C.  Difficile Toxins A & B, by EIA.  GDH is a highly sensitive screening  marker for C. Difficile that is produced in large amounts by all C.  Difficile strains, both toxigenic and nontoxigenic.  This assay has not  been validated as a test of cure.  Repeat testing during the same episode  of diarrhea is of limited value and is discouraged.  The results of this  assay should always be interpreted in conjunction with pateint's clinical  history.          .Urine Catheterized  02-16-21   10,000 - 49,000 CFU/mL Presumptive Candida albicans  --  --      .Urine Catheterized  02-15-21   >=3 organisms. Probable collection contamination.  --  --      .Stool Feces  02-15-21   GI PCR Results: NOT detected  *******Please Note:*******  GI panel PCR evaluates for:  Campylobacter, Plesiomonas shigelloides, Salmonella,  Vibrio, Yersinia enterocolitica, Enteroaggregative  Escherichia coli (EAEC), Enteropathogenic E.coli (EPEC),  Enterotoxigenic E. coli (ETEC) lt/st, Shiga-like  toxin-producing E. coli (STEC) stx1/stx2,  Shigella/ Enteroinvasive E. coli (EIEC), Cryptosporidium,  Cyclospora cayetanensis, Entamoeba histolytica,  Giardia lamblia, Adenovirus F 40/41, Astrovirus,  Norovirus GI/GII, Rotavirus A, Sapovirus  --  --      .Blood Blood-Peripheral  02-14-21   No Growth Final  --  --            Clostridium difficile GDH Interpretation: Negative for toxigenic C. Difficile.  This specimen is negative for C.  Difficile glutamate dehydrogenase (GDH) antigen and negative for C.  Difficile Toxins A & B, by EIA.  GDH is a highly sensitive screening  marker for C. Difficile that is produced in large amounts by all C.  Difficile strains, both toxigenic and nontoxigenic.  This assay has not  been validated as a test of cure.  Repeat testing during the same episode  of diarrhea is of limited value and is discouraged.  The results of this  assay should always be interpreted in conjunction with pateint's clinical  history. (02-18-21 @ 17:11)          WBC Count: 11.64 (02-19-21 @ 07:02)  WBC Count: 15.60 (02-18-21 @ 07:04)  WBC Count: 23.30 (02-17-21 @ 07:01)  WBC Count: 24.71 (02-16-21 @ 06:45)  WBC Count: 31.37 (02-15-21 @ 11:16)  WBC Count: 32.04 (02-14-21 @ 13:51)  WBC Count: 26.64 (02-14-21 @ 11:11)  WBC Count: 24.73 (02-13-21 @ 22:45)  WBC Count: 16.80 (02-13-21 @ 20:15)

## 2021-02-19 NOTE — PROGRESS NOTE ADULT - PROBLEM SELECTOR PLAN 4
Hx of urinary retention and chronic UTI  - Requiring straight cath x2  - c/w home bethanechol. Hold pyridium. Hx of urinary retention and chronic UTI  - Requiring straight cath, can place harding   - c/w home bethanechol. Hold pyridium.

## 2021-02-19 NOTE — PROGRESS NOTE ADULT - PROBLEM SELECTOR PLAN 1
Pancolitis, most significant in descending and rectosigmoid colon. C diff vs. ischemic colitis.  High suspicion for recurrent C diff although initial test negative. WBC, lactate, and clinical improvement on with oral vanc and IV metronidazole.   - Will attempt to resend C diff  - c/w PO Vancomycin 500mg q6h (2/14-), Zosyn (2/14-), Flagyl IV 500mg BID (2/14-). Day 5  - Lactate downtrending to 2.6.  C/w NS + D5 100cc/hr  - Per GI, ok to start probiotics upon discharge Pancolitis, most significant in descending and rectosigmoid colon. C diff negative x 2, less suspicion for recurrent C diff colitis. More likely to be ischemic.  - d/c Zosyn (2/14-19), Flagyl IV 500mg BID (2/14-19). s/p 6 day course  - decrease PO Vancomycin 125mg q6h and continue for 5 days (until 2/23) and then taper  - Start probiotics

## 2021-02-19 NOTE — PROGRESS NOTE ADULT - PROBLEM SELECTOR PLAN 10
DVT ppx: SC heparin  Diet: soft diet, lactose restricted.  Code: DNR/DNI, no pressors, no blood transfusions per HCP    Palliative medicine consulted, appreciate recs DVT ppx: SC heparin  Diet: dysphagia diet, lactose restricted   Code: DNR/DNI, no pressors, no blood transfusions per HCP

## 2021-02-19 NOTE — PROGRESS NOTE ADULT - PROBLEM SELECTOR PLAN 9
Hx of multiple CAD s/p 1 stent  - holding ASA 81mg daily and Isosorbide ER 60mg daily  - holding enalapril iso JERONIMO 3 children/

## 2021-02-19 NOTE — PROGRESS NOTE ADULT - ASSESSMENT
89F w/ PMH of CAD s/p PCI, Chronic UTI's, MDD, HTN, HLD, [place] (hearing aids), dysphagia, COVID19 infection (2 months ago), and recent hospitalization for C. diff colitis (~2/7/21) presenting from [facility] after episode of vomiting and bloody bowel movement on 2/13. Found to have left sided colitis on imaging   Acute Right Below Knee DVT     #Colitis - given clinical picture likely 2/2 ischemic.  Per family wishes no aggressive measures or interventions/procedures.  DNR/DNI in place.   stool C diff negative x2 (2/14, 2/18)    -Monitor BMs; replete lytes PRN  -Abx per ID, agree with d/c of Zosyn and Flagyl.  Plan for PO Vanco per ID  -avoid hypotension  -monitor clinical exam  -PO diet per SLP recs  -can give PO probiotics (ok upon discharge)    Discussed with Medicine team and ID attending    Please call over weekend prn with acute  GI concerns   GI service : [phone]      Zain Paris PA-C    [facility]  [phone]  After hours and weekend coverage [phone]

## 2021-02-19 NOTE — PROGRESS NOTE ADULT - PROBLEM SELECTOR PLAN 6
R leg swelling  - Duplex + for R soleus DVT, below the knee  - no tx given DVT below the knee. Repeat duplex in 2 weeks. R leg swelling  - Duplex + for R soleus DVT, below the knee  - no tx given DVT below the knee. Repeat duplex in 2 weeks (3/2)

## 2021-02-19 NOTE — PROGRESS NOTE ADULT - PROBLEM SELECTOR PLAN 2
UCx growing Candida albicans, possibly colonization vs. cystitis.  - Per ID, likely no indication for antifungal given already improving clinical picture on above antibiotics  - Urogynecologist Dr. Hernandez contacted by daughter, plan to take out pessary to clean and replace UCx growing 3+ organisms and Candida albicans (unclear significance)  - Per ID, likely no indication for antifungal given already improving clinical picture on above antibiotics  - Pessary removed, cleaned, replaced by Urogynecologist Dr. Hernandez's associate

## 2021-02-19 NOTE — PROGRESS NOTE ADULT - SUBJECTIVE AND OBJECTIVE BOX
Ramses Pinedaon, PGY1  Pager [phone]    INCOMPLETE NOTE - IN PROGRESS    Patient is a 89y old  Female who presents with a chief complaint of GI Bleed, Vomiting (18 Feb 2021 17:57)      SUBJECTIVE/INTERVAL EVENTS: Patient seen and examined at bedside.    MEDICATIONS  (STANDING):  ascorbic acid 500 milliGRAM(s) Oral daily  bethanechol 10 milliGRAM(s) Oral three times a day  cholecalciferol 1000 Unit(s) Oral daily  cyanocobalamin 1000 MICROGram(s) Oral daily  dextrose 5% + sodium chloride 0.9%. 1000 milliLiter(s) (75 mL/Hr) IV Continuous <Continuous>  heparin   Injectable 5000 Unit(s) SubCutaneous every 8 hours  metroNIDAZOLE  IVPB 500 milliGRAM(s) IV Intermittent every 8 hours  mirtazapine 15 milliGRAM(s) Oral at bedtime  Nephro-portia 1 Tablet(s) Oral daily  nystatin Powder 1 Application(s) Topical two times a day  piperacillin/tazobactam IVPB.. 3.375 Gram(s) IV Intermittent every 12 hours  potassium phosphate / sodium phosphate Powder (PHOS-NaK) 1 Packet(s) Oral three times a day with meals  vancomycin    Solution 500 milliGRAM(s) Oral every 6 hours    MEDICATIONS  (PRN):  acetaminophen   Tablet .. 650 milliGRAM(s) Oral every 12 hours PRN Mild Pain (1 - 3), Moderate Pain (4 - 6)  ondansetron    Tablet 4 milliGRAM(s) Oral every 8 hours PRN Nausea and/or Vomiting      VITAL SIGNS:  T(F): 99.1 (02-19-21 @ 04:50), Max: 99.4 (02-18-21 @ 20:53)  HR: 103 (02-19-21 @ 04:50) (97 - 106)  BP: 135/74 (02-19-21 @ 04:50) (135/74 - 147/73)  RR: 18 (02-19-21 @ 04:50) (18 - 18)  SpO2: 95% (02-19-21 @ 04:50) (93% - 96%)    I&O's Summary    17 Feb 2021 07:01  -  18 Feb 2021 07:00  --------------------------------------------------------  IN: 2220 mL / OUT: 1575 mL / NET: 645 mL    18 Feb 2021 07:01  -  19 Feb 2021 06:42  --------------------------------------------------------  IN: 1195 mL / OUT: 500 mL / NET: 695 mL      Daily     Daily     PHYSICAL EXAM:  Gen: Alert, NAD  HEENT: NCAT, conjunctiva clear, sclera anicteric, no erythema or exudates in the oropharynx, mmm  Neck: Supple, no JVD  CV: RRR, S1S2, no m/r/g  Resp: CTAB, normal respiratory effort  Abd: Soft, nontender, nondistended, normal bowel sounds  Ext: no edema, no clubbing or cyanosis  Neuro: AOx3, CN2-12 grossly intact, POLK  SKIN: warm, perfused    LABS:                        9.3    15.60 )-----------( 218      ( 18 Feb 2021 07:04 )             29.6     Hgb Trend: 9.3<--, 10.0<--, 10.2<--, 10.5<--, 12.6<--  02-18    134<L>  |  103  |  13  ----------------------------<  84  3.7   |  18<L>  |  1.16    Ca    8.2<L>      18 Feb 2021 07:04  Phos  2.6     02-18  Mg     1.7     02-18      Creatinine Trend: 1.16<--, 1.37<--, 2.03<--, 2.47<--, 2.59<--, 2.48<--              CAPILLARY BLOOD GLUCOSE          RADIOLOGY & ADDITIONAL TESTS: Reviewed    Imaging Personally Reviewed:    Consultant(s) Notes Reviewed:      Care Discussed with Consultants/Other Providers:   Ramses Pinedaon, PGY1  Pager [phone]      Patient is a 89y old  Female who presents with a chief complaint of GI Bleed, Vomiting (18 Feb 2021 17:57)      SUBJECTIVE/INTERVAL EVENTS:  - Continues to have soft stool with some watery components. Admits to L sided abdominal pain, mostly unchanged from yesterday.      MEDICATIONS  (STANDING):  ascorbic acid 500 milliGRAM(s) Oral daily  bethanechol 10 milliGRAM(s) Oral three times a day  cholecalciferol 1000 Unit(s) Oral daily  cyanocobalamin 1000 MICROGram(s) Oral daily  dextrose 5% + sodium chloride 0.9%. 1000 milliLiter(s) (75 mL/Hr) IV Continuous <Continuous>  heparin   Injectable 5000 Unit(s) SubCutaneous every 8 hours  metroNIDAZOLE  IVPB 500 milliGRAM(s) IV Intermittent every 8 hours  mirtazapine 15 milliGRAM(s) Oral at bedtime  Nephro-portia 1 Tablet(s) Oral daily  nystatin Powder 1 Application(s) Topical two times a day  piperacillin/tazobactam IVPB.. 3.375 Gram(s) IV Intermittent every 12 hours  potassium phosphate / sodium phosphate Powder (PHOS-NaK) 1 Packet(s) Oral three times a day with meals  vancomycin    Solution 500 milliGRAM(s) Oral every 6 hours    MEDICATIONS  (PRN):  acetaminophen   Tablet .. 650 milliGRAM(s) Oral every 12 hours PRN Mild Pain (1 - 3), Moderate Pain (4 - 6)  ondansetron    Tablet 4 milliGRAM(s) Oral every 8 hours PRN Nausea and/or Vomiting      VITAL SIGNS:  T(F): 99.1 (02-19-21 @ 04:50), Max: 99.4 (02-18-21 @ 20:53)  HR: 103 (02-19-21 @ 04:50) (97 - 106)  BP: 135/74 (02-19-21 @ 04:50) (135/74 - 147/73)  RR: 18 (02-19-21 @ 04:50) (18 - 18)  SpO2: 95% (02-19-21 @ 04:50) (93% - 96%)    I&O's Summary    17 Feb 2021 07:01  -  18 Feb 2021 07:00  --------------------------------------------------------  IN: 2220 mL / OUT: 1575 mL / NET: 645 mL    18 Feb 2021 07:01  -  19 Feb 2021 06:42  --------------------------------------------------------  IN: 1195 mL / OUT: 500 mL / NET: 695 mL      Daily     Daily     PHYSICAL EXAM:  Gen: Awake, verbal, well-developed, NAD  HEENT: NCAT, PERRL, EOMI, clear conjunctiva, no scleral icterus  Neck: Supple, no JVD, no LAD  CV: tachycardic, S1S2, no m/r/g  Resp: CTAB, normal respiratory effort  Abd: Soft, L sided TTP unchanged, ND  Ext: R calf swelling compared to L, no clubbing or cyanosis  Neuro: responds to questions appropriately, AOx2-3, CN2-12 grossly intact, POLK  Skin: warm, perfused    LABS:                        9.3    15.60 )-----------( 218      ( 18 Feb 2021 07:04 )             29.6     Hgb Trend: 9.3<--, 10.0<--, 10.2<--, 10.5<--, 12.6<--  02-18    134<L>  |  103  |  13  ----------------------------<  84  3.7   |  18<L>  |  1.16    Ca    8.2<L>      18 Feb 2021 07:04  Phos  2.6     02-18  Mg     1.7     02-18      Creatinine Trend: 1.16<--, 1.37<--, 2.03<--, 2.47<--, 2.59<--, 2.48<--              CAPILLARY BLOOD GLUCOSE          RADIOLOGY & ADDITIONAL TESTS: Reviewed    Imaging Personally Reviewed:    Consultant(s) Notes Reviewed:      Care Discussed with Consultants/Other Providers:

## 2021-02-19 NOTE — PROGRESS NOTE ADULT - ASSESSMENT
89F w/ PMH of CAD s/p PCI, Chronic UTI's, MDD, HTN, HLD, [place] (hearing aids), dysphagia, prior COVID19 infection, and recent hospitalization for C. diff colitis (~2/7/21) presenting from rehab center for bloody bowel movement, likely LGIB secondary to infectious colitis vs. ischemic colitis.  Also found to be in septic shock, likely secondary to colitis iso recent C. diff.  COVID +, asymptomatic and without hypoxia.  Sepsis now improving but persistently elevated lactate.

## 2021-02-19 NOTE — PROGRESS NOTE ADULT - SUBJECTIVE AND OBJECTIVE BOX
Patient is a 89y old  Female who presented with a chief complaint of GI Bleed, Vomiting (19 Feb 2021 08:17)      INTERVAL HPI/OVERNIGHT EVENTS:  stools still loose, no bleeding  appetite good  no fever or chills    MEDICATIONS  (STANDING):  ascorbic acid 500 milliGRAM(s) Oral daily  bethanechol 10 milliGRAM(s) Oral three times a day  cholecalciferol 1000 Unit(s) Oral daily  cyanocobalamin 1000 MICROGram(s) Oral daily  heparin   Injectable 5000 Unit(s) SubCutaneous every 8 hours  mirtazapine 15 milliGRAM(s) Oral at bedtime  Nephro-portia 1 Tablet(s) Oral daily  nystatin Powder 1 Application(s) Topical two times a day  potassium chloride   Powder 40 milliEquivalent(s) Oral two times a day  potassium phosphate / sodium phosphate Powder (PHOS-NaK) 1 Packet(s) Oral three times a day with meals  vancomycin    Solution 125 milliGRAM(s) Oral every 6 hours    MEDICATIONS  (PRN):  acetaminophen   Tablet .. 650 milliGRAM(s) Oral every 12 hours PRN Mild Pain (1 - 3), Moderate Pain (4 - 6)  ondansetron    Tablet 4 milliGRAM(s) Oral every 8 hours PRN Nausea and/or Vomiting      Allergies  flu vaccine (Anaphylaxis)  No Known Drug Allergies      Review of Systems:  unable to obtain    Vital Signs Last 24 Hrs  T(C): 37.4 (19 Feb 2021 12:21), Max: 37.4 (18 Feb 2021 20:53)  T(F): 99.3 (19 Feb 2021 12:21), Max: 99.4 (18 Feb 2021 20:53)  HR: 79 (19 Feb 2021 12:21) (79 - 106)  BP: 127/73 (19 Feb 2021 12:21) (127/73 - 147/73)  BP(mean): --  RR: 18 (19 Feb 2021 12:21) (18 - 18)  SpO2: 95% (19 Feb 2021 12:21) (94% - 96%)    PHYSICAL EXAM:  Constitutional: elderly female. appears comfortable, non toxic appearing sitting up in bed  Neck: No LAD, supple no JVD  Respiratory: grossly clear ant.  Cardiovascular: S1 and S2, RRR  Gastrointestinal: obese soft  minimal tenderness ot deep palpation (much improved from previous exams) no rebound guarding or rigidity  Extremities: neg clubbing, cyanosis +b/l LE edema  Vascular: 2+ peripheral pulses  Neurological: oriented to self, pleasantly confused.  moves all extremities  Skin: No rashes    LABS:                        9.5    11.64 )-----------( 207      ( 19 Feb 2021 07:02 )             29.6     02-19    130<L>  |  99  |  8   ----------------------------<  96  3.4<L>   |  20<L>  |  0.99    Ca    8.0<L>      19 Feb 2021 07:01  Phos  2.7     02-19  Mg     1.5     02-19      C. difficile GDH & toxins A/B by EIA (02.18.21 @ 17:11)   Clostridium difficile GDH Toxins A&B, EIA:   Negative   Clostridium difficile GDH Interpretation: Negative for toxigenic C. Difficile.       RADIOLOGY & ADDITIONAL TESTS:

## 2021-02-19 NOTE — PROGRESS NOTE ADULT - ASSESSMENT
89F w/ PMH of CAD s/p PCI, Chronic UTI's, MDD, HTN, HLD, [place] (hearing aids), dysphagia, COVID19 infection (2 months ago, s/p dexa, remdesivir, convalescent plasma, not intubated), and recent hospitalization for C. diff colitis (~2/7/21) presenting from [facility] after episode of vomiting and bloody bowel movement on 2/13.     unclear if this still c diff vs ischemic bowel  vs microperforation vs other   pt seems to have a UTI as well  Pt was seen by surgery     treating for c diff, UTI and bowel event and DVT       follow lactate  await cultures . ( urine had greater than three types of organisms) and now yeast - of unclear significance   BP is better today   , ? ischemic bowel vs  c diff  In view of negative c diff again and her response to fluids, I suspect this might have been more from ischemic colitis  day # 6 abs      would stop IV flagyl  decrease the po vancomycin to 125 mg po q 6 hours  can d/c the zosyn         DVT- per team

## 2021-02-19 NOTE — PROGRESS NOTE ADULT - PROBLEM SELECTOR PLAN 3
Likely pre-renal, ATN secondary to septic shock. Unclear baseline Cr.   - Resolved. Cr 1.16  - c/w NS + D5 100cc/hr  - on harding, I&Os Likely pre-renal, ATN secondary to septic shock. Unclear baseline Cr.   - Resolved. Cr 0.99

## 2021-02-20 LAB
ANION GAP SERPL CALC-SCNC: 11 MMOL/L — SIGNIFICANT CHANGE UP (ref 5–17)
BUN SERPL-MCNC: 7 MG/DL — SIGNIFICANT CHANGE UP (ref 7–23)
CALCIUM SERPL-MCNC: 8.4 MG/DL — SIGNIFICANT CHANGE UP (ref 8.4–10.5)
CHLORIDE SERPL-SCNC: 100 MMOL/L — SIGNIFICANT CHANGE UP (ref 96–108)
CO2 SERPL-SCNC: 21 MMOL/L — LOW (ref 22–31)
CREAT SERPL-MCNC: 0.9 MG/DL — SIGNIFICANT CHANGE UP (ref 0.5–1.3)
GLUCOSE SERPL-MCNC: 80 MG/DL — SIGNIFICANT CHANGE UP (ref 70–99)
HCT VFR BLD CALC: 29.3 % — LOW (ref 34.5–45)
HGB BLD-MCNC: 9.6 G/DL — LOW (ref 11.5–15.5)
MAGNESIUM SERPL-MCNC: 1.7 MG/DL — SIGNIFICANT CHANGE UP (ref 1.6–2.6)
MCHC RBC-ENTMCNC: 31.8 PG — SIGNIFICANT CHANGE UP (ref 27–34)
MCHC RBC-ENTMCNC: 32.8 GM/DL — SIGNIFICANT CHANGE UP (ref 32–36)
MCV RBC AUTO: 97 FL — SIGNIFICANT CHANGE UP (ref 80–100)
NRBC # BLD: 0 /100 WBCS — SIGNIFICANT CHANGE UP (ref 0–0)
PHOSPHATE SERPL-MCNC: 3.3 MG/DL — SIGNIFICANT CHANGE UP (ref 2.5–4.5)
PLATELET # BLD AUTO: 223 K/UL — SIGNIFICANT CHANGE UP (ref 150–400)
POTASSIUM SERPL-MCNC: 3.5 MMOL/L — SIGNIFICANT CHANGE UP (ref 3.5–5.3)
POTASSIUM SERPL-SCNC: 3.5 MMOL/L — SIGNIFICANT CHANGE UP (ref 3.5–5.3)
RBC # BLD: 3.02 M/UL — LOW (ref 3.8–5.2)
RBC # FLD: 14.6 % — HIGH (ref 10.3–14.5)
SODIUM SERPL-SCNC: 132 MMOL/L — LOW (ref 135–145)
WBC # BLD: 13.91 K/UL — HIGH (ref 3.8–10.5)
WBC # FLD AUTO: 13.91 K/UL — HIGH (ref 3.8–10.5)

## 2021-02-20 PROCEDURE — 99233 SBSQ HOSP IP/OBS HIGH 50: CPT | Mod: GC

## 2021-02-20 RX ADMIN — PREGABALIN 1000 MICROGRAM(S): 225 CAPSULE ORAL at 12:27

## 2021-02-20 RX ADMIN — Medication 125 MILLIGRAM(S): at 05:57

## 2021-02-20 RX ADMIN — Medication 125 MILLIGRAM(S): at 17:37

## 2021-02-20 RX ADMIN — Medication 10 MILLIGRAM(S): at 05:57

## 2021-02-20 RX ADMIN — Medication 40 MILLIEQUIVALENT(S): at 05:57

## 2021-02-20 RX ADMIN — Medication 125 MILLIGRAM(S): at 12:27

## 2021-02-20 RX ADMIN — HEPARIN SODIUM 5000 UNIT(S): 5000 INJECTION INTRAVENOUS; SUBCUTANEOUS at 05:57

## 2021-02-20 RX ADMIN — Medication 1000 UNIT(S): at 12:27

## 2021-02-20 RX ADMIN — Medication 10 MILLIGRAM(S): at 20:22

## 2021-02-20 RX ADMIN — NYSTATIN CREAM 1 APPLICATION(S): 100000 CREAM TOPICAL at 05:57

## 2021-02-20 RX ADMIN — Medication 1 PACKET(S): at 12:27

## 2021-02-20 RX ADMIN — Medication 500 MILLIGRAM(S): at 12:27

## 2021-02-20 RX ADMIN — Medication 10 MILLIGRAM(S): at 14:30

## 2021-02-20 RX ADMIN — HEPARIN SODIUM 5000 UNIT(S): 5000 INJECTION INTRAVENOUS; SUBCUTANEOUS at 20:21

## 2021-02-20 RX ADMIN — Medication 1 PACKET(S): at 17:37

## 2021-02-20 RX ADMIN — Medication 1 TABLET(S): at 12:27

## 2021-02-20 RX ADMIN — Medication 1 PACKET(S): at 09:25

## 2021-02-20 RX ADMIN — MIRTAZAPINE 15 MILLIGRAM(S): 45 TABLET, ORALLY DISINTEGRATING ORAL at 20:21

## 2021-02-20 RX ADMIN — NYSTATIN CREAM 1 APPLICATION(S): 100000 CREAM TOPICAL at 17:37

## 2021-02-20 RX ADMIN — Medication 125 MILLIGRAM(S): at 22:00

## 2021-02-20 RX ADMIN — HEPARIN SODIUM 5000 UNIT(S): 5000 INJECTION INTRAVENOUS; SUBCUTANEOUS at 14:30

## 2021-02-20 NOTE — PROGRESS NOTE ADULT - PROBLEM SELECTOR PLAN 3
Likely pre-renal, ATN secondary to septic shock. Unclear baseline Cr.   - Resolved. Cr 0.99 Likely pre-renal, ATN secondary to septic shock. Unclear baseline Cr.   - Resolved. Cr 0.9

## 2021-02-20 NOTE — PROGRESS NOTE ADULT - PROBLEM SELECTOR PLAN 2
UCx growing 3+ organisms and Candida albicans (unclear significance)  - Per ID, likely no indication for antifungal given already improving clinical picture on above antibiotics  - Pessary removed, cleaned, replaced by Urogynecologist Dr. Hernandez's associate UCx growing 3+ organisms and Candida albicans (unclear significance)  - Per ID, likely no indication for antifungal given already improving clinical picture on above antibiotics  - Pessary removed, cleaned, replaced by Urogynecologist Dr. Hernandez's associate  - Monitor WBC d/t rising leukocytosis i/s/o infection.

## 2021-02-20 NOTE — PROCEDURE NOTE - ADDITIONAL PROCEDURE DETAILS
UROLOGY PROGRESS NOTE:     Called to see patient for difficult harding placement.  Unsuccessful attempt by nursing staff.  standard 16F harding kit used.  standard 16F harding placed under typical sterile technique without difficulty  No complications.  Patient tolerated procedure well.  ~500cc clear yellow urine drained.  Future harding catheter placements can be attempted by nursing staff or primary team as this was not an especially difficult or challenging placement  Harding plan as per primary team.

## 2021-02-20 NOTE — PROGRESS NOTE ADULT - PROBLEM SELECTOR PLAN 10
DVT ppx: SC heparin  Diet: dysphagia diet, lactose restricted   Code: DNR/DNI, no pressors, no blood transfusions per HCP DVT ppx: SC heparin  Diet: dysphagia diet, lactose restricted   Code: DNR/DNI, no pressors, no blood transfusions per HCP  Dispo: Rehab, obtain covid swab tomorrow

## 2021-02-20 NOTE — PROGRESS NOTE ADULT - ASSESSMENT
89F w/ PMH of CAD s/p PCI, Chronic UTI's, MDD, HTN, HLD, [place] (hearing aids), dysphagia, prior COVID19 infection, and recent hospitalization for C. diff colitis (~2/7/21) presenting from rehab center for bloody bowel movement, likely LGIB secondary to infectious colitis vs. ischemic colitis.  Also found to be in septic shock, likely secondary to colitis iso recent C. diff.  COVID +, asymptomatic and without hypoxia.  Sepsis now improving but persistently elevated lactate. 89F w/ PMH of CAD s/p PCI, Chronic UTI's, MDD, HTN, HLD, [place] (hearing aids), dysphagia, prior COVID19 infection, and recent hospitalization for C. diff colitis (~2/7/21) presenting from rehab center for bloody bowel movement, likely LGIB secondary to infectious colitis vs. ischemic colitis.  Also found to be in septic shock, likely secondary to colitis iso recent C. diff.  COVID +, asymptomatic and without hypoxia.  Sepsis now improving

## 2021-02-20 NOTE — PROGRESS NOTE ADULT - PROBLEM SELECTOR PLAN 6
R leg swelling  - Duplex + for R soleus DVT, below the knee  - no tx given DVT below the knee. Repeat duplex in 2 weeks (3/2)

## 2021-02-20 NOTE — PROGRESS NOTE ADULT - PROBLEM SELECTOR PLAN 1
Pancolitis, most significant in descending and rectosigmoid colon. C diff negative x 2, less suspicion for recurrent C diff colitis. More likely to be ischemic.  - d/c Zosyn (2/14-19), Flagyl IV 500mg BID (2/14-19). s/p 6 day course  - decrease PO Vancomycin 125mg q6h and continue for 5 days (until 2/23) and then taper  - Start probiotics Pancolitis, most significant in descending and rectosigmoid colon. C diff negative x 2, less suspicion for recurrent C diff colitis. Most likely to be ischemic.  - s/p Zosyn (2/14-19), Flagyl IV 500mg BID (2/14-19). s/p 6 day course  - decreased PO Vancomycin 125mg q6h and continue for 5 days (until 2/23) and c/w probiotics  - Will obtain follow up lactate in AM given persistent pain and now rising WBC

## 2021-02-20 NOTE — PROGRESS NOTE ADULT - SUBJECTIVE AND OBJECTIVE BOX
***INCOMPLETE NOTE***  Jorge Al MD PGY-2  Internal Medicine  Pager [phone] / [phone]    Patient is a 89y old  Female who presents with a chief complaint of GI Bleed, Vomiting (19 Feb 2021 13:34)      SUBJECTIVE / OVERNIGHT EVENTS:    MEDICATIONS  (STANDING):  ascorbic acid 500 milliGRAM(s) Oral daily  bethanechol 10 milliGRAM(s) Oral three times a day  cholecalciferol 1000 Unit(s) Oral daily  cyanocobalamin 1000 MICROGram(s) Oral daily  heparin   Injectable 5000 Unit(s) SubCutaneous every 8 hours  mirtazapine 15 milliGRAM(s) Oral at bedtime  Nephro-portia 1 Tablet(s) Oral daily  nystatin Powder 1 Application(s) Topical two times a day  potassium phosphate / sodium phosphate Powder (PHOS-NaK) 1 Packet(s) Oral three times a day with meals  vancomycin    Solution 125 milliGRAM(s) Oral every 6 hours    MEDICATIONS  (PRN):  acetaminophen   Tablet .. 650 milliGRAM(s) Oral every 12 hours PRN Mild Pain (1 - 3), Moderate Pain (4 - 6)  ondansetron    Tablet 4 milliGRAM(s) Oral every 8 hours PRN Nausea and/or Vomiting      CAPILLARY BLOOD GLUCOSE        I&O's Summary    19 Feb 2021 07:01  -  20 Feb 2021 07:00  --------------------------------------------------------  IN: 855 mL / OUT: 650 mL / NET: 205 mL    20 Feb 2021 07:01  -  20 Feb 2021 07:54  --------------------------------------------------------  IN: 0 mL / OUT: 800 mL / NET: -800 mL        PHYSICAL EXAM:  Vital Signs Last 24 Hrs  T(C): 36.6 (02-20-21 @ 04:48)  T(F): 97.9 (02-20-21 @ 04:48), Max: 99.3 (02-19-21 @ 12:21)  HR: 95 (02-20-21 @ 04:48) (79 - 104)  BP: 96/62 (02-20-21 @ 04:48)  BP(mean): --  RR: 18 (02-20-21 @ 04:48) (18 - 18)  SpO2: 93% (02-20-21 @ 04:48) (93% - 98%)  Wt(kg): --    02-19 @ 07:01  -  02-20 @ 07:00  --------------------------------------------------------  IN: 855 mL / OUT: 650 mL / NET: 205 mL    02-20 @ 07:01  -  02-20 @ 07:54  --------------------------------------------------------  IN: 0 mL / OUT: 800 mL / NET: -800 mL      Constitutional: NAD, awake and alert  EYES: EOMI  ENT:  Normal Hearing, no tonsillar exudates   Neck: Soft and supple , no thyromegaly   Respiratory: Breath sounds are clear bilaterally, No wheezing, rales or rhonchi  Cardiovascular: S1 and S2, regular rate and rhythm, no Murmurs, gallops or rubs, no JVD,    Gastrointestinal: Bowel Sounds present, soft, nontender, nondistended, no guarding, no rebound  Extremities: No cyanosis or clubbing; warm to touch  Vascular: 2+ peripheral pulses lower ex  Neurological: No focal deficits, CN II-XII intact bilaterally, sensation to light touch intact in all extremities, gait intact. Pupils are equally reactive to light and symmetrical in size.   Musculoskeletal: 5/5 strength b/l upper and lower extremities; no joint swelling.  Skin: No rashes  Psych: no depression or anhedonia, AAOx3  HEME: no bruises, no nose bleeds      LABS:                        9.6    13.91 )-----------( 223      ( 20 Feb 2021 06:45 )             29.3     02-19    130<L>  |  99  |  8   ----------------------------<  96  3.4<L>   |  20<L>  |  0.99    Ca    8.0<L>      19 Feb 2021 07:01  Phos  2.7     02-19  Mg     1.5     02-19                RADIOLOGY & ADDITIONAL TESTS:    Imaging Personally Reviewed:    Consultant(s) Notes Reviewed:      Care Discussed with Consultants/Other Providers:   Jorge Al MD PGY-2  Internal Medicine  Pager [phone] / [phone]    Patient is a 89y old  Female who presents with a chief complaint of GI Bleed, Vomiting (19 Feb 2021 13:34)    SUBJECTIVE / OVERNIGHT EVENTS:  Loera placed overnight for retention of 500cc urine. Placed by urology due to difficult insertion. Denies abdominal pain.   Denies nausea, vomiting, constipation, diarrhea, fevers, chills, chest pain, SOB.    MEDICATIONS  (STANDING):  ascorbic acid 500 milliGRAM(s) Oral daily  bethanechol 10 milliGRAM(s) Oral three times a day  cholecalciferol 1000 Unit(s) Oral daily  cyanocobalamin 1000 MICROGram(s) Oral daily  heparin   Injectable 5000 Unit(s) SubCutaneous every 8 hours  mirtazapine 15 milliGRAM(s) Oral at bedtime  Nephro-portia 1 Tablet(s) Oral daily  nystatin Powder 1 Application(s) Topical two times a day  potassium phosphate / sodium phosphate Powder (PHOS-NaK) 1 Packet(s) Oral three times a day with meals  vancomycin    Solution 125 milliGRAM(s) Oral every 6 hours    MEDICATIONS  (PRN):  acetaminophen   Tablet .. 650 milliGRAM(s) Oral every 12 hours PRN Mild Pain (1 - 3), Moderate Pain (4 - 6)  ondansetron    Tablet 4 milliGRAM(s) Oral every 8 hours PRN Nausea and/or Vomiting      CAPILLARY BLOOD GLUCOSE        I&O's Summary    19 Feb 2021 07:01  -  20 Feb 2021 07:00  --------------------------------------------------------  IN: 855 mL / OUT: 650 mL / NET: 205 mL    20 Feb 2021 07:01  -  20 Feb 2021 07:54  --------------------------------------------------------  IN: 0 mL / OUT: 800 mL / NET: -800 mL        PHYSICAL EXAM:  Vital Signs Last 24 Hrs  T(C): 36.6 (02-20-21 @ 04:48)  T(F): 97.9 (02-20-21 @ 04:48), Max: 99.3 (02-19-21 @ 12:21)  HR: 95 (02-20-21 @ 04:48) (79 - 104)  BP: 96/62 (02-20-21 @ 04:48)  BP(mean): --  RR: 18 (02-20-21 @ 04:48) (18 - 18)  SpO2: 93% (02-20-21 @ 04:48) (93% - 98%)  Wt(kg): --    02-19 @ 07:01  -  02-20 @ 07:00  --------------------------------------------------------  IN: 855 mL / OUT: 650 mL / NET: 205 mL    02-20 @ 07:01  -  02-20 @ 07:54  --------------------------------------------------------  IN: 0 mL / OUT: 800 mL / NET: -800 mL      Gen: Awake, verbal, well-developed, NAD  HEENT: NCAT, PERRL, EOMI, clear conjunctiva, no scleral icterus  Neck: Supple, no JVD, no LAD  CV: RRR, S1S2, no m/r/g  Resp: CTAB, normal respiratory effort  Abd: Soft, L sided TTP, ND  Ext: minimal R calf swelling compared to L, no clubbing or cyanosis  Neuro: responds to questions appropriately, AOx2-3, CN2-12 grossly intact, POLK  Skin: warm, perfused    LABS:                        9.6    13.91 )-----------( 223      ( 20 Feb 2021 06:45 )             29.3     02-19    130<L>  |  99  |  8   ----------------------------<  96  3.4<L>   |  20<L>  |  0.99    Ca    8.0<L>      19 Feb 2021 07:01  Phos  2.7     02-19  Mg     1.5     02-19      RADIOLOGY & ADDITIONAL TESTS:    Imaging Personally Reviewed:    Consultant(s) Notes Reviewed:    Gastroenterology    Care Discussed with Consultants/Other Providers:

## 2021-02-21 LAB
ANION GAP SERPL CALC-SCNC: 11 MMOL/L — SIGNIFICANT CHANGE UP (ref 5–17)
BASE EXCESS BLDV CALC-SCNC: -1.7 MMOL/L — SIGNIFICANT CHANGE UP (ref -2–2)
BASE EXCESS BLDV CALC-SCNC: -1.7 MMOL/L — SIGNIFICANT CHANGE UP (ref -2–2)
BUN SERPL-MCNC: 6 MG/DL — LOW (ref 7–23)
CA-I SERPL-SCNC: 1.15 MMOL/L — SIGNIFICANT CHANGE UP (ref 1.12–1.3)
CALCIUM SERPL-MCNC: 8.5 MG/DL — SIGNIFICANT CHANGE UP (ref 8.4–10.5)
CHLORIDE BLDV-SCNC: 102 MMOL/L — SIGNIFICANT CHANGE UP (ref 96–108)
CHLORIDE SERPL-SCNC: 97 MMOL/L — SIGNIFICANT CHANGE UP (ref 96–108)
CO2 BLDV-SCNC: 24 MMOL/L — SIGNIFICANT CHANGE UP (ref 22–30)
CO2 BLDV-SCNC: 24 MMOL/L — SIGNIFICANT CHANGE UP (ref 22–30)
CO2 SERPL-SCNC: 22 MMOL/L — SIGNIFICANT CHANGE UP (ref 22–31)
CREAT SERPL-MCNC: 0.99 MG/DL — SIGNIFICANT CHANGE UP (ref 0.5–1.3)
GAS PNL BLDV: 129 MMOL/L — LOW (ref 135–145)
GAS PNL BLDV: SIGNIFICANT CHANGE UP
GLUCOSE BLDV-MCNC: 87 MG/DL — SIGNIFICANT CHANGE UP (ref 70–99)
GLUCOSE SERPL-MCNC: 80 MG/DL — SIGNIFICANT CHANGE UP (ref 70–99)
HCO3 BLDV-SCNC: 23 MMOL/L — SIGNIFICANT CHANGE UP (ref 21–29)
HCO3 BLDV-SCNC: 23 MMOL/L — SIGNIFICANT CHANGE UP (ref 21–29)
HCT VFR BLD CALC: 33.1 % — LOW (ref 34.5–45)
HCT VFR BLDA CALC: 46 % — SIGNIFICANT CHANGE UP (ref 39–50)
HGB BLD CALC-MCNC: 15 G/DL — SIGNIFICANT CHANGE UP (ref 11.5–15.5)
HGB BLD-MCNC: 10.6 G/DL — LOW (ref 11.5–15.5)
LACTATE BLDV-MCNC: 1.9 MMOL/L — SIGNIFICANT CHANGE UP (ref 0.7–2)
LACTATE BLDV-MCNC: 1.9 MMOL/L — SIGNIFICANT CHANGE UP (ref 0.7–2)
MAGNESIUM SERPL-MCNC: 1.6 MG/DL — SIGNIFICANT CHANGE UP (ref 1.6–2.6)
MCHC RBC-ENTMCNC: 31.4 PG — SIGNIFICANT CHANGE UP (ref 27–34)
MCHC RBC-ENTMCNC: 32 GM/DL — SIGNIFICANT CHANGE UP (ref 32–36)
MCV RBC AUTO: 97.9 FL — SIGNIFICANT CHANGE UP (ref 80–100)
NRBC # BLD: 0 /100 WBCS — SIGNIFICANT CHANGE UP (ref 0–0)
OTHER CELLS CSF MANUAL: 14 ML/DL — LOW (ref 18–22)
PCO2 BLDV: 41 MMHG — SIGNIFICANT CHANGE UP (ref 35–50)
PCO2 BLDV: 41 MMHG — SIGNIFICANT CHANGE UP (ref 35–50)
PH BLDV: 7.36 — SIGNIFICANT CHANGE UP (ref 7.35–7.45)
PH BLDV: 7.36 — SIGNIFICANT CHANGE UP (ref 7.35–7.45)
PHOSPHATE SERPL-MCNC: 3.7 MG/DL — SIGNIFICANT CHANGE UP (ref 2.5–4.5)
PLATELET # BLD AUTO: 296 K/UL — SIGNIFICANT CHANGE UP (ref 150–400)
PO2 BLDV: 41 MMHG — SIGNIFICANT CHANGE UP (ref 25–45)
PO2 BLDV: 41 MMHG — SIGNIFICANT CHANGE UP (ref 25–45)
POTASSIUM BLDV-SCNC: 3.4 MMOL/L — LOW (ref 3.5–5.3)
POTASSIUM SERPL-MCNC: 3.8 MMOL/L — SIGNIFICANT CHANGE UP (ref 3.5–5.3)
POTASSIUM SERPL-SCNC: 3.8 MMOL/L — SIGNIFICANT CHANGE UP (ref 3.5–5.3)
RBC # BLD: 3.38 M/UL — LOW (ref 3.8–5.2)
RBC # FLD: 14.7 % — HIGH (ref 10.3–14.5)
SAO2 % BLDV: 69 % — SIGNIFICANT CHANGE UP (ref 67–88)
SAO2 % BLDV: 69 % — SIGNIFICANT CHANGE UP (ref 67–88)
SARS-COV-2 RNA SPEC QL NAA+PROBE: SIGNIFICANT CHANGE UP
SODIUM SERPL-SCNC: 130 MMOL/L — LOW (ref 135–145)
WBC # BLD: 17.58 K/UL — HIGH (ref 3.8–10.5)
WBC # FLD AUTO: 17.58 K/UL — HIGH (ref 3.8–10.5)

## 2021-02-21 PROCEDURE — 99232 SBSQ HOSP IP/OBS MODERATE 35: CPT | Mod: GC

## 2021-02-21 RX ORDER — MAGNESIUM SULFATE 500 MG/ML
1 VIAL (ML) INJECTION ONCE
Refills: 0 | Status: COMPLETED | OUTPATIENT
Start: 2021-02-21 | End: 2021-02-21

## 2021-02-21 RX ADMIN — Medication 125 MILLIGRAM(S): at 22:30

## 2021-02-21 RX ADMIN — Medication 10 MILLIGRAM(S): at 21:35

## 2021-02-21 RX ADMIN — Medication 1 PACKET(S): at 17:00

## 2021-02-21 RX ADMIN — Medication 1 PACKET(S): at 09:31

## 2021-02-21 RX ADMIN — Medication 100 GRAM(S): at 17:00

## 2021-02-21 RX ADMIN — Medication 1000 UNIT(S): at 12:59

## 2021-02-21 RX ADMIN — Medication 500 MILLIGRAM(S): at 12:59

## 2021-02-21 RX ADMIN — PREGABALIN 1000 MICROGRAM(S): 225 CAPSULE ORAL at 12:59

## 2021-02-21 RX ADMIN — NYSTATIN CREAM 1 APPLICATION(S): 100000 CREAM TOPICAL at 04:42

## 2021-02-21 RX ADMIN — Medication 125 MILLIGRAM(S): at 12:38

## 2021-02-21 RX ADMIN — Medication 125 MILLIGRAM(S): at 04:41

## 2021-02-21 RX ADMIN — HEPARIN SODIUM 5000 UNIT(S): 5000 INJECTION INTRAVENOUS; SUBCUTANEOUS at 04:41

## 2021-02-21 RX ADMIN — Medication 10 MILLIGRAM(S): at 12:59

## 2021-02-21 RX ADMIN — Medication 1 PACKET(S): at 12:38

## 2021-02-21 RX ADMIN — NYSTATIN CREAM 1 APPLICATION(S): 100000 CREAM TOPICAL at 17:00

## 2021-02-21 RX ADMIN — Medication 10 MILLIGRAM(S): at 04:41

## 2021-02-21 RX ADMIN — Medication 125 MILLIGRAM(S): at 17:00

## 2021-02-21 RX ADMIN — HEPARIN SODIUM 5000 UNIT(S): 5000 INJECTION INTRAVENOUS; SUBCUTANEOUS at 21:35

## 2021-02-21 RX ADMIN — MIRTAZAPINE 15 MILLIGRAM(S): 45 TABLET, ORALLY DISINTEGRATING ORAL at 21:35

## 2021-02-21 RX ADMIN — HEPARIN SODIUM 5000 UNIT(S): 5000 INJECTION INTRAVENOUS; SUBCUTANEOUS at 13:00

## 2021-02-21 RX ADMIN — Medication 1 TABLET(S): at 12:59

## 2021-02-21 NOTE — PROGRESS NOTE ADULT - PROBLEM SELECTOR PLAN 3
Likely pre-renal, ATN secondary to septic shock. Unclear baseline Cr.   - Resolved. Cr 0.9 Likely pre-renal, ATN secondary to septic shock. Unclear baseline Cr.   - Cr stable 0.99

## 2021-02-21 NOTE — PROGRESS NOTE ADULT - PROBLEM SELECTOR PLAN 8
Previous infection 2 months ago.  - per ID, likely remnant of previous infection  - Sating well on RA Hx of multiple CAD s/p 1 stent  - consider restarting ASA 81mg daily given resolved GIB  - Holding Isosorbide ER 60mg and enalapril iso soft BP's

## 2021-02-21 NOTE — PROGRESS NOTE ADULT - PROBLEM SELECTOR PLAN 2
UCx growing 3+ organisms and Candida albicans (unclear significance)  - Per ID, likely no indication for antifungal given already improving clinical picture on above antibiotics  - Pessary removed, cleaned, replaced by Urogynecologist Dr. Hernandez's associate  - Monitor WBC d/t rising leukocytosis i/s/o infection. UCx growing 3+ organisms and Candida albicans (unclear significance)  - Per ID, likely no indication for antifungal given already improving clinical picture on above antibiotics  - Pessary removed, cleaned, replaced by Urogynecologist Dr. Hernandez's associate  - Rising leukocytosis, however lactate wnl 1.9. Continue to monitor WBC

## 2021-02-21 NOTE — PROGRESS NOTE ADULT - SUBJECTIVE AND OBJECTIVE BOX
Ramses Fofana, PGY1  Pager [phone]    INCOMPLETE NOTE - IN PROGRESS    Patient is a 89y old  Female who presents with a chief complaint of GI Bleed, Vomiting (20 Feb 2021 07:53)      SUBJECTIVE/INTERVAL EVENTS: Patient seen and examined at bedside.    MEDICATIONS  (STANDING):  ascorbic acid 500 milliGRAM(s) Oral daily  bethanechol 10 milliGRAM(s) Oral three times a day  cholecalciferol 1000 Unit(s) Oral daily  cyanocobalamin 1000 MICROGram(s) Oral daily  heparin   Injectable 5000 Unit(s) SubCutaneous every 8 hours  mirtazapine 15 milliGRAM(s) Oral at bedtime  Nephro-portia 1 Tablet(s) Oral daily  nystatin Powder 1 Application(s) Topical two times a day  potassium phosphate / sodium phosphate Powder (PHOS-NaK) 1 Packet(s) Oral three times a day with meals  vancomycin    Solution 125 milliGRAM(s) Oral every 6 hours    MEDICATIONS  (PRN):  acetaminophen   Tablet .. 650 milliGRAM(s) Oral every 12 hours PRN Mild Pain (1 - 3), Moderate Pain (4 - 6)  ondansetron    Tablet 4 milliGRAM(s) Oral every 8 hours PRN Nausea and/or Vomiting      VITAL SIGNS:  T(F): 98.9 (02-21-21 @ 04:48), Max: 99.4 (02-20-21 @ 21:05)  HR: 98 (02-21-21 @ 04:48) (98 - 118)  BP: 100/64 (02-21-21 @ 04:48) (98/64 - 139/84)  RR: 18 (02-21-21 @ 04:48) (18 - 18)  SpO2: 94% (02-21-21 @ 04:48) (94% - 94%)    I&O's Summary    19 Feb 2021 07:01  -  20 Feb 2021 07:00  --------------------------------------------------------  IN: 855 mL / OUT: 650 mL / NET: 205 mL    20 Feb 2021 07:01  -  21 Feb 2021 06:53  --------------------------------------------------------  IN: 580 mL / OUT: 2000 mL / NET: -1420 mL      Daily     Daily     PHYSICAL EXAM:  Gen: Alert, NAD  HEENT: NCAT, conjunctiva clear, sclera anicteric, no erythema or exudates in the oropharynx, mmm  Neck: Supple, no JVD  CV: RRR, S1S2, no m/r/g  Resp: CTAB, normal respiratory effort  Abd: Soft, nontender, nondistended, normal bowel sounds  Ext: no edema, no clubbing or cyanosis  Neuro: AOx3, CN2-12 grossly intact, POLK  SKIN: warm, perfused    LABS:                        9.6    13.91 )-----------( 223      ( 20 Feb 2021 06:45 )             29.3     Hgb Trend: 9.6<--, 9.5<--, 9.3<--, 10.0<--, 10.2<--  02-20    132<L>  |  100  |  7   ----------------------------<  80  3.5   |  21<L>  |  0.90    Ca    8.4      20 Feb 2021 06:45  Phos  3.3     02-20  Mg     1.7     02-20      Creatinine Trend: 0.90<--, 0.99<--, 1.16<--, 1.37<--, 2.03<--, 2.47<--              CAPILLARY BLOOD GLUCOSE          RADIOLOGY & ADDITIONAL TESTS: Reviewed    Imaging Personally Reviewed:    Consultant(s) Notes Reviewed:      Care Discussed with Consultants/Other Providers:   Ramses Pinedaon, PGY1  Pager [phone]      Patient is a 89y old  Female who presents with a chief complaint of GI Bleed, Vomiting (20 Feb 2021 07:53)      SUBJECTIVE/INTERVAL EVENTS:  - 3 stool count in last 24 hours  - still endorses L sided abdominal pain    MEDICATIONS  (STANDING):  ascorbic acid 500 milliGRAM(s) Oral daily  bethanechol 10 milliGRAM(s) Oral three times a day  cholecalciferol 1000 Unit(s) Oral daily  cyanocobalamin 1000 MICROGram(s) Oral daily  heparin   Injectable 5000 Unit(s) SubCutaneous every 8 hours  mirtazapine 15 milliGRAM(s) Oral at bedtime  Nephro-portia 1 Tablet(s) Oral daily  nystatin Powder 1 Application(s) Topical two times a day  potassium phosphate / sodium phosphate Powder (PHOS-NaK) 1 Packet(s) Oral three times a day with meals  vancomycin    Solution 125 milliGRAM(s) Oral every 6 hours    MEDICATIONS  (PRN):  acetaminophen   Tablet .. 650 milliGRAM(s) Oral every 12 hours PRN Mild Pain (1 - 3), Moderate Pain (4 - 6)  ondansetron    Tablet 4 milliGRAM(s) Oral every 8 hours PRN Nausea and/or Vomiting      VITAL SIGNS:  T(F): 98.9 (02-21-21 @ 04:48), Max: 99.4 (02-20-21 @ 21:05)  HR: 98 (02-21-21 @ 04:48) (98 - 118)  BP: 100/64 (02-21-21 @ 04:48) (98/64 - 139/84)  RR: 18 (02-21-21 @ 04:48) (18 - 18)  SpO2: 94% (02-21-21 @ 04:48) (94% - 94%)    I&O's Summary    19 Feb 2021 07:01  -  20 Feb 2021 07:00  --------------------------------------------------------  IN: 855 mL / OUT: 650 mL / NET: 205 mL    20 Feb 2021 07:01  -  21 Feb 2021 06:53  --------------------------------------------------------  IN: 580 mL / OUT: 2000 mL / NET: -1420 mL      Daily     Daily     PHYSICAL EXAM:  Gen: Awake, verbal, well-developed, NAD  HEENT: NCAT, PERRL, EOMI, clear conjunctiva, no scleral icterus  Neck: Supple, no JVD, no LAD  CV: RRR, S1S2, no m/r/g  Resp: CTAB, normal respiratory effort  Abd: Soft, L sided TTP, ND  Ext: minimal R calf swelling compared to L, no clubbing or cyanosis  Neuro: responds to questions appropriately, AOx2-3, CN2-12 grossly intact, POLK  Skin: warm, perfused      LABS:                        9.6    13.91 )-----------( 223      ( 20 Feb 2021 06:45 )             29.3     Hgb Trend: 9.6<--, 9.5<--, 9.3<--, 10.0<--, 10.2<--  02-20    132<L>  |  100  |  7   ----------------------------<  80  3.5   |  21<L>  |  0.90    Ca    8.4      20 Feb 2021 06:45  Phos  3.3     02-20  Mg     1.7     02-20      Creatinine Trend: 0.90<--, 0.99<--, 1.16<--, 1.37<--, 2.03<--, 2.47<--              CAPILLARY BLOOD GLUCOSE          RADIOLOGY & ADDITIONAL TESTS: Reviewed    Imaging Personally Reviewed:    Consultant(s) Notes Reviewed:      Care Discussed with Consultants/Other Providers:

## 2021-02-21 NOTE — PROGRESS NOTE ADULT - PROBLEM SELECTOR PLAN 7
Likely LGIB given normal colored stool with blood-tinged water. Possibly diverticulosis as noted on CT a/p vs. ischemic colitis given hx of CAD vs. infectious colitis iso recent C diff colitis.  - no further episodes since day of admission Previous infection 2 months ago. Per ID, initial positive COVID PCR likely remnant of previous infection.  - Repeat COVID PCR neg on 2/18  - Repeat COVID PCR today for discharge  - Sating well on RA

## 2021-02-21 NOTE — PROGRESS NOTE ADULT - PROBLEM SELECTOR PLAN 10
DVT ppx: SC heparin  Diet: dysphagia diet, lactose restricted   Code: DNR/DNI, no pressors, no blood transfusions per HCP  Dispo: Rehab, obtain covid swab tomorrow

## 2021-02-21 NOTE — PROGRESS NOTE ADULT - PROBLEM SELECTOR PLAN 9
Hx of multiple CAD s/p 1 stent  - holding ASA 81mg daily and Isosorbide ER 60mg daily  - holding enalapril iso JERONIMO DVT ppx: SC heparin  Diet: dysphagia diet, lactose restricted   Code: DNR/DNI, no pressors, no blood transfusions per HCP  Dispo: Rehab, obtain covid swab

## 2021-02-21 NOTE — PROGRESS NOTE ADULT - ASSESSMENT
89F w/ PMH of CAD s/p PCI, Chronic UTI's, MDD, HTN, HLD, [place] (hearing aids), dysphagia, prior COVID19 infection, and recent hospitalization for C. diff colitis (~2/7/21) presenting from rehab center for bloody bowel movement, likely LGIB secondary to infectious colitis vs. ischemic colitis.  Also found to be in septic shock, likely secondary to colitis iso recent C. diff.  COVID +, asymptomatic and without hypoxia.  Sepsis now improving

## 2021-02-21 NOTE — PROGRESS NOTE ADULT - PROBLEM SELECTOR PLAN 4
Hx of urinary retention and chronic UTI  - Requiring straight cath, can place harding   - c/w home bethanechol. Hold pyridium. Hx of urinary retention and chronic UTI  - c/w home bethanechol. Hold pyridium.  - Failed multiple TOV. Harding placed, will likely need to d/c with harding

## 2021-02-21 NOTE — PROGRESS NOTE ADULT - PROBLEM SELECTOR PLAN 1
Pancolitis, most significant in descending and rectosigmoid colon. C diff negative x 2, less suspicion for recurrent C diff colitis. Most likely to be ischemic.  - s/p Zosyn (2/14-19), Flagyl IV 500mg BID (2/14-19). s/p 6 day course  - decreased PO Vancomycin 125mg q6h and continue for 5 days (until 2/23) and c/w probiotics  - Will obtain follow up lactate in AM given persistent pain and now rising WBC Pancolitis, most significant in descending and rectosigmoid colon. C diff negative x 2, less suspicion for recurrent C diff colitis. Most likely to be ischemic.  - s/p Zosyn (2/14-19), Flagyl IV 500mg BID (2/14-19). s/p 6 day course  - decreased PO Vancomycin 125mg q6h and continue for 5 days (until 2/23) and c/w probiotics  - Rising leukocytosis, however lactate wnl 1.9. Continue to monitor WBC

## 2021-02-22 ENCOUNTER — APPOINTMENT (OUTPATIENT)
Dept: PHARMACY | Facility: CLINIC | Age: 86
End: 2021-02-22
Payer: SELF-PAY

## 2021-02-22 DIAGNOSIS — E87.1 HYPO-OSMOLALITY AND HYPONATREMIA: ICD-10-CM

## 2021-02-22 PROBLEM — U07.1 COVID-19: Chronic | Status: ACTIVE | Noted: 2021-02-14

## 2021-02-22 PROBLEM — I25.10 ATHEROSCLEROTIC HEART DISEASE OF NATIVE CORONARY ARTERY WITHOUT ANGINA PECTORIS: Chronic | Status: ACTIVE | Noted: 2021-02-14

## 2021-02-22 PROBLEM — N39.0 URINARY TRACT INFECTION, SITE NOT SPECIFIED: Chronic | Status: ACTIVE | Noted: 2021-02-14

## 2021-02-22 LAB
ANION GAP SERPL CALC-SCNC: 11 MMOL/L — SIGNIFICANT CHANGE UP (ref 5–17)
ANION GAP SERPL CALC-SCNC: 12 MMOL/L — SIGNIFICANT CHANGE UP (ref 5–17)
BASOPHILS # BLD AUTO: 0.04 K/UL — SIGNIFICANT CHANGE UP (ref 0–0.2)
BASOPHILS # BLD AUTO: 0.05 K/UL — SIGNIFICANT CHANGE UP (ref 0–0.2)
BASOPHILS NFR BLD AUTO: 0.3 % — SIGNIFICANT CHANGE UP (ref 0–2)
BASOPHILS NFR BLD AUTO: 0.3 % — SIGNIFICANT CHANGE UP (ref 0–2)
BUN SERPL-MCNC: 7 MG/DL — SIGNIFICANT CHANGE UP (ref 7–23)
BUN SERPL-MCNC: 8 MG/DL — SIGNIFICANT CHANGE UP (ref 7–23)
CALCIUM SERPL-MCNC: 7.9 MG/DL — LOW (ref 8.4–10.5)
CALCIUM SERPL-MCNC: 8.4 MG/DL — SIGNIFICANT CHANGE UP (ref 8.4–10.5)
CHLORIDE SERPL-SCNC: 96 MMOL/L — SIGNIFICANT CHANGE UP (ref 96–108)
CHLORIDE SERPL-SCNC: 97 MMOL/L — SIGNIFICANT CHANGE UP (ref 96–108)
CO2 SERPL-SCNC: 19 MMOL/L — LOW (ref 22–31)
CO2 SERPL-SCNC: 21 MMOL/L — LOW (ref 22–31)
CREAT ?TM UR-MCNC: 89 MG/DL — SIGNIFICANT CHANGE UP
CREAT SERPL-MCNC: 0.87 MG/DL — SIGNIFICANT CHANGE UP (ref 0.5–1.3)
CREAT SERPL-MCNC: 0.93 MG/DL — SIGNIFICANT CHANGE UP (ref 0.5–1.3)
EOSINOPHIL # BLD AUTO: 0.13 K/UL — SIGNIFICANT CHANGE UP (ref 0–0.5)
EOSINOPHIL # BLD AUTO: 0.18 K/UL — SIGNIFICANT CHANGE UP (ref 0–0.5)
EOSINOPHIL NFR BLD AUTO: 1 % — SIGNIFICANT CHANGE UP (ref 0–6)
EOSINOPHIL NFR BLD AUTO: 1.1 % — SIGNIFICANT CHANGE UP (ref 0–6)
GAS PNL BLDA: SIGNIFICANT CHANGE UP
GLUCOSE BLDC GLUCOMTR-MCNC: 86 MG/DL — SIGNIFICANT CHANGE UP (ref 70–99)
GLUCOSE SERPL-MCNC: 77 MG/DL — SIGNIFICANT CHANGE UP (ref 70–99)
GLUCOSE SERPL-MCNC: 94 MG/DL — SIGNIFICANT CHANGE UP (ref 70–99)
HCT VFR BLD CALC: 28.2 % — LOW (ref 34.5–45)
HCT VFR BLD CALC: 30.9 % — LOW (ref 34.5–45)
HGB BLD-MCNC: 10.2 G/DL — LOW (ref 11.5–15.5)
HGB BLD-MCNC: 9.3 G/DL — LOW (ref 11.5–15.5)
IMM GRANULOCYTES NFR BLD AUTO: 1.1 % — SIGNIFICANT CHANGE UP (ref 0–1.5)
IMM GRANULOCYTES NFR BLD AUTO: 1.5 % — SIGNIFICANT CHANGE UP (ref 0–1.5)
LYMPHOCYTES # BLD AUTO: 14.6 % — SIGNIFICANT CHANGE UP (ref 13–44)
LYMPHOCYTES # BLD AUTO: 17.2 % — SIGNIFICANT CHANGE UP (ref 13–44)
LYMPHOCYTES # BLD AUTO: 2.27 K/UL — SIGNIFICANT CHANGE UP (ref 1–3.3)
LYMPHOCYTES # BLD AUTO: 2.46 K/UL — SIGNIFICANT CHANGE UP (ref 1–3.3)
MAGNESIUM SERPL-MCNC: 1.9 MG/DL — SIGNIFICANT CHANGE UP (ref 1.6–2.6)
MCHC RBC-ENTMCNC: 32.2 PG — SIGNIFICANT CHANGE UP (ref 27–34)
MCHC RBC-ENTMCNC: 32.2 PG — SIGNIFICANT CHANGE UP (ref 27–34)
MCHC RBC-ENTMCNC: 33 GM/DL — SIGNIFICANT CHANGE UP (ref 32–36)
MCHC RBC-ENTMCNC: 33 GM/DL — SIGNIFICANT CHANGE UP (ref 32–36)
MCV RBC AUTO: 97.5 FL — SIGNIFICANT CHANGE UP (ref 80–100)
MCV RBC AUTO: 97.6 FL — SIGNIFICANT CHANGE UP (ref 80–100)
MONOCYTES # BLD AUTO: 1.09 K/UL — HIGH (ref 0–0.9)
MONOCYTES # BLD AUTO: 1.35 K/UL — HIGH (ref 0–0.9)
MONOCYTES NFR BLD AUTO: 8 % — SIGNIFICANT CHANGE UP (ref 2–14)
MONOCYTES NFR BLD AUTO: 8.3 % — SIGNIFICANT CHANGE UP (ref 2–14)
NEUTROPHILS # BLD AUTO: 12.54 K/UL — HIGH (ref 1.8–7.4)
NEUTROPHILS # BLD AUTO: 9.52 K/UL — HIGH (ref 1.8–7.4)
NEUTROPHILS NFR BLD AUTO: 72.1 % — SIGNIFICANT CHANGE UP (ref 43–77)
NEUTROPHILS NFR BLD AUTO: 74.5 % — SIGNIFICANT CHANGE UP (ref 43–77)
NRBC # BLD: 0 /100 WBCS — SIGNIFICANT CHANGE UP (ref 0–0)
NRBC # BLD: 0 /100 WBCS — SIGNIFICANT CHANGE UP (ref 0–0)
PHOSPHATE SERPL-MCNC: 4.1 MG/DL — SIGNIFICANT CHANGE UP (ref 2.5–4.5)
PLATELET # BLD AUTO: 288 K/UL — SIGNIFICANT CHANGE UP (ref 150–400)
PLATELET # BLD AUTO: 301 K/UL — SIGNIFICANT CHANGE UP (ref 150–400)
POTASSIUM SERPL-MCNC: 3.9 MMOL/L — SIGNIFICANT CHANGE UP (ref 3.5–5.3)
POTASSIUM SERPL-MCNC: 4 MMOL/L — SIGNIFICANT CHANGE UP (ref 3.5–5.3)
POTASSIUM SERPL-SCNC: 3.9 MMOL/L — SIGNIFICANT CHANGE UP (ref 3.5–5.3)
POTASSIUM SERPL-SCNC: 4 MMOL/L — SIGNIFICANT CHANGE UP (ref 3.5–5.3)
RBC # BLD: 2.89 M/UL — LOW (ref 3.8–5.2)
RBC # BLD: 3.17 M/UL — LOW (ref 3.8–5.2)
RBC # FLD: 14.7 % — HIGH (ref 10.3–14.5)
RBC # FLD: 15 % — HIGH (ref 10.3–14.5)
SODIUM SERPL-SCNC: 128 MMOL/L — LOW (ref 135–145)
SODIUM SERPL-SCNC: 128 MMOL/L — LOW (ref 135–145)
SODIUM UR-SCNC: 78 MMOL/L — SIGNIFICANT CHANGE UP
WBC # BLD: 13.2 K/UL — HIGH (ref 3.8–10.5)
WBC # BLD: 16.84 K/UL — HIGH (ref 3.8–10.5)
WBC # FLD AUTO: 13.2 K/UL — HIGH (ref 3.8–10.5)
WBC # FLD AUTO: 16.84 K/UL — HIGH (ref 3.8–10.5)

## 2021-02-22 PROCEDURE — 99233 SBSQ HOSP IP/OBS HIGH 50: CPT | Mod: CS

## 2021-02-22 PROCEDURE — 99231 SBSQ HOSP IP/OBS SF/LOW 25: CPT | Mod: CS

## 2021-02-22 PROCEDURE — 93010 ELECTROCARDIOGRAM REPORT: CPT

## 2021-02-22 PROCEDURE — 99233 SBSQ HOSP IP/OBS HIGH 50: CPT | Mod: GC

## 2021-02-22 PROCEDURE — 99233 SBSQ HOSP IP/OBS HIGH 50: CPT

## 2021-02-22 PROCEDURE — V5014A: CUSTOM | Mod: LT

## 2021-02-22 RX ORDER — SODIUM CHLORIDE 9 MG/ML
1000 INJECTION INTRAMUSCULAR; INTRAVENOUS; SUBCUTANEOUS
Refills: 0 | Status: DISCONTINUED | OUTPATIENT
Start: 2021-02-22 | End: 2021-02-23

## 2021-02-22 RX ORDER — ASPIRIN/CALCIUM CARB/MAGNESIUM 324 MG
81 TABLET ORAL DAILY
Refills: 0 | Status: DISCONTINUED | OUTPATIENT
Start: 2021-02-22 | End: 2021-03-03

## 2021-02-22 RX ORDER — MAGNESIUM SULFATE 500 MG/ML
2 VIAL (ML) INJECTION ONCE
Refills: 0 | Status: COMPLETED | OUTPATIENT
Start: 2021-02-22 | End: 2021-02-22

## 2021-02-22 RX ORDER — MEROPENEM 1 G/30ML
INJECTION INTRAVENOUS
Refills: 0 | Status: DISCONTINUED | OUTPATIENT
Start: 2021-02-22 | End: 2021-02-22

## 2021-02-22 RX ORDER — VANCOMYCIN HCL 1 G
1000 VIAL (EA) INTRAVENOUS ONCE
Refills: 0 | Status: COMPLETED | OUTPATIENT
Start: 2021-02-22 | End: 2021-02-23

## 2021-02-22 RX ORDER — SODIUM CHLORIDE 9 MG/ML
1000 INJECTION INTRAMUSCULAR; INTRAVENOUS; SUBCUTANEOUS ONCE
Refills: 0 | Status: COMPLETED | OUTPATIENT
Start: 2021-02-22 | End: 2021-02-22

## 2021-02-22 RX ORDER — MEROPENEM 1 G/30ML
2000 INJECTION INTRAVENOUS ONCE
Refills: 0 | Status: COMPLETED | OUTPATIENT
Start: 2021-02-22 | End: 2021-02-23

## 2021-02-22 RX ORDER — LACTOBACILLUS ACIDOPHILUS 100MM CELL
1 CAPSULE ORAL
Refills: 0 | Status: DISCONTINUED | OUTPATIENT
Start: 2021-02-22 | End: 2021-03-03

## 2021-02-22 RX ORDER — PSYLLIUM SEED (WITH DEXTROSE)
1 POWDER (GRAM) ORAL
Refills: 0 | Status: DISCONTINUED | OUTPATIENT
Start: 2021-02-22 | End: 2021-03-03

## 2021-02-22 RX ORDER — MEROPENEM 1 G/30ML
2000 INJECTION INTRAVENOUS
Refills: 0 | Status: DISCONTINUED | OUTPATIENT
Start: 2021-02-23 | End: 2021-02-24

## 2021-02-22 RX ORDER — MEROPENEM 1 G/30ML
2000 INJECTION INTRAVENOUS ONCE
Refills: 0 | Status: DISCONTINUED | OUTPATIENT
Start: 2021-02-22 | End: 2021-02-22

## 2021-02-22 RX ORDER — MEROPENEM 1 G/30ML
2000 INJECTION INTRAVENOUS EVERY 12 HOURS
Refills: 0 | Status: DISCONTINUED | OUTPATIENT
Start: 2021-02-23 | End: 2021-02-24

## 2021-02-22 RX ADMIN — Medication 10 MILLIGRAM(S): at 13:07

## 2021-02-22 RX ADMIN — NYSTATIN CREAM 1 APPLICATION(S): 100000 CREAM TOPICAL at 17:13

## 2021-02-22 RX ADMIN — Medication 1 PACKET(S): at 17:13

## 2021-02-22 RX ADMIN — Medication 125 MILLIGRAM(S): at 04:19

## 2021-02-22 RX ADMIN — SODIUM CHLORIDE 75 MILLILITER(S): 9 INJECTION INTRAMUSCULAR; INTRAVENOUS; SUBCUTANEOUS at 12:00

## 2021-02-22 RX ADMIN — NYSTATIN CREAM 1 APPLICATION(S): 100000 CREAM TOPICAL at 04:21

## 2021-02-22 RX ADMIN — HEPARIN SODIUM 5000 UNIT(S): 5000 INJECTION INTRAVENOUS; SUBCUTANEOUS at 22:16

## 2021-02-22 RX ADMIN — HEPARIN SODIUM 5000 UNIT(S): 5000 INJECTION INTRAVENOUS; SUBCUTANEOUS at 13:07

## 2021-02-22 RX ADMIN — Medication 1 PACKET(S): at 09:00

## 2021-02-22 RX ADMIN — Medication 81 MILLIGRAM(S): at 13:07

## 2021-02-22 RX ADMIN — Medication 10 MILLIGRAM(S): at 04:19

## 2021-02-22 RX ADMIN — HEPARIN SODIUM 5000 UNIT(S): 5000 INJECTION INTRAVENOUS; SUBCUTANEOUS at 04:19

## 2021-02-22 RX ADMIN — Medication 1000 UNIT(S): at 13:07

## 2021-02-22 RX ADMIN — Medication 50 GRAM(S): at 08:59

## 2021-02-22 RX ADMIN — Medication 125 MILLIGRAM(S): at 13:06

## 2021-02-22 RX ADMIN — PREGABALIN 1000 MICROGRAM(S): 225 CAPSULE ORAL at 13:07

## 2021-02-22 RX ADMIN — Medication 125 MILLIGRAM(S): at 17:13

## 2021-02-22 RX ADMIN — Medication 1 PACKET(S): at 13:06

## 2021-02-22 RX ADMIN — Medication 650 MILLIGRAM(S): at 22:15

## 2021-02-22 RX ADMIN — Medication 500 MILLIGRAM(S): at 13:07

## 2021-02-22 RX ADMIN — Medication 1 TABLET(S): at 13:06

## 2021-02-22 RX ADMIN — Medication 1 TABLET(S): at 17:13

## 2021-02-22 NOTE — PROGRESS NOTE ADULT - ASSESSMENT
89 year old woman w/ PMH of CAD s/p PCI, Chronic UTI's, MDD, HTN, HLD, [place] (hearing aids), dysphagia, COVID19 infection (2 months ago), and recent hospitalization for C. diff colitis (~2/7/21) presenting [facility] after episode of vomiting and bloody bowel movement with c/b UTI. Palliative consulted for GOC.

## 2021-02-22 NOTE — PROGRESS NOTE ADULT - SUBJECTIVE AND OBJECTIVE BOX
Patient is a 89y old  Female who presented with a chief complaint of GI Bleed, Vomiting (22 Feb 2021 06:37)      INTERVAL HPI/OVERNIGHT EVENTS:    no abdominal pain, nausea or vomiting  +loose stools - green/loose  no rectal bleeding  appetite fair    overall decreased stooling per documentation over weekend, but still loose    MEDICATIONS  (STANDING):  ascorbic acid 500 milliGRAM(s) Oral daily  aspirin enteric coated 81 milliGRAM(s) Oral daily  bethanechol 10 milliGRAM(s) Oral three times a day  cholecalciferol 1000 Unit(s) Oral daily  cyanocobalamin 1000 MICROGram(s) Oral daily  heparin   Injectable 5000 Unit(s) SubCutaneous every 8 hours  mirtazapine 15 milliGRAM(s) Oral at bedtime  Nephro-portia 1 Tablet(s) Oral daily  nystatin Powder 1 Application(s) Topical two times a day  potassium phosphate / sodium phosphate Powder (PHOS-NaK) 1 Packet(s) Oral three times a day with meals  sodium chloride 0.9%. 1000 milliLiter(s) (75 mL/Hr) IV Continuous <Continuous>  vancomycin    Solution 125 milliGRAM(s) Oral every 6 hours    MEDICATIONS  (PRN):  acetaminophen   Tablet .. 650 milliGRAM(s) Oral every 12 hours PRN Mild Pain (1 - 3), Moderate Pain (4 - 6)  ondansetron    Tablet 4 milliGRAM(s) Oral every 8 hours PRN Nausea and/or Vomiting      Allergies  flu vaccine (Anaphylaxis)  No Known Drug Allergies      Review of Systems:  unable to obtain    Vital Signs Last 24 Hrs  T(C): 37.6 (22 Feb 2021 08:56), Max: 37.6 (22 Feb 2021 08:56)  T(F): 99.7 (22 Feb 2021 08:56), Max: 99.7 (22 Feb 2021 08:56)  HR: 94 (22 Feb 2021 08:56) (73 - 103)  BP: 125/73 (22 Feb 2021 08:56) (109/69 - 125/73)  BP(mean): --  RR: 18 (22 Feb 2021 08:56) (18 - 19)  SpO2: 94% (22 Feb 2021 08:56) (94% - 98%)    PHYSICAL EXAM:  Constitutional: elderly female. appears comfortable, non toxic appearing sitting up in bed  Neck: No LAD, supple no JVD  Respiratory: grossly clear ant.  Cardiovascular: S1 and S2, RRR  Gastrointestinal: obese soft  ND NT no rebound guarding or rigidity +loose green stool on bedpad  Extremities: neg clubbing, cyanosis +b/l LE edema  Vascular: 2+ peripheral pulses  Neurological: oriented to self, pleasantly confused.  moves all extremities  Skin: No rashes      LABS:                        10.2   16.84 )-----------( 288      ( 22 Feb 2021 07:13 )             30.9     02-22    128<L>  |  97  |  8   ----------------------------<  77  4.0   |  19<L>  |  0.93    Ca    8.4      22 Feb 2021 07:11  Phos  4.1     02-22  Mg     1.9     02-22              RADIOLOGY & ADDITIONAL TESTS:

## 2021-02-22 NOTE — PROGRESS NOTE ADULT - PROBLEM SELECTOR PLAN 2
UCx growing 3+ organisms and Candida albicans (unclear significance)  - Per ID, likely no indication for antifungal given already improving clinical picture on above antibiotics  - Pessary removed, cleaned, replaced by Urogynecologist Dr. Hernandez's associate  - Rising leukocytosis, however lactate wnl 1.9. Continue to monitor WBC UCx growing 3+ organisms and Candida albicans (unclear significance)  - Per ID, likely no indication for antifungal given already improving clinical picture on above antibiotics  - Pessary removed, cleaned, replaced by Urogynecologist Dr. Hernandez's associate

## 2021-02-22 NOTE — PROGRESS NOTE ADULT - PROBLEM SELECTOR PLAN 8
Hx of multiple CAD s/p 1 stent  - consider restarting ASA 81mg daily given resolved GIB  - Holding Isosorbide ER 60mg and enalapril iso soft BP's Hx of multiple CAD s/p 1 stent  - will restart ASA 81mg daily given resolved GIB  - Holding Isosorbide ER 60mg and enalapril iso soft BP's

## 2021-02-22 NOTE — PROGRESS NOTE ADULT - SUBJECTIVE AND OBJECTIVE BOX
Patient is a 89y old  Female who presents with a chief complaint of GI Bleed, Vomiting (22 Feb 2021 12:58)    Being followed by ID for        Interval history:  No other acute events      ROS:  No cough,SOB,CP  No N/V/D  No abd pain  No urinary complaints  No HA  No joint or limb pain  No other complaints    PAST MEDICAL & SURGICAL HISTORY:  COVID-19    Urinary tract infection    CAD in native artery    Dyslipidemia    Depression    Hyponatremia    Hypertension    History of repair of hip fracture      Allergies    flu vaccine (Anaphylaxis)  No Known Drug Allergies    Intolerances      Antimicrobials:    vancomycin    Solution 125 milliGRAM(s) Oral every 6 hours    MEDICATIONS  (STANDING):  ascorbic acid 500 milliGRAM(s) Oral daily  aspirin enteric coated 81 milliGRAM(s) Oral daily  bethanechol 10 milliGRAM(s) Oral three times a day  cholecalciferol 1000 Unit(s) Oral daily  cyanocobalamin 1000 MICROGram(s) Oral daily  heparin   Injectable 5000 Unit(s) SubCutaneous every 8 hours  lactobacillus acidophilus 1 Tablet(s) Oral two times a day  mirtazapine 15 milliGRAM(s) Oral at bedtime  Nephro-portia 1 Tablet(s) Oral daily  nystatin Powder 1 Application(s) Topical two times a day  potassium phosphate / sodium phosphate Powder (PHOS-NaK) 1 Packet(s) Oral three times a day with meals  psyllium Powder 1 Packet(s) Oral two times a day  sodium chloride 0.9%. 1000 milliLiter(s) (75 mL/Hr) IV Continuous <Continuous>  vancomycin    Solution 125 milliGRAM(s) Oral every 6 hours      Vital Signs Last 24 Hrs  T(C): 37.7 (02-22-21 @ 12:45), Max: 37.7 (02-22-21 @ 12:45)  T(F): 99.9 (02-22-21 @ 12:45), Max: 99.9 (02-22-21 @ 12:45)  HR: 100 (02-22-21 @ 12:45) (94 - 106)  BP: 121/72 (02-22-21 @ 12:45) (109/69 - 125/73)  BP(mean): --  RR: 18 (02-22-21 @ 12:45) (18 - 19)  SpO2: 100% (02-22-21 @ 12:45) (94% - 100%)    Physical Exam:    Constitutional well preserved,comfortable,pleasant    HEENT PERRLA EOMI,No pallor or icterus    No oral exudate or erythema    Neck supple no JVD or LN    Chest Good AE,CTA    CVS RRR S1 S2 WNl No murmur or rub or gallop    Abd soft BS normal No tenderness no masses    Ext No cyanosis clubbing or edema    IV site no erythema tenderness or discharge    Joints no swelling or LOM    CNS AAO X 3 no focal    Lab Data:                          10.2   16.84 )-----------( 288      ( 22 Feb 2021 07:13 )             30.9       02-22    128<L>  |  97  |  8   ----------------------------<  77  4.0   |  19<L>  |  0.93    Ca    8.4      22 Feb 2021 07:11  Phos  4.1     02-22  Mg     1.9     02-22            .Urine Catheterized  02-16-21   10,000 - 49,000 CFU/mL Presumptive Candida albicans  --  --                    WBC Count: 16.84 (02-22-21 @ 07:13)  WBC Count: 17.58 (02-21-21 @ 07:18)  WBC Count: 13.91 (02-20-21 @ 06:45)  WBC Count: 11.64 (02-19-21 @ 07:02)  WBC Count: 15.60 (02-18-21 @ 07:04)  WBC Count: 23.30 (02-17-21 @ 07:01)  WBC Count: 24.71 (02-16-21 @ 06:45)             Patient is a 89y old  Female who presents with a chief complaint of GI Bleed, Vomiting (22 Feb 2021 12:58)    Being followed by ID for        Interval history:  pt much more alert   still with loose stool  No other acute events      PAST MEDICAL & SURGICAL HISTORY:  COVID-19    Urinary tract infection    CAD in native artery    Dyslipidemia    Depression    Hyponatremia    Hypertension    History of repair of hip fracture      Allergies    flu vaccine (Anaphylaxis)  No Known Drug Allergies    Intolerances      Antimicrobials:    vancomycin    Solution 125 milliGRAM(s) Oral every 6 hours    MEDICATIONS  (STANDING):  ascorbic acid 500 milliGRAM(s) Oral daily  aspirin enteric coated 81 milliGRAM(s) Oral daily  bethanechol 10 milliGRAM(s) Oral three times a day  cholecalciferol 1000 Unit(s) Oral daily  cyanocobalamin 1000 MICROGram(s) Oral daily  heparin   Injectable 5000 Unit(s) SubCutaneous every 8 hours  lactobacillus acidophilus 1 Tablet(s) Oral two times a day  mirtazapine 15 milliGRAM(s) Oral at bedtime  Nephro-portia 1 Tablet(s) Oral daily  nystatin Powder 1 Application(s) Topical two times a day  potassium phosphate / sodium phosphate Powder (PHOS-NaK) 1 Packet(s) Oral three times a day with meals  psyllium Powder 1 Packet(s) Oral two times a day  sodium chloride 0.9%. 1000 milliLiter(s) (75 mL/Hr) IV Continuous <Continuous>  vancomycin    Solution 125 milliGRAM(s) Oral every 6 hours      Vital Signs Last 24 Hrs  T(C): 37.7 (02-22-21 @ 12:45), Max: 37.7 (02-22-21 @ 12:45)  T(F): 99.9 (02-22-21 @ 12:45), Max: 99.9 (02-22-21 @ 12:45)  HR: 100 (02-22-21 @ 12:45) (94 - 106)  BP: 121/72 (02-22-21 @ 12:45) (109/69 - 125/73)  BP(mean): --  RR: 18 (02-22-21 @ 12:45) (18 - 19)  SpO2: 100% (02-22-21 @ 12:45) (94% - 100%)    Physical Exam:    Constitutional well preserved,comfortable,pleasant    HEENT PERRLA EOMI,No pallor or icterus    No oral exudate or erythema    Neck supple no JVD or LN    Chest Good AE,CTA    CVS S1 S2     Abd soft BS normal No tenderness    Ext No cyanosis clubbing or edema    IV site no erythema tenderness or discharge        Lab Data:                          10.2   16.84 )-----------( 288      ( 22 Feb 2021 07:13 )             30.9       02-22    128<L>  |  97  |  8   ----------------------------<  77  4.0   |  19<L>  |  0.93    Ca    8.4      22 Feb 2021 07:11  Phos  4.1     02-22  Mg     1.9     02-22            .Urine Catheterized  02-16-21   10,000 - 49,000 CFU/mL Presumptive Candida albicans  --  --      Blood Gas Venous - Lactate (02.21.21 @ 07:26)    Blood Gas Venous - Lactate: 1.9 mmoL/L          WBC Count: 16.84 (02-22-21 @ 07:13)  WBC Count: 17.58 (02-21-21 @ 07:18)  WBC Count: 13.91 (02-20-21 @ 06:45)  WBC Count: 11.64 (02-19-21 @ 07:02)  WBC Count: 15.60 (02-18-21 @ 07:04)  WBC Count: 23.30 (02-17-21 @ 07:01)  WBC Count: 24.71 (02-16-21 @ 06:45)

## 2021-02-22 NOTE — PROGRESS NOTE ADULT - SUBJECTIVE AND OBJECTIVE BOX
SUBJECTIVE AND OBJECTIVE:  INTERVAL HPI/OVERNIGHT EVENTS: No major over night events. Tolerating PO. No PRNs of Zofran used. It appears pt with worsening leukocytosis and worsening hyponatremia. Pt pending dc to [facility] as per primary team.    DNR on chart: Yes    Yes      Allergies    flu vaccine (Anaphylaxis)  No Known Drug Allergies    Intolerances    MEDICATIONS  (STANDING):  ascorbic acid 500 milliGRAM(s) Oral daily  aspirin enteric coated 81 milliGRAM(s) Oral daily  bethanechol 10 milliGRAM(s) Oral three times a day  cholecalciferol 1000 Unit(s) Oral daily  cyanocobalamin 1000 MICROGram(s) Oral daily  heparin   Injectable 5000 Unit(s) SubCutaneous every 8 hours  lactobacillus acidophilus 1 Tablet(s) Oral two times a day  mirtazapine 15 milliGRAM(s) Oral at bedtime  Nephro-portia 1 Tablet(s) Oral daily  nystatin Powder 1 Application(s) Topical two times a day  potassium phosphate / sodium phosphate Powder (PHOS-NaK) 1 Packet(s) Oral three times a day with meals  psyllium Powder 1 Packet(s) Oral two times a day  sodium chloride 0.9%. 1000 milliLiter(s) (75 mL/Hr) IV Continuous <Continuous>  vancomycin    Solution 125 milliGRAM(s) Oral every 6 hours    MEDICATIONS  (PRN):  acetaminophen   Tablet .. 650 milliGRAM(s) Oral every 12 hours PRN Mild Pain (1 - 3), Moderate Pain (4 - 6)  ondansetron    Tablet 4 milliGRAM(s) Oral every 8 hours PRN Nausea and/or Vomiting      ITEMS UNCHECKED ARE NOT PRESENT    PRESENT SYMPTOMS: [ ]Unable to obtain due to poor mentation   Source if other than patient:  [ ]Family   [ ]Team     Pain:  [ ]yes [ X]no  QOL impact -   Location -                    Aggravating factors -  Quality -  Radiation -  Timing-  Severity (0-10 scale):  Minimal acceptable level (0-10 scale):     Dyspnea:                           [ ]Mild [ ]Moderate [ ]Severe  Anxiety:                             [ ]Mild [ ]Moderate [ ]Severe  Fatigue:                             [ ]Mild [ ]Moderate [ ]Severe  Nausea:                             [ ]Mild [ ]Moderate [ ]Severe  Loss of appetite:              [ ]Mild [ ]Moderate [ ]Severe  Constipation:                    [ ]Mild [ ]Moderate [ ]Severe    CPOT:    https://www.[facility].org/getattachment/[email or web address]/Critical-Care-Pain-Observation-Tool-(CPOT)    PAIN AD Score:	  http://geriatrictoolkit.[facility]/cog/painad.pdf (Ctrl + left click to view)    Other Symptoms:  [ ]All other review of systems negative     Palliative Performance Status Version 2:     30%      http://[facility].org/files/news/palliative_performance_scale_ppsv2.pdf  PHYSICAL EXAM:  Vital Signs Last 24 Hrs  T(C): 37.7 (22 Feb 2021 12:45), Max: 37.7 (22 Feb 2021 12:45)  T(F): 99.9 (22 Feb 2021 12:45), Max: 99.9 (22 Feb 2021 12:45)  HR: 100 (22 Feb 2021 12:45) (94 - 106)  BP: 121/72 (22 Feb 2021 12:45) (109/69 - 125/73)  BP(mean): --  RR: 18 (22 Feb 2021 12:45) (18 - 19)  SpO2: 100% (22 Feb 2021 12:45) (94% - 100%) I&O's Summary    21 Feb 2021 07:01  -  22 Feb 2021 07:00  --------------------------------------------------------  IN: 200 mL / OUT: 425 mL / NET: -225 mL       GENERAL:  [ X]Alert  [ ]Oriented x   [ ]Lethargic  [ ]Cachexia  [ ]Unarousable  [X ]Verbal  [ ]Non-Verbal  Behavioral:   [ ]Anxiety  [ ]Delirium [ ]Agitation [ ]Other  HEENT:  [ ]Normal   [X ]Dry mouth   [ ]ET Tube/Trach  [ ]Oral lesions  PULMONARY:   [X ]Clear [ ]Tachypnea  [ ]Audible excessive secretions   [ ]Rhonchi        [ ]Right [ ]Left [ ]Bilateral  [ ]Crackles        [ ]Right [ ]Left [ ]Bilateral  [ ]Wheezing     [ ]Right [ ]Left [ ]Bilateral  [ ]Diminished BS [ ] Right [ ]Left [ ]Bilateral  CARDIOVASCULAR:    [ X]Regular [ ]Irregular [ ]Tachy  [ ]Jamie [ ]Murmur [ ]Other  GASTROINTESTINAL:  [X ]Soft  [ ]Distended   [ ]+BS  [ X]Non tender [ ]Tender  [ ]PEG [ ]OGT/ NGT   Last BM: 2/22/2021     GENITOURINARY:  [ ]Normal[ X]Incontinent   [ ]Oliguria/Anuria   [X ]Loera  MUSCULOSKELETAL:   [ ]Normal   [X ]Weakness  [ ]Bed/Wheelchair bound [ ]Edema  NEUROLOGIC:   [ ]No focal deficits  [ ] Cognitive impairment  [X ] Dysphagia [ ]Dysarthria [ ] Paresis [ ]Other   SKIN:   [X ]Normal  [ ]Rash   [ ]Pressure ulcer(s) [ ]y [ ]n present on admission    CRITICAL CARE:  [ ]Shock Present  [ ]Septic [ ]Cardiogenic [ ]Neurologic [ ]Hypovolemic  [ ]Vasopressors [ ]Inotropes  [ ]Respiratory failure present [ ]Mechanical Ventilation [ ]Non-invasive ventilatory support [ ]High-Flow  [ ]Acute  [ ]Chronic [ ]Hypoxic  [ ]Hypercarbic [ ]Other  [ ]Other organ failure     LABS:                        10.2   16.84 )-----------( 288      ( 22 Feb 2021 07:13 )             30.9   02-22    128<L>  |  97  |  8   ----------------------------<  77  4.0   |  19<L>  |  0.93    Ca    8.4      22 Feb 2021 07:11  Phos  4.1     02-22  Mg     1.9     02-22          RADIOLOGY & ADDITIONAL STUDIES:    Protein Calorie Malnutrition Present: [ ]mild [ ]moderate [ ]severe [ ]underweight [ ]morbid obesity  https://www.andeal.org/vault/2440/web/files/ONC/Table_Clinical%20Characteristics%20to%20Document%20Malnutrition-White%20JV%20et%20al%202012.pdf    Height (cm): 152.4 (02-14-21 @ 05:48)  Weight (kg): 62.7 (02-14-21 @ 05:48)  BMI (kg/m2): 27 (02-14-21 @ 05:48)    [ X]PPSV2 < or = 30%  [ ]significant weight loss [X ]poor nutritional intake [ ]anasarca   Albumin, Serum: 3.3 g/dL (02-14-21 @ 13:59)   [ ]Artificial Nutrition    REFERRALS:   [ ]Chaplaincy  [ ]Hospice  [ ]Child Life  [ X]Social Work  [ X]Case management [ ]Holistic Therapy     Goals of Care Document:  EMILY Fofana (02-14-21 @ 14:14)  Goals of Care Conversation:   Participants:  · Participants  Family; Staff  · Child(domingo)  Batsheva Palm (daughter)  · Provider  Ramses Fofana MD,  Alan Al MD.    Advance Directives:  · Does Patient Have a Surrogate  Yes   · Surrogate's Name  Batsheva Palm  · Surrogate's Phone Number  [phone]    Conversation Discussion:  · Conversation  MOLST Discussed  · Conversation Details  Discussion regarding goals of care was done with health care proxy, chuyita Palm.  HCP wishes for code status to be DNR/DNI. HCP does not wish for aggressive measures including vasopressors for hypotension and blood transfusions.  Discussion completed by medicine team: Ramses Fofana, PGY1, and Alan Al, PGY2.    Personal Advance Directives Treatment Guidelines:   Treatment Guidelines:  · Treatment Guidelines  DNR Order  · Treatment Guideline Comments  No pressors and no blood transfusions.    MOLST:  · Completed  14-Feb-2021    Location of Discussion:   Duration of Advanced Care Planning Meeting:  · Time spent (in minutes)  20     Location of Discussion:  · Location of discussion  Telephone       Electronic Signatures:  Ramses Fofana Ae)  (Signed 14-Feb-2021 14:21)  	Authored: Goals of Care Conversation, Personal Advance Directives Treatment Guidelines, Location of Discussion      Last Updated: 14-Feb-2021 14:21 by Ramses Fofana Ae)        ______________    Ramirez Covington M.D.  Hospice and Palliative Medicine Fellow  Pager [phone] SUBJECTIVE AND OBJECTIVE:  INTERVAL HPI/OVERNIGHT EVENTS: No major over night events. Tolerating PO. No PRNs of Zofran used. It appears pt with worsening leukocytosis and worsening hyponatremia. Pt pending dc to [facility] as per primary team.    DNR on chart: Yes    Yes      Allergies    flu vaccine (Anaphylaxis)  No Known Drug Allergies    Intolerances    MEDICATIONS  (STANDING):  ascorbic acid 500 milliGRAM(s) Oral daily  aspirin enteric coated 81 milliGRAM(s) Oral daily  bethanechol 10 milliGRAM(s) Oral three times a day  cholecalciferol 1000 Unit(s) Oral daily  cyanocobalamin 1000 MICROGram(s) Oral daily  heparin   Injectable 5000 Unit(s) SubCutaneous every 8 hours  lactobacillus acidophilus 1 Tablet(s) Oral two times a day  mirtazapine 15 milliGRAM(s) Oral at bedtime  Nephro-portia 1 Tablet(s) Oral daily  nystatin Powder 1 Application(s) Topical two times a day  potassium phosphate / sodium phosphate Powder (PHOS-NaK) 1 Packet(s) Oral three times a day with meals  psyllium Powder 1 Packet(s) Oral two times a day  sodium chloride 0.9%. 1000 milliLiter(s) (75 mL/Hr) IV Continuous <Continuous>  vancomycin    Solution 125 milliGRAM(s) Oral every 6 hours    MEDICATIONS  (PRN):  acetaminophen   Tablet .. 650 milliGRAM(s) Oral every 12 hours PRN Mild Pain (1 - 3), Moderate Pain (4 - 6)  ondansetron    Tablet 4 milliGRAM(s) Oral every 8 hours PRN Nausea and/or Vomiting      ITEMS UNCHECKED ARE NOT PRESENT    PRESENT SYMPTOMS: [ ]Unable to obtain due to poor mentation   Source if other than patient:  [ ]Family   [ ]Team     Pain:  [ ]yes [ X]no  QOL impact -   Location -                    Aggravating factors -  Quality -  Radiation -  Timing-  Severity (0-10 scale):  Minimal acceptable level (0-10 scale):     Dyspnea:                           [ ]Mild [ ]Moderate [ ]Severe  Anxiety:                             [ ]Mild [ ]Moderate [ ]Severe  Fatigue:                             [ ]Mild [ ]Moderate [ ]Severe  Nausea:                             [ ]Mild [ ]Moderate [ ]Severe  Loss of appetite:              [ ]Mild [ ]Moderate [ ]Severe  Constipation:                    [ ]Mild [ ]Moderate [ ]Severe    CPOT:    https://www.[facility].org/getattachment/[email or web address]/Critical-Care-Pain-Observation-Tool-(CPOT)    PAIN AD Score:	  http://geriatrictoolkit.[facility]/cog/painad.pdf (Ctrl + left click to view)    Other Symptoms:  [ ]All other review of systems negative     Palliative Performance Status Version 2:     30%      http://[facility].org/files/news/palliative_performance_scale_ppsv2.pdf  PHYSICAL EXAM:  Vital Signs Last 24 Hrs  T(C): 37.7 (22 Feb 2021 12:45), Max: 37.7 (22 Feb 2021 12:45)  T(F): 99.9 (22 Feb 2021 12:45), Max: 99.9 (22 Feb 2021 12:45)  HR: 100 (22 Feb 2021 12:45) (94 - 106)  BP: 121/72 (22 Feb 2021 12:45) (109/69 - 125/73)  BP(mean): --  RR: 18 (22 Feb 2021 12:45) (18 - 19)  SpO2: 100% (22 Feb 2021 12:45) (94% - 100%) I&O's Summary    21 Feb 2021 07:01  -  22 Feb 2021 07:00  --------------------------------------------------------  IN: 200 mL / OUT: 425 mL / NET: -225 mL       GENERAL:  [ X]Alert  [ ]Oriented x   [ ]Lethargic  [ ]Cachexia  [ ]Unarousable  [X ]Verbal  [ ]Non-Verbal  Behavioral:   [ ]Anxiety  [ ]Delirium [ ]Agitation [ ]Other  HEENT:  [ ]Normal   [X ]Dry mouth   [ ]ET Tube/Trach  [ ]Oral lesions  PULMONARY:   [X ]Clear [ ]Tachypnea  [ ]Audible excessive secretions   [ ]Rhonchi        [ ]Right [ ]Left [ ]Bilateral  [ ]Crackles        [ ]Right [ ]Left [ ]Bilateral  [ ]Wheezing     [ ]Right [ ]Left [ ]Bilateral  [ ]Diminished BS [ ] Right [ ]Left [ ]Bilateral  CARDIOVASCULAR:    [ X]Regular [ ]Irregular [ ]Tachy  [ ]Jamie [ ]Murmur [ ]Other  GASTROINTESTINAL:  [X ]Soft  [ ]Distended   [ ]+BS  [ X]Non tender [ ]Tender  [ ]PEG [ ]OGT/ NGT   Last BM: 2/22/2021     GENITOURINARY:  [ ]Normal[ X]Incontinent   [ ]Oliguria/Anuria   [X ]Loera  MUSCULOSKELETAL:   [ ]Normal   [X ]Weakness  [ ]Bed/Wheelchair bound [ ]Edema  NEUROLOGIC:   [ ]No focal deficits  [ ] Cognitive impairment  [X ] Dysphagia [ ]Dysarthria [ ] Paresis [ ]Other   SKIN:   [X ]Normal  [ ]Rash   [ ]Pressure ulcer(s) [ ]y [ ]n present on admission    CRITICAL CARE:  [ ]Shock Present  [ ]Septic [ ]Cardiogenic [ ]Neurologic [ ]Hypovolemic  [ ]Vasopressors [ ]Inotropes  [ ]Respiratory failure present [ ]Mechanical Ventilation [ ]Non-invasive ventilatory support [ ]High-Flow  [ ]Acute  [ ]Chronic [ ]Hypoxic  [ ]Hypercarbic [ ]Other  [ ]Other organ failure     LABS: reviewed                        10.2   16.84 )-----------( 288      ( 22 Feb 2021 07:13 )             30.9   02-22    128<L>  |  97  |  8   ----------------------------<  77  4.0   |  19<L>  |  0.93    Ca    8.4      22 Feb 2021 07:11  Phos  4.1     02-22  Mg     1.9     02-22          RADIOLOGY & ADDITIONAL STUDIES:    Protein Calorie Malnutrition Present: [ ]mild [ ]moderate [ ]severe [ ]underweight [ ]morbid obesity  https://www.andeal.org/vault/2440/web/files/ONC/Table_Clinical%20Characteristics%20to%20Document%20Malnutrition-White%20JV%20et%20al%202012.pdf    Height (cm): 152.4 (02-14-21 @ 05:48)  Weight (kg): 62.7 (02-14-21 @ 05:48)  BMI (kg/m2): 27 (02-14-21 @ 05:48)    [ X]PPSV2 < or = 30%  [ ]significant weight loss [X ]poor nutritional intake [ ]anasarca   Albumin, Serum: 3.3 g/dL (02-14-21 @ 13:59)   [ ]Artificial Nutrition    REFERRALS:   [ ]Chaplaincy  [ ]Hospice  [ ]Child Life  [ X]Social Work  [ X]Case management [ ]Holistic Therapy     Goals of Care Document:  EMILY Fofana (02-14-21 @ 14:14)  Goals of Care Conversation:   Participants:  · Participants  Family; Staff  · Child(domingo)  Batsheva Palm (daughter)  · Provider  Ramses Fofana MD,  Alan Al MD.    Advance Directives:  · Does Patient Have a Surrogate  Yes   · Surrogate's Name  Batsheva Palm  · Surrogate's Phone Number  [phone]    Conversation Discussion:  · Conversation  MOLST Discussed  · Conversation Details  Discussion regarding goals of care was done with health care proxy, daughter Stephanie Palm.  HCP wishes for code status to be DNR/DNI. HCP does not wish for aggressive measures including vasopressors for hypotension and blood transfusions.  Discussion completed by medicine team: Ramses Fofana, PGY1, and Alan Al, PGY2.    Personal Advance Directives Treatment Guidelines:   Treatment Guidelines:  · Treatment Guidelines  DNR Order  · Treatment Guideline Comments  No pressors and no blood transfusions.    MOLST:  · Completed  14-Feb-2021    Location of Discussion:   Duration of Advanced Care Planning Meeting:  · Time spent (in minutes)  20     Location of Discussion:  · Location of discussion  Telephone       Electronic Signatures:  Ramses Fofana Ae)  (Signed 14-Feb-2021 14:21)  	Authored: Goals of Care Conversation, Personal Advance Directives Treatment Guidelines, Location of Discussion      Last Updated: 14-Feb-2021 14:21 by Ramses Fofana Ae)        ______________    Ramirez Covington M.D.  Hospice and Palliative Medicine Fellow  Pager [phone]

## 2021-02-22 NOTE — PROGRESS NOTE ADULT - ASSESSMENT
89F w/ PMH of CAD s/p PCI, Chronic UTI's, MDD, HTN, HLD, [place] (hearing aids), dysphagia, COVID19 infection (2 months ago), and recent hospitalization for C. diff colitis (~2/7/21) presenting from [facility] after episode of vomiting and bloody bowel movement on 2/13. Found to have left sided colitis on imaging   Acute Right Below Knee DVT     #Colitis - given clinical picture likely 2/2 ischemic.  Per family wishes no aggressive measures or interventions/procedures.  DNR/DNI in place.   stool C diff negative x2 (2/14, 2/18)    -Monitor BMs; replete lytes PRN  -Abx per ID, Plan for PO Vanco per ID  -avoid hypotension  -monitor clinical exam  -Will add Metamucil BID in attempt to bulk stool  -PO Bacid BID  -PO diet per SLP recs    Discussed with Medicine team and ID attending    Zain Paris PA-C    [facility]  [phone]  After hours and weekend coverage [phone]

## 2021-02-22 NOTE — PROGRESS NOTE ADULT - ASSESSMENT
89F w/ PMH of CAD s/p PCI, Chronic UTI's, MDD, HTN, HLD, [place] (hearing aids), dysphagia, prior COVID19 infection, and recent hospitalization for C. diff colitis (~2/7/21) presenting from rehab center for bloody bowel movement, likely LGIB secondary to infectious colitis vs. ischemic colitis.  Also found to be in septic shock, likely secondary to colitis iso recent C. diff.  COVID +, asymptomatic and without hypoxia.  Sepsis now improving 89F w/ PMH of CAD s/p PCI, Chronic UTI's, MDD, HTN, HLD, [place] (hearing aids), dysphagia, prior COVID19 infection, and recent hospitalization for C. diff colitis (~2/7/21) presenting from rehab center for bloody bowel movement, likely LGIB secondary to infectious colitis vs. ischemic colitis.  Also found to be in septic shock, likely secondary to colitis iso recent C. diff.  COVID +, asymptomatic and without hypoxia.  Sepsis now improving, WBC downtrending, lactate wnl.

## 2021-02-22 NOTE — PROGRESS NOTE ADULT - PROBLEM SELECTOR PLAN 5
DNR/DNI, MOLST in chart  -SW referral. Will discuss additional options besides ESTUARDO to daughter today. Pending discussion.  -Pt has recommend ESTUARDO DNR/DNI, CARLEEN in chart  -SW referral.   -Discussed dispo options with daughter. She mentions she is unable to take care of her mother at home. She mentions to be she is hopeful for her mother to recover while at rehab to be able to be accepted to to her SH[facility]. However she mentions the last several months have been rough on her mother including having a dx of COVID-19. We discussed to possibility of hospice so that she can have that options. At this time however she declines the benefit of hospice and would like to proceed with ESTUARDO. At this time will sign off. Please Re-consulted if needed.  -Pt has recommend ESTUARDO

## 2021-02-22 NOTE — PROGRESS NOTE ADULT - ASSESSMENT
89F w/ PMH of CAD s/p PCI, Chronic UTI's, MDD, HTN, HLD, [place] (hearing aids), dysphagia, COVID19 infection (2 months ago, s/p dexa, remdesivir, convalescent plasma, not intubated), and recent hospitalization for C. diff colitis (~2/7/21) presenting from [facility] after episode of vomiting and bloody bowel movement on 2/13.     unclear if this still c diff vs ischemic bowel  vs microperforation vs other   pt seems to have a UTI as well  Pt was seen by surgery     treating for c diff, UTI and bowel event and DVT       follow lactate  await cultures . ( urine had greater than three types of organisms) and now yeast - of unclear significance   BP is better today   , ? ischemic bowel vs  c diff  In view of negative c diff again and her response to fluids, I suspect this might have been more from ischemic colitis  completed abs   appreciate GI input, They are trying to bulk up stool     po vancomycin to 125 mg po q 6 hours  can start taper         DVT- per team

## 2021-02-22 NOTE — PROGRESS NOTE ADULT - PROBLEM SELECTOR PLAN 5
Hx of dysphagia  - dysphagia diet per s/s Hx of dysphagia  - dysphagia diet per speech/swallow with supplemental nutrition

## 2021-02-22 NOTE — PROGRESS NOTE ADULT - PROBLEM SELECTOR PLAN 1
Pancolitis, most significant in descending and rectosigmoid colon. C diff negative x 2, less suspicion for recurrent C diff colitis. Most likely to be ischemic.  - s/p Zosyn (2/14-19), Flagyl IV 500mg BID (2/14-19). s/p 6 day course  - decreased PO Vancomycin 125mg q6h and continue for 5 days (until 2/23) and c/w probiotics  - Rising leukocytosis, however lactate wnl 1.9. Continue to monitor WBC Pancolitis, most significant in descending and rectosigmoid colon. C diff negative x 2, less suspicion for recurrent C diff colitis. Most likely to be ischemic.  - s/p Zosyn (2/14-19), Flagyl IV 500mg BID (2/14-19). s/p 6 day course  - decreased PO Vancomycin 125mg q6h and continue for 5 days (until 2/23) and c/w probiotics  - WBC remains elevated; now downtrending 16.84

## 2021-02-22 NOTE — PROGRESS NOTE ADULT - PROBLEM SELECTOR PLAN 9
DVT ppx: SC heparin  Diet: dysphagia diet, lactose restricted   Code: DNR/DNI, no pressors, no blood transfusions per HCP  Dispo: Rehab, obtain covid swab DVT ppx: SC heparin  Diet: dysphagia diet, lactose restricted   Code: DNR/DNI, no pressors, no blood transfusions per HCP  Dispo: Rehab, COVID swab negative 2/21

## 2021-02-22 NOTE — PROGRESS NOTE ADULT - PROBLEM SELECTOR PLAN 3
Likely pre-renal, ATN secondary to septic shock. Unclear baseline Cr.   - Cr stable 0.99 Likely pre-renal, ATN secondary to septic shock. Unclear baseline Cr.   - Cr stable 0.93 Sodium downtrending, today 128  - likely in setting of decreased PO intake vs SIADH  - start IV NS 75 cc/hr x 10 hours  - monitor BMP and electrolytes

## 2021-02-22 NOTE — PROGRESS NOTE ADULT - PROBLEM SELECTOR PLAN 4
Hx of urinary retention and chronic UTI  - c/w home bethanechol. Hold pyridium.  - Failed multiple TOV. Harding placed, will likely need to d/c with harding

## 2021-02-22 NOTE — PROGRESS NOTE ADULT - PROBLEM SELECTOR PLAN 7
Previous infection 2 months ago. Per ID, initial positive COVID PCR likely remnant of previous infection.  - Repeat COVID PCR neg on 2/18  - Repeat COVID PCR today for discharge  - Sating well on RA Previous infection 2 months ago. Per ID, initial positive COVID PCR likely remnant of previous infection.  - Repeat COVID PCR neg on 2/18  - COVID negative today  - Sating well on RA

## 2021-02-22 NOTE — PROGRESS NOTE ADULT - SUBJECTIVE AND OBJECTIVE BOX
Internal Medicine   Blake Josh | PGY-1    OVERNIGHT EVENTS: No acute overnight events.       SUBJECTIVE: Patient was seen and examined at bedside this morning. Denies any nausea/vomiting/diarrhea, headache, shortness of breath or chest pain.       MEDICATIONS  (STANDING):  ascorbic acid 500 milliGRAM(s) Oral daily  bethanechol 10 milliGRAM(s) Oral three times a day  cholecalciferol 1000 Unit(s) Oral daily  cyanocobalamin 1000 MICROGram(s) Oral daily  heparin   Injectable 5000 Unit(s) SubCutaneous every 8 hours  mirtazapine 15 milliGRAM(s) Oral at bedtime  Nephro-portia 1 Tablet(s) Oral daily  nystatin Powder 1 Application(s) Topical two times a day  potassium phosphate / sodium phosphate Powder (PHOS-NaK) 1 Packet(s) Oral three times a day with meals  vancomycin    Solution 125 milliGRAM(s) Oral every 6 hours    MEDICATIONS  (PRN):  acetaminophen   Tablet .. 650 milliGRAM(s) Oral every 12 hours PRN Mild Pain (1 - 3), Moderate Pain (4 - 6)  ondansetron    Tablet 4 milliGRAM(s) Oral every 8 hours PRN Nausea and/or Vomiting        T(F): 99.2 (02-22-21 @ 04:55), Max: 99.2 (02-21-21 @ 21:11)  HR: 98 (02-22-21 @ 04:55) (73 - 103)  BP: 109/69 (02-22-21 @ 04:55) (109/69 - 123/64)  BP(mean): --  RR: 18 (02-22-21 @ 04:55) (18 - 19)  SpO2: 96% (02-22-21 @ 04:55) (95% - 98%)    PHYSICAL EXAM:   Gen: Awake, verbal, well-developed, NAD  HEENT: NCAT, PERRL, EOMI, clear conjunctiva, no scleral icterus  Neck: Supple, no JVD, no LAD  CV: RRR, S1S2, no m/r/g  Resp: CTAB, normal respiratory effort  Abd: Soft, L sided TTP, ND  Ext: minimal R calf swelling compared to L, no clubbing or cyanosis  Neuro: responds to questions appropriately, AOx2-3, CN2-12 grossly intact, POLK  Skin: warm, perfused      TELEMETRY:    LABS:                        10.6   17.58 )-----------( 296      ( 21 Feb 2021 07:18 )             33.1     02-21    130<L>  |  97  |  6<L>  ----------------------------<  80  3.8   |  22  |  0.99    Ca    8.5      21 Feb 2021 07:17  Phos  3.7     02-21  Mg     1.6     02-21            Blood Gas Profile w/Lytes - Venous: Performed In Lab (02-21-21 @ 07:26)    Creatinine Trend: 0.99<--, 0.90<--, 0.99<--, 1.16<--, 1.37<--, 2.03<--  I&O's Summary    20 Feb 2021 07:01  -  21 Feb 2021 07:00  --------------------------------------------------------  IN: 580 mL / OUT: 2200 mL / NET: -1620 mL    21 Feb 2021 07:01  -  22 Feb 2021 06:37  --------------------------------------------------------  IN: 100 mL / OUT: 425 mL / NET: -325 mL      BNP  Blood Gas Profile w/Lytes - Venous: Performed In Lab (02-21-21 @ 07:26)    RADIOLOGY & ADDITIONAL STUDIES:             Internal Medicine   Blake Moreno | PGY-1    OVERNIGHT EVENTS: No acute overnight events.       SUBJECTIVE: Patient was seen and examined at bedside this morning. Minimally conversational, nods/shakes head to questions. Winces on palpation to abdomen.       MEDICATIONS  (STANDING):  ascorbic acid 500 milliGRAM(s) Oral daily  bethanechol 10 milliGRAM(s) Oral three times a day  cholecalciferol 1000 Unit(s) Oral daily  cyanocobalamin 1000 MICROGram(s) Oral daily  heparin   Injectable 5000 Unit(s) SubCutaneous every 8 hours  mirtazapine 15 milliGRAM(s) Oral at bedtime  Nephro-portia 1 Tablet(s) Oral daily  nystatin Powder 1 Application(s) Topical two times a day  potassium phosphate / sodium phosphate Powder (PHOS-NaK) 1 Packet(s) Oral three times a day with meals  vancomycin    Solution 125 milliGRAM(s) Oral every 6 hours    MEDICATIONS  (PRN):  acetaminophen   Tablet .. 650 milliGRAM(s) Oral every 12 hours PRN Mild Pain (1 - 3), Moderate Pain (4 - 6)  ondansetron    Tablet 4 milliGRAM(s) Oral every 8 hours PRN Nausea and/or Vomiting        T(F): 99.2 (02-22-21 @ 04:55), Max: 99.2 (02-21-21 @ 21:11)  HR: 98 (02-22-21 @ 04:55) (73 - 103)  BP: 109/69 (02-22-21 @ 04:55) (109/69 - 123/64)  BP(mean): --  RR: 18 (02-22-21 @ 04:55) (18 - 19)  SpO2: 96% (02-22-21 @ 04:55) (95% - 98%)    PHYSICAL EXAM:   Gen: Awake, NAD, minimally conversational  HEENT: NCAT, PERRL, EOMI, clear conjunctiva, no scleral icterus  Neck: Supple, no JVD, no LAD  CV: RRR, S1S2, no m/r/g  Resp: CTAB, normal respiratory effort  Abd: Soft, L sided TTP on soft and deep palpation, no rebound or guarding,   Ext: minimal R calf swelling compared to L, no clubbing or cyanosis  Neuro: minimally conversational, following verbal commands, nods/shakes head to questions   Skin: warm, perfused      TELEMETRY:    LABS:                        10.6   17.58 )-----------( 296      ( 21 Feb 2021 07:18 )             33.1     02-21    130<L>  |  97  |  6<L>  ----------------------------<  80  3.8   |  22  |  0.99    Ca    8.5      21 Feb 2021 07:17  Phos  3.7     02-21  Mg     1.6     02-21            Blood Gas Profile w/Lytes - Venous: Performed In Lab (02-21-21 @ 07:26)    Creatinine Trend: 0.99<--, 0.90<--, 0.99<--, 1.16<--, 1.37<--, 2.03<--  I&O's Summary    20 Feb 2021 07:01  -  21 Feb 2021 07:00  --------------------------------------------------------  IN: 580 mL / OUT: 2200 mL / NET: -1620 mL    21 Feb 2021 07:01  -  22 Feb 2021 06:37  --------------------------------------------------------  IN: 100 mL / OUT: 425 mL / NET: -325 mL      BNP  Blood Gas Profile w/Lytes - Venous: Performed In Lab (02-21-21 @ 07:26)    RADIOLOGY & ADDITIONAL STUDIES:             Internal Medicine   Blake Moreno | PGY-1    OVERNIGHT EVENTS: No acute overnight events. Has had one loose bowel movement overnight, no blood noted.     SUBJECTIVE: Patient was seen and examined at bedside this morning. Minimally conversational, nods/shakes head to questions. Winces on palpation to abdomen.     INTERVAL EVENTS: Patient had another loose bowl episode this AM, no blood noted/not malodorous.       MEDICATIONS  (STANDING):  ascorbic acid 500 milliGRAM(s) Oral daily  bethanechol 10 milliGRAM(s) Oral three times a day  cholecalciferol 1000 Unit(s) Oral daily  cyanocobalamin 1000 MICROGram(s) Oral daily  heparin   Injectable 5000 Unit(s) SubCutaneous every 8 hours  mirtazapine 15 milliGRAM(s) Oral at bedtime  Nephro-portia 1 Tablet(s) Oral daily  nystatin Powder 1 Application(s) Topical two times a day  potassium phosphate / sodium phosphate Powder (PHOS-NaK) 1 Packet(s) Oral three times a day with meals  vancomycin    Solution 125 milliGRAM(s) Oral every 6 hours    MEDICATIONS  (PRN):  acetaminophen   Tablet .. 650 milliGRAM(s) Oral every 12 hours PRN Mild Pain (1 - 3), Moderate Pain (4 - 6)  ondansetron    Tablet 4 milliGRAM(s) Oral every 8 hours PRN Nausea and/or Vomiting        T(F): 99.2 (02-22-21 @ 04:55), Max: 99.2 (02-21-21 @ 21:11)  HR: 98 (02-22-21 @ 04:55) (73 - 103)  BP: 109/69 (02-22-21 @ 04:55) (109/69 - 123/64)  BP(mean): --  RR: 18 (02-22-21 @ 04:55) (18 - 19)  SpO2: 96% (02-22-21 @ 04:55) (95% - 98%)    PHYSICAL EXAM:   Gen: Awake, NAD, minimally conversational  HEENT: NCAT, PERRL, EOMI, clear conjunctiva, no scleral icterus  Neck: Supple, no JVD, no LAD  CV: RRR, S1S2, no m/r/g  Resp: CTAB, normal respiratory effort  Abd: Soft, L sided TTP on soft and deep palpation, no rebound or guarding,   Ext: minimal R calf swelling compared to L, no clubbing or cyanosis  Neuro: minimally conversational, following verbal commands, nods/shakes head to questions   Skin: warm, perfused      TELEMETRY:    LABS:                        10.6   17.58 )-----------( 296      ( 21 Feb 2021 07:18 )             33.1     02-21    130<L>  |  97  |  6<L>  ----------------------------<  80  3.8   |  22  |  0.99    Ca    8.5      21 Feb 2021 07:17  Phos  3.7     02-21  Mg     1.6     02-21            Blood Gas Profile w/Lytes - Venous: Performed In Lab (02-21-21 @ 07:26)    Creatinine Trend: 0.99<--, 0.90<--, 0.99<--, 1.16<--, 1.37<--, 2.03<--  I&O's Summary    20 Feb 2021 07:01  -  21 Feb 2021 07:00  --------------------------------------------------------  IN: 580 mL / OUT: 2200 mL / NET: -1620 mL    21 Feb 2021 07:01  -  22 Feb 2021 06:37  --------------------------------------------------------  IN: 100 mL / OUT: 425 mL / NET: -325 mL      BNP  Blood Gas Profile w/Lytes - Venous: Performed In Lab (02-21-21 @ 07:26)    RADIOLOGY & ADDITIONAL STUDIES:             Internal Medicine   Blake Moreno | PGY-1    OVERNIGHT EVENTS: No acute overnight events. Has had one loose bowel movement overnight, no blood noted.     SUBJECTIVE: Patient was seen and examined at bedside this morning. Minimally conversational, nods/shakes head to questions. Winces on palpation to abdomen.     INTERVAL EVENTS: Patient had another loose bowl episode this AM, no blood noted/not malodorous. Started NS 75 cc/hr x 10 hours for hyponatremia.       MEDICATIONS  (STANDING):  ascorbic acid 500 milliGRAM(s) Oral daily  bethanechol 10 milliGRAM(s) Oral three times a day  cholecalciferol 1000 Unit(s) Oral daily  cyanocobalamin 1000 MICROGram(s) Oral daily  heparin   Injectable 5000 Unit(s) SubCutaneous every 8 hours  mirtazapine 15 milliGRAM(s) Oral at bedtime  Nephro-portia 1 Tablet(s) Oral daily  nystatin Powder 1 Application(s) Topical two times a day  potassium phosphate / sodium phosphate Powder (PHOS-NaK) 1 Packet(s) Oral three times a day with meals  vancomycin    Solution 125 milliGRAM(s) Oral every 6 hours    MEDICATIONS  (PRN):  acetaminophen   Tablet .. 650 milliGRAM(s) Oral every 12 hours PRN Mild Pain (1 - 3), Moderate Pain (4 - 6)  ondansetron    Tablet 4 milliGRAM(s) Oral every 8 hours PRN Nausea and/or Vomiting        T(F): 99.2 (02-22-21 @ 04:55), Max: 99.2 (02-21-21 @ 21:11)  HR: 98 (02-22-21 @ 04:55) (73 - 103)  BP: 109/69 (02-22-21 @ 04:55) (109/69 - 123/64)  BP(mean): --  RR: 18 (02-22-21 @ 04:55) (18 - 19)  SpO2: 96% (02-22-21 @ 04:55) (95% - 98%)    PHYSICAL EXAM:   Gen: Awake, NAD, minimally conversational  HEENT: NCAT, PERRL, EOMI, clear conjunctiva, no scleral icterus  Neck: Supple, no JVD, no LAD  CV: RRR, S1S2, no m/r/g  Resp: CTAB, normal respiratory effort  Abd: Soft, L sided TTP on soft and deep palpation, no rebound or guarding,   Ext: minimal R calf swelling compared to L, no clubbing or cyanosis  Neuro: minimally conversational, following verbal commands, nods/shakes head to questions   Skin: warm, perfused      TELEMETRY:    LABS:                        10.6   17.58 )-----------( 296      ( 21 Feb 2021 07:18 )             33.1     02-21    130<L>  |  97  |  6<L>  ----------------------------<  80  3.8   |  22  |  0.99    Ca    8.5      21 Feb 2021 07:17  Phos  3.7     02-21  Mg     1.6     02-21            Blood Gas Profile w/Lytes - Venous: Performed In Lab (02-21-21 @ 07:26)    Creatinine Trend: 0.99<--, 0.90<--, 0.99<--, 1.16<--, 1.37<--, 2.03<--  I&O's Summary    20 Feb 2021 07:01  -  21 Feb 2021 07:00  --------------------------------------------------------  IN: 580 mL / OUT: 2200 mL / NET: -1620 mL    21 Feb 2021 07:01  -  22 Feb 2021 06:37  --------------------------------------------------------  IN: 100 mL / OUT: 425 mL / NET: -325 mL      BNP  Blood Gas Profile w/Lytes - Venous: Performed In Lab (02-21-21 @ 07:26)    RADIOLOGY & ADDITIONAL STUDIES:

## 2021-02-22 NOTE — PROGRESS NOTE ADULT - PROBLEM SELECTOR PLAN 6
R leg swelling  - Duplex + for R soleus DVT, below the knee  - no tx given DVT below the knee. Repeat duplex in 2 weeks (3/2) R leg swelling  - Duplex + for R soleus DVT, below the knee  - no tx given DVT below the knee. Repeat duplex in 2 weeks (3/2) outpatient

## 2021-02-22 NOTE — PROGRESS NOTE ADULT - PROBLEM SELECTOR PLAN 4
-Due to C diff and UTI  -completed Abx course now just on PO vanco for hx of Cdiff dx earlier this month  -Afebrile however now with worsening leukocytosis

## 2021-02-23 DIAGNOSIS — I48.91 UNSPECIFIED ATRIAL FIBRILLATION: ICD-10-CM

## 2021-02-23 LAB
ALBUMIN SERPL ELPH-MCNC: 2.1 G/DL — LOW (ref 3.3–5)
ALBUMIN SERPL ELPH-MCNC: 2.3 G/DL — LOW (ref 3.3–5)
ALP SERPL-CCNC: 62 U/L — SIGNIFICANT CHANGE UP (ref 40–120)
ALP SERPL-CCNC: 64 U/L — SIGNIFICANT CHANGE UP (ref 40–120)
ALT FLD-CCNC: 7 U/L — LOW (ref 10–45)
ALT FLD-CCNC: 8 U/L — LOW (ref 10–45)
ANION GAP SERPL CALC-SCNC: 15 MMOL/L — SIGNIFICANT CHANGE UP (ref 5–17)
ANION GAP SERPL CALC-SCNC: 9 MMOL/L — SIGNIFICANT CHANGE UP (ref 5–17)
APPEARANCE UR: ABNORMAL
APTT BLD: 28 SEC — SIGNIFICANT CHANGE UP (ref 27.5–35.5)
AST SERPL-CCNC: 16 U/L — SIGNIFICANT CHANGE UP (ref 10–40)
AST SERPL-CCNC: 17 U/L — SIGNIFICANT CHANGE UP (ref 10–40)
BASOPHILS # BLD AUTO: 0.03 K/UL — SIGNIFICANT CHANGE UP (ref 0–0.2)
BASOPHILS NFR BLD AUTO: 0.3 % — SIGNIFICANT CHANGE UP (ref 0–2)
BILIRUB SERPL-MCNC: 0.2 MG/DL — SIGNIFICANT CHANGE UP (ref 0.2–1.2)
BILIRUB SERPL-MCNC: 0.3 MG/DL — SIGNIFICANT CHANGE UP (ref 0.2–1.2)
BILIRUB UR-MCNC: NEGATIVE — SIGNIFICANT CHANGE UP
BLD GP AB SCN SERPL QL: NEGATIVE — SIGNIFICANT CHANGE UP
BUN SERPL-MCNC: 6 MG/DL — LOW (ref 7–23)
BUN SERPL-MCNC: 8 MG/DL — SIGNIFICANT CHANGE UP (ref 7–23)
CALCIUM SERPL-MCNC: 7.7 MG/DL — LOW (ref 8.4–10.5)
CALCIUM SERPL-MCNC: 8.2 MG/DL — LOW (ref 8.4–10.5)
CHLORIDE SERPL-SCNC: 98 MMOL/L — SIGNIFICANT CHANGE UP (ref 96–108)
CHLORIDE SERPL-SCNC: 99 MMOL/L — SIGNIFICANT CHANGE UP (ref 96–108)
CO2 SERPL-SCNC: 18 MMOL/L — LOW (ref 22–31)
CO2 SERPL-SCNC: 22 MMOL/L — SIGNIFICANT CHANGE UP (ref 22–31)
COLOR SPEC: YELLOW — SIGNIFICANT CHANGE UP
CREAT SERPL-MCNC: 0.78 MG/DL — SIGNIFICANT CHANGE UP (ref 0.5–1.3)
CREAT SERPL-MCNC: 0.87 MG/DL — SIGNIFICANT CHANGE UP (ref 0.5–1.3)
DIFF PNL FLD: ABNORMAL
EOSINOPHIL # BLD AUTO: 0.09 K/UL — SIGNIFICANT CHANGE UP (ref 0–0.5)
EOSINOPHIL NFR BLD AUTO: 0.8 % — SIGNIFICANT CHANGE UP (ref 0–6)
GLUCOSE SERPL-MCNC: 84 MG/DL — SIGNIFICANT CHANGE UP (ref 70–99)
GLUCOSE SERPL-MCNC: 86 MG/DL — SIGNIFICANT CHANGE UP (ref 70–99)
GLUCOSE UR QL: NEGATIVE — SIGNIFICANT CHANGE UP
HCT VFR BLD CALC: 29.8 % — LOW (ref 34.5–45)
HGB BLD-MCNC: 9.6 G/DL — LOW (ref 11.5–15.5)
IMM GRANULOCYTES NFR BLD AUTO: 1 % — SIGNIFICANT CHANGE UP (ref 0–1.5)
INR BLD: 1.28 RATIO — HIGH (ref 0.88–1.16)
KETONES UR-MCNC: NEGATIVE — SIGNIFICANT CHANGE UP
LACTATE SERPL-SCNC: 0.6 MMOL/L — LOW (ref 0.7–2)
LEUKOCYTE ESTERASE UR-ACNC: ABNORMAL
LYMPHOCYTES # BLD AUTO: 1.26 K/UL — SIGNIFICANT CHANGE UP (ref 1–3.3)
LYMPHOCYTES # BLD AUTO: 11.2 % — LOW (ref 13–44)
MAGNESIUM SERPL-MCNC: 1.8 MG/DL — SIGNIFICANT CHANGE UP (ref 1.6–2.6)
MAGNESIUM SERPL-MCNC: 2 MG/DL — SIGNIFICANT CHANGE UP (ref 1.6–2.6)
MCHC RBC-ENTMCNC: 32.2 GM/DL — SIGNIFICANT CHANGE UP (ref 32–36)
MCHC RBC-ENTMCNC: 32.3 PG — SIGNIFICANT CHANGE UP (ref 27–34)
MCV RBC AUTO: 100.3 FL — HIGH (ref 80–100)
MONOCYTES # BLD AUTO: 0.74 K/UL — SIGNIFICANT CHANGE UP (ref 0–0.9)
MONOCYTES NFR BLD AUTO: 6.6 % — SIGNIFICANT CHANGE UP (ref 2–14)
NEUTROPHILS # BLD AUTO: 9.03 K/UL — HIGH (ref 1.8–7.4)
NEUTROPHILS NFR BLD AUTO: 80.1 % — HIGH (ref 43–77)
NITRITE UR-MCNC: POSITIVE
NRBC # BLD: 0 /100 WBCS — SIGNIFICANT CHANGE UP (ref 0–0)
OSMOLALITY UR: 411 MOSM/KG — SIGNIFICANT CHANGE UP (ref 50–1200)
PH UR: 6 — SIGNIFICANT CHANGE UP (ref 5–8)
PHOSPHATE SERPL-MCNC: 3.7 MG/DL — SIGNIFICANT CHANGE UP (ref 2.5–4.5)
PHOSPHATE SERPL-MCNC: 4 MG/DL — SIGNIFICANT CHANGE UP (ref 2.5–4.5)
PLATELET # BLD AUTO: 293 K/UL — SIGNIFICANT CHANGE UP (ref 150–400)
POTASSIUM SERPL-MCNC: 3.9 MMOL/L — SIGNIFICANT CHANGE UP (ref 3.5–5.3)
POTASSIUM SERPL-MCNC: 4.1 MMOL/L — SIGNIFICANT CHANGE UP (ref 3.5–5.3)
POTASSIUM SERPL-SCNC: 3.9 MMOL/L — SIGNIFICANT CHANGE UP (ref 3.5–5.3)
POTASSIUM SERPL-SCNC: 4.1 MMOL/L — SIGNIFICANT CHANGE UP (ref 3.5–5.3)
PROT SERPL-MCNC: 5.2 G/DL — LOW (ref 6–8.3)
PROT SERPL-MCNC: 5.6 G/DL — LOW (ref 6–8.3)
PROT UR-MCNC: ABNORMAL
PROTHROM AB SERPL-ACNC: 15.2 SEC — HIGH (ref 10.6–13.6)
RBC # BLD: 2.97 M/UL — LOW (ref 3.8–5.2)
RBC # FLD: 15 % — HIGH (ref 10.3–14.5)
RH IG SCN BLD-IMP: NEGATIVE — SIGNIFICANT CHANGE UP
SODIUM SERPL-SCNC: 130 MMOL/L — LOW (ref 135–145)
SODIUM SERPL-SCNC: 131 MMOL/L — LOW (ref 135–145)
SP GR SPEC: 1.01 — SIGNIFICANT CHANGE UP (ref 1.01–1.02)
UROBILINOGEN FLD QL: NEGATIVE — SIGNIFICANT CHANGE UP
UUN UR-MCNC: 342 MG/DL — SIGNIFICANT CHANGE UP
WBC # BLD: 11.26 K/UL — HIGH (ref 3.8–10.5)
WBC # FLD AUTO: 11.26 K/UL — HIGH (ref 3.8–10.5)

## 2021-02-23 PROCEDURE — 99233 SBSQ HOSP IP/OBS HIGH 50: CPT | Mod: GC

## 2021-02-23 PROCEDURE — 71045 X-RAY EXAM CHEST 1 VIEW: CPT | Mod: 26

## 2021-02-23 PROCEDURE — 93010 ELECTROCARDIOGRAM REPORT: CPT

## 2021-02-23 RX ORDER — FLUCONAZOLE 150 MG/1
TABLET ORAL
Refills: 0 | Status: DISCONTINUED | OUTPATIENT
Start: 2021-02-23 | End: 2021-02-25

## 2021-02-23 RX ORDER — SODIUM CHLORIDE 9 MG/ML
1000 INJECTION INTRAMUSCULAR; INTRAVENOUS; SUBCUTANEOUS
Refills: 0 | Status: DISCONTINUED | OUTPATIENT
Start: 2021-02-23 | End: 2021-02-26

## 2021-02-23 RX ORDER — ACETAMINOPHEN 500 MG
650 TABLET ORAL EVERY 12 HOURS
Refills: 0 | Status: DISCONTINUED | OUTPATIENT
Start: 2021-02-23 | End: 2021-03-03

## 2021-02-23 RX ORDER — FLUCONAZOLE 150 MG/1
400 TABLET ORAL ONCE
Refills: 0 | Status: COMPLETED | OUTPATIENT
Start: 2021-02-23 | End: 2021-02-23

## 2021-02-23 RX ORDER — FLUCONAZOLE 150 MG/1
200 TABLET ORAL EVERY 24 HOURS
Refills: 0 | Status: DISCONTINUED | OUTPATIENT
Start: 2021-02-24 | End: 2021-02-25

## 2021-02-23 RX ADMIN — Medication 125 MILLIGRAM(S): at 05:11

## 2021-02-23 RX ADMIN — MEROPENEM 200 MILLIGRAM(S): 1 INJECTION INTRAVENOUS at 13:30

## 2021-02-23 RX ADMIN — Medication 125 MILLIGRAM(S): at 17:13

## 2021-02-23 RX ADMIN — Medication 125 MILLIGRAM(S): at 13:30

## 2021-02-23 RX ADMIN — MEROPENEM 200 MILLIGRAM(S): 1 INJECTION INTRAVENOUS at 00:53

## 2021-02-23 RX ADMIN — Medication 1 PACKET(S): at 05:10

## 2021-02-23 RX ADMIN — Medication 125 MILLIGRAM(S): at 00:48

## 2021-02-23 RX ADMIN — Medication 1 PACKET(S): at 17:13

## 2021-02-23 RX ADMIN — MIRTAZAPINE 15 MILLIGRAM(S): 45 TABLET, ORALLY DISINTEGRATING ORAL at 22:36

## 2021-02-23 RX ADMIN — PREGABALIN 1000 MICROGRAM(S): 225 CAPSULE ORAL at 13:30

## 2021-02-23 RX ADMIN — NYSTATIN CREAM 1 APPLICATION(S): 100000 CREAM TOPICAL at 17:14

## 2021-02-23 RX ADMIN — HEPARIN SODIUM 5000 UNIT(S): 5000 INJECTION INTRAVENOUS; SUBCUTANEOUS at 05:11

## 2021-02-23 RX ADMIN — FLUCONAZOLE 100 MILLIGRAM(S): 150 TABLET ORAL at 23:07

## 2021-02-23 RX ADMIN — Medication 10 MILLIGRAM(S): at 22:36

## 2021-02-23 RX ADMIN — Medication 81 MILLIGRAM(S): at 13:30

## 2021-02-23 RX ADMIN — Medication 125 MILLIGRAM(S): at 22:36

## 2021-02-23 RX ADMIN — HEPARIN SODIUM 5000 UNIT(S): 5000 INJECTION INTRAVENOUS; SUBCUTANEOUS at 13:30

## 2021-02-23 RX ADMIN — Medication 500 MILLIGRAM(S): at 13:30

## 2021-02-23 RX ADMIN — SODIUM CHLORIDE 75 MILLILITER(S): 9 INJECTION INTRAMUSCULAR; INTRAVENOUS; SUBCUTANEOUS at 00:48

## 2021-02-23 RX ADMIN — Medication 650 MILLIGRAM(S): at 20:26

## 2021-02-23 RX ADMIN — Medication 1 PACKET(S): at 09:42

## 2021-02-23 RX ADMIN — HEPARIN SODIUM 5000 UNIT(S): 5000 INJECTION INTRAVENOUS; SUBCUTANEOUS at 22:36

## 2021-02-23 RX ADMIN — NYSTATIN CREAM 1 APPLICATION(S): 100000 CREAM TOPICAL at 05:12

## 2021-02-23 RX ADMIN — Medication 1000 UNIT(S): at 13:30

## 2021-02-23 RX ADMIN — Medication 1 TABLET(S): at 05:11

## 2021-02-23 RX ADMIN — Medication 250 MILLIGRAM(S): at 00:13

## 2021-02-23 RX ADMIN — Medication 1 TABLET(S): at 13:30

## 2021-02-23 RX ADMIN — Medication 1 PACKET(S): at 13:31

## 2021-02-23 RX ADMIN — SODIUM CHLORIDE 1000 MILLILITER(S): 9 INJECTION INTRAMUSCULAR; INTRAVENOUS; SUBCUTANEOUS at 00:14

## 2021-02-23 RX ADMIN — Medication 1 TABLET(S): at 17:13

## 2021-02-23 NOTE — PROGRESS NOTE ADULT - PROBLEM SELECTOR PLAN 3
New a-fib with RVR seen on EKG 2/23   - no anti-coagulation in setting of possible LGIB  - hemoglobin has been stable UCx growing 3+ organisms and Candida albicans (unclear significance)  - Per ID, likely no indication for antifungal given already improving clinical picture on above antibiotics  - Pessary removed, cleaned, replaced by Urogynecologist Dr. Hernandez's associate

## 2021-02-23 NOTE — PROGRESS NOTE ADULT - ASSESSMENT
89F w/ PMH of CAD s/p PCI, Chronic UTI's, MDD, HTN, HLD, [place] (hearing aids), dysphagia, prior COVID19 infection, and recent hospitalization for C. diff colitis (~2/7/21) presenting from rehab center for bloody bowel movement, likely LGIB secondary to infectious colitis vs. ischemic colitis.  Also found to be in septic shock, likely secondary to colitis iso recent C. diff.  COVID +, asymptomatic and without hypoxia. RRT 2/23 for hypotension, tachycardia with new afib and fever, currently on meropenem.

## 2021-02-23 NOTE — PROGRESS NOTE ADULT - PROBLEM SELECTOR PLAN 6
Hx of dysphagia  - dysphagia diet per speech/swallow with supplemental nutrition Hx of urinary retention and chronic UTI  - c/w home bethanechol. Hold pyridium.  - Failed multiple TOV. Harding placed, will likely need to d/c with harding

## 2021-02-23 NOTE — PROGRESS NOTE ADULT - SUBJECTIVE AND OBJECTIVE BOX
Internal Medicine   Blake Josh | PGY-1    OVERNIGHT EVENTS: RRT called for hypotension to systolic 70's and tachycardic found to be in afib with RVR. Found to be febrile to 100.3 Received a dose of meropenem and vancomycin. BP responded appropriately after 2L of NS.    SUBJECTIVE:       MEDICATIONS  (STANDING):  ascorbic acid 500 milliGRAM(s) Oral daily  aspirin enteric coated 81 milliGRAM(s) Oral daily  bethanechol 10 milliGRAM(s) Oral three times a day  cholecalciferol 1000 Unit(s) Oral daily  cyanocobalamin 1000 MICROGram(s) Oral daily  heparin   Injectable 5000 Unit(s) SubCutaneous every 8 hours  lactobacillus acidophilus 1 Tablet(s) Oral two times a day  meropenem  IVPB 2000     meropenem  IVPB 2000 milliGRAM(s) IV Intermittent every 12 hours  mirtazapine 15 milliGRAM(s) Oral at bedtime  Nephro-portia 1 Tablet(s) Oral daily  nystatin Powder 1 Application(s) Topical two times a day  potassium phosphate / sodium phosphate Powder (PHOS-NaK) 1 Packet(s) Oral three times a day with meals  psyllium Powder 1 Packet(s) Oral two times a day  sodium chloride 0.9%. 1000 milliLiter(s) (75 mL/Hr) IV Continuous <Continuous>  vancomycin    Solution 125 milliGRAM(s) Oral every 6 hours    MEDICATIONS  (PRN):  acetaminophen   Tablet .. 650 milliGRAM(s) Oral every 12 hours PRN Mild Pain (1 - 3), Moderate Pain (4 - 6)  ondansetron    Tablet 4 milliGRAM(s) Oral every 8 hours PRN Nausea and/or Vomiting        T(F): 98.9 (02-23-21 @ 04:43), Max: 100.3 (02-22-21 @ 21:34)  HR: 95 (02-23-21 @ 04:43) (94 - 115)  BP: 101/66 (02-23-21 @ 04:43) (84/60 - 125/73)  BP(mean): --  RR: 18 (02-22-21 @ 21:34) (18 - 18)  SpO2: 99% (02-23-21 @ 04:43) (94% - 100%)    PHYSICAL EXAM:   Gen: Awake, NAD, minimally conversational  HEENT: NCAT, PERRL, EOMI, clear conjunctiva, no scleral icterus  Neck: Supple, no JVD, no LAD  CV: RRR, S1S2, no m/r/g  Resp: CTAB, normal respiratory effort  Abd: Soft, L sided TTP on soft and deep palpation, no rebound or guarding,   Ext: minimal R calf swelling compared to L, no clubbing or cyanosis  Neuro: minimally conversational, following verbal commands, nods/shakes head to questions   Skin: warm, perfused  TELEMETRY:    LABS:                        9.3    13.20 )-----------( 301      ( 22 Feb 2021 23:38 )             28.2     02-22    131<L>  |  98  |  8   ----------------------------<  84  3.9   |  18<L>  |  0.87    Ca    8.2<L>      22 Feb 2021 23:38  Phos  4.0     02-22  Mg     2.0     02-22    TPro  5.6<L>  /  Alb  2.3<L>  /  TBili  0.3  /  DBili  x   /  AST  17  /  ALT  8<L>  /  AlkPhos  64  02-22        PT/INR - ( 22 Feb 2021 23:48 )   PT: 15.2 sec;   INR: 1.28 ratio         PTT - ( 22 Feb 2021 23:48 )  PTT:28.0 sec    Creatinine Trend: 0.87<--, 0.87<--, 0.93<--, 0.99<--, 0.90<--, 0.99<--  I&O's Summary    22 Feb 2021 07:01  -  23 Feb 2021 07:00  --------------------------------------------------------  IN: 890 mL / OUT: 650 mL / NET: 240 mL      BNP    RADIOLOGY & ADDITIONAL STUDIES:             Internal Medicine   Blake Josh | PGY-1    OVERNIGHT EVENTS: RRT called for hypotension to systolic 70's and tachycardic found to be in afib with RVR. Found to be febrile to 100.3 Received a dose of meropenem and vancomycin. BP responded appropriately after 2L of NS. UA + many bacteria, elevated WBC, +nitrite/LE pending urine and blood cultures.     SUBJECTIVE: Patient seen and examined at bedside this morning, appears more alert and conversational compared to yesterday. Still lethargic appearing.       MEDICATIONS  (STANDING):  ascorbic acid 500 milliGRAM(s) Oral daily  aspirin enteric coated 81 milliGRAM(s) Oral daily  bethanechol 10 milliGRAM(s) Oral three times a day  cholecalciferol 1000 Unit(s) Oral daily  cyanocobalamin 1000 MICROGram(s) Oral daily  heparin   Injectable 5000 Unit(s) SubCutaneous every 8 hours  lactobacillus acidophilus 1 Tablet(s) Oral two times a day  meropenem  IVPB 2000     meropenem  IVPB 2000 milliGRAM(s) IV Intermittent every 12 hours  mirtazapine 15 milliGRAM(s) Oral at bedtime  Nephro-portia 1 Tablet(s) Oral daily  nystatin Powder 1 Application(s) Topical two times a day  potassium phosphate / sodium phosphate Powder (PHOS-NaK) 1 Packet(s) Oral three times a day with meals  psyllium Powder 1 Packet(s) Oral two times a day  sodium chloride 0.9%. 1000 milliLiter(s) (75 mL/Hr) IV Continuous <Continuous>  vancomycin    Solution 125 milliGRAM(s) Oral every 6 hours    MEDICATIONS  (PRN):  acetaminophen   Tablet .. 650 milliGRAM(s) Oral every 12 hours PRN Mild Pain (1 - 3), Moderate Pain (4 - 6)  ondansetron    Tablet 4 milliGRAM(s) Oral every 8 hours PRN Nausea and/or Vomiting        T(F): 98.9 (02-23-21 @ 04:43), Max: 100.3 (02-22-21 @ 21:34)  HR: 95 (02-23-21 @ 04:43) (94 - 115)  BP: 101/66 (02-23-21 @ 04:43) (84/60 - 125/73)  BP(mean): --  RR: 18 (02-22-21 @ 21:34) (18 - 18)  SpO2: 99% (02-23-21 @ 04:43) (94% - 100%)    PHYSICAL EXAM:   Gen: Awake, NAD, minimally conversational  HEENT: NCAT, PERRL, EOMI, clear conjunctiva, no scleral icterus  Neck: Supple, no JVD, no LAD  CV: RRR, S1S2, no m/r/g  Resp: CTAB, normal respiratory effort  Abd: Soft, L sided TTP on soft and deep palpation, no rebound or guarding,   Ext: minimal R calf swelling compared to L, no clubbing or cyanosis  Neuro: minimally conversational, following verbal commands, nods/shakes head to questions   Skin: warm, perfused  TELEMETRY:    LABS:                        9.3    13.20 )-----------( 301      ( 22 Feb 2021 23:38 )             28.2     02-22    131<L>  |  98  |  8   ----------------------------<  84  3.9   |  18<L>  |  0.87    Ca    8.2<L>      22 Feb 2021 23:38  Phos  4.0     02-22  Mg     2.0     02-22    TPro  5.6<L>  /  Alb  2.3<L>  /  TBili  0.3  /  DBili  x   /  AST  17  /  ALT  8<L>  /  AlkPhos  64  02-22        PT/INR - ( 22 Feb 2021 23:48 )   PT: 15.2 sec;   INR: 1.28 ratio         PTT - ( 22 Feb 2021 23:48 )  PTT:28.0 sec    Creatinine Trend: 0.87<--, 0.87<--, 0.93<--, 0.99<--, 0.90<--, 0.99<--  I&O's Summary    22 Feb 2021 07:01  -  23 Feb 2021 07:00  --------------------------------------------------------  IN: 890 mL / OUT: 650 mL / NET: 240 mL      BNP    RADIOLOGY & ADDITIONAL STUDIES:             Internal Medicine   Blake Josh | PGY-1    OVERNIGHT EVENTS: RRT called for hypotension to systolic 70's and tachycardic found to be in afib with RVR. Found to be febrile to 100.3 Received a dose of meropenem and vancomycin. BP responded appropriately after 2L of NS. UA + many bacteria, elevated WBC, +nitrite/LE pending urine and blood cultures.     SUBJECTIVE: Patient seen and examined at bedside this morning, appears more alert and conversational compared to yesterday. Still lethargic appearing. Blood pressure this morning 122/69, HR in 90's.       MEDICATIONS  (STANDING):  ascorbic acid 500 milliGRAM(s) Oral daily  aspirin enteric coated 81 milliGRAM(s) Oral daily  bethanechol 10 milliGRAM(s) Oral three times a day  cholecalciferol 1000 Unit(s) Oral daily  cyanocobalamin 1000 MICROGram(s) Oral daily  heparin   Injectable 5000 Unit(s) SubCutaneous every 8 hours  lactobacillus acidophilus 1 Tablet(s) Oral two times a day  meropenem  IVPB 2000     meropenem  IVPB 2000 milliGRAM(s) IV Intermittent every 12 hours  mirtazapine 15 milliGRAM(s) Oral at bedtime  Nephro-portia 1 Tablet(s) Oral daily  nystatin Powder 1 Application(s) Topical two times a day  potassium phosphate / sodium phosphate Powder (PHOS-NaK) 1 Packet(s) Oral three times a day with meals  psyllium Powder 1 Packet(s) Oral two times a day  sodium chloride 0.9%. 1000 milliLiter(s) (75 mL/Hr) IV Continuous <Continuous>  vancomycin    Solution 125 milliGRAM(s) Oral every 6 hours    MEDICATIONS  (PRN):  acetaminophen   Tablet .. 650 milliGRAM(s) Oral every 12 hours PRN Mild Pain (1 - 3), Moderate Pain (4 - 6)  ondansetron    Tablet 4 milliGRAM(s) Oral every 8 hours PRN Nausea and/or Vomiting        T(F): 98.9 (02-23-21 @ 04:43), Max: 100.3 (02-22-21 @ 21:34)  HR: 95 (02-23-21 @ 04:43) (94 - 115)  BP: 101/66 (02-23-21 @ 04:43) (84/60 - 125/73)  BP(mean): --  RR: 18 (02-22-21 @ 21:34) (18 - 18)  SpO2: 99% (02-23-21 @ 04:43) (94% - 100%)    PHYSICAL EXAM:   Gen: Awake, NAD, minimally conversational  HEENT: NCAT, PERRL, EOMI, clear conjunctiva, no scleral icterus  Neck: Supple, no JVD, no LAD  CV: RRR, S1S2, no m/r/g  Resp: CTAB, normal respiratory effort  Abd: Soft, L sided TTP on soft and deep palpation, no rebound or guarding,   Ext: minimal R calf swelling compared to L, no clubbing or cyanosis  Neuro: minimally conversational, following verbal commands, nods/shakes head to questions   Skin: warm, perfused  TELEMETRY:    LABS:                        9.3    13.20 )-----------( 301      ( 22 Feb 2021 23:38 )             28.2     02-22    131<L>  |  98  |  8   ----------------------------<  84  3.9   |  18<L>  |  0.87    Ca    8.2<L>      22 Feb 2021 23:38  Phos  4.0     02-22  Mg     2.0     02-22    TPro  5.6<L>  /  Alb  2.3<L>  /  TBili  0.3  /  DBili  x   /  AST  17  /  ALT  8<L>  /  AlkPhos  64  02-22        PT/INR - ( 22 Feb 2021 23:48 )   PT: 15.2 sec;   INR: 1.28 ratio         PTT - ( 22 Feb 2021 23:48 )  PTT:28.0 sec    Creatinine Trend: 0.87<--, 0.87<--, 0.93<--, 0.99<--, 0.90<--, 0.99<--  I&O's Summary    22 Feb 2021 07:01  -  23 Feb 2021 07:00  --------------------------------------------------------  IN: 890 mL / OUT: 650 mL / NET: 240 mL      BNP    RADIOLOGY & ADDITIONAL STUDIES:

## 2021-02-23 NOTE — PROGRESS NOTE ADULT - PROBLEM SELECTOR PLAN 5
Hx of urinary retention and chronic UTI  - c/w home bethanechol. Hold pyridium.  - Failed multiple TOV. Harding placed, will likely need to d/c with harding Sodium downtrending, today 128  - likely in setting of decreased PO intake vs SIADH  - start IV NS 75 cc/hr x 10 hours  - monitor BMP and electrolytes Sodium today 131   - likely in setting of decreased PO intake vs SIADH  - monitor BMP and electrolytes

## 2021-02-23 NOTE — PROVIDER CONTACT NOTE (OTHER) - SITUATION
Pt arrived on unit with non blanchable redness on sacrum and b/l heels
Pt. has temp 100.9, , increased work of breathing with RR 20 and pulse ox 98% on 2L NC.
Pt unable to void/urinary retention
Pt. oral temp 100.3, , BP 89/59.
no urine output, temp 99.9 axillary hx of bloody BM

## 2021-02-23 NOTE — PROVIDER CONTACT NOTE (OTHER) - ASSESSMENT
Pt. A&Ox2-3. Pt. oral temp 100.3, , BP 89/59. Pt. denies chest pain, shortness of breath, lightheadedness, dizziness, palpitations. Pt. asymptomatic, sitting comfortably in bed.

## 2021-02-23 NOTE — PROGRESS NOTE ADULT - SUBJECTIVE AND OBJECTIVE BOX
Patient is a 89y old  Female who presented with a chief complaint of GI Bleed, Vomiting (2021 07:02)      INTERVAL HPI/OVERNIGHT EVENTS:  prior events noted  s/p RRT overnight - hypotension and low grade fever  started on Metamucil and Bacid BID yesterday - improved stooling today - no BM thus far this shift (last BM ~6AM)    appetite good  no abdominal pain    MEDICATIONS  (STANDING):  ascorbic acid 500 milliGRAM(s) Oral daily  aspirin enteric coated 81 milliGRAM(s) Oral daily  bethanechol 10 milliGRAM(s) Oral three times a day  cholecalciferol 1000 Unit(s) Oral daily  cyanocobalamin 1000 MICROGram(s) Oral daily  heparin   Injectable 5000 Unit(s) SubCutaneous every 8 hours  lactobacillus acidophilus 1 Tablet(s) Oral two times a day  meropenem  IVPB 2000     meropenem  IVPB 2000 milliGRAM(s) IV Intermittent every 12 hours  mirtazapine 15 milliGRAM(s) Oral at bedtime  Nephro-portia 1 Tablet(s) Oral daily  nystatin Powder 1 Application(s) Topical two times a day  potassium phosphate / sodium phosphate Powder (PHOS-NaK) 1 Packet(s) Oral three times a day with meals  psyllium Powder 1 Packet(s) Oral two times a day  sodium chloride 0.9%. 1000 milliLiter(s) (75 mL/Hr) IV Continuous <Continuous>  vancomycin    Solution 125 milliGRAM(s) Oral every 6 hours    MEDICATIONS  (PRN):  acetaminophen   Tablet .. 650 milliGRAM(s) Oral every 12 hours PRN Mild Pain (1 - 3), Moderate Pain (4 - 6)  ondansetron    Tablet 4 milliGRAM(s) Oral every 8 hours PRN Nausea and/or Vomiting      Allergies  flu vaccine (Anaphylaxis)  No Known Drug Allergies    Intolerances  lactose (Unknown)      Review of Systems:  unable to obtain    Vital Signs Last 24 Hrs  T(C): 37.2 (2021 12:48), Max: 37.9 (2021 21:34)  T(F): 99 (2021 12:48), Max: 100.3 (2021 21:34)  HR: 94 (2021 12:48) (92 - 115)  BP: 103/69 (2021 12:48) (84/60 - 122/69)  BP(mean): --  RR: 20 (2021 12:48) (17 - 20)  SpO2: 100% (2021 12:48) (97% - 100%)    PHYSICAL EXAM:  Constitutional: elderly female. appears comfortable, non toxic appearing sitting up in bed eating  Neck: No LAD, supple no JVD  Respiratory: grossly clear ant.  Cardiovascular: S1 and S2, RRR  Gastrointestinal: obese soft  ND NT no rebound guarding or rigidity   Extremities: neg clubbing, cyanosis tr b/l LE edema  Vascular: 2+ peripheral pulses  Neurological: oriented to self, pleasantly confused.  moves all extremities  Skin: No rashes      LABS:                        9.6    11.26 )-----------( 293      ( 2021 10:22 )             29.8   WBC Count: 13.20 K/uL (21 @ 23:38)   WBC Count: 16.84 K/uL (21 @ 07:13)         130<L>  |  99  |  6<L>  ----------------------------<  86  4.1   |  22  |  0.78    Ca    7.7<L>      2021 10:22  Phos  3.7       Mg     1.8         TPro  5.2<L>  /  Alb  2.1<L>  /  TBili  0.2  /  DBili  x   /  AST  16  /  ALT  7<L>  /  AlkPhos  62  23    PT/INR - ( 2021 23:48 )   PT: 15.2 sec;   INR: 1.28 ratio         PTT - ( 2021 23:48 )  PTT:28.0 sec    Lactate, Blood: 0.6 mmol/L (21 @ 10:23)   Urinalysis Basic - ( 2021 23:46 )    Color: Yellow / Appearance: Turbid / S.015 / pH: x  Gluc: x / Ketone: Negative  / Bili: Negative / Urobili: Negative   Blood: x / Protein: 30 mg/dL / Nitrite: Positive   Leuk Esterase: Large / RBC: 13 /hpf /  /HPF   Sq Epi: x / Non Sq Epi: 2 /hpf / Bacteria: Many         RADIOLOGY & ADDITIONAL TESTS:

## 2021-02-23 NOTE — PROVIDER CONTACT NOTE (OTHER) - BACKGROUND
Pt admitted with gastrointestinal hemorrhage. Hx of urinary retention. Indwelling urethral catheter was placed for retention and removed 2/17/2021 for trial-of-void. Pt unable to void since removal.
Pt. came in with GIB, PMH HTN, UTI, covid-19.
hx of bloody bm  admitted with AMS
Pt admitted for GI bleed. PMH: GERD, CAD, enterocolitis.
Pt. came in with GI bleed. PMH recent covid-19 infection and UTI.

## 2021-02-23 NOTE — PROGRESS NOTE ADULT - PROBLEM SELECTOR PLAN 1
Pancolitis, most significant in descending and rectosigmoid colon. C diff negative x 2, less suspicion for recurrent C diff colitis. Most likely to be ischemic  - RRT 2/23 for fever, tachycardia with a-fib and, hypotension; started meropenem 2g q12 hrs  - s/p Zosyn (2/14-19), Flagyl IV 500mg BID (2/14-19). s/p 6 day course  - decreased PO Vancomycin 125mg q6h and continue for 5 days (until 2/23) and c/w probiotics  - WBC remains elevated; now downtrending 16.84 RRT 2/23 for fever, tachycardia with a-fib and hypotension; started on meropenem 2g q12 hrs  - blood pressure responded appropriately to 2L NS   - follow up blood/urine cultures RRT 2/23 for fever, tachycardia with a-fib and hypotension; started on meropenem 2g q12 hrs  - blood pressure responded appropriately to 2L NS   - UA + for many bacteria, +LE, nitrites, leukocytosis   - hemodynamically stable   - follow up blood/urine cultures RRT 2/23 for fever, tachycardia with a-fib and hypotension; started on meropenem 2g q12 hrs  - blood pressure responded appropriately to 2L NS   - UA + for many bacteria, +LE, nitrites, leukocytosis   - hemodynamically stable; continue to monitor vitals q4hrs  - follow up blood/urine cultures

## 2021-02-23 NOTE — PROGRESS NOTE ADULT - ASSESSMENT
89F w/ PMH of CAD s/p PCI, Chronic UTI's, MDD, HTN, HLD, [place] (hearing aids), dysphagia, COVID19 infection (2 months ago), and recent hospitalization for C. diff colitis (~2/7/21) presenting from [facility] after episode of vomiting and bloody bowel movement on 2/13. Found to have left sided colitis on imaging   Acute Right Below Knee DVT     #Colitis - given clinical picture likely 2/2 ischemic.  Per family wishes no aggressive measures or interventions/procedures.  DNR/DNI in place.   stool C diff negative x2 (2/14, 2/18)    -Monitor BMs; replete lytes PRN  -ID following;  Plan for PO Vanco taper per ID. Now on Meropenem following RRT overnight  -avoid hypotension  -Leukocytosis improving and normal lactate  -monitor clinical exam  -continue  Metamucil BID in attempt to bulk stool  -PO Bacid BID  -PO diet per SLP recs    Discussed with  ID attending    Zain Paris PA-C    [facility]  [phone]  After hours and weekend coverage [phone]

## 2021-02-23 NOTE — RAPID RESPONSE TEAM SUMMARY - NSSITUATIONBACKGROUNDRRT_GEN_ALL_CORE
89F w/ PMH of CAD s/p PCI, Chronic UTI's, MDD, HTN, HLD, [place] (hearing aids), dysphagia, prior COVID19 infection, and recent hospitalization for C. diff colitis (~2/7/21) presenting from rehab center for bloody bowel movement, likely LGIB secondary to infectious colitis vs. ischemic colitis.  Also found to be in septic shock, likely secondary to colitis iso recent C. diff.  COVID +, asymptomatic and without hypoxia.  Sepsis now improving, WBC downtrending, lactate wnl.   RRT called for hypotension.  Upon arrival, patient was hypotensive 70's/40's and tachycardic to 100-130 with temp 100.3F orally. sat 100% on 2L NC. Pt being treated as sepsis, blood cultures and urine cx drawn, will get 300cc/kg bolus and started on maintenance IVF and started on meropenum (received zosyn this admission, however had ESBL in the past), and given a dose of vancomycin. Labs including cbc, cmp, blood gas with lactate were drawn as well. Of note, EKG shows afib which appears to be new. Would not anticoagulate at this time given the hx of LGIB in past and now hypotensive. If hgb stable, will be a primary team discussion about risks and benefits.   After 1L IVF, BP is improved, MAo > 65 x2 readings, pt mentating the entire time.  89F w/ PMH of CAD s/p PCI, Chronic UTI's, MDD, HTN, HLD, [place] (hearing aids), dysphagia, prior COVID19 infection, and recent hospitalization for C. diff colitis (~2/7/21) presenting from rehab center for bloody bowel movement, likely LGIB secondary to infectious colitis vs. ischemic colitis.  Also found to be in septic shock, likely secondary to colitis iso recent C. diff.  COVID +, asymptomatic and without hypoxia.  Sepsis now improving, WBC downtrending, lactate wnl.   RRT called for hypotension.  Upon arrival, patient was hypotensive 70's/40's and tachycardic to 100-130 with temp 100.3F orally. sat 100% on 2L NC. Pt being treated as sepsis, blood cultures and urine cx drawn, will get 300cc/kg bolus and started on maintenance IVF and started on meropenum (received zosyn this admission, however had ESBL in the past), and given a dose of vancomycin.   Of note, candida in urine on prior Ucx this admission, so if there is no other identified source, or patient further decompensates, would start fungal coverage. Labs including cbc, cmp, blood gas with lactate were drawn as well. Of note, EKG shows afib which appears to be new. Would not anticoagulate at this time given the hx of LGIB in past and now hypotensive. If hgb stable, will be a primary team discussion about risks and benefits.   After 1L IVF, BP is improved, MAo > 65 x2 readings, pt mentating the entire time.

## 2021-02-23 NOTE — PROGRESS NOTE ADULT - PROBLEM SELECTOR PLAN 2
UCx growing 3+ organisms and Candida albicans (unclear significance)  - Per ID, likely no indication for antifungal given already improving clinical picture on above antibiotics  - Pessary removed, cleaned, replaced by Urogynecologist Dr. Hernandez's associate Pancolitis, most significant in descending and rectosigmoid colon. C diff negative x 2, less suspicion for recurrent C diff colitis. Most likely to be ischemic  - s/p Zosyn (2/14-19), Flagyl IV 500mg BID (2/14-19). s/p 6 day course  - decreased PO Vancomycin 125mg q6h and continue for 5 days (until 2/23) and c/w probiotics; can start taper today  - WBC remains elevated; now downtrending 16.84 --> 13.20

## 2021-02-23 NOTE — PROGRESS NOTE ADULT - PROBLEM SELECTOR PLAN 9
Hx of multiple CAD s/p 1 stent  - will restart ASA 81mg daily given resolved GIB  - Holding Isosorbide ER 60mg and enalapril iso soft BP's

## 2021-02-23 NOTE — PROVIDER CONTACT NOTE (OTHER) - ACTION/TREATMENT ORDERED:
MD notified and aware, assessed patient at bedside. Give tylenol and ice packs for temperature and reassess. CXR for increased work of breathing.

## 2021-02-23 NOTE — PROVIDER CONTACT NOTE (OTHER) - REASON
Non blanchable redness on sacrum and b/l heels
COVID-19 Isolation Discontinuation
Pt unable to void/urinary retention
Pt. oral temp 100.3, , BP 89/59.
no urine output, temp 99.9 axillary, 1BM
Pt. has temp 100.9, , increased work of breathing with RR 20 and pulse ox 98% on 2L NC.

## 2021-02-23 NOTE — PROVIDER CONTACT NOTE (OTHER) - RECOMMENDATIONS
MD paged multiple times from 2140 to 2250 with no response. In this time PRN tylenol given. Reassess VS at this time.

## 2021-02-23 NOTE — PROVIDER CONTACT NOTE (OTHER) - ASSESSMENT
Pt. A&Ox2-3, alert but states she is tired at this time. Pt. denies chest pain, shortness of breath, lightheadedness, dizziness. Pt. VS temp 100.9 oral, , /77, RR 20, and pulse ox 98% on 2L NC. Pt. sitting comfortably in bed, no complaints of pain.

## 2021-02-23 NOTE — PROVIDER CONTACT NOTE (CHANGE IN STATUS NOTIFICATION) - ACTION/TREATMENT ORDERED:
MD at bedside. Labs including blood cultures drawn, UA sent, 2L NSS given and NSS started at 75ml/hr. Antibiotics started. See RRT note.

## 2021-02-23 NOTE — PROGRESS NOTE ADULT - PROBLEM SELECTOR PLAN 10
DVT ppx: SC heparin  Diet: dysphagia diet, lactose restricted   Code: DNR/DNI, no pressors, no blood transfusions per HCP  Dispo: Rehab, COVID swab negative 2/21 DVT ppx: SC heparin  Diet: dysphagia diet, lactose restricted; Hx of dysphagia  - dysphagia diet per speech/swallow with supplemental nutrition  Code: DNR/DNI, no pressors, no blood transfusions per HCP  Dispo: Rehab, COVID swab negative 2/21

## 2021-02-23 NOTE — PROGRESS NOTE ADULT - PROBLEM SELECTOR PLAN 8
Previous infection 2 months ago. Per ID, initial positive COVID PCR likely remnant of previous infection.  - Repeat COVID PCR neg on 2/18  - COVID negative today  - Sating well on RA Previous infection 2 months ago. Per ID, initial positive COVID PCR likely remnant of previous infection.  - Repeat COVID PCR neg on 2/18  - COVID negative today 2/21  - Sating well on RA

## 2021-02-23 NOTE — PROVIDER CONTACT NOTE (CHANGE IN STATUS NOTIFICATION) - ASSESSMENT
Pt. A&Ox4, denies chest pain, SOB, lightheadedness, dizziness, palpitations. Pt. BP 70/50, HR 120s, temp now 99.6 oral, was 100.3 oral, tylenol given.

## 2021-02-23 NOTE — PROGRESS NOTE ADULT - PROBLEM SELECTOR PLAN 4
Sodium downtrending, today 128  - likely in setting of decreased PO intake vs SIADH  - start IV NS 75 cc/hr x 10 hours  - monitor BMP and electrolytes New a-fib with RVR seen on EKG 2/23   - no anti-coagulation in setting of possible LGIB; will discuss with family on starting anticoagulation at this time  - hemoglobin has been stable New a-fib with RVR seen on EKG 2/23   - no anti-coagulation in setting of possible LGIB; discussed benefits/risk of anticoagulation at this time, patient amenable to starting if clinically necessary  - hemoglobin has been stable

## 2021-02-23 NOTE — PROVIDER CONTACT NOTE (OTHER) - DATE AND TIME:
14-Feb-2021 18:42
15-Feb-2021 06:24
18-Feb-2021 03:25
22-Feb-2021 22:51
14-Feb-2021 07:54
23-Feb-2021 20:30

## 2021-02-23 NOTE — RAPID RESPONSE TEAM SUMMARY - NSOTHERINTERVENTIONSRRT_GEN_ALL_CORE
please follow up labs  If Lactate >2, please repeat in 5 hours.   Please continue the 30cc/kg IV bolus and them maintenance IVF  please f/u with cx  follow up with ID  Please confirm if patient needs to be on isolation for cdiff or not, she is being treated with PO vanc.  please follow up labs  If Lactate >2, please repeat in 5 hours.   Please continue the 30cc/kg IV bolus and them maintenance IVF  please f/u with cx  Can get Cxr as well  follow up with ID  Please confirm if patient needs to be on isolation for cdiff or not, she is being treated with PO vanc.   follow up with new afib

## 2021-02-23 NOTE — PROGRESS NOTE ADULT - PROBLEM SELECTOR PLAN 7
R leg swelling  - Duplex + for R soleus DVT, below the knee  - no tx given DVT below the knee. Repeat duplex in 2 weeks (3/2) outpatient

## 2021-02-24 LAB
ANION GAP SERPL CALC-SCNC: 7 MMOL/L — SIGNIFICANT CHANGE UP (ref 5–17)
BASOPHILS # BLD AUTO: 0.02 K/UL — SIGNIFICANT CHANGE UP (ref 0–0.2)
BASOPHILS NFR BLD AUTO: 0.3 % — SIGNIFICANT CHANGE UP (ref 0–2)
BUN SERPL-MCNC: 5 MG/DL — LOW (ref 7–23)
CALCIUM SERPL-MCNC: 8.2 MG/DL — LOW (ref 8.4–10.5)
CHLORIDE SERPL-SCNC: 97 MMOL/L — SIGNIFICANT CHANGE UP (ref 96–108)
CO2 SERPL-SCNC: 25 MMOL/L — SIGNIFICANT CHANGE UP (ref 22–31)
CREAT SERPL-MCNC: 0.82 MG/DL — SIGNIFICANT CHANGE UP (ref 0.5–1.3)
EOSINOPHIL # BLD AUTO: 0.02 K/UL — SIGNIFICANT CHANGE UP (ref 0–0.5)
EOSINOPHIL NFR BLD AUTO: 0.3 % — SIGNIFICANT CHANGE UP (ref 0–6)
GLUCOSE SERPL-MCNC: 88 MG/DL — SIGNIFICANT CHANGE UP (ref 70–99)
HCT VFR BLD CALC: 30.4 % — LOW (ref 34.5–45)
HGB BLD-MCNC: 10 G/DL — LOW (ref 11.5–15.5)
IMM GRANULOCYTES NFR BLD AUTO: 1.6 % — HIGH (ref 0–1.5)
LACTATE SERPL-SCNC: 0.9 MMOL/L — SIGNIFICANT CHANGE UP (ref 0.7–2)
LYMPHOCYTES # BLD AUTO: 0.84 K/UL — LOW (ref 1–3.3)
LYMPHOCYTES # BLD AUTO: 12.5 % — LOW (ref 13–44)
MAGNESIUM SERPL-MCNC: 1.6 MG/DL — SIGNIFICANT CHANGE UP (ref 1.6–2.6)
MCHC RBC-ENTMCNC: 32.2 PG — SIGNIFICANT CHANGE UP (ref 27–34)
MCHC RBC-ENTMCNC: 32.9 GM/DL — SIGNIFICANT CHANGE UP (ref 32–36)
MCV RBC AUTO: 97.7 FL — SIGNIFICANT CHANGE UP (ref 80–100)
MONOCYTES # BLD AUTO: 0.69 K/UL — SIGNIFICANT CHANGE UP (ref 0–0.9)
MONOCYTES NFR BLD AUTO: 10.3 % — SIGNIFICANT CHANGE UP (ref 2–14)
NEUTROPHILS # BLD AUTO: 5.05 K/UL — SIGNIFICANT CHANGE UP (ref 1.8–7.4)
NEUTROPHILS NFR BLD AUTO: 75 % — SIGNIFICANT CHANGE UP (ref 43–77)
NRBC # BLD: 0 /100 WBCS — SIGNIFICANT CHANGE UP (ref 0–0)
PHOSPHATE SERPL-MCNC: 3 MG/DL — SIGNIFICANT CHANGE UP (ref 2.5–4.5)
PLATELET # BLD AUTO: 341 K/UL — SIGNIFICANT CHANGE UP (ref 150–400)
POTASSIUM SERPL-MCNC: 3.9 MMOL/L — SIGNIFICANT CHANGE UP (ref 3.5–5.3)
POTASSIUM SERPL-SCNC: 3.9 MMOL/L — SIGNIFICANT CHANGE UP (ref 3.5–5.3)
RBC # BLD: 3.11 M/UL — LOW (ref 3.8–5.2)
RBC # FLD: 14.7 % — HIGH (ref 10.3–14.5)
SARS-COV-2 RNA SPEC QL NAA+PROBE: SIGNIFICANT CHANGE UP
SODIUM SERPL-SCNC: 129 MMOL/L — LOW (ref 135–145)
WBC # BLD: 6.73 K/UL — SIGNIFICANT CHANGE UP (ref 3.8–10.5)
WBC # FLD AUTO: 6.73 K/UL — SIGNIFICANT CHANGE UP (ref 3.8–10.5)

## 2021-02-24 PROCEDURE — 99233 SBSQ HOSP IP/OBS HIGH 50: CPT | Mod: CS

## 2021-02-24 PROCEDURE — 93010 ELECTROCARDIOGRAM REPORT: CPT

## 2021-02-24 PROCEDURE — 99233 SBSQ HOSP IP/OBS HIGH 50: CPT | Mod: GC

## 2021-02-24 PROCEDURE — 99232 SBSQ HOSP IP/OBS MODERATE 35: CPT | Mod: CS

## 2021-02-24 RX ORDER — MEROPENEM 1 G/30ML
1000 INJECTION INTRAVENOUS EVERY 8 HOURS
Refills: 0 | Status: DISCONTINUED | OUTPATIENT
Start: 2021-02-24 | End: 2021-02-24

## 2021-02-24 RX ORDER — MEROPENEM 1 G/30ML
1000 INJECTION INTRAVENOUS EVERY 12 HOURS
Refills: 0 | Status: DISCONTINUED | OUTPATIENT
Start: 2021-02-24 | End: 2021-03-01

## 2021-02-24 RX ADMIN — Medication 125 MILLIGRAM(S): at 18:25

## 2021-02-24 RX ADMIN — Medication 650 MILLIGRAM(S): at 21:21

## 2021-02-24 RX ADMIN — Medication 1 PACKET(S): at 05:31

## 2021-02-24 RX ADMIN — Medication 1 PACKET(S): at 09:12

## 2021-02-24 RX ADMIN — MEROPENEM 200 MILLIGRAM(S): 1 INJECTION INTRAVENOUS at 01:25

## 2021-02-24 RX ADMIN — Medication 1 PACKET(S): at 18:25

## 2021-02-24 RX ADMIN — FLUCONAZOLE 50 MILLIGRAM(S): 150 TABLET ORAL at 21:09

## 2021-02-24 RX ADMIN — Medication 1 TABLET(S): at 05:31

## 2021-02-24 RX ADMIN — PREGABALIN 1000 MICROGRAM(S): 225 CAPSULE ORAL at 13:11

## 2021-02-24 RX ADMIN — NYSTATIN CREAM 1 APPLICATION(S): 100000 CREAM TOPICAL at 05:31

## 2021-02-24 RX ADMIN — Medication 10 MILLIGRAM(S): at 21:09

## 2021-02-24 RX ADMIN — Medication 125 MILLIGRAM(S): at 05:31

## 2021-02-24 RX ADMIN — HEPARIN SODIUM 5000 UNIT(S): 5000 INJECTION INTRAVENOUS; SUBCUTANEOUS at 21:08

## 2021-02-24 RX ADMIN — Medication 81 MILLIGRAM(S): at 13:11

## 2021-02-24 RX ADMIN — Medication 1 PACKET(S): at 13:12

## 2021-02-24 RX ADMIN — Medication 1000 UNIT(S): at 13:11

## 2021-02-24 RX ADMIN — Medication 650 MILLIGRAM(S): at 13:10

## 2021-02-24 RX ADMIN — Medication 10 MILLIGRAM(S): at 05:31

## 2021-02-24 RX ADMIN — MEROPENEM 200 MILLIGRAM(S): 1 INJECTION INTRAVENOUS at 13:11

## 2021-02-24 RX ADMIN — HEPARIN SODIUM 5000 UNIT(S): 5000 INJECTION INTRAVENOUS; SUBCUTANEOUS at 05:31

## 2021-02-24 RX ADMIN — MIRTAZAPINE 15 MILLIGRAM(S): 45 TABLET, ORALLY DISINTEGRATING ORAL at 21:08

## 2021-02-24 RX ADMIN — Medication 1 TABLET(S): at 13:11

## 2021-02-24 RX ADMIN — Medication 500 MILLIGRAM(S): at 13:11

## 2021-02-24 RX ADMIN — NYSTATIN CREAM 1 APPLICATION(S): 100000 CREAM TOPICAL at 18:26

## 2021-02-24 RX ADMIN — HEPARIN SODIUM 5000 UNIT(S): 5000 INJECTION INTRAVENOUS; SUBCUTANEOUS at 13:11

## 2021-02-24 RX ADMIN — Medication 1 TABLET(S): at 18:25

## 2021-02-24 RX ADMIN — Medication 10 MILLIGRAM(S): at 13:11

## 2021-02-24 RX ADMIN — Medication 125 MILLIGRAM(S): at 13:12

## 2021-02-24 NOTE — PROGRESS NOTE ADULT - PROBLEM SELECTOR PLAN 5
Sodium today 131   - likely in setting of decreased PO intake vs SIADH  - monitor BMP and electrolytes

## 2021-02-24 NOTE — PROGRESS NOTE ADULT - PROBLEM SELECTOR PLAN 8
Previous infection 2 months ago. Per ID, initial positive COVID PCR likely remnant of previous infection.  - Repeat COVID PCR neg on 2/18  - COVID negative today 2/21  - Sating well on RA

## 2021-02-24 NOTE — PROGRESS NOTE ADULT - ASSESSMENT
89F w/ PMH of CAD s/p PCI, Chronic UTI's, MDD, HTN, HLD, [place] (hearing aids), dysphagia, COVID19 infection (2 months ago), and recent hospitalization for C. diff colitis (~2/7/21) presenting from [facility] after episode of vomiting and bloody bowel movement on 2/13. Found to have left sided colitis on imaging   Acute Right Below Knee DVT     #Colitis - given clinical picture likely 2/2 ischemic.  Per family wishes no aggressive measures or interventions/procedures.  DNR/DNI in place.   stool C diff negative x2 (2/14, 2/18)    #s/p RRT and fevers  +UTI - Citrobacter koseri    RECS:  -Monitor BMs; replete lytes PRN  -ID following;  Plan for PO Vanco taper per ID. Now on Meropenem and IV Diflucan following RRT and fevers  -avoid hypotension  -Leukocytosis improving and normal lactate  -monitor clinical exam  -continue  Metamucil BID in attempt to bulk stool  -PO Bacid BID  -PO diet per SLP recs    Discussed with  ID and Medicine attendings    Zain Paris PA-C    [facility]  [phone]  After hours and weekend coverage [phone]

## 2021-02-24 NOTE — PROGRESS NOTE ADULT - PROBLEM SELECTOR PLAN 1
RRT 2/23 for fever, tachycardia with a-fib and hypotension; started on meropenem 2g q12 hrs  - blood pressure responded appropriately to 2L NS   - UA + for many bacteria, +LE, nitrites, leukocytosis   - hemodynamically stable; continue to monitor vitals q4hrs  - follow up blood/urine cultures RRT 2/23 for fever, tachycardia with a-fib and hypotension  - continue meropenem 2g q12 hrs  - blood pressure responded appropriately to 2L NS   - UA + for many bacteria, +LE, nitrites, leukocytosis   - UCx growing Citrobacter, pending final results and sensitivities   - hemodynamically stable; continue to monitor vitals q4hrs  - follow up blood/urine cultures RRT 2/23 for fever, tachycardia with a-fib and hypotension  - continue meropenem 2g q12 hrs  - blood pressure responded appropriately to 2L NS   - UA + for many bacteria, +LE, nitrites, leukocytosis   - UCx growing Citrobacter, pending final results and sensitivities   - hemodynamically stable; continue to monitor vitals q4hrs  - follow up blood/urine cultures and repeat COVID

## 2021-02-24 NOTE — CHART NOTE - NSCHARTNOTESELECT_GEN_ALL_CORE
Goals of Care/Event Note
Nutrition Services
Sepsis/Event Note
Febrile O/N/Event Note
MAR SEPSIS NOTE/Event Note
Sepsis follow up note/Event Note

## 2021-02-24 NOTE — PROGRESS NOTE ADULT - SUBJECTIVE AND OBJECTIVE BOX
Patient is a 89y old  Female who presented with a chief complaint of GI Bleed, Vomiting (2021 06:54)      INTERVAL HPI/OVERNIGHT EVENTS:  2 BMs reported   +fever overnight  no abdominal pain, nausea, vomiting, melena or rectal bleeding    MEDICATIONS  (STANDING):  ascorbic acid 500 milliGRAM(s) Oral daily  aspirin enteric coated 81 milliGRAM(s) Oral daily  bethanechol 10 milliGRAM(s) Oral three times a day  cholecalciferol 1000 Unit(s) Oral daily  cyanocobalamin 1000 MICROGram(s) Oral daily  fluconAZOLE IVPB      fluconAZOLE IVPB 200 milliGRAM(s) IV Intermittent every 24 hours  heparin   Injectable 5000 Unit(s) SubCutaneous every 8 hours  lactobacillus acidophilus 1 Tablet(s) Oral two times a day  meropenem  IVPB 2000     meropenem  IVPB 2000 milliGRAM(s) IV Intermittent every 12 hours  mirtazapine 15 milliGRAM(s) Oral at bedtime  Nephro-portia 1 Tablet(s) Oral daily  nystatin Powder 1 Application(s) Topical two times a day  potassium phosphate / sodium phosphate Powder (PHOS-NaK) 1 Packet(s) Oral three times a day with meals  psyllium Powder 1 Packet(s) Oral two times a day  sodium chloride 0.9%. 1000 milliLiter(s) (75 mL/Hr) IV Continuous <Continuous>  vancomycin    Solution 125 milliGRAM(s) Oral every 6 hours    MEDICATIONS  (PRN):  acetaminophen   Tablet .. 650 milliGRAM(s) Oral every 12 hours PRN Temp greater or equal to 38C (100.4F), Mild Pain (1 - 3), Moderate Pain (4 - 6)  ondansetron    Tablet 4 milliGRAM(s) Oral every 8 hours PRN Nausea and/or Vomiting      Allergies  flu vaccine (Anaphylaxis)  No Known Drug Allergies    Intolerances  lactose (Unknown)      Review of Systems:  unable to obtain    Vital Signs Last 24 Hrs  T(C): 36.5 (2021 04:24), Max: 38.3 (2021 20:06)  T(F): 97.7 (2021 04:24), Max: 100.9 (2021 20:06)  HR: 91 (2021 04:24) (91 - 111)  BP: 122/80 (2021 04:24) (102/63 - 122/80)  BP(mean): --  RR: 18 (2021 04:24) (17 - 20)  SpO2: 99% (2021 04:24) (97% - 100%)    PHYSICAL EXAM:  Constitutional: elderly female. appears comfortable, non toxic appearing resting comfortably in bed- arousable  Neck: No LAD, supple no JVD  Respiratory: decreased BS at bases, no rales or wheeze  Cardiovascular: S1 and S2, RRR  Gastrointestinal: obese soft  ND NT no rebound guarding or rigidity +harding  Extremities: neg clubbing, cyanosis tr b/l LE edema  Vascular: 2+ peripheral pulses  Neurological: oriented to self, pleasantly confused.  moves all extremities  Skin: No rashes      LABS:                        10.0   6.73  )-----------( 341      ( 2021 09:35 )             30.4     02-24    129<L>  |  97  |  5<L>  ----------------------------<  88  3.9   |  25  |  0.82    Ca    8.2<L>      2021 09:35  Phos  3.0     02-24  Mg     1.6     02-24    TPro  5.2<L>  /  Alb  2.1<L>  /  TBili  0.2  /  DBili  x   /  AST  16  /  ALT  7<L>  /  AlkPhos  62  02-23    PT/INR - ( 2021 23:48 )   PT: 15.2 sec;   INR: 1.28 ratio    PTT - ( 2021 23:48 )  PTT:28.0 sec    Urinalysis Basic - ( 2021 23:46 )    Color: Yellow / Appearance: Turbid / S.015 / pH: x  Gluc: x / Ketone: Negative  / Bili: Negative / Urobili: Negative   Blood: x / Protein: 30 mg/dL / Nitrite: Positive   Leuk Esterase: Large / RBC: 13 /hpf /  /HPF   Sq Epi: x / Non Sq Epi: 2 /hpf / Bacteria: Many     Culture - Blood (21 @ 03:30)   Specimen Source: .Blood   Culture Results:   No growth to date.   Culture - Blood (21 @ 03:30)   Specimen Source: .Blood Blood-Peripheral   Culture Results:   No growth to date.   Culture - Urine (21 @ 03:19)   Specimen Source: .Urine Catheterized   Culture Results:   >100,000 CFU/ml Citrobacter koseri           RADIOLOGY & ADDITIONAL TESTS:  EXAM:  XR CHEST PORTABLE URGENT 1V                        PROCEDURE DATE:  2021        INTERPRETATION:  A single chest x-ray was obtained on 2021.    Indication: Septic.    Impression:    The heart is normal in size. Platelike atelectasis left lower lobe otherwise the lungs appear to be clear. Calcified aortic knob. No pleural effusion. No pneumothorax.

## 2021-02-24 NOTE — CHART NOTE - NSCHARTNOTEFT_GEN_A_CORE
RRT earlier in the night for sepsis received 30cc /Kg IVF, a dose of vanc and started on mino. Lactate was 0.9. repeat BP improved 101/66 and HR 90's. primary team to continue excellent care.     VIRGIL Zee  PGY-3, [phone], [id number]
TO BE COMPLETED WITHIN 6 HOURS OF INITIAL ASSESSMENT: fever, tachycardia and tachypnea    For use in patients that have 2 sepsis criteria and new organ dysfunction   •	New or increased oxygen requirement  •	Creatinine >2mg/dL  •	Bilirubin>2mg/dL  •	Platelet <100,000/mm3  •	INR >1.5, PTT>60  •	Lactate >2. 4.3 --> 4.3    If patient persistent hypotension (SBP<90) or any lactate >4 then provider evaluation (Physician/PA/NP) within 30 minutes of bolus completion is required.    Vital Signs Last 24 Hrs  T(C): 36.5 (14 Feb 2021 05:48), Max: 38.3 (14 Feb 2021 05:16)  T(F): 97.7 (14 Feb 2021 05:48), Max: 101 (14 Feb 2021 05:16)  HR: 110 (14 Feb 2021 05:48) (56 - 120)  BP: 106/71 (14 Feb 2021 05:48) (85/48 - 129/89)  BP(mean): 55 (13 Feb 2021 21:19) (55 - 61)  RR: 18 (14 Feb 2021 05:48) (12 - 32)  SpO2: 96% (14 Feb 2021 05:48) (96% - 100%)  		  LUNGS:  [x  ] Clear bilaterally [  ] Wheeze [  ] Rhonchi [  ] Rales [  ] Crackles; Other:  HEART: [  ]RRR [  ] No murmur [ x ]  Normal S1S2 [  x] Tachycardia;  Other:  CAPILLARY REFILL:  	Fingers: [ x ] less than 2 seconds [  ] more than 2 seconds                                           Toes: [ x ]  less than 2 seconds [  ] more than 2 seconds   PERIPHERAL PULSES:  Radial: [ x ] Palpable  [  ]  non-palpable                                         Dorsalis Pedis: [x  ] Palpable  [  ] non-palpable                                         Posterior Tibial: [  x] Palpable  [  ] non-palpable                                          Other:  SKIN:   [  ]  Diaphoretic  [  ]  Mottling  [  ]  Cold extremities  [x  ]  Warm [  ]  Dry                      Other:    BEDSIDE ULTRASOUND FINDINGS (IF APPLICABLE):    Labs:  13 Feb 2021 22:45    135    |  96     |  22     ----------------------------<  208    3.5     |  20     |  1.70     Ca    9.4        13 Feb 2021 22:45  Phos  3.9       13 Feb 2021 20:16  Mg     2.1       13 Feb 2021 20:16    TPro  8.0    /  Alb  3.8    /  TBili  0.3    /  DBili  x      /  AST  59     /  ALT  11     /  AlkPhos  93     13 Feb 2021 20:16                          11.9   24.73 )-----------( 303      ( 13 Feb 2021 22:45 )             36.7     PT/INR - ( 13 Feb 2021 20:16 )   PT: 12.1 sec;   INR: 1.01 ratio         PTT - ( 13 Feb 2021 20:16 )  PTT:19.7 sec  Lactate:    [x ] I have re-evaluated the patient's fluid status and reviewed vital signs. Clinical evaluation demonstrates an appropriate response to fluid resuscitation, with subsequent plan as follows:    Patient received a modified total of fluid resuscitation for the following reason:  [ ] obesity BMI > 30, patient received 30 cc/kg according to Ideal Body Weight   [ ] acute, decompensated CHF   [ ] pulmonary edema    [ ] ESRD with signs of fluid overload  [ ] presence of LVAD     Plan (orders must be placed in EMR):     [  ]  Check Repeat Lactate   [ x ]  No change in current plan  [  ]  Start Vasopressors:  [  ]  Repeat Fluid Bolus:  [  ] other:    Care Discussed with Consultants/Other Providers [ ] YES  [ ] NO
Was notified by RN that pt was febrile to 100.9 F O/N and was having increased WOB. STAT CXR was ordered. Pt was seen and examined at the bedside. On my assessment, pt appeared comfortable and was not in any distress, w/ no increased work of breathing. Per RN, pt also was more comfortable, w/ no increased work of breathing by the time of my assessment. STAT CXR was reviewed by me, and did not show evidence of increased fluid overload/ collection that would contribute increased WOB. Given sign out instructions from primary team, pt was started on anti-fungal coverage for candida UTI in addition to giving her tylenol. Fluconazole dose was determined with the assistance of pharmacist, given pt's renal history. O/N events were discussed w/ primary team on sign out this morning.
Nutrition Follow Up Note  Patient seen for: follow up on 3 DSU    · Reason for Admission	GI Bleed, Vomiting  chart reviewed, events noted     as per care note on : pt remains acute with multiple episodes of diarrhea. Discharge  unclear pending hospital course    Source: nurse, EMR    Diet : Diet, Dysphagia 3 Soft-Thin Liquids:   Fiber/Residue Restricted (LOWFIBER)  Lactose Restricted (Milk Sugar Intoler.)  Kosher  Supplement Feeding Modality:  Oral  Suplena Cans or Servings Per Day:  2       Frequency:  Daily  Probiotic Yogurt/Smoothie Cans or Servings Per Day:  2       Frequency:  Daily (- @ 14:02) [Active]             Patient reports: pt very [place], disoriented. RD attempted to have pt read written interview questions -pt unable to read the questions.      PO intake : 50% as per flow sheet          Daily Weight in k (-), Weight in k.8 (), Weight in k.8 (), Weight in k.1 (), Weight in k (), Weight in k (), Weight in k.3 ()  % Weight Change: 4% gain since     Pertinent Medications: MEDICATIONS  (STANDING):  ascorbic acid 500 milliGRAM(s) Oral daily  aspirin enteric coated 81 milliGRAM(s) Oral daily  bethanechol 10 milliGRAM(s) Oral three times a day  cholecalciferol 1000 Unit(s) Oral daily  cyanocobalamin 1000 MICROGram(s) Oral daily  heparin   Injectable 5000 Unit(s) SubCutaneous every 8 hours  lactobacillus acidophilus 1 Tablet(s) Oral two times a day  meropenem  IVPB 2000     meropenem  IVPB 2000 milliGRAM(s) IV Intermittent every 12 hours  mirtazapine 15 milliGRAM(s) Oral at bedtime  Nephro-portia 1 Tablet(s) Oral daily  nystatin Powder 1 Application(s) Topical two times a day  potassium phosphate / sodium phosphate Powder (PHOS-NaK) 1 Packet(s) Oral three times a day with meals  psyllium Powder 1 Packet(s) Oral two times a day  sodium chloride 0.9%. 1000 milliLiter(s) (75 mL/Hr) IV Continuous <Continuous>  vancomycin    Solution 125 milliGRAM(s) Oral every 6 hours    MEDICATIONS  (PRN):  acetaminophen   Tablet .. 650 milliGRAM(s) Oral every 12 hours PRN Mild Pain (1 - 3), Moderate Pain (4 - 6)  ondansetron    Tablet 4 milliGRAM(s) Oral every 8 hours PRN Nausea and/or Vomiting    Pertinent Labs:  @ 23:38: Na 131<L>, BUN 8, Cr 0.87, BG 84, K+ 3.9, Phos 4.0, Mg 2.0, Alk Phos 64, ALT/SGPT 8<L>, AST/SGOT 17   @ 22:08: Na 128<L>, BUN 7, Cr 0.87, BG 94, K+ 3.9    Finger Sticks:  POCT Blood Glucose.: 86 mg/dL ( @ 23:15)      Skin per nursing documentation: sacrum stage , left heel stage 1, right heel stage 1  Edema: +1 generalized    Estimated Needs:   [x ] no change since previous assessment  [ ] recalculated:      Estimated Energy Needs:  · Weight (lbs)	138.2 lb  · Weight (kg)	62.7 kg  · Enter From (cleopatra/kg)	25  · Enter To (cleopatra/kg)	30  · Calculated From (cleopatra/kg)	1567  · Calculated To (cleopatra/kg)	1881     Estimated Protein Needs:  · Weight (lbs)	138.2 lb  · Weight (kg)	62.7 kg  · Enter From (g/kg)	1  · Enter To (g/kg)	1.2  · Calculated From (g/kg)	62.7  · Calculated To (g/kg)	75.24     Estimated Fluid Needs:  · Weight (lbs)	138.2 lb  · Weight (kg)	62.7 kg  · Enter From (ml/kg)	25  · Enter To (ml/kg)	30  · Calculated From (ml/kg)	1567  · Calculated To (ml/kg)	1881     Other Calculations:  · Other Calculations	needs based on dosing weight. BMI overweight -needs greater due to multiple pressure injuries      Previous Nutrition Diagnosis:  Increased Nutrient Needs..  Nutrition Diagnosis is: being addressed with supplements        Recommend  continue KOSHER, Dysphagia 3-thin liquid, low fiber, restricted lactose.   continue Suplena 2 x daily  continue Nephro-Portia and Vit C daily  continue Danactive 2 x daily    Monitoring and Evaluation:     Continue to monitor Nutritional intake, Tolerance to diet prescription, weights, labs, skin integrity    RD remains available upon request and will follow up per protocol  Lexi Real MA, [facility], CDN #[phone]
RRt for hypotension, suspect hypotension, tachycardia, and ?fever (100.3F oral), with leukocytosis. being treated with Po vanc for cdif, but I suspect this is urosepsis. Blood and urine cx sent, started on meropenum given esbl in the past, also given a dose of vanc. of note, candida in urine on prior Ucx this admission, so if there is no other identified source, or patient further decompensates, would start fungal coverage. Pt receiving 30cc/kg IVF and Bp already improved after 15cc/kg. Lactate sent will followup.     VIRGIL Zee  PGY-3, [phone], [phone]

## 2021-02-24 NOTE — PROGRESS NOTE ADULT - PROBLEM SELECTOR PLAN 6
Hx of urinary retention and chronic UTI  - c/w home bethanechol. Hold pyridium.  - Failed multiple TOV. Harding placed, will likely need to d/c with harding Hx of urinary retention and chronic UTI  - c/w home bethanechol. Hold pyridium.  - Failed multiple TOV. Harding placed 2/19, will likely need to d/c with harding

## 2021-02-24 NOTE — PROGRESS NOTE ADULT - PROBLEM SELECTOR PLAN 2
Pancolitis, most significant in descending and rectosigmoid colon. C diff negative x 2, less suspicion for recurrent C diff colitis. Most likely to be ischemic  - s/p Zosyn (2/14-19), Flagyl IV 500mg BID (2/14-19). s/p 6 day course  - decreased PO Vancomycin 125mg q6h and continue for 5 days (until 2/23) and c/w probiotics; can start taper today  - WBC remains elevated; now downtrending 16.84 --> 13.20 Pancolitis, most significant in descending and rectosigmoid colon. C diff negative x 2, less suspicion for recurrent C diff colitis. Most likely to be ischemic  - s/p Zosyn (2/14-19), Flagyl IV 500mg BID (2/14-19). s/p 6 day course  - decreased PO Vancomycin 125mg q6h and continue for 5 days (until 2/23) and c/w probiotics; continue taper 125 mg q6hrs x 7 days  - WBC remains elevated

## 2021-02-24 NOTE — PROGRESS NOTE ADULT - ASSESSMENT
89F w/ PMH of CAD s/p PCI, Chronic UTI's, MDD, HTN, HLD, [place] (hearing aids), dysphagia, prior COVID19 infection, and recent hospitalization for C. diff colitis (~2/7/21) presenting from rehab center for bloody bowel movement, likely LGIB secondary to infectious colitis vs. ischemic colitis.  Also found to be in septic shock, likely secondary to colitis iso recent C. diff.  COVID +, asymptomatic and without hypoxia. RRT 2/23 for hypotension, tachycardia with new afib and fever, currently on meropenem.

## 2021-02-24 NOTE — PROGRESS NOTE ADULT - PROBLEM SELECTOR PLAN 4
New a-fib with RVR seen on EKG 2/23   - no anti-coagulation in setting of possible LGIB; discussed benefits/risk of anticoagulation at this time, patient amenable to starting if clinically necessary  - hemoglobin has been stable

## 2021-02-24 NOTE — PROGRESS NOTE ADULT - SUBJECTIVE AND OBJECTIVE BOX
Patient is a 89y old  Female who presents with a chief complaint of GI Bleed, Vomiting (2021 11:09)    Being followed by ID for        Interval history:  No other acute events      ROS:  No cough,SOB,CP  No N/V/D  No abd pain  No urinary complaints  No HA  No joint or limb pain  No other complaints    PAST MEDICAL & SURGICAL HISTORY:  COVID-19    Urinary tract infection    CAD in native artery    Dyslipidemia    Depression    Hyponatremia    Hypertension    History of repair of hip fracture      Allergies    flu vaccine (Anaphylaxis)  No Known Drug Allergies    Intolerances    lactose (Unknown)    Antimicrobials:    fluconAZOLE IVPB      fluconAZOLE IVPB 200 milliGRAM(s) IV Intermittent every 24 hours  meropenem  IVPB 2000     meropenem  IVPB 2000 milliGRAM(s) IV Intermittent every 12 hours  vancomycin    Solution 125 milliGRAM(s) Oral every 6 hours    MEDICATIONS  (STANDING):  ascorbic acid 500 milliGRAM(s) Oral daily  aspirin enteric coated 81 milliGRAM(s) Oral daily  bethanechol 10 milliGRAM(s) Oral three times a day  cholecalciferol 1000 Unit(s) Oral daily  cyanocobalamin 1000 MICROGram(s) Oral daily  fluconAZOLE IVPB      fluconAZOLE IVPB 200 milliGRAM(s) IV Intermittent every 24 hours  heparin   Injectable 5000 Unit(s) SubCutaneous every 8 hours  lactobacillus acidophilus 1 Tablet(s) Oral two times a day  meropenem  IVPB 2000     meropenem  IVPB 2000 milliGRAM(s) IV Intermittent every 12 hours  mirtazapine 15 milliGRAM(s) Oral at bedtime  Nephro-portia 1 Tablet(s) Oral daily  nystatin Powder 1 Application(s) Topical two times a day  potassium phosphate / sodium phosphate Powder (PHOS-NaK) 1 Packet(s) Oral three times a day with meals  psyllium Powder 1 Packet(s) Oral two times a day  sodium chloride 0.9%. 1000 milliLiter(s) (75 mL/Hr) IV Continuous <Continuous>  vancomycin    Solution 125 milliGRAM(s) Oral every 6 hours      Vital Signs Last 24 Hrs  T(C): 37.1 (21 @ 13:47), Max: 38.3 (21 @ 20:06)  T(F): 98.8 (21 @ 13:47), Max: 101 (21 @ 12:59)  HR: 100 (02-24-21 @ 12:59) (91 - 111)  BP: 131/72 (21 @ 12:59) (102/63 - 131/72)  BP(mean): --  RR: 17 (21 @ 12:59) (17 - 20)  SpO2: 98% (21 @ 12:59) (97% - 99%)    Physical Exam:    Constitutional well preserved,comfortable,pleasant    HEENT PERRLA EOMI,No pallor or icterus    No oral exudate or erythema    Neck supple no JVD or LN    Chest Good AE,CTA    CVS RRR S1 S2 WNl No murmur or rub or gallop    Abd soft BS normal No tenderness no masses    Ext No cyanosis clubbing or edema    IV site no erythema tenderness or discharge    Joints no swelling or LOM    CNS AAO X 3 no focal    Lab Data:                          10.0   6.73  )-----------( 341      ( 2021 09:35 )             30.4           129<L>  |  97  |  5<L>  ----------------------------<  88  3.9   |  25  |  0.82    Ca    8.2<L>      2021 09:35  Phos  3.0       Mg     1.6         TPro  5.2<L>  /  Alb  2.1<L>  /  TBili  0.2  /  DBili  x   /  AST  16  /  ALT  7<L>  /  AlkPhos  62        Urinalysis Basic - ( 2021 23:46 )    Color: Yellow / Appearance: Turbid / S.015 / pH: x  Gluc: x / Ketone: Negative  / Bili: Negative / Urobili: Negative   Blood: x / Protein: 30 mg/dL / Nitrite: Positive   Leuk Esterase: Large / RBC: 13 /hpf /  /HPF   Sq Epi: x / Non Sq Epi: 2 /hpf / Bacteria: Many        .Blood Blood-Peripheral  21   No growth to date.  --  --      .Urine Catheterized  21   >100,000 CFU/ml Citrobacter koseri  --  --                    WBC Count: 6.73 (21 @ 09:35)  WBC Count: 11.26 (21 @ 10:22)  WBC Count: 13.20 (21 @ 23:38)  WBC Count: 16.84 (21 @ 07:13)  WBC Count: 17.58 (21 @ 07:18)  WBC Count: 13.91 (21 @ 06:45)  WBC Count: 11.64 (21 @ 07:02)  WBC Count: 15.60 (21 @ 07:04)             Patient is a 89y old  Female who presents with a chief complaint of GI Bleed, Vomiting (2021 11:09)    Being followed by ID for        Interval history:  RRT  for hypotension, tachycardia with new afib and fever, currently on meropenem.   pt suspected to have urosepsis  No other acute events        PAST MEDICAL & SURGICAL HISTORY:  COVID-19    Urinary tract infection    CAD in native artery    Dyslipidemia    Depression    Hyponatremia    Hypertension    History of repair of hip fracture      Allergies    flu vaccine (Anaphylaxis)  No Known Drug Allergies    Intolerances    lactose (Unknown)    Antimicrobials:    fluconAZOLE IVPB      fluconAZOLE IVPB 200 milliGRAM(s) IV Intermittent every 24 hours  meropenem  IVPB 2000     meropenem  IVPB 2000 milliGRAM(s) IV Intermittent every 12 hours  vancomycin    Solution 125 milliGRAM(s) Oral every 6 hours    MEDICATIONS  (STANDING):  ascorbic acid 500 milliGRAM(s) Oral daily  aspirin enteric coated 81 milliGRAM(s) Oral daily  bethanechol 10 milliGRAM(s) Oral three times a day  cholecalciferol 1000 Unit(s) Oral daily  cyanocobalamin 1000 MICROGram(s) Oral daily  fluconAZOLE IVPB      fluconAZOLE IVPB 200 milliGRAM(s) IV Intermittent every 24 hours  heparin   Injectable 5000 Unit(s) SubCutaneous every 8 hours  lactobacillus acidophilus 1 Tablet(s) Oral two times a day  meropenem  IVPB 2000     meropenem  IVPB 2000 milliGRAM(s) IV Intermittent every 12 hours  mirtazapine 15 milliGRAM(s) Oral at bedtime  Nephro-portia 1 Tablet(s) Oral daily  nystatin Powder 1 Application(s) Topical two times a day  potassium phosphate / sodium phosphate Powder (PHOS-NaK) 1 Packet(s) Oral three times a day with meals  psyllium Powder 1 Packet(s) Oral two times a day  sodium chloride 0.9%. 1000 milliLiter(s) (75 mL/Hr) IV Continuous <Continuous>  vancomycin    Solution 125 milliGRAM(s) Oral every 6 hours      Vital Signs Last 24 Hrs  T(C): 37.1 (21 @ 13:47), Max: 38.3 (21 @ 20:06)  T(F): 98.8 (21 @ 13:47), Max: 101 (21 @ 12:59)  HR: 100 (21 @ 12:59) (91 - 111)  BP: 131/72 (21 @ 12:59) (102/63 - 131/72)  BP(mean): --  RR: 17 (21 @ 12:59) (17 - 20)  SpO2: 98% (21 @ 12:59) (97% - 99%)    Physical Exam:    Constitutional resting quietly    HEENT PERRLA EOMI,No pallor or icterus    No oral exudate or erythema    Neck supple no JVD or LN    Chest Good AE,CTA    CVS  S1 S2   Abd soft BS normal No tenderness     Ext No cyanosis clubbing or edema    IV site no erythema tenderness or discharge    Joints no swelling or LOM      Lab Data:                          10.0   6.73  )-----------( 341      ( 2021 09:35 )             30.4           129<L>  |  97  |  5<L>  ----------------------------<  88  3.9   |  25  |  0.82    Ca    8.2<L>      2021 09:35  Phos  3.0       Mg     1.6         TPro  5.2<L>  /  Alb  2.1<L>  /  TBili  0.2  /  DBili  x   /  AST  16  /  ALT  7<L>  /  AlkPhos  62        Urinalysis Basic - ( 2021 23:46 )    Color: Yellow / Appearance: Turbid / S.015 / pH: x  Gluc: x / Ketone: Negative  / Bili: Negative / Urobili: Negative   Blood: x / Protein: 30 mg/dL / Nitrite: Positive   Leuk Esterase: Large / RBC: 13 /hpf /  /HPF   Sq Epi: x / Non Sq Epi: 2 /hpf / Bacteria: Many        .Blood Blood-Peripheral  21   No growth to date.  --  --      .Urine Catheterized  21   >100,000 CFU/ml Citrobacter koseri  --  --          WBC Count: 6.73 (21 @ 09:35)  WBC Count: 11.26 (21 @ 10:22)  WBC Count: 13.20 (21 @ 23:38)  WBC Count: 16.84 (21 @ 07:13)  WBC Count: 17.58 (21 @ 07:18)  WBC Count: 13.91 (21 @ 06:45)  WBC Count: 11.64 (21 @ 07:02)  WBC Count: 15.60 (21 @ 07:04)

## 2021-02-24 NOTE — PROGRESS NOTE ADULT - SUBJECTIVE AND OBJECTIVE BOX
Internal Medicine   Blake Moreno | PGY-1    OVERNIGHT EVENTS: Had fever to 100.9 with tachycardia and tachypnea, was given tylenol and started on fluconazole.     SUBJECTIVE: Patient was seen and examined at bedside this morning. Denies any nausea/vomiting/diarrhea, headache, shortness of breath or chest pain.       MEDICATIONS  (STANDING):  ascorbic acid 500 milliGRAM(s) Oral daily  aspirin enteric coated 81 milliGRAM(s) Oral daily  bethanechol 10 milliGRAM(s) Oral three times a day  cholecalciferol 1000 Unit(s) Oral daily  cyanocobalamin 1000 MICROGram(s) Oral daily  fluconAZOLE IVPB      fluconAZOLE IVPB 200 milliGRAM(s) IV Intermittent every 24 hours  heparin   Injectable 5000 Unit(s) SubCutaneous every 8 hours  lactobacillus acidophilus 1 Tablet(s) Oral two times a day  meropenem  IVPB 2000     meropenem  IVPB 2000 milliGRAM(s) IV Intermittent every 12 hours  mirtazapine 15 milliGRAM(s) Oral at bedtime  Nephro-portia 1 Tablet(s) Oral daily  nystatin Powder 1 Application(s) Topical two times a day  potassium phosphate / sodium phosphate Powder (PHOS-NaK) 1 Packet(s) Oral three times a day with meals  psyllium Powder 1 Packet(s) Oral two times a day  sodium chloride 0.9%. 1000 milliLiter(s) (75 mL/Hr) IV Continuous <Continuous>  vancomycin    Solution 125 milliGRAM(s) Oral every 6 hours    MEDICATIONS  (PRN):  acetaminophen   Tablet .. 650 milliGRAM(s) Oral every 12 hours PRN Temp greater or equal to 38C (100.4F), Mild Pain (1 - 3), Moderate Pain (4 - 6)  ondansetron    Tablet 4 milliGRAM(s) Oral every 8 hours PRN Nausea and/or Vomiting        T(F): 97.7 (02-24-21 @ 04:24), Max: 100.9 (02-23-21 @ 20:06)  HR: 91 (02-24-21 @ 04:24) (91 - 111)  BP: 122/80 (02-24-21 @ 04:24) (102/63 - 122/80)  BP(mean): --  RR: 18 (02-24-21 @ 04:24) (17 - 20)  SpO2: 99% (02-24-21 @ 04:24) (97% - 100%)    PHYSICAL EXAM:   Gen: Awake, NAD, minimally conversational  HEENT: NCAT, PERRL, EOMI, clear conjunctiva, no scleral icterus  Neck: Supple, no JVD, no LAD  CV: RRR, S1S2, no m/r/g  Resp: CTAB, normal respiratory effort  Abd: Soft, L sided TTP on soft and deep palpation, no rebound or guarding,   Ext: minimal R calf swelling compared to L, no clubbing or cyanosis  Neuro: minimally conversational, following verbal commands, nods/shakes head to questions   Skin: warm, perfused    TELEMETRY:    LABS:                        9.6    11.26 )-----------( 293      ( 23 Feb 2021 10:22 )             29.8     02-23    130<L>  |  99  |  6<L>  ----------------------------<  86  4.1   |  22  |  0.78    Ca    7.7<L>      23 Feb 2021 10:22  Phos  3.7     02-23  Mg     1.8     02-23    TPro  5.2<L>  /  Alb  2.1<L>  /  TBili  0.2  /  DBili  x   /  AST  16  /  ALT  7<L>  /  AlkPhos  62  02-23        PT/INR - ( 22 Feb 2021 23:48 )   PT: 15.2 sec;   INR: 1.28 ratio         PTT - ( 22 Feb 2021 23:48 )  PTT:28.0 sec    Creatinine Trend: 0.78<--, 0.87<--, 0.87<--, 0.93<--, 0.99<--, 0.90<--  I&O's Summary    22 Feb 2021 07:01  -  23 Feb 2021 07:00  --------------------------------------------------------  IN: 4210 mL / OUT: 750 mL / NET: 3460 mL    23 Feb 2021 07:01  -  24 Feb 2021 06:54  --------------------------------------------------------  IN: 300 mL / OUT: 1125 mL / NET: -825 mL      BNP    RADIOLOGY & ADDITIONAL STUDIES:             Internal Medicine   Blake Moreno | PGY-1    OVERNIGHT EVENTS: Had fever to 100.9 with tachycardia and tachypnea, was given tylenol and started on fluconazole. Urine cx positive for gram negative rods.    SUBJECTIVE: Patient was seen and examined at bedside this morning. Denies any nausea/vomiting/diarrhea, headache, shortness of breath or chest pain.       MEDICATIONS  (STANDING):  ascorbic acid 500 milliGRAM(s) Oral daily  aspirin enteric coated 81 milliGRAM(s) Oral daily  bethanechol 10 milliGRAM(s) Oral three times a day  cholecalciferol 1000 Unit(s) Oral daily  cyanocobalamin 1000 MICROGram(s) Oral daily  fluconAZOLE IVPB      fluconAZOLE IVPB 200 milliGRAM(s) IV Intermittent every 24 hours  heparin   Injectable 5000 Unit(s) SubCutaneous every 8 hours  lactobacillus acidophilus 1 Tablet(s) Oral two times a day  meropenem  IVPB 2000     meropenem  IVPB 2000 milliGRAM(s) IV Intermittent every 12 hours  mirtazapine 15 milliGRAM(s) Oral at bedtime  Nephro-portia 1 Tablet(s) Oral daily  nystatin Powder 1 Application(s) Topical two times a day  potassium phosphate / sodium phosphate Powder (PHOS-NaK) 1 Packet(s) Oral three times a day with meals  psyllium Powder 1 Packet(s) Oral two times a day  sodium chloride 0.9%. 1000 milliLiter(s) (75 mL/Hr) IV Continuous <Continuous>  vancomycin    Solution 125 milliGRAM(s) Oral every 6 hours    MEDICATIONS  (PRN):  acetaminophen   Tablet .. 650 milliGRAM(s) Oral every 12 hours PRN Temp greater or equal to 38C (100.4F), Mild Pain (1 - 3), Moderate Pain (4 - 6)  ondansetron    Tablet 4 milliGRAM(s) Oral every 8 hours PRN Nausea and/or Vomiting        T(F): 97.7 (02-24-21 @ 04:24), Max: 100.9 (02-23-21 @ 20:06)  HR: 91 (02-24-21 @ 04:24) (91 - 111)  BP: 122/80 (02-24-21 @ 04:24) (102/63 - 122/80)  BP(mean): --  RR: 18 (02-24-21 @ 04:24) (17 - 20)  SpO2: 99% (02-24-21 @ 04:24) (97% - 100%)    PHYSICAL EXAM:   Gen: Awake, NAD, minimally conversational  HEENT: NCAT, PERRL, EOMI, clear conjunctiva, no scleral icterus  Neck: Supple, no JVD, no LAD  CV: RRR, S1S2, no m/r/g  Resp: CTAB, normal respiratory effort  Abd: Soft, L sided TTP on soft and deep palpation, no rebound or guarding,   Ext: minimal R calf swelling compared to L, no clubbing or cyanosis  Neuro: minimally conversational, following verbal commands, nods/shakes head to questions   Skin: warm, perfused    TELEMETRY:    LABS:                        9.6    11.26 )-----------( 293      ( 23 Feb 2021 10:22 )             29.8     02-23    130<L>  |  99  |  6<L>  ----------------------------<  86  4.1   |  22  |  0.78    Ca    7.7<L>      23 Feb 2021 10:22  Phos  3.7     02-23  Mg     1.8     02-23    TPro  5.2<L>  /  Alb  2.1<L>  /  TBili  0.2  /  DBili  x   /  AST  16  /  ALT  7<L>  /  AlkPhos  62  02-23        PT/INR - ( 22 Feb 2021 23:48 )   PT: 15.2 sec;   INR: 1.28 ratio         PTT - ( 22 Feb 2021 23:48 )  PTT:28.0 sec    Creatinine Trend: 0.78<--, 0.87<--, 0.87<--, 0.93<--, 0.99<--, 0.90<--  I&O's Summary    22 Feb 2021 07:01  -  23 Feb 2021 07:00  --------------------------------------------------------  IN: 4210 mL / OUT: 750 mL / NET: 3460 mL    23 Feb 2021 07:01  - 24 Feb 2021 06:54  --------------------------------------------------------  IN: 300 mL / OUT: 1125 mL / NET: -825 mL      BNP    RADIOLOGY & ADDITIONAL STUDIES:             Internal Medicine   Blake Moreno | PGY-1    OVERNIGHT EVENTS: Had fever to 100.9 with tachycardia and tachypnea, was given tylenol and started on fluconazole. Urine cx positive for gram negative rods, growing Citrobacter on prelim.     SUBJECTIVE: Patient was seen and examined at bedside this morning. Denies any nausea/vomiting/diarrhea, headache, shortness of breath or chest pain.       MEDICATIONS  (STANDING):  ascorbic acid 500 milliGRAM(s) Oral daily  aspirin enteric coated 81 milliGRAM(s) Oral daily  bethanechol 10 milliGRAM(s) Oral three times a day  cholecalciferol 1000 Unit(s) Oral daily  cyanocobalamin 1000 MICROGram(s) Oral daily  fluconAZOLE IVPB      fluconAZOLE IVPB 200 milliGRAM(s) IV Intermittent every 24 hours  heparin   Injectable 5000 Unit(s) SubCutaneous every 8 hours  lactobacillus acidophilus 1 Tablet(s) Oral two times a day  meropenem  IVPB 2000     meropenem  IVPB 2000 milliGRAM(s) IV Intermittent every 12 hours  mirtazapine 15 milliGRAM(s) Oral at bedtime  Nephro-portia 1 Tablet(s) Oral daily  nystatin Powder 1 Application(s) Topical two times a day  potassium phosphate / sodium phosphate Powder (PHOS-NaK) 1 Packet(s) Oral three times a day with meals  psyllium Powder 1 Packet(s) Oral two times a day  sodium chloride 0.9%. 1000 milliLiter(s) (75 mL/Hr) IV Continuous <Continuous>  vancomycin    Solution 125 milliGRAM(s) Oral every 6 hours    MEDICATIONS  (PRN):  acetaminophen   Tablet .. 650 milliGRAM(s) Oral every 12 hours PRN Temp greater or equal to 38C (100.4F), Mild Pain (1 - 3), Moderate Pain (4 - 6)  ondansetron    Tablet 4 milliGRAM(s) Oral every 8 hours PRN Nausea and/or Vomiting        T(F): 97.7 (02-24-21 @ 04:24), Max: 100.9 (02-23-21 @ 20:06)  HR: 91 (02-24-21 @ 04:24) (91 - 111)  BP: 122/80 (02-24-21 @ 04:24) (102/63 - 122/80)  BP(mean): --  RR: 18 (02-24-21 @ 04:24) (17 - 20)  SpO2: 99% (02-24-21 @ 04:24) (97% - 100%)    PHYSICAL EXAM:   Gen: Awake, NAD, minimally conversational  HEENT: NCAT, PERRL, EOMI, clear conjunctiva, no scleral icterus  Neck: Supple, no JVD, no LAD  CV: RRR, S1S2, no m/r/g  Resp: CTAB, normal respiratory effort  Abd: Soft, L sided TTP on soft and deep palpation, no rebound or guarding,   Ext: minimal R calf swelling compared to L, no clubbing or cyanosis  Neuro: minimally conversational, following verbal commands, nods/shakes head to questions   Skin: warm, perfused    TELEMETRY:    LABS:                        9.6    11.26 )-----------( 293      ( 23 Feb 2021 10:22 )             29.8     02-23    130<L>  |  99  |  6<L>  ----------------------------<  86  4.1   |  22  |  0.78    Ca    7.7<L>      23 Feb 2021 10:22  Phos  3.7     02-23  Mg     1.8     02-23    TPro  5.2<L>  /  Alb  2.1<L>  /  TBili  0.2  /  DBili  x   /  AST  16  /  ALT  7<L>  /  AlkPhos  62  02-23        PT/INR - ( 22 Feb 2021 23:48 )   PT: 15.2 sec;   INR: 1.28 ratio         PTT - ( 22 Feb 2021 23:48 )  PTT:28.0 sec    Creatinine Trend: 0.78<--, 0.87<--, 0.87<--, 0.93<--, 0.99<--, 0.90<--  I&O's Summary    22 Feb 2021 07:01  -  23 Feb 2021 07:00  --------------------------------------------------------  IN: 4210 mL / OUT: 750 mL / NET: 3460 mL    23 Feb 2021 07:01  -  24 Feb 2021 06:54  --------------------------------------------------------  IN: 300 mL / OUT: 1125 mL / NET: -825 mL      BNP    RADIOLOGY & ADDITIONAL STUDIES:             Internal Medicine   Blake Moreno | PGY-1    OVERNIGHT EVENTS: Had fever to 100.9 with tachycardia and tachypnea, was given tylenol and started on fluconazole. Urine cx positive for gram negative rods, growing Citrobacter on prelim. Repeat CXR no evidence of consolidation, pending final read. Repeat COVID swab sent.     SUBJECTIVE: Patient was seen and examined at bedside this morning. Denies any nausea/vomiting/diarrhea, headache, shortness of breath or chest pain.       MEDICATIONS  (STANDING):  ascorbic acid 500 milliGRAM(s) Oral daily  aspirin enteric coated 81 milliGRAM(s) Oral daily  bethanechol 10 milliGRAM(s) Oral three times a day  cholecalciferol 1000 Unit(s) Oral daily  cyanocobalamin 1000 MICROGram(s) Oral daily  fluconAZOLE IVPB      fluconAZOLE IVPB 200 milliGRAM(s) IV Intermittent every 24 hours  heparin   Injectable 5000 Unit(s) SubCutaneous every 8 hours  lactobacillus acidophilus 1 Tablet(s) Oral two times a day  meropenem  IVPB 2000     meropenem  IVPB 2000 milliGRAM(s) IV Intermittent every 12 hours  mirtazapine 15 milliGRAM(s) Oral at bedtime  Nephro-portia 1 Tablet(s) Oral daily  nystatin Powder 1 Application(s) Topical two times a day  potassium phosphate / sodium phosphate Powder (PHOS-NaK) 1 Packet(s) Oral three times a day with meals  psyllium Powder 1 Packet(s) Oral two times a day  sodium chloride 0.9%. 1000 milliLiter(s) (75 mL/Hr) IV Continuous <Continuous>  vancomycin    Solution 125 milliGRAM(s) Oral every 6 hours    MEDICATIONS  (PRN):  acetaminophen   Tablet .. 650 milliGRAM(s) Oral every 12 hours PRN Temp greater or equal to 38C (100.4F), Mild Pain (1 - 3), Moderate Pain (4 - 6)  ondansetron    Tablet 4 milliGRAM(s) Oral every 8 hours PRN Nausea and/or Vomiting        T(F): 97.7 (02-24-21 @ 04:24), Max: 100.9 (02-23-21 @ 20:06)  HR: 91 (02-24-21 @ 04:24) (91 - 111)  BP: 122/80 (02-24-21 @ 04:24) (102/63 - 122/80)  BP(mean): --  RR: 18 (02-24-21 @ 04:24) (17 - 20)  SpO2: 99% (02-24-21 @ 04:24) (97% - 100%)    PHYSICAL EXAM:   Gen: Awake, NAD, minimally conversational  HEENT: NCAT, PERRL, EOMI, clear conjunctiva, no scleral icterus  Neck: Supple, no JVD, no LAD  CV: RRR, S1S2, no m/r/g  Resp: CTAB, normal respiratory effort  Abd: Soft, L sided TTP on soft and deep palpation, no rebound or guarding,   Ext: minimal R calf swelling compared to L, no clubbing or cyanosis  Neuro: minimally conversational, following verbal commands, nods/shakes head to questions   Skin: warm, perfused    TELEMETRY:    LABS:                        9.6    11.26 )-----------( 293      ( 23 Feb 2021 10:22 )             29.8     02-23    130<L>  |  99  |  6<L>  ----------------------------<  86  4.1   |  22  |  0.78    Ca    7.7<L>      23 Feb 2021 10:22  Phos  3.7     02-23  Mg     1.8     02-23    TPro  5.2<L>  /  Alb  2.1<L>  /  TBili  0.2  /  DBili  x   /  AST  16  /  ALT  7<L>  /  AlkPhos  62  02-23        PT/INR - ( 22 Feb 2021 23:48 )   PT: 15.2 sec;   INR: 1.28 ratio         PTT - ( 22 Feb 2021 23:48 )  PTT:28.0 sec    Creatinine Trend: 0.78<--, 0.87<--, 0.87<--, 0.93<--, 0.99<--, 0.90<--  I&O's Summary    22 Feb 2021 07:01  -  23 Feb 2021 07:00  --------------------------------------------------------  IN: 4210 mL / OUT: 750 mL / NET: 3460 mL    23 Feb 2021 07:01  -  24 Feb 2021 06:54  --------------------------------------------------------  IN: 300 mL / OUT: 1125 mL / NET: -825 mL      BNP    RADIOLOGY & ADDITIONAL STUDIES:

## 2021-02-24 NOTE — PROGRESS NOTE ADULT - PROBLEM SELECTOR PLAN 10
DVT ppx: SC heparin  Diet: dysphagia diet, lactose restricted; Hx of dysphagia  - dysphagia diet per speech/swallow with supplemental nutrition  Code: DNR/DNI, no pressors, no blood transfusions per HCP  Dispo: Rehab, COVID swab negative 2/21

## 2021-02-24 NOTE — PROGRESS NOTE ADULT - PROBLEM SELECTOR PLAN 9
Hx of multiple CAD s/p 1 stent  - will restart ASA 81mg daily given resolved GIB  - Holding Isosorbide ER 60mg and enalapril iso soft BP's Hx of multiple CAD s/p 1 stent  - continue aspirin 81 mg daily   - Holding Isosorbide ER 60mg and enalapril iso soft BP's

## 2021-02-24 NOTE — PROGRESS NOTE ADULT - ASSESSMENT
89F w/ PMH of CAD s/p PCI, Chronic UTI's, MDD, HTN, HLD, [place] (hearing aids), dysphagia, COVID19 infection (2 months ago, s/p dexa, remdesivir, convalescent plasma, not intubated), and recent hospitalization for C. diff colitis (~2/7/21) presenting from [facility] after episode of vomiting and bloody bowel movement on 2/13.     unclear if this still c diff vs ischemic bowel  vs microperforation vs other         pt was treated initially for c diff, ischemic colitis and possible UTI  urine only grew yeast  antibiotics were tapered and pt improved with hydration  yesterday with acute decompensation  likely UTI  change meropenem to 1 gm Q 8   continue po vancomycin to 125 mg po q 6 hours  can start taper         DVT- per team

## 2021-02-24 NOTE — PROGRESS NOTE ADULT - PROBLEM SELECTOR PLAN 3
UCx growing 3+ organisms and Candida albicans (unclear significance)  - Per ID, likely no indication for antifungal given already improving clinical picture on above antibiotics  - Pessary removed, cleaned, replaced by Urogynecologist Dr. Hernandez's associate UCx now growing Citrobacter, now febrile and hypotensive   - pending sensitivties   - Pessary removed, cleaned, replaced by Urogynecologist Dr. Hernandez's associate

## 2021-02-25 LAB
ANION GAP SERPL CALC-SCNC: 13 MMOL/L — SIGNIFICANT CHANGE UP (ref 5–17)
BASOPHILS # BLD AUTO: 0.04 K/UL — SIGNIFICANT CHANGE UP (ref 0–0.2)
BASOPHILS NFR BLD AUTO: 0.7 % — SIGNIFICANT CHANGE UP (ref 0–2)
BUN SERPL-MCNC: 6 MG/DL — LOW (ref 7–23)
CALCIUM SERPL-MCNC: 8.2 MG/DL — LOW (ref 8.4–10.5)
CHLORIDE SERPL-SCNC: 96 MMOL/L — SIGNIFICANT CHANGE UP (ref 96–108)
CO2 SERPL-SCNC: 19 MMOL/L — LOW (ref 22–31)
CREAT SERPL-MCNC: 0.76 MG/DL — SIGNIFICANT CHANGE UP (ref 0.5–1.3)
EOSINOPHIL # BLD AUTO: 0.03 K/UL — SIGNIFICANT CHANGE UP (ref 0–0.5)
EOSINOPHIL NFR BLD AUTO: 0.5 % — SIGNIFICANT CHANGE UP (ref 0–6)
GLUCOSE SERPL-MCNC: 75 MG/DL — SIGNIFICANT CHANGE UP (ref 70–99)
HCT VFR BLD CALC: 29.8 % — LOW (ref 34.5–45)
HGB BLD-MCNC: 9.6 G/DL — LOW (ref 11.5–15.5)
IMM GRANULOCYTES NFR BLD AUTO: 0.8 % — SIGNIFICANT CHANGE UP (ref 0–1.5)
LACTATE SERPL-SCNC: 0.8 MMOL/L — SIGNIFICANT CHANGE UP (ref 0.7–2)
LYMPHOCYTES # BLD AUTO: 1.47 K/UL — SIGNIFICANT CHANGE UP (ref 1–3.3)
LYMPHOCYTES # BLD AUTO: 24.4 % — SIGNIFICANT CHANGE UP (ref 13–44)
MAGNESIUM SERPL-MCNC: 1.6 MG/DL — SIGNIFICANT CHANGE UP (ref 1.6–2.6)
MCHC RBC-ENTMCNC: 32.2 GM/DL — SIGNIFICANT CHANGE UP (ref 32–36)
MCHC RBC-ENTMCNC: 32.3 PG — SIGNIFICANT CHANGE UP (ref 27–34)
MCV RBC AUTO: 100.3 FL — HIGH (ref 80–100)
MONOCYTES # BLD AUTO: 0.71 K/UL — SIGNIFICANT CHANGE UP (ref 0–0.9)
MONOCYTES NFR BLD AUTO: 11.8 % — SIGNIFICANT CHANGE UP (ref 2–14)
NEUTROPHILS # BLD AUTO: 3.73 K/UL — SIGNIFICANT CHANGE UP (ref 1.8–7.4)
NEUTROPHILS NFR BLD AUTO: 61.8 % — SIGNIFICANT CHANGE UP (ref 43–77)
NRBC # BLD: 0 /100 WBCS — SIGNIFICANT CHANGE UP (ref 0–0)
PHOSPHATE SERPL-MCNC: 2.8 MG/DL — SIGNIFICANT CHANGE UP (ref 2.5–4.5)
PLATELET # BLD AUTO: 343 K/UL — SIGNIFICANT CHANGE UP (ref 150–400)
POTASSIUM SERPL-MCNC: 4 MMOL/L — SIGNIFICANT CHANGE UP (ref 3.5–5.3)
POTASSIUM SERPL-SCNC: 4 MMOL/L — SIGNIFICANT CHANGE UP (ref 3.5–5.3)
RBC # BLD: 2.97 M/UL — LOW (ref 3.8–5.2)
RBC # FLD: 14.7 % — HIGH (ref 10.3–14.5)
SODIUM SERPL-SCNC: 128 MMOL/L — LOW (ref 135–145)
WBC # BLD: 6.03 K/UL — SIGNIFICANT CHANGE UP (ref 3.8–10.5)
WBC # FLD AUTO: 6.03 K/UL — SIGNIFICANT CHANGE UP (ref 3.8–10.5)

## 2021-02-25 PROCEDURE — 93970 EXTREMITY STUDY: CPT | Mod: 26

## 2021-02-25 PROCEDURE — 74177 CT ABD & PELVIS W/CONTRAST: CPT | Mod: 26

## 2021-02-25 PROCEDURE — 99232 SBSQ HOSP IP/OBS MODERATE 35: CPT | Mod: CS

## 2021-02-25 PROCEDURE — 99233 SBSQ HOSP IP/OBS HIGH 50: CPT | Mod: GC

## 2021-02-25 PROCEDURE — 71260 CT THORAX DX C+: CPT | Mod: 26

## 2021-02-25 RX ORDER — SODIUM CHLORIDE 9 MG/ML
1000 INJECTION INTRAMUSCULAR; INTRAVENOUS; SUBCUTANEOUS
Refills: 0 | Status: DISCONTINUED | OUTPATIENT
Start: 2021-02-25 | End: 2021-02-26

## 2021-02-25 RX ORDER — MAGNESIUM SULFATE 500 MG/ML
2 VIAL (ML) INJECTION ONCE
Refills: 0 | Status: COMPLETED | OUTPATIENT
Start: 2021-02-25 | End: 2021-02-25

## 2021-02-25 RX ADMIN — HEPARIN SODIUM 5000 UNIT(S): 5000 INJECTION INTRAVENOUS; SUBCUTANEOUS at 04:29

## 2021-02-25 RX ADMIN — Medication 10 MILLIGRAM(S): at 04:29

## 2021-02-25 RX ADMIN — Medication 50 GRAM(S): at 11:52

## 2021-02-25 RX ADMIN — PREGABALIN 1000 MICROGRAM(S): 225 CAPSULE ORAL at 11:56

## 2021-02-25 RX ADMIN — Medication 1 PACKET(S): at 17:58

## 2021-02-25 RX ADMIN — Medication 1 PACKET(S): at 04:29

## 2021-02-25 RX ADMIN — MEROPENEM 100 MILLIGRAM(S): 1 INJECTION INTRAVENOUS at 04:29

## 2021-02-25 RX ADMIN — MEROPENEM 100 MILLIGRAM(S): 1 INJECTION INTRAVENOUS at 17:58

## 2021-02-25 RX ADMIN — Medication 125 MILLIGRAM(S): at 11:55

## 2021-02-25 RX ADMIN — Medication 125 MILLIGRAM(S): at 04:29

## 2021-02-25 RX ADMIN — Medication 1 TABLET(S): at 11:55

## 2021-02-25 RX ADMIN — Medication 125 MILLIGRAM(S): at 00:27

## 2021-02-25 RX ADMIN — SODIUM CHLORIDE 75 MILLILITER(S): 9 INJECTION INTRAMUSCULAR; INTRAVENOUS; SUBCUTANEOUS at 14:07

## 2021-02-25 RX ADMIN — NYSTATIN CREAM 1 APPLICATION(S): 100000 CREAM TOPICAL at 04:30

## 2021-02-25 RX ADMIN — Medication 125 MILLIGRAM(S): at 17:58

## 2021-02-25 RX ADMIN — Medication 1 PACKET(S): at 11:56

## 2021-02-25 RX ADMIN — HEPARIN SODIUM 5000 UNIT(S): 5000 INJECTION INTRAVENOUS; SUBCUTANEOUS at 14:06

## 2021-02-25 RX ADMIN — MIRTAZAPINE 15 MILLIGRAM(S): 45 TABLET, ORALLY DISINTEGRATING ORAL at 21:48

## 2021-02-25 RX ADMIN — NYSTATIN CREAM 1 APPLICATION(S): 100000 CREAM TOPICAL at 17:34

## 2021-02-25 RX ADMIN — Medication 81 MILLIGRAM(S): at 11:56

## 2021-02-25 RX ADMIN — Medication 1 TABLET(S): at 17:34

## 2021-02-25 RX ADMIN — Medication 1000 UNIT(S): at 11:57

## 2021-02-25 RX ADMIN — Medication 10 MILLIGRAM(S): at 14:06

## 2021-02-25 RX ADMIN — SODIUM CHLORIDE 75 MILLILITER(S): 9 INJECTION INTRAMUSCULAR; INTRAVENOUS; SUBCUTANEOUS at 21:48

## 2021-02-25 RX ADMIN — Medication 10 MILLIGRAM(S): at 21:48

## 2021-02-25 RX ADMIN — HEPARIN SODIUM 5000 UNIT(S): 5000 INJECTION INTRAVENOUS; SUBCUTANEOUS at 21:48

## 2021-02-25 RX ADMIN — Medication 1 TABLET(S): at 04:29

## 2021-02-25 RX ADMIN — Medication 500 MILLIGRAM(S): at 11:56

## 2021-02-25 RX ADMIN — Medication 1 PACKET(S): at 09:19

## 2021-02-25 NOTE — PROGRESS NOTE ADULT - PROBLEM SELECTOR PLAN 4
New a-fib with RVR seen on EKG 2/23   - no anti-coagulation in setting of possible LGIB; discussed benefits/risk of anticoagulation at this time, patient amenable to starting if clinically necessary  - hemoglobin has been stable New a-fib with RVR seen on EKG 2/23   - no anti-coagulation in setting of possible LGIB; discussed benefits/risk of anticoagulation at this time, patient's family amenable to starting if clinically necessary  - hemoglobin has been stable

## 2021-02-25 NOTE — PROGRESS NOTE ADULT - SUBJECTIVE AND OBJECTIVE BOX
Patient is a 89y old  Female who presented with a chief complaint of GI Bleed, Vomiting (25 Feb 2021 09:45)      INTERVAL HPI/OVERNIGHT EVENTS:  prior events noted  +fever overnight  no diarrhea, no rectal bleeding   no BMs yesterday, 1 BM today    went down to have CT scan but family refused study/contrast per RN report    MEDICATIONS  (STANDING):  ascorbic acid 500 milliGRAM(s) Oral daily  aspirin enteric coated 81 milliGRAM(s) Oral daily  bethanechol 10 milliGRAM(s) Oral three times a day  cholecalciferol 1000 Unit(s) Oral daily  cyanocobalamin 1000 MICROGram(s) Oral daily  heparin   Injectable 5000 Unit(s) SubCutaneous every 8 hours  lactobacillus acidophilus 1 Tablet(s) Oral two times a day  meropenem  IVPB 1000 milliGRAM(s) IV Intermittent every 12 hours  mirtazapine 15 milliGRAM(s) Oral at bedtime  Nephro-portia 1 Tablet(s) Oral daily  nystatin Powder 1 Application(s) Topical two times a day  potassium phosphate / sodium phosphate Powder (PHOS-NaK) 1 Packet(s) Oral three times a day with meals  psyllium Powder 1 Packet(s) Oral two times a day  sodium chloride 0.9%. 1000 milliLiter(s) (75 mL/Hr) IV Continuous <Continuous>  vancomycin    Solution 125 milliGRAM(s) Oral every 6 hours    MEDICATIONS  (PRN):  acetaminophen   Tablet .. 650 milliGRAM(s) Oral every 12 hours PRN Temp greater or equal to 38C (100.4F), Mild Pain (1 - 3), Moderate Pain (4 - 6)  ondansetron    Tablet 4 milliGRAM(s) Oral every 8 hours PRN Nausea and/or Vomiting      Allergies  flu vaccine (Anaphylaxis)  No Known Drug Allergies    Intolerances  lactose (Unknown)      Review of Systems:  unable to obtain from pt    Vital Signs Last 24 Hrs  T(C): 37.3 (25 Feb 2021 08:30), Max: 39.3 (24 Feb 2021 21:02)  T(F): 99.2 (25 Feb 2021 08:30), Max: 102.7 (24 Feb 2021 21:02)  HR: 90 (25 Feb 2021 08:30) (90 - 125)  BP: 100/62 (25 Feb 2021 08:30) (91/53 - 140/77)  BP(mean): --  RR: 18 (25 Feb 2021 08:30) (17 - 18)  SpO2: 98% (25 Feb 2021 08:30) (98% - 99%)    PHYSICAL EXAM:  Constitutional: elderly female. appears comfortable, non toxic appearing  Neck: No LAD, supple no JVD  Respiratory: decreased BS at bases, no rales or wheeze  Cardiovascular: S1 and S2, RRR  Gastrointestinal: obese soft  ND NT no rebound guarding or rigidity +harding  Extremities: neg clubbing, cyanosis tr b/l LE edema  Vascular: 2+ peripheral pulses  Neurological: oriented to self, pleasantly confused.  moves all extremities  Skin: No rashes    LABS:                        9.6    6.03  )-----------( 343      ( 25 Feb 2021 06:36 )             29.8     02-25    128<L>  |  96  |  6<L>  ----------------------------<  75  4.0   |  19<L>  |  0.76    Ca    8.2<L>      25 Feb 2021 06:36  Phos  2.8     02-25  Mg     1.6     02-25      Lactate, Blood: 0.8 mmol/L (02.25.21 @ 06:36)   COVID-19 PCR . (02.24.21 @ 10:53)   [facility]  RADIOLOGY & ADDITIONAL TESTS:

## 2021-02-25 NOTE — PROGRESS NOTE ADULT - ASSESSMENT
89F w/ PMH of CAD s/p PCI, Chronic UTI's, MDD, HTN, HLD, [place] (hearing aids), dysphagia, prior COVID19 infection, and recent hospitalization for C. diff colitis (~2/7/21) presenting from rehab center for bloody bowel movement, likely LGIB secondary to infectious colitis vs. ischemic colitis.  Also found to be in septic shock, likely secondary to colitis iso recent C. diff.  COVID +, asymptomatic and without hypoxia. RRT 2/23 for hypotension, tachycardia with new afib and fever, currently on meropenem.

## 2021-02-25 NOTE — PROGRESS NOTE ADULT - PROBLEM SELECTOR PLAN 2
Pancolitis, most significant in descending and rectosigmoid colon. C diff negative x 2, less suspicion for recurrent C diff colitis. Most likely to be ischemic  - s/p Zosyn (2/14-19), Flagyl IV 500mg BID (2/14-19). s/p 6 day course  - decreased PO Vancomycin 125mg q6h and continue for 5 days (until 2/23) and c/w probiotics; continue taper 125 mg q6hrs x 7 days  - WBC remains elevated Pancolitis, most significant in descending and rectosigmoid colon. C diff negative x 2, less suspicion for recurrent C diff colitis. Most likely to be ischemic  - s/p Zosyn (2/14-19), Flagyl IV 500mg BID (2/14-19). s/p 6 day course  - decreased PO Vancomycin 125mg q6h and continue for 5 days (until 2/23) and c/w probiotics; continue taper 125 mg q6hrs x 7 days until 2/30   - WBC remains elevated Pancolitis, most significant in descending and rectosigmoid colon. C diff negative x 2, less suspicion for recurrent C diff colitis. Most likely to be ischemic  - s/p Zosyn (2/14-19), Flagyl IV 500mg BID (2/14-19). s/p 6 day course  - decreased PO Vancomycin 125mg q6h and continue for 5 days (until 2/23) and c/w probiotics; continue taper 125 mg q6hrs x 7 days until 2/30   - follow up repeat CT scans

## 2021-02-25 NOTE — PROGRESS NOTE ADULT - PROBLEM SELECTOR PLAN 3
UCx now growing Citrobacter, now febrile and hypotensive   - pending sensitivties   - Pessary removed, cleaned, replaced by Urogynecologist Dr. Hernandez's associate UCx now growing Citrobacter, now febrile and hypotensive   - pending sensitivities   - Pessary removed, cleaned, replaced by Urogynecologist Dr. Hernandez's associate

## 2021-02-25 NOTE — PROGRESS NOTE ADULT - PROBLEM SELECTOR PLAN 10
DVT ppx: SC heparin  Diet: dysphagia diet, lactose restricted; Hx of dysphagia  - dysphagia diet per speech/swallow with supplemental nutrition  Code: DNR/DNI, no pressors, no blood transfusions per HCP  Dispo: Rehab, COVID swab negative 2/21 DVT ppx: SC heparin  Diet: dysphagia diet, lactose restricted; Hx of dysphagia  - dysphagia diet per speech/swallow with supplemental nutrition  Code: DNR/DNI, no pressors, no blood transfusions per HCP  Dispo: Rehab, COVID swab negative 2/21, 2/24

## 2021-02-25 NOTE — PROGRESS NOTE ADULT - SUBJECTIVE AND OBJECTIVE BOX
Patient is a 89y old  Female who presents with a chief complaint of GI Bleed, Vomiting (25 Feb 2021 06:55)    Being followed by ID for        Interval history:  No other acute events      ROS:  No cough,SOB,CP  No N/V/D  No abd pain  No urinary complaints  No HA  No joint or limb pain  No other complaints    PAST MEDICAL & SURGICAL HISTORY:  COVID-19    Urinary tract infection    CAD in native artery    Dyslipidemia    Depression    Hyponatremia    Hypertension    History of repair of hip fracture      Allergies    flu vaccine (Anaphylaxis)  No Known Drug Allergies    Intolerances    lactose (Unknown)    Antimicrobials:    meropenem  IVPB 1000 milliGRAM(s) IV Intermittent every 12 hours  vancomycin    Solution 125 milliGRAM(s) Oral every 6 hours    MEDICATIONS  (STANDING):  ascorbic acid 500 milliGRAM(s) Oral daily  aspirin enteric coated 81 milliGRAM(s) Oral daily  bethanechol 10 milliGRAM(s) Oral three times a day  cholecalciferol 1000 Unit(s) Oral daily  cyanocobalamin 1000 MICROGram(s) Oral daily  heparin   Injectable 5000 Unit(s) SubCutaneous every 8 hours  lactobacillus acidophilus 1 Tablet(s) Oral two times a day  magnesium sulfate  IVPB 2 Gram(s) IV Intermittent once  meropenem  IVPB 1000 milliGRAM(s) IV Intermittent every 12 hours  mirtazapine 15 milliGRAM(s) Oral at bedtime  Nephro-portia 1 Tablet(s) Oral daily  nystatin Powder 1 Application(s) Topical two times a day  potassium phosphate / sodium phosphate Powder (PHOS-NaK) 1 Packet(s) Oral three times a day with meals  psyllium Powder 1 Packet(s) Oral two times a day  sodium chloride 0.9%. 1000 milliLiter(s) (75 mL/Hr) IV Continuous <Continuous>  vancomycin    Solution 125 milliGRAM(s) Oral every 6 hours      Vital Signs Last 24 Hrs  T(C): 37.3 (02-25-21 @ 08:30), Max: 39.3 (02-24-21 @ 21:02)  T(F): 99.2 (02-25-21 @ 08:30), Max: 102.7 (02-24-21 @ 21:02)  HR: 90 (02-25-21 @ 08:30) (90 - 125)  BP: 100/62 (02-25-21 @ 08:30) (91/53 - 140/77)  BP(mean): --  RR: 18 (02-25-21 @ 08:30) (17 - 18)  SpO2: 98% (02-25-21 @ 08:30) (98% - 99%)    Physical Exam:    Constitutional well preserved,comfortable,pleasant    HEENT PERRLA EOMI,No pallor or icterus    No oral exudate or erythema    Neck supple no JVD or LN    Chest Good AE,CTA    CVS RRR S1 S2 WNl No murmur or rub or gallop    Abd soft BS normal No tenderness no masses    Ext No cyanosis clubbing or edema    IV site no erythema tenderness or discharge    Joints no swelling or LOM    CNS AAO X 3 no focal    Lab Data:                          9.6    6.03  )-----------( 343      ( 25 Feb 2021 06:36 )             29.8       02-25    128<L>  |  96  |  6<L>  ----------------------------<  75  4.0   |  19<L>  |  0.76    Ca    8.2<L>      25 Feb 2021 06:36  Phos  2.8     02-25  Mg     1.6     02-25    TPro  5.2<L>  /  Alb  2.1<L>  /  TBili  0.2  /  DBili  x   /  AST  16  /  ALT  7<L>  /  AlkPhos  62  02-23          .Blood Blood-Peripheral  02-23-21   No growth to date.  --  --      .Urine Catheterized  02-23-21   >100,000 CFU/ml Citrobacter koseri  --  --                    WBC Count: 6.03 (02-25-21 @ 06:36)  WBC Count: 6.73 (02-24-21 @ 09:35)  WBC Count: 11.26 (02-23-21 @ 10:22)  WBC Count: 13.20 (02-22-21 @ 23:38)  WBC Count: 16.84 (02-22-21 @ 07:13)  WBC Count: 17.58 (02-21-21 @ 07:18)  WBC Count: 13.91 (02-20-21 @ 06:45)  WBC Count: 11.64 (02-19-21 @ 07:02)             Patient is a 89y old  Female who presents with a chief complaint of GI Bleed, Vomiting (25 Feb 2021 06:55)    Being followed by ID for        Interval history:  pt with diarrhea this morning  remains febrile   unable to provide history  No other acute events        PAST MEDICAL & SURGICAL HISTORY:  COVID-19    Urinary tract infection    CAD in native artery    Dyslipidemia    Depression    Hyponatremia    Hypertension    History of repair of hip fracture      Allergies    flu vaccine (Anaphylaxis)  No Known Drug Allergies    Intolerances    lactose (Unknown)    Antimicrobials:    meropenem  IVPB 1000 milliGRAM(s) IV Intermittent every 12 hours  vancomycin    Solution 125 milliGRAM(s) Oral every 6 hours    MEDICATIONS  (STANDING):  ascorbic acid 500 milliGRAM(s) Oral daily  aspirin enteric coated 81 milliGRAM(s) Oral daily  bethanechol 10 milliGRAM(s) Oral three times a day  cholecalciferol 1000 Unit(s) Oral daily  cyanocobalamin 1000 MICROGram(s) Oral daily  heparin   Injectable 5000 Unit(s) SubCutaneous every 8 hours  lactobacillus acidophilus 1 Tablet(s) Oral two times a day  magnesium sulfate  IVPB 2 Gram(s) IV Intermittent once  meropenem  IVPB 1000 milliGRAM(s) IV Intermittent every 12 hours  mirtazapine 15 milliGRAM(s) Oral at bedtime  Nephro-portia 1 Tablet(s) Oral daily  nystatin Powder 1 Application(s) Topical two times a day  potassium phosphate / sodium phosphate Powder (PHOS-NaK) 1 Packet(s) Oral three times a day with meals  psyllium Powder 1 Packet(s) Oral two times a day  sodium chloride 0.9%. 1000 milliLiter(s) (75 mL/Hr) IV Continuous <Continuous>  vancomycin    Solution 125 milliGRAM(s) Oral every 6 hours      Vital Signs Last 24 Hrs  T(C): 37.3 (02-25-21 @ 08:30), Max: 39.3 (02-24-21 @ 21:02)  T(F): 99.2 (02-25-21 @ 08:30), Max: 102.7 (02-24-21 @ 21:02)  HR: 90 (02-25-21 @ 08:30) (90 - 125)  BP: 100/62 (02-25-21 @ 08:30) (91/53 - 140/77)  BP(mean): --  RR: 18 (02-25-21 @ 08:30) (17 - 18)  SpO2: 98% (02-25-21 @ 08:30) (98% - 99%)    Physical Exam:    Constitutional resting quietly    HEENT PERRLA EOMI,No pallor or icterus    No oral exudate or erythema    Neck supple no JVD or LN    Chest Good AE,CTA    CVS  S1 S2    Abd soft BS normal No tenderness     Ext No cyanosis clubbing or edema    IV site no erythema tenderness or discharge        Lab Data:                          9.6    6.03  )-----------( 343      ( 25 Feb 2021 06:36 )             29.8       02-25    128<L>  |  96  |  6<L>  ----------------------------<  75  4.0   |  19<L>  |  0.76    Ca    8.2<L>      25 Feb 2021 06:36  Phos  2.8     02-25  Mg     1.6     02-25    TPro  5.2<L>  /  Alb  2.1<L>  /  TBili  0.2  /  DBili  x   /  AST  16  /  ALT  7<L>  /  AlkPhos  62  02-23          .Blood Blood-Peripheral  02-23-21   No growth to date.  --  --      .Urine Catheterized  02-23-21   >100,000 CFU/ml Citrobacter koseri  --  --      < from: [facility] Duplex Lower Ext Vein Scan, Bilat (02.25.21 @ 11:36) >    IMPRESSION:  Persistent right calf DVT confined to the soleal vein. No evidence of interval clot propagation.    < end of copied text >        < from: Xray Chest 1 View- PORTABLE-Urgent (Xray Chest 1 View- PORTABLE-Urgent .) (02.23.21 @ 21:00) >  EXAM:  XR CHEST PORTABLE URGENT 1V                            PROCEDURE DATE:  02/23/2021            INTERPRETATION:  A single chest x-ray was obtained on February 23, 2021.    Indication: Septic.    Impression:    The heart is normal in size. Platelike atelectasis left lower lobe otherwise the lungs appear to be clear. Calcified aortic knob. No pleural effusion. No pneumothorax.                BRAULIO LOVE MD; Attending Radiologist  This document has been electronically signed. Feb 24 202110:43AM    < end of copied text >          WBC Count: 6.03 (02-25-21 @ 06:36)  WBC Count: 6.73 (02-24-21 @ 09:35)  WBC Count: 11.26 (02-23-21 @ 10:22)  WBC Count: 13.20 (02-22-21 @ 23:38)  WBC Count: 16.84 (02-22-21 @ 07:13)  WBC Count: 17.58 (02-21-21 @ 07:18)  WBC Count: 13.91 (02-20-21 @ 06:45)  WBC Count: 11.64 (02-19-21 @ 07:02)

## 2021-02-25 NOTE — PROGRESS NOTE ADULT - PROBLEM SELECTOR PLAN 8
Previous infection 2 months ago. Per ID, initial positive COVID PCR likely remnant of previous infection.  - Repeat COVID PCR neg on 2/18  - COVID negative today 2/21  - Sating well on RA Previous infection 2 months ago. Per ID, initial positive COVID PCR likely remnant of previous infection.  - Repeat COVID PCR neg on 2/18  - COVID negative 2/21, 2/24  - Sating well on RA

## 2021-02-25 NOTE — PROGRESS NOTE ADULT - ASSESSMENT
89F w/ PMH of CAD s/p PCI, Chronic UTI's, MDD, HTN, HLD, [place] (hearing aids), dysphagia, COVID19 infection (2 months ago), and recent hospitalization for C. diff colitis (~2/7/21) presenting from [facility] after episode of vomiting and bloody bowel movement on 2/13. Found to have left sided colitis on imaging   Acute Right Below Knee DVT     #Colitis - given clinical picture likely 2/2 ischemic.  Per family wishes no aggressive measures or interventions/procedures.  DNR/DNI in place.   stool C diff negative x2 (2/14, 2/18)    #s/p RRT and fevers  +UTI - Citrobacter koseri. Persistent fevers; family refused CT scan    COVID negative    RECS:  -Monitor BMs; replete lytes PRN  -ID following;  Plan for PO Vanco taper per ID. Now on Meropenem and IV Diflucan following RRT and fevers  -avoid hypotension  -Leukocytosis improving and normal lactate  -monitor clinical exam  -continue  Metamucil BID in attempt to bulk stool; diarrhea resolved  -PO Bacid BID  -PO diet per SLP recs    Discussed with  ID attending    Zain Paris PA-C    [facility]  [phone]  After hours and weekend coverage [phone]

## 2021-02-25 NOTE — PROGRESS NOTE ADULT - PROBLEM SELECTOR PLAN 1
RRT 2/23 for fever, tachycardia with a-fib and hypotension  - continue meropenem 2g q12 hrs  - blood pressure responded appropriately to 2L NS   - UA + for many bacteria, +LE, nitrites, leukocytosis   - UCx growing Citrobacter, pending final results and sensitivities   - hemodynamically stable; continue to monitor vitals q4hrs  - follow up blood/urine cultures and repeat COVID RRT 2/23 for fever, tachycardia with a-fib and hypotension, continues to have fevers overnight  - ordered CT C/A/P for assessment of new infection/worsening colitis   - continue meropenem 1g q12 hrs and fluconazole 200 daily   - UA + for many bacteria, +LE, nitrites, leukocytosis   - UCx growing Citrobacter, pending final results and sensitivities   - hemodynamically stable; continue to monitor vitals q4hrs  - follow up blood/urine cultures and repeat COVID RRT 2/23 for fever, tachycardia with a-fib and hypotension, continues to have fevers overnight, leukoycytosis now improving  - ordered CT C/A/P for assessment of new infection/worsening colitis   - continue meropenem 1g q12 hrs ; d/c fluconazole   - UA + for many bacteria, +LE, nitrites, leukocytosis   - UCx growing Citrobacter, pending final results and sensitivities   - hemodynamically stable; continue to monitor vitals q4hrs  - follow up blood/urine cultures and repeat COVID

## 2021-02-25 NOTE — PROGRESS NOTE ADULT - PROBLEM SELECTOR PLAN 5
Sodium today 131   - likely in setting of decreased PO intake vs SIADH  - monitor BMP and electrolytes Sodium today 128  - likely in setting of decreased PO intake vs SIADH  - monitor BMP and electrolytes

## 2021-02-25 NOTE — PROGRESS NOTE ADULT - SUBJECTIVE AND OBJECTIVE BOX
Internal Medicine   Blake Moreno | PGY-1    OVERNIGHT EVENTS: Patient spiked fever overnight to 102. Received tylenol. Currently on meropenem 1 g q12 hrs and fluconazole.     SUBJECTIVE: Patient was seen and examined at bedside this morning. Denies any nausea/vomiting/diarrhea, headache, shortness of breath or chest pain.       MEDICATIONS  (STANDING):  ascorbic acid 500 milliGRAM(s) Oral daily  aspirin enteric coated 81 milliGRAM(s) Oral daily  bethanechol 10 milliGRAM(s) Oral three times a day  cholecalciferol 1000 Unit(s) Oral daily  cyanocobalamin 1000 MICROGram(s) Oral daily  fluconAZOLE IVPB      fluconAZOLE IVPB 200 milliGRAM(s) IV Intermittent every 24 hours  heparin   Injectable 5000 Unit(s) SubCutaneous every 8 hours  lactobacillus acidophilus 1 Tablet(s) Oral two times a day  meropenem  IVPB 1000 milliGRAM(s) IV Intermittent every 12 hours  mirtazapine 15 milliGRAM(s) Oral at bedtime  Nephro-portia 1 Tablet(s) Oral daily  nystatin Powder 1 Application(s) Topical two times a day  potassium phosphate / sodium phosphate Powder (PHOS-NaK) 1 Packet(s) Oral three times a day with meals  psyllium Powder 1 Packet(s) Oral two times a day  sodium chloride 0.9%. 1000 milliLiter(s) (75 mL/Hr) IV Continuous <Continuous>  vancomycin    Solution 125 milliGRAM(s) Oral every 6 hours    MEDICATIONS  (PRN):  acetaminophen   Tablet .. 650 milliGRAM(s) Oral every 12 hours PRN Temp greater or equal to 38C (100.4F), Mild Pain (1 - 3), Moderate Pain (4 - 6)  ondansetron    Tablet 4 milliGRAM(s) Oral every 8 hours PRN Nausea and/or Vomiting        T(F): 99 (02-25-21 @ 04:17), Max: 102.7 (02-24-21 @ 21:02)  HR: 98 (02-25-21 @ 04:17) (90 - 125)  BP: 95/65 (02-25-21 @ 04:17) (91/53 - 140/77)  BP(mean): --  RR: 18 (02-25-21 @ 04:17) (17 - 18)  SpO2: 98% (02-25-21 @ 04:17) (98% - 99%)    PHYSICAL EXAM:   Gen: Awake, NAD, minimally conversational  HEENT: NCAT, PERRL, EOMI, clear conjunctiva, no scleral icterus  Neck: Supple, no JVD, no LAD  CV: RRR, S1S2, no m/r/g  Resp: CTAB, normal respiratory effort  Abd: Soft, L sided TTP on soft and deep palpation, no rebound or guarding,   Ext: minimal R calf swelling compared to L, no clubbing or cyanosis  Neuro: minimally conversational, following verbal commands, nods/shakes head to questions   Skin: warm, perfused  TELEMETRY:    LABS:                        10.0   6.73  )-----------( 341      ( 24 Feb 2021 09:35 )             30.4     02-24    129<L>  |  97  |  5<L>  ----------------------------<  88  3.9   |  25  |  0.82    Ca    8.2<L>      24 Feb 2021 09:35  Phos  3.0     02-24  Mg     1.6     02-24    TPro  5.2<L>  /  Alb  2.1<L>  /  TBili  0.2  /  DBili  x   /  AST  16  /  ALT  7<L>  /  AlkPhos  62  02-23            Creatinine Trend: 0.82<--, 0.78<--, 0.87<--, 0.87<--, 0.93<--, 0.99<--  I&O's Summary    23 Feb 2021 07:01  -  24 Feb 2021 07:00  --------------------------------------------------------  IN: 300 mL / OUT: 1125 mL / NET: -825 mL    24 Feb 2021 07:01  -  25 Feb 2021 06:55  --------------------------------------------------------  IN: 480 mL / OUT: 1000 mL / NET: -520 mL      BNP    RADIOLOGY & ADDITIONAL STUDIES:             Internal Medicine   Blake Moreno | PGY-1    OVERNIGHT EVENTS: Patient spiked fever overnight to 102. Received tylenol. Currently on meropenem 1 g q12 hrs and fluconazole.     SUBJECTIVE: Patient was seen and examined at bedside this morning. Denies any nausea/vomiting/diarrhea, headache, shortness of breath or chest pain.     INTERVAL EVENTS: Persistent right calf DVT confined to the soleal vein. No evidence of interval clot propagation. Fluconazole d/pancho.     MEDICATIONS  (STANDING):  ascorbic acid 500 milliGRAM(s) Oral daily  aspirin enteric coated 81 milliGRAM(s) Oral daily  bethanechol 10 milliGRAM(s) Oral three times a day  cholecalciferol 1000 Unit(s) Oral daily  cyanocobalamin 1000 MICROGram(s) Oral daily  fluconAZOLE IVPB      fluconAZOLE IVPB 200 milliGRAM(s) IV Intermittent every 24 hours  heparin   Injectable 5000 Unit(s) SubCutaneous every 8 hours  lactobacillus acidophilus 1 Tablet(s) Oral two times a day  meropenem  IVPB 1000 milliGRAM(s) IV Intermittent every 12 hours  mirtazapine 15 milliGRAM(s) Oral at bedtime  Nephro-oprtia 1 Tablet(s) Oral daily  nystatin Powder 1 Application(s) Topical two times a day  potassium phosphate / sodium phosphate Powder (PHOS-NaK) 1 Packet(s) Oral three times a day with meals  psyllium Powder 1 Packet(s) Oral two times a day  sodium chloride 0.9%. 1000 milliLiter(s) (75 mL/Hr) IV Continuous <Continuous>  vancomycin    Solution 125 milliGRAM(s) Oral every 6 hours    MEDICATIONS  (PRN):  acetaminophen   Tablet .. 650 milliGRAM(s) Oral every 12 hours PRN Temp greater or equal to 38C (100.4F), Mild Pain (1 - 3), Moderate Pain (4 - 6)  ondansetron    Tablet 4 milliGRAM(s) Oral every 8 hours PRN Nausea and/or Vomiting        T(F): 99 (02-25-21 @ 04:17), Max: 102.7 (02-24-21 @ 21:02)  HR: 98 (02-25-21 @ 04:17) (90 - 125)  BP: 95/65 (02-25-21 @ 04:17) (91/53 - 140/77)  BP(mean): --  RR: 18 (02-25-21 @ 04:17) (17 - 18)  SpO2: 98% (02-25-21 @ 04:17) (98% - 99%)    PHYSICAL EXAM:   Gen: Awake, NAD, minimally conversational  HEENT: NCAT, PERRL, EOMI, clear conjunctiva, no scleral icterus  Neck: Supple, no JVD, no LAD  CV: RRR, S1S2, no m/r/g  Resp: CTAB, normal respiratory effort  Abd: Soft, L sided TTP on soft and deep palpation, no rebound or guarding,   Ext: minimal R calf swelling compared to L, no clubbing or cyanosis  Neuro: minimally conversational, following verbal commands, nods/shakes head to questions   Skin: warm, perfused  TELEMETRY:    LABS:                        10.0   6.73  )-----------( 341      ( 24 Feb 2021 09:35 )             30.4     02-24    129<L>  |  97  |  5<L>  ----------------------------<  88  3.9   |  25  |  0.82    Ca    8.2<L>      24 Feb 2021 09:35  Phos  3.0     02-24  Mg     1.6     02-24    TPro  5.2<L>  /  Alb  2.1<L>  /  TBili  0.2  /  DBili  x   /  AST  16  /  ALT  7<L>  /  AlkPhos  62  02-23            Creatinine Trend: 0.82<--, 0.78<--, 0.87<--, 0.87<--, 0.93<--, 0.99<--  I&O's Summary    23 Feb 2021 07:01  -  24 Feb 2021 07:00  --------------------------------------------------------  IN: 300 mL / OUT: 1125 mL / NET: -825 mL    24 Feb 2021 07:01  -  25 Feb 2021 06:55  --------------------------------------------------------  IN: 480 mL / OUT: 1000 mL / NET: -520 mL      BNP    RADIOLOGY & ADDITIONAL STUDIES:    < from: [facility] Duplex Lower Ext Vein Scan, [place] (02.25.21 @ 11:36) >    INTERPRETATION:  CLINICAL INFORMATION: Follow-up right calf DVT.    COMPARISON: Bilateral lower extremity venous duplex study dated 2/16/2021.    TECHNIQUE: Duplex sonography of the BILATERAL LOWER extremity veins with color and spectral Doppler, with and without compression.    FINDINGS:    In the right leg, there is persistent occlusive thrombus within the soleal vein of the calf. Absence of compression and venous flow is noted here. No propagation to the popliteal vein or veins of the thigh is noted.    In the left leg, there is no evidence of DVT.    IMPRESSION:  Persistent right calf DVT confined to the soleal vein. No evidence of interval clot propagation.    < end of copied text >

## 2021-02-25 NOTE — PROGRESS NOTE ADULT - PROBLEM SELECTOR PLAN 7
R leg swelling  - Duplex + for R soleus DVT, below the knee  - no tx given DVT below the knee. Repeat duplex in 2 weeks (3/2) outpatient R leg swelling  - Duplex + for R soleus DVT, below the knee  - no tx given DVT below the knee  - ordered new doppler studies, follow up results

## 2021-02-25 NOTE — PROGRESS NOTE ADULT - ASSESSMENT
89F w/ PMH of CAD s/p PCI, Chronic UTI's, MDD, HTN, HLD, [place] (hearing aids), dysphagia, COVID19 infection (2 months ago, s/p dexa, remdesivir, convalescent plasma, not intubated), and recent hospitalization for C. diff colitis (~2/7/21) presenting from [facility] after episode of vomiting and bloody bowel movement on 2/13.     unclear if this still c diff vs ischemic bowel  vs microperforation vs other         pt was treated initially for c diff, ischemic colitis and possible UTI  urine only grew yeast  antibiotics were tapered and pt improved with hydration  tuesday  with acute decompensation  plaed back on antibioitcs  likely UTI  change meropenem to 1 gm Q 8   continue po vancomycin to 125 mg po q 6 hours    await ct     repeat C diff if diarrhea persists  check lactate    DVT- per team

## 2021-02-25 NOTE — PROGRESS NOTE ADULT - PROBLEM SELECTOR PLAN 6
Hx of urinary retention and chronic UTI  - c/w home bethanechol. Hold pyridium.  - Failed multiple TOV. Harding placed 2/19, will likely need to d/c with harding

## 2021-02-25 NOTE — PROGRESS NOTE ADULT - PROBLEM SELECTOR PLAN 9
Hx of multiple CAD s/p 1 stent  - continue aspirin 81 mg daily   - Holding Isosorbide ER 60mg and enalapril iso soft BP's

## 2021-02-26 LAB
-  AMIKACIN: SIGNIFICANT CHANGE UP
-  AMOXICILLIN/CLAVULANIC ACID: SIGNIFICANT CHANGE UP
-  AMPICILLIN/SULBACTAM: SIGNIFICANT CHANGE UP
-  AMPICILLIN: SIGNIFICANT CHANGE UP
-  AZTREONAM: SIGNIFICANT CHANGE UP
-  CEFAZOLIN: SIGNIFICANT CHANGE UP
-  CEFEPIME: SIGNIFICANT CHANGE UP
-  CEFOXITIN: SIGNIFICANT CHANGE UP
-  CEFTRIAXONE: SIGNIFICANT CHANGE UP
-  CIPROFLOXACIN: SIGNIFICANT CHANGE UP
-  ERTAPENEM: SIGNIFICANT CHANGE UP
-  GENTAMICIN: SIGNIFICANT CHANGE UP
-  IMIPENEM: SIGNIFICANT CHANGE UP
-  LEVOFLOXACIN: SIGNIFICANT CHANGE UP
-  MEROPENEM: SIGNIFICANT CHANGE UP
-  NITROFURANTOIN: SIGNIFICANT CHANGE UP
-  PIPERACILLIN/TAZOBACTAM: SIGNIFICANT CHANGE UP
-  TIGECYCLINE: SIGNIFICANT CHANGE UP
-  TOBRAMYCIN: SIGNIFICANT CHANGE UP
-  TRIMETHOPRIM/SULFAMETHOXAZOLE: SIGNIFICANT CHANGE UP
ALBUMIN SERPL ELPH-MCNC: 2.4 G/DL — LOW (ref 3.3–5)
ALP SERPL-CCNC: 57 U/L — SIGNIFICANT CHANGE UP (ref 40–120)
ALT FLD-CCNC: 11 U/L — SIGNIFICANT CHANGE UP (ref 10–45)
ANION GAP SERPL CALC-SCNC: 10 MMOL/L — SIGNIFICANT CHANGE UP (ref 5–17)
ANION GAP SERPL CALC-SCNC: 12 MMOL/L — SIGNIFICANT CHANGE UP (ref 5–17)
ANION GAP SERPL CALC-SCNC: 12 MMOL/L — SIGNIFICANT CHANGE UP (ref 5–17)
APPEARANCE UR: ABNORMAL
AST SERPL-CCNC: 25 U/L — SIGNIFICANT CHANGE UP (ref 10–40)
BACTERIA # UR AUTO: NEGATIVE — SIGNIFICANT CHANGE UP
BASOPHILS # BLD AUTO: 0.02 K/UL — SIGNIFICANT CHANGE UP (ref 0–0.2)
BASOPHILS NFR BLD AUTO: 0.3 % — SIGNIFICANT CHANGE UP (ref 0–2)
BILIRUB SERPL-MCNC: 0.1 MG/DL — LOW (ref 0.2–1.2)
BILIRUB UR-MCNC: NEGATIVE — SIGNIFICANT CHANGE UP
BUN SERPL-MCNC: 6 MG/DL — LOW (ref 7–23)
BUN SERPL-MCNC: 6 MG/DL — LOW (ref 7–23)
BUN SERPL-MCNC: 7 MG/DL — SIGNIFICANT CHANGE UP (ref 7–23)
CALCIUM SERPL-MCNC: 8.3 MG/DL — LOW (ref 8.4–10.5)
CALCIUM SERPL-MCNC: 8.4 MG/DL — SIGNIFICANT CHANGE UP (ref 8.4–10.5)
CALCIUM SERPL-MCNC: 8.4 MG/DL — SIGNIFICANT CHANGE UP (ref 8.4–10.5)
CHLORIDE SERPL-SCNC: 90 MMOL/L — LOW (ref 96–108)
CHLORIDE SERPL-SCNC: 90 MMOL/L — LOW (ref 96–108)
CHLORIDE SERPL-SCNC: 92 MMOL/L — LOW (ref 96–108)
CHLORIDE UR-SCNC: 77 MMOL/L — SIGNIFICANT CHANGE UP
CO2 SERPL-SCNC: 21 MMOL/L — LOW (ref 22–31)
CO2 SERPL-SCNC: 21 MMOL/L — LOW (ref 22–31)
CO2 SERPL-SCNC: 23 MMOL/L — SIGNIFICANT CHANGE UP (ref 22–31)
COLOR SPEC: YELLOW — SIGNIFICANT CHANGE UP
CORTIS AM PEAK SERPL-MCNC: 12.7 UG/DL — SIGNIFICANT CHANGE UP (ref 6–18.4)
CREAT ?TM UR-MCNC: 54 MG/DL — SIGNIFICANT CHANGE UP
CREAT SERPL-MCNC: 0.65 MG/DL — SIGNIFICANT CHANGE UP (ref 0.5–1.3)
CREAT SERPL-MCNC: 0.67 MG/DL — SIGNIFICANT CHANGE UP (ref 0.5–1.3)
CREAT SERPL-MCNC: 0.7 MG/DL — SIGNIFICANT CHANGE UP (ref 0.5–1.3)
CULTURE RESULTS: SIGNIFICANT CHANGE UP
DIFF PNL FLD: ABNORMAL
EOSINOPHIL # BLD AUTO: 0.08 K/UL — SIGNIFICANT CHANGE UP (ref 0–0.5)
EOSINOPHIL NFR BLD AUTO: 1.1 % — SIGNIFICANT CHANGE UP (ref 0–6)
EPI CELLS # UR: 1 /HPF — SIGNIFICANT CHANGE UP
GLUCOSE BLDC GLUCOMTR-MCNC: 96 MG/DL — SIGNIFICANT CHANGE UP (ref 70–99)
GLUCOSE BLDC GLUCOMTR-MCNC: 98 MG/DL — SIGNIFICANT CHANGE UP (ref 70–99)
GLUCOSE SERPL-MCNC: 84 MG/DL — SIGNIFICANT CHANGE UP (ref 70–99)
GLUCOSE SERPL-MCNC: 91 MG/DL — SIGNIFICANT CHANGE UP (ref 70–99)
GLUCOSE SERPL-MCNC: 94 MG/DL — SIGNIFICANT CHANGE UP (ref 70–99)
GLUCOSE UR QL: NEGATIVE — SIGNIFICANT CHANGE UP
HCT VFR BLD CALC: 27.8 % — LOW (ref 34.5–45)
HGB BLD-MCNC: 9.2 G/DL — LOW (ref 11.5–15.5)
HYALINE CASTS # UR AUTO: 2 /LPF — SIGNIFICANT CHANGE UP (ref 0–2)
IMM GRANULOCYTES NFR BLD AUTO: 1 % — SIGNIFICANT CHANGE UP (ref 0–1.5)
KETONES UR-MCNC: SIGNIFICANT CHANGE UP
LACTATE SERPL-SCNC: 0.7 MMOL/L — SIGNIFICANT CHANGE UP (ref 0.7–2)
LEUKOCYTE ESTERASE UR-ACNC: ABNORMAL
LYMPHOCYTES # BLD AUTO: 1.67 K/UL — SIGNIFICANT CHANGE UP (ref 1–3.3)
LYMPHOCYTES # BLD AUTO: 23.4 % — SIGNIFICANT CHANGE UP (ref 13–44)
MAGNESIUM SERPL-MCNC: 2 MG/DL — SIGNIFICANT CHANGE UP (ref 1.6–2.6)
MCHC RBC-ENTMCNC: 31.8 PG — SIGNIFICANT CHANGE UP (ref 27–34)
MCHC RBC-ENTMCNC: 33.1 GM/DL — SIGNIFICANT CHANGE UP (ref 32–36)
MCV RBC AUTO: 96.2 FL — SIGNIFICANT CHANGE UP (ref 80–100)
METHOD TYPE: SIGNIFICANT CHANGE UP
MONOCYTES # BLD AUTO: 0.8 K/UL — SIGNIFICANT CHANGE UP (ref 0–0.9)
MONOCYTES NFR BLD AUTO: 11.2 % — SIGNIFICANT CHANGE UP (ref 2–14)
NEUTROPHILS # BLD AUTO: 4.51 K/UL — SIGNIFICANT CHANGE UP (ref 1.8–7.4)
NEUTROPHILS NFR BLD AUTO: 63 % — SIGNIFICANT CHANGE UP (ref 43–77)
NITRITE UR-MCNC: NEGATIVE — SIGNIFICANT CHANGE UP
NRBC # BLD: 0 /100 WBCS — SIGNIFICANT CHANGE UP (ref 0–0)
ORGANISM # SPEC MICROSCOPIC CNT: SIGNIFICANT CHANGE UP
ORGANISM # SPEC MICROSCOPIC CNT: SIGNIFICANT CHANGE UP
OSMOLALITY UR: 265 MOS/KG — LOW (ref 300–900)
OSMOLALITY UR: 385 MOS/KG — SIGNIFICANT CHANGE UP (ref 300–900)
PH UR: 7 — SIGNIFICANT CHANGE UP (ref 5–8)
PHOSPHATE SERPL-MCNC: 3 MG/DL — SIGNIFICANT CHANGE UP (ref 2.5–4.5)
PLATELET # BLD AUTO: 390 K/UL — SIGNIFICANT CHANGE UP (ref 150–400)
POTASSIUM SERPL-MCNC: 3.7 MMOL/L — SIGNIFICANT CHANGE UP (ref 3.5–5.3)
POTASSIUM SERPL-MCNC: 3.8 MMOL/L — SIGNIFICANT CHANGE UP (ref 3.5–5.3)
POTASSIUM SERPL-MCNC: 4.1 MMOL/L — SIGNIFICANT CHANGE UP (ref 3.5–5.3)
POTASSIUM SERPL-SCNC: 3.7 MMOL/L — SIGNIFICANT CHANGE UP (ref 3.5–5.3)
POTASSIUM SERPL-SCNC: 3.8 MMOL/L — SIGNIFICANT CHANGE UP (ref 3.5–5.3)
POTASSIUM SERPL-SCNC: 4.1 MMOL/L — SIGNIFICANT CHANGE UP (ref 3.5–5.3)
POTASSIUM UR-SCNC: 37 MMOL/L — SIGNIFICANT CHANGE UP
PROT SERPL-MCNC: 5.4 G/DL — LOW (ref 6–8.3)
PROT UR-MCNC: ABNORMAL
RBC # BLD: 2.89 M/UL — LOW (ref 3.8–5.2)
RBC # FLD: 14.7 % — HIGH (ref 10.3–14.5)
RBC CASTS # UR COMP ASSIST: 3 /HPF — SIGNIFICANT CHANGE UP (ref 0–4)
SODIUM SERPL-SCNC: 123 MMOL/L — LOW (ref 135–145)
SODIUM SERPL-SCNC: 123 MMOL/L — LOW (ref 135–145)
SODIUM SERPL-SCNC: 125 MMOL/L — LOW (ref 135–145)
SODIUM UR-SCNC: 105 MMOL/L — SIGNIFICANT CHANGE UP
SODIUM UR-SCNC: 80 MMOL/L — SIGNIFICANT CHANGE UP
SP GR SPEC: 1.03 — HIGH (ref 1.01–1.02)
SPECIMEN SOURCE: SIGNIFICANT CHANGE UP
UROBILINOGEN FLD QL: NEGATIVE — SIGNIFICANT CHANGE UP
WBC # BLD: 7.15 K/UL — SIGNIFICANT CHANGE UP (ref 3.8–10.5)
WBC # FLD AUTO: 7.15 K/UL — SIGNIFICANT CHANGE UP (ref 3.8–10.5)
WBC UR QL: 251 /HPF — HIGH (ref 0–5)

## 2021-02-26 PROCEDURE — 99232 SBSQ HOSP IP/OBS MODERATE 35: CPT | Mod: CS

## 2021-02-26 PROCEDURE — 99232 SBSQ HOSP IP/OBS MODERATE 35: CPT

## 2021-02-26 PROCEDURE — 99222 1ST HOSP IP/OBS MODERATE 55: CPT | Mod: GC

## 2021-02-26 PROCEDURE — 99233 SBSQ HOSP IP/OBS HIGH 50: CPT | Mod: GC

## 2021-02-26 RX ORDER — SODIUM CHLORIDE 9 MG/ML
1 INJECTION INTRAMUSCULAR; INTRAVENOUS; SUBCUTANEOUS THREE TIMES A DAY
Refills: 0 | Status: DISCONTINUED | OUTPATIENT
Start: 2021-02-26 | End: 2021-02-27

## 2021-02-26 RX ORDER — SODIUM CHLORIDE 5 G/100ML
500 INJECTION, SOLUTION INTRAVENOUS
Refills: 0 | Status: DISCONTINUED | OUTPATIENT
Start: 2021-02-26 | End: 2021-02-26

## 2021-02-26 RX ADMIN — Medication 125 MILLIGRAM(S): at 14:00

## 2021-02-26 RX ADMIN — MEROPENEM 100 MILLIGRAM(S): 1 INJECTION INTRAVENOUS at 18:37

## 2021-02-26 RX ADMIN — Medication 1 PACKET(S): at 05:36

## 2021-02-26 RX ADMIN — Medication 125 MILLIGRAM(S): at 05:36

## 2021-02-26 RX ADMIN — Medication 10 MILLIGRAM(S): at 21:44

## 2021-02-26 RX ADMIN — Medication 1 PACKET(S): at 18:51

## 2021-02-26 RX ADMIN — Medication 1 TABLET(S): at 05:36

## 2021-02-26 RX ADMIN — Medication 1 TABLET(S): at 18:51

## 2021-02-26 RX ADMIN — NYSTATIN CREAM 1 APPLICATION(S): 100000 CREAM TOPICAL at 18:51

## 2021-02-26 RX ADMIN — Medication 1000 UNIT(S): at 13:59

## 2021-02-26 RX ADMIN — SODIUM CHLORIDE 1 GRAM(S): 9 INJECTION INTRAMUSCULAR; INTRAVENOUS; SUBCUTANEOUS at 21:43

## 2021-02-26 RX ADMIN — Medication 500 MILLIGRAM(S): at 13:58

## 2021-02-26 RX ADMIN — Medication 10 MILLIGRAM(S): at 05:37

## 2021-02-26 RX ADMIN — Medication 1 PACKET(S): at 13:58

## 2021-02-26 RX ADMIN — NYSTATIN CREAM 1 APPLICATION(S): 100000 CREAM TOPICAL at 05:37

## 2021-02-26 RX ADMIN — HEPARIN SODIUM 5000 UNIT(S): 5000 INJECTION INTRAVENOUS; SUBCUTANEOUS at 05:36

## 2021-02-26 RX ADMIN — MIRTAZAPINE 15 MILLIGRAM(S): 45 TABLET, ORALLY DISINTEGRATING ORAL at 21:43

## 2021-02-26 RX ADMIN — Medication 125 MILLIGRAM(S): at 00:13

## 2021-02-26 RX ADMIN — Medication 10 MILLIGRAM(S): at 13:58

## 2021-02-26 RX ADMIN — PREGABALIN 1000 MICROGRAM(S): 225 CAPSULE ORAL at 13:58

## 2021-02-26 RX ADMIN — Medication 81 MILLIGRAM(S): at 13:58

## 2021-02-26 RX ADMIN — HEPARIN SODIUM 5000 UNIT(S): 5000 INJECTION INTRAVENOUS; SUBCUTANEOUS at 21:44

## 2021-02-26 RX ADMIN — Medication 1 PACKET(S): at 10:13

## 2021-02-26 RX ADMIN — Medication 125 MILLIGRAM(S): at 18:51

## 2021-02-26 RX ADMIN — HEPARIN SODIUM 5000 UNIT(S): 5000 INJECTION INTRAVENOUS; SUBCUTANEOUS at 13:59

## 2021-02-26 RX ADMIN — Medication 1 TABLET(S): at 13:58

## 2021-02-26 RX ADMIN — Medication 125 MILLIGRAM(S): at 23:43

## 2021-02-26 RX ADMIN — MEROPENEM 100 MILLIGRAM(S): 1 INJECTION INTRAVENOUS at 05:36

## 2021-02-26 NOTE — PROGRESS NOTE ADULT - SUBJECTIVE AND OBJECTIVE BOX
Patient is a 89y old  Female who presents with a chief complaint of GI Bleed, Vomiting (2021 11:20)    Being followed by ID for        Interval history:  No other acute events      ROS:  No cough,SOB,CP  No N/V/D  No abd pain  No urinary complaints  No HA  No joint or limb pain  No other complaints    PAST MEDICAL & SURGICAL HISTORY:  COVID-19    Urinary tract infection    CAD in native artery    Dyslipidemia    Depression    Hyponatremia    Hypertension    History of repair of hip fracture      Allergies    flu vaccine (Anaphylaxis)  No Known Drug Allergies    Intolerances    lactose (Unknown)    Antimicrobials:    meropenem  IVPB 1000 milliGRAM(s) IV Intermittent every 12 hours  vancomycin    Solution 125 milliGRAM(s) Oral every 6 hours    MEDICATIONS  (STANDING):  ascorbic acid 500 milliGRAM(s) Oral daily  aspirin enteric coated 81 milliGRAM(s) Oral daily  bethanechol 10 milliGRAM(s) Oral three times a day  cholecalciferol 1000 Unit(s) Oral daily  cyanocobalamin 1000 MICROGram(s) Oral daily  heparin   Injectable 5000 Unit(s) SubCutaneous every 8 hours  lactobacillus acidophilus 1 Tablet(s) Oral two times a day  meropenem  IVPB 1000 milliGRAM(s) IV Intermittent every 12 hours  mirtazapine 15 milliGRAM(s) Oral at bedtime  Nephro-portia 1 Tablet(s) Oral daily  nystatin Powder 1 Application(s) Topical two times a day  potassium phosphate / sodium phosphate Powder (PHOS-NaK) 1 Packet(s) Oral three times a day with meals  psyllium Powder 1 Packet(s) Oral two times a day  sodium chloride 0.9%. 1000 milliLiter(s) (75 mL/Hr) IV Continuous <Continuous>  sodium chloride 0.9%. 1000 milliLiter(s) (75 mL/Hr) IV Continuous <Continuous>  vancomycin    Solution 125 milliGRAM(s) Oral every 6 hours      Vital Signs Last 24 Hrs  T(C): 37.2 (21 @ 13:29), Max: 38.1 (21 @ 00:11)  T(F): 99 (21 @ 13:29), Max: 100.5 (21 @ 00:11)  HR: 90 (21 @ 13:29) (90 - 111)  BP: 108/66 (21 @ 13:29) (99/63 - 136/83)  BP(mean): --  RR: 19 (21 @ 13:29) (18 - 20)  SpO2: 99% (21 @ 13:29) (98% - 99%)    Physical Exam:    Constitutional well preserved,comfortable,pleasant    HEENT PERRLA EOMI,No pallor or icterus    No oral exudate or erythema    Neck supple no JVD or LN    Chest Good AE,CTA    CVS RRR S1 S2 WNl No murmur or rub or gallop    Abd soft BS normal No tenderness no masses    Ext No cyanosis clubbing or edema    IV site no erythema tenderness or discharge    Joints no swelling or LOM    CNS AAO X 3 no focal    Lab Data:                          9.2    7.15  )-----------( 390      ( 2021 05:51 )             27.8           123<L>  |  90<L>  |  6<L>  ----------------------------<  84  3.7   |  21<L>  |  0.70    Ca    8.3<L>      2021 05:51  Phos  3.0       Mg     2.0         TPro  5.4<L>  /  Alb  2.4<L>  /  TBili  0.1<L>  /  DBili  x   /  AST  25  /  ALT  11  /  AlkPhos  57        Urinalysis Basic - ( 2021 11:38 )    Color: Yellow / Appearance: Slightly Turbid / S.026 / pH: x  Gluc: x / Ketone: Trace  / Bili: Negative / Urobili: Negative   Blood: x / Protein: 30 mg/dL / Nitrite: Negative   Leuk Esterase: Large / RBC: 3 /hpf /  /HPF   Sq Epi: x / Non Sq Epi: 1 /hpf / Bacteria: Negative        .Blood Blood-Peripheral  21   No growth to date.  --  --      .Urine Catheterized  21   >100,000 CFU/ml Citrobacter koseri  --  Citrobacter koseri                    WBC Count: 7.15 (21 @ 05:51)  WBC Count: 6.03 (21 @ 06:36)  WBC Count: 6.73 (21 @ 09:35)  WBC Count: 11.26 (21 @ 10:22)  WBC Count: 13.20 (21 @ 23:38)  WBC Count: 16.84 (21 @ 07:13)  WBC Count: 17.58 (21 @ 07:18)  WBC Count: 13.91 (21 @ 06:45)        < from: CT Abdomen and Pelvis w/ IV Cont (21 @ 17:10) >    EXAM:  CT ABDOMEN AND PELVIS IC                          EXAM:  CT CHEST IC                            PROCEDURE DATE:  2021            INTERPRETATION:  CLINICAL INFORMATION: Colitis, persistent fevers. Evaluate for abscess.    COMPARISON: 2021.    PROCEDURE:  CT of the Chest, Abdomen and Pelvis was performed with intravenous contrast.  Intravenous contrast: 90 ml Omnipaque 350. 10 ml discarded.  Oral contrast: None.  Sagittal and coronal reformats were performed.    FINDINGS:  CHEST:  LUNGS AND LARGE AIRWAYS: Patent central airways. No pulmonary nodules.  PLEURA: Trace bilateral pleural effusions.  VESSELS: Atherosclerotic changes of the aorta and coronary arteries. Aberrant right subclavian artery coursing posterior to the esophagus. There is extensive calcifications of the portion coursing posterior to the esophagus.  HEART: Heart size is normal. No pericardial effusion.  MEDIASTINUM AND NIHARIKA: No lymphadenopathy.  CHEST WALL AND LOWER NECK: Within normal limits.    ABDOMENAND PELVIS:  LIVER: Within normal limits.  BILE DUCTS: Normal caliber.  GALLBLADDER: Within normal limits.  SPLEEN: Within normal limits.  PANCREAS: Within normal limits.  ADRENALS: Within normal limits.  KIDNEYS/URETERS: Mild bilateral hydronephrosis, new. Small left renal cyst.    BLADDER: Decompressed around an indwelling catheter and not visualized due to streak artifacts.  REPRODUCTIVE ORGANS: Fibroids. No adnexal mass. Pessary in place.    BOWEL: No bowel obstruction. Appendix is normal. Small hiatal hernia. Mural thickening, mucosal enhancement involving the rectosigmoid and distal transverse colon. Adjacent fatty stranding.  PERITONEUM: No ascites.  VESSELS: Marked vascular calcifications.  RETROPERITONEUM/LYMPH NODES: No lymphadenopathy.  ABDOMINAL WALL: Anasarca.  BONES: Degenerative changes. Osteopenia, right hip replacement.    IMPRESSION:  Colitis from distal transverse colon to the rectum. No fluid collection.  Suggestion of mild bilateral hydronephrosis, new. Urinary bladder is not well visualized.                ANIKA LOPEZ MD; Attending Radiologist  This document has been electronically signed. 2021  9:04AM    < end of copied text >       Patient is a 89y old  Female who presents with a chief complaint of GI Bleed, Vomiting (2021 11:20)    Being followed by ID for        Interval history:  diarrhea improving  fevers downtrending  pt is breathing comfortably  No other acute events      PAST MEDICAL & SURGICAL HISTORY:  COVID-19    Urinary tract infection    CAD in native artery    Dyslipidemia    Depression    Hyponatremia    Hypertension    History of repair of hip fracture      Allergies    flu vaccine (Anaphylaxis)  No Known Drug Allergies    Intolerances    lactose (Unknown)    Antimicrobials:    meropenem  IVPB 1000 milliGRAM(s) IV Intermittent every 12 hours  vancomycin    Solution 125 milliGRAM(s) Oral every 6 hours    MEDICATIONS  (STANDING):  ascorbic acid 500 milliGRAM(s) Oral daily  aspirin enteric coated 81 milliGRAM(s) Oral daily  bethanechol 10 milliGRAM(s) Oral three times a day  cholecalciferol 1000 Unit(s) Oral daily  cyanocobalamin 1000 MICROGram(s) Oral daily  heparin   Injectable 5000 Unit(s) SubCutaneous every 8 hours  lactobacillus acidophilus 1 Tablet(s) Oral two times a day  meropenem  IVPB 1000 milliGRAM(s) IV Intermittent every 12 hours  mirtazapine 15 milliGRAM(s) Oral at bedtime  Nephro-portia 1 Tablet(s) Oral daily  nystatin Powder 1 Application(s) Topical two times a day  potassium phosphate / sodium phosphate Powder (PHOS-NaK) 1 Packet(s) Oral three times a day with meals  psyllium Powder 1 Packet(s) Oral two times a day  sodium chloride 0.9%. 1000 milliLiter(s) (75 mL/Hr) IV Continuous <Continuous>  sodium chloride 0.9%. 1000 milliLiter(s) (75 mL/Hr) IV Continuous <Continuous>  vancomycin    Solution 125 milliGRAM(s) Oral every 6 hours      Vital Signs Last 24 Hrs  T(C): 37.2 (21 @ 13:29), Max: 38.1 (21 @ 00:11)  T(F): 99 (21 @ 13:29), Max: 100.5 (21 @ 00:11)  HR: 90 (21 @ 13:29) (90 - 111)  BP: 108/66 (21 @ 13:29) (99/63 - 136/83)  BP(mean): --  RR: 19 (21 @ 13:29) (18 - 20)  SpO2: 99% (21 @ 13:29) (98% - 99%)    Physical Exam:    Constitutional well preserved,comfortable,pleasant    HEENT PERRLA EOMI,No pallor or icterus    No oral exudate or erythema    Neck supple no JVD or LN    Chest Good AE,CTA    CVS  S1 S2 murmur  Abd soft BS normal No tenderness     Ext No cyanosis clubbing or edema    IV site no erythema tenderness or discharge      Lab Data:                          9.2    7.15  )-----------( 390      ( 2021 05:51 )             27.8           123<L>  |  90<L>  |  6<L>  ----------------------------<  84  3.7   |  21<L>  |  0.70    Ca    8.3<L>      2021 05:51  Phos  3.0       Mg     2.0         TPro  5.4<L>  /  Alb  2.4<L>  /  TBili  0.1<L>  /  DBili  x   /  AST  25  /  ALT  11  /  AlkPhos  57        Urinalysis Basic - ( 2021 11:38 )    Color: Yellow / Appearance: Slightly Turbid / S.026 / pH: x  Gluc: x / Ketone: Trace  / Bili: Negative / Urobili: Negative   Blood: x / Protein: 30 mg/dL / Nitrite: Negative   Leuk Esterase: Large / RBC: 3 /hpf /  /HPF   Sq Epi: x / Non Sq Epi: 1 /hpf / Bacteria: Negative        .Blood Blood-Peripheral  21   No growth to date.  --  --      .Urine Catheterized  21   >100,000 CFU/ml Citrobacter koseri  --  Citrobacter koseri      Lactate, Blood (21 @ 05:51)    Lactate, Blood: 0.7 mmol/L                  WBC Count: 7.15 (21 @ 05:51)  WBC Count: 6.03 (21 @ 06:36)  WBC Count: 6.73 (21 @ 09:35)  WBC Count: 11.26 (21 @ 10:22)  WBC Count: 13.20 (21 @ 23:38)  WBC Count: 16.84 (21 @ 07:13)  WBC Count: 17.58 (21 @ 07:18)  WBC Count: 13.91 (21 @ 06:45)        < from: CT Abdomen and Pelvis w/ IV Cont (21 @ 17:10) >    EXAM:  CT ABDOMEN AND PELVIS IC                          EXAM:  CT CHEST IC                            PROCEDURE DATE:  2021            INTERPRETATION:  CLINICAL INFORMATION: Colitis, persistent fevers. Evaluate for abscess.    COMPARISON: 2021.    PROCEDURE:  CT of the Chest, Abdomen and Pelvis was performed with intravenous contrast.  Intravenous contrast: 90 ml Omnipaque 350. 10 ml discarded.  Oral contrast: None.  Sagittal and coronal reformats were performed.    FINDINGS:  CHEST:  LUNGS AND LARGE AIRWAYS: Patent central airways. No pulmonary nodules.  PLEURA: Trace bilateral pleural effusions.  VESSELS: Atherosclerotic changes of the aorta and coronary arteries. Aberrant right subclavian artery coursing posterior to the esophagus. There is extensive calcifications of the portion coursing posterior to the esophagus.  HEART: Heart size is normal. No pericardial effusion.  MEDIASTINUM AND NIHARIKA: No lymphadenopathy.  CHEST WALL AND LOWER NECK: Within normal limits.    ABDOMENAND PELVIS:  LIVER: Within normal limits.  BILE DUCTS: Normal caliber.  GALLBLADDER: Within normal limits.  SPLEEN: Within normal limits.  PANCREAS: Within normal limits.  ADRENALS: Within normal limits.  KIDNEYS/URETERS: Mild bilateral hydronephrosis, new. Small left renal cyst.    BLADDER: Decompressed around an indwelling catheter and not visualized due to streak artifacts.  REPRODUCTIVE ORGANS: Fibroids. No adnexal mass. Pessary in place.    BOWEL: No bowel obstruction. Appendix is normal. Small hiatal hernia. Mural thickening, mucosal enhancement involving the rectosigmoid and distal transverse colon. Adjacent fatty stranding.  PERITONEUM: No ascites.  VESSELS: Marked vascular calcifications.  RETROPERITONEUM/LYMPH NODES: No lymphadenopathy.  ABDOMINAL WALL: Anasarca.  BONES: Degenerative changes. Osteopenia, right hip replacement.    IMPRESSION:  Colitis from distal transverse colon to the rectum. No fluid collection.  Suggestion of mild bilateral hydronephrosis, new. Urinary bladder is not well visualized.                ANIKA LOPEZ MD; Attending Radiologist  This document has been electronically signed. Feb 26 2021  9:04AM    < end of copied text >

## 2021-02-26 NOTE — PROGRESS NOTE ADULT - SUBJECTIVE AND OBJECTIVE BOX
Patient is a 89y old  Female who presented with a chief complaint of GI Bleed, Vomiting (26 Feb 2021 07:11)      INTERVAL HPI/OVERNIGHT EVENTS:  fevers down-trending, family agreed to CT  no nausea, vomiting or abdominal pain  no rectal bleeding  1 BM this AM - loose/pasty but not watery ; brown/green in color per nursing report  appetite fair    MEDICATIONS  (STANDING):  ascorbic acid 500 milliGRAM(s) Oral daily  aspirin enteric coated 81 milliGRAM(s) Oral daily  bethanechol 10 milliGRAM(s) Oral three times a day  cholecalciferol 1000 Unit(s) Oral daily  cyanocobalamin 1000 MICROGram(s) Oral daily  heparin   Injectable 5000 Unit(s) SubCutaneous every 8 hours  lactobacillus acidophilus 1 Tablet(s) Oral two times a day  meropenem  IVPB 1000 milliGRAM(s) IV Intermittent every 12 hours  mirtazapine 15 milliGRAM(s) Oral at bedtime  Nephro-portia 1 Tablet(s) Oral daily  nystatin Powder 1 Application(s) Topical two times a day  potassium phosphate / sodium phosphate Powder (PHOS-NaK) 1 Packet(s) Oral three times a day with meals  psyllium Powder 1 Packet(s) Oral two times a day  sodium chloride 0.9%. 1000 milliLiter(s) (75 mL/Hr) IV Continuous <Continuous>  sodium chloride 0.9%. 1000 milliLiter(s) (75 mL/Hr) IV Continuous <Continuous>  sodium chloride 3%. 500 milliLiter(s) (50 mL/Hr) IV Continuous <Continuous>  vancomycin    Solution 125 milliGRAM(s) Oral every 6 hours    MEDICATIONS  (PRN):  acetaminophen   Tablet .. 650 milliGRAM(s) Oral every 12 hours PRN Temp greater or equal to 38C (100.4F), Mild Pain (1 - 3), Moderate Pain (4 - 6)  ondansetron    Tablet 4 milliGRAM(s) Oral every 8 hours PRN Nausea and/or Vomiting      Allergies  flu vaccine (Anaphylaxis)  No Known Drug Allergies    Intolerances  lactose (Unknown)      Review of Systems:  unable to obtain    Vital Signs Last 24 Hrs  T(C): 37.2 (26 Feb 2021 04:25), Max: 38.1 (26 Feb 2021 00:11)  T(F): 98.9 (26 Feb 2021 04:25), Max: 100.5 (26 Feb 2021 00:11)  HR: 95 (26 Feb 2021 04:25) (92 - 111)  BP: 100/64 (26 Feb 2021 04:25) (99/63 - 136/83)  BP(mean): --  RR: 20 (26 Feb 2021 04:25) (18 - 20)  SpO2: 98% (26 Feb 2021 04:25) (95% - 98%)    PHYSICAL EXAM:  Constitutional: elderly female. appears comfortable, non toxic appearing  Neck: No LAD, supple no JVD  Respiratory: decreased BS at bases, no rales or wheeze  Cardiovascular: S1 and S2, RRR  Gastrointestinal: obese soft  ND NT no rebound guarding or rigidity +harding  Extremities: neg clubbing, cyanosis tr b/l LE edema  Vascular: 2+ peripheral pulses  Neurological: oriented to self, pleasantly confused.  moves all extremities  Skin: No rashes      LABS:                        9.2    7.15  )-----------( 390      ( 26 Feb 2021 05:51 )             27.8     02-26    123<L>  |  90<L>  |  6<L>  ----------------------------<  84  3.7   |  21<L>  |  0.70    Ca    8.3<L>      26 Feb 2021 05:51  Phos  3.0     02-26  Mg     2.0     02-26    TPro  5.4<L>  /  Alb  2.4<L>  /  TBili  0.1<L>  /  DBili  x   /  AST  25  /  ALT  11  /  AlkPhos  57  02-26             RADIOLOGY & ADDITIONAL TESTS:  EXAM:  CT ABDOMEN AND PELVIS IC                          EXAM:  CT CHEST IC                            PROCEDURE DATE:  02/25/2021            INTERPRETATION:  CLINICAL INFORMATION: Colitis, persistent fevers. Evaluate for abscess.    COMPARISON: 2/13/2021.    PROCEDURE:  CT of the Chest, Abdomen and Pelvis was performed with intravenous contrast.  Intravenous contrast: 90 ml Omnipaque 350. 10 ml discarded.  Oral contrast: None.  Sagittal and coronal reformats were performed.    FINDINGS:  CHEST:  LUNGS AND LARGE AIRWAYS: Patent central airways. No pulmonary nodules.  PLEURA: Trace bilateral pleural effusions.  VESSELS: Atherosclerotic changes of the aorta and coronary arteries. Aberrant right subclavian artery coursing posterior to the esophagus. There is extensive calcifications of the portion coursing posterior to the esophagus.  HEART: Heart size is normal. No pericardial effusion.  MEDIASTINUM AND NIHARIKA: No lymphadenopathy.  CHEST WALL AND LOWER NECK: Within normal limits.    ABDOMENAND PELVIS:  LIVER: Within normal limits.  BILE DUCTS: Normal caliber.  GALLBLADDER: Within normal limits.  SPLEEN: Within normal limits.  PANCREAS: Within normal limits.  ADRENALS: Within normal limits.  KIDNEYS/URETERS: Mild bilateral hydronephrosis, new. Small left renal cyst.    BLADDER: Decompressed around an indwelling catheter and not visualized due to streak artifacts.  REPRODUCTIVE ORGANS: Fibroids. No adnexal mass. Pessary in place.    BOWEL: No bowel obstruction. Appendix is normal. Small hiatal hernia. Mural thickening, mucosal enhancement involving the rectosigmoid and distal transverse colon. Adjacent fatty stranding.  PERITONEUM: No ascites.  VESSELS: Marked vascular calcifications.  RETROPERITONEUM/LYMPH NODES: No lymphadenopathy.  ABDOMINAL WALL: Anasarca.  BONES: Degenerative changes. Osteopenia, right hip replacement.    IMPRESSION:  Colitis from distal transverse colon to the rectum. No fluid collection.  Suggestion of mild bilateral hydronephrosis, new. Urinary bladder is not well visualized.

## 2021-02-26 NOTE — CONSULT NOTE ADULT - SUBJECTIVE AND OBJECTIVE BOX
[facility] Division of Kidney Diseases & Hypertension  INITIAL CONSULT NOTE  [phone]--------------------------------------------------------------------------------  HPI: 89F w/ PMH of CAD s/p PCI, Chronic UTI's, MDD, HTN, HLD, [place] (hearing aids), dysphagia, COVID19 infection (2 months ago), and recent hospitalization for C. diff colitis (~2/7/21) presenting from [facility] after episode of vomiting and bloody bowel movement on 2/13. Found to have left sided colitis on imaging. Pt admitted LGIB likely secondary to infectious colitis vs. ischemic colitis. Course complicated by septic shock secondary to colitis, UTI with UCx with  candida/citrobacter. Pt now on Meropenem and Vancomycin. Nephrology team consulted for hyponatremia    On review of labs, pt with hyponatremia in the past with SNa: 128 on 7/6/20 which has resolved. On current admission, pt found to have JERONIMO with SCr peaked at 2.5 on 2/14 which resolved with IVF. Her SNa was 135 WNL on admission 2/13 which decreased to 130 on 2/21 and has been stable at 128-130 until yesterday 2/25/21. Pt received IVF NS yesterday 675 cc and SNa found to be low at 123 today 2/26/21. Pt seen today, Bernabe unable to provide ROS.     PAST HISTORY  --------------------------------------------------------------------------------  PAST MEDICAL & SURGICAL HISTORY:  COVID-19    Urinary tract infection    CAD in native artery    Dyslipidemia    Depression    Hyponatremia    Hypertension    History of repair of hip fracture      FAMILY HISTORY:  No pertinent family history in first degree relatives      PAST SOCIAL HISTORY:    ALLERGIES & MEDICATIONS  --------------------------------------------------------------------------------  Allergies    flu vaccine (Anaphylaxis)  No Known Drug Allergies    Intolerances    lactose (Unknown)    Standing Inpatient Medications  ascorbic acid 500 milliGRAM(s) Oral daily  aspirin enteric coated 81 milliGRAM(s) Oral daily  bethanechol 10 milliGRAM(s) Oral three times a day  cholecalciferol 1000 Unit(s) Oral daily  cyanocobalamin 1000 MICROGram(s) Oral daily  heparin   Injectable 5000 Unit(s) SubCutaneous every 8 hours  lactobacillus acidophilus 1 Tablet(s) Oral two times a day  meropenem  IVPB 1000 milliGRAM(s) IV Intermittent every 12 hours  mirtazapine 15 milliGRAM(s) Oral at bedtime  Nephro-portia 1 Tablet(s) Oral daily  nystatin Powder 1 Application(s) Topical two times a day  potassium phosphate / sodium phosphate Powder (PHOS-NaK) 1 Packet(s) Oral three times a day with meals  psyllium Powder 1 Packet(s) Oral two times a day  sodium chloride 0.9%. 1000 milliLiter(s) IV Continuous <Continuous>  sodium chloride 0.9%. 1000 milliLiter(s) IV Continuous <Continuous>  vancomycin    Solution 125 milliGRAM(s) Oral every 6 hours    PRN Inpatient Medications  acetaminophen   Tablet .. 650 milliGRAM(s) Oral every 12 hours PRN  ondansetron    Tablet 4 milliGRAM(s) Oral every 8 hours PRN      REVIEW OF SYSTEMS  --------------------------------------------------------------------------------  Unable to provide ROS  VITALS/PHYSICAL EXAM  --------------------------------------------------------------------------------  T(C): 37.2 (02-26-21 @ 13:29), Max: 38.1 (02-26-21 @ 00:11)  HR: 90 (02-26-21 @ 13:29) (90 - 111)  BP: 108/66 (02-26-21 @ 13:29) (99/63 - 136/83)  RR: 19 (02-26-21 @ 13:29) (18 - 20)  SpO2: 99% (02-26-21 @ 13:29) (98% - 99%)  Wt(kg): --        02-25-21 @ 07:01  -  02-26-21 @ 07:00  --------------------------------------------------------  IN: 1275 mL / OUT: 1850 mL / NET: -575 mL    02-26-21 @ 07:01  -  02-26-21 @ 15:31  --------------------------------------------------------  IN: 600 mL / OUT: 0 mL / NET: 600 mL      Physical Exam:  	Gen: awake NAD  	HEENT: MMM  	Pulm: CTA B/L  	CV:  S1S2  	Abd: +BS, soft   	Ext: mild Upper and LE edema  	Neuro: awake, AOx1  	Skin: Warm, without rashes  	Vascular access:     LABS/STUDIES  --------------------------------------------------------------------------------              9.2    7.15  >-----------<  390      [02-26-21 @ 05:51]              27.8     123  |  90  |  6   ----------------------------<  84      [02-26-21 @ 05:51]  3.7   |  21  |  0.70        Ca     8.3     [02-26-21 @ 05:51]      Mg     2.0     [02-26-21 @ 05:51]      Phos  3.0     [02-26-21 @ 05:51]    TPro  5.4  /  Alb  2.4  /  TBili  0.1  /  DBili  x   /  AST  25  /  ALT  11  /  AlkPhos  57  [02-26-21 @ 05:51]    Creatinine Trend:  SCr 0.70 [02-26 @ 05:51]  SCr 0.76 [02-25 @ 06:36]  SCr 0.82 [02-24 @ 09:35]  SCr 0.78 [02-23 @ 10:22]  SCr 0.87 [02-22 @ 23:38]    Urinalysis - [02-26-21 @ 11:38]      Color Yellow / Appearance Slightly Turbid / SG 1.026 / pH 7.0      Gluc Negative / Ketone Trace  / Bili Negative / Urobili Negative       Blood Trace / Protein 30 mg/dL / Leuk Est Large / Nitrite Negative      RBC 3 /  / Hyaline 2 / Gran  / Sq Epi  / Non Sq Epi 1 / Bacteria Negative    Urine Creatinine 54      [02-26-21 @ 11:38]  Urine Sodium 105      [02-26-21 @ 11:38]  Urine Urea Nitrogen 342      [02-23-21 @ 02:22]  Urine Potassium 37      [02-26-21 @ 11:38]  Urine Chloride 77      [02-26-21 @ 11:38]  Urine Osmolality 385      [02-26-21 @ 11:40]         [facility] Division of Kidney Diseases & Hypertension  INITIAL CONSULT NOTE  [phone]--------------------------------------------------------------------------------  HPI: 89F w/ PMH of CAD s/p PCI, Chronic UTI's, MDD, HTN, HLD, [place] (hearing aids), dysphagia, COVID19 infection (2 months ago), and recent hospitalization for C. diff colitis (~2/7/21) presenting from [facility] after episode of vomiting and bloody bowel movement on 2/13. Found to have left sided colitis on imaging. Pt admitted LGIB likely secondary to infectious colitis vs. ischemic colitis. Course complicated by septic shock secondary to colitis, UTI with UCx with  candida/citrobacter. Pt now on Meropenem and Vancomycin. Nephrology team consulted for hyponatremia    On review of labs, pt with hyponatremia in the past with SNa: 128 on 7/6/20 which has resolved. On current admission, pt found to have JERONIMO with SCr peaked at 2.5 on 2/14 which resolved with IVF. Her SNa was 135 WNL on admission 2/13 which decreased to 130 on 2/21 and has been stable at 128-130 until yesterday 2/25/21. Pt received IVF NS yesterday 675 cc and SNa found to be low at 123 today 2/26/21. Pt seen today, Bernabe unable to provide ROS.     PAST HISTORY  --------------------------------------------------------------------------------  PAST MEDICAL & SURGICAL HISTORY:  COVID-19    Urinary tract infection    CAD in native artery    Dyslipidemia    Depression    Hyponatremia    Hypertension    History of repair of hip fracture      FAMILY HISTORY:  No pertinent family history in first degree relatives      PAST SOCIAL HISTORY: unable to assess.    ALLERGIES & MEDICATIONS  --------------------------------------------------------------------------------  Allergies    flu vaccine (Anaphylaxis)  No Known Drug Allergies    Intolerances    lactose (Unknown)    Standing Inpatient Medications  ascorbic acid 500 milliGRAM(s) Oral daily  aspirin enteric coated 81 milliGRAM(s) Oral daily  bethanechol 10 milliGRAM(s) Oral three times a day  cholecalciferol 1000 Unit(s) Oral daily  cyanocobalamin 1000 MICROGram(s) Oral daily  heparin   Injectable 5000 Unit(s) SubCutaneous every 8 hours  lactobacillus acidophilus 1 Tablet(s) Oral two times a day  meropenem  IVPB 1000 milliGRAM(s) IV Intermittent every 12 hours  mirtazapine 15 milliGRAM(s) Oral at bedtime  Nephro-portia 1 Tablet(s) Oral daily  nystatin Powder 1 Application(s) Topical two times a day  potassium phosphate / sodium phosphate Powder (PHOS-NaK) 1 Packet(s) Oral three times a day with meals  psyllium Powder 1 Packet(s) Oral two times a day  sodium chloride 0.9%. 1000 milliLiter(s) IV Continuous <Continuous>  sodium chloride 0.9%. 1000 milliLiter(s) IV Continuous <Continuous>  vancomycin    Solution 125 milliGRAM(s) Oral every 6 hours    PRN Inpatient Medications  acetaminophen   Tablet .. 650 milliGRAM(s) Oral every 12 hours PRN  ondansetron    Tablet 4 milliGRAM(s) Oral every 8 hours PRN      REVIEW OF SYSTEMS  --------------------------------------------------------------------------------  Unable to provide ROS  VITALS/PHYSICAL EXAM  --------------------------------------------------------------------------------  T(C): 37.2 (02-26-21 @ 13:29), Max: 38.1 (02-26-21 @ 00:11)  HR: 90 (02-26-21 @ 13:29) (90 - 111)  BP: 108/66 (02-26-21 @ 13:29) (99/63 - 136/83)  RR: 19 (02-26-21 @ 13:29) (18 - 20)  SpO2: 99% (02-26-21 @ 13:29) (98% - 99%)  Wt(kg): --        02-25-21 @ 07:01  -  02-26-21 @ 07:00  --------------------------------------------------------  IN: 1275 mL / OUT: 1850 mL / NET: -575 mL    02-26-21 @ 07:01  -  02-26-21 @ 15:31  --------------------------------------------------------  IN: 600 mL / OUT: 0 mL / NET: 600 mL      Physical Exam:  	Gen: awake NAD  	HEENT: MMM  	Pulm: CTA B/L  	CV:  S1S2  	Abd: +BS, soft   	Ext: mild Upper and LE edema  	Neuro: awake, AOx1  	Skin: Warm, without rashes  	Vascular access:     LABS/STUDIES  --------------------------------------------------------------------------------              9.2    7.15  >-----------<  390      [02-26-21 @ 05:51]              27.8     123  |  90  |  6   ----------------------------<  84      [02-26-21 @ 05:51]  3.7   |  21  |  0.70        Ca     8.3     [02-26-21 @ 05:51]      Mg     2.0     [02-26-21 @ 05:51]      Phos  3.0     [02-26-21 @ 05:51]    TPro  5.4  /  Alb  2.4  /  TBili  0.1  /  DBili  x   /  AST  25  /  ALT  11  /  AlkPhos  57  [02-26-21 @ 05:51]    Creatinine Trend:  SCr 0.70 [02-26 @ 05:51]  SCr 0.76 [02-25 @ 06:36]  SCr 0.82 [02-24 @ 09:35]  SCr 0.78 [02-23 @ 10:22]  SCr 0.87 [02-22 @ 23:38]    Urinalysis - [02-26-21 @ 11:38]      Color Yellow / Appearance Slightly Turbid / SG 1.026 / pH 7.0      Gluc Negative / Ketone Trace  / Bili Negative / Urobili Negative       Blood Trace / Protein 30 mg/dL / Leuk Est Large / Nitrite Negative      RBC 3 /  / Hyaline 2 / Gran  / Sq Epi  / Non Sq Epi 1 / Bacteria Negative    Urine Creatinine 54      [02-26-21 @ 11:38]  Urine Sodium 105      [02-26-21 @ 11:38]  Urine Urea Nitrogen 342      [02-23-21 @ 02:22]  Urine Potassium 37      [02-26-21 @ 11:38]  Urine Chloride 77      [02-26-21 @ 11:38]  Urine Osmolality 385      [02-26-21 @ 11:40]

## 2021-02-26 NOTE — PROGRESS NOTE ADULT - PROBLEM SELECTOR PLAN 5
Sodium today 128  - likely in setting of decreased PO intake vs SIADH  - monitor BMP and electrolytes Sodium today 128  - likely in setting of decreased PO intake vs SIADH  - monitor BMP and electrolytes  - urinelytes significant for intrinsic kidney azotemia 1.1%

## 2021-02-26 NOTE — CONSULT NOTE ADULT - ATTENDING COMMENTS
seen and examined 02-14-21 @ 0300    Tmin = 91.7  soft / moderate LLQ tenderness / ND  no surgical scars on abdomen    WBC = 24  Cr - 1.7  lactate - 4.3    CTA abd/pelvis - mild pancolitis, no evidence of perforation, pessary in place      colitis of unclear etiology, most likely ischemic or infectious given recent C Diff infection  hypothermia is concerning for sepsis  -ABx  -serial abdominal exams  -no indication for surgery at this time
GI consulted for abnormal imaging.   Patient with recent CDI s/p antibiotics, now admitted with abdominal pain and bloody stool, found to have COVID infection and pancolitis (more pronounced on left side) for which GI was consulted. Differential includes possible infectious (recent C diff infection, current COVID infectious) vs ischemic etiology. No role for endoscopic evaluation at this time. Continue supportive care and reasonable to continue PO vanc given possibility of contribution from ongoing C diff infection.
I have seen this patient with the fellow and agree with their assessment and plan. In addition,    # Hyponatremia - likely due to poor solute intake (her BUN is only 6) and possibly SIADH. Please check serum osm/ repeat urine osm/ urine urea/ Urine lytes again tomorrow. Check BMP now and then Q8H.  Start salt tabs 1 g TID.  Increase protein intake (to consider protein shakes).       The rest of the recommendations as per fellow's note.    Dennise Fournier MD  Attending Nephrologist  [phone]
89F w/ PMH of CAD s/p PCI, Chronic UTI's, MDD, HTN, HLD, [place] (hearing aids), dysphagia, COVID19 infection (2 months ago, s/p dexa, remdesivir, convalescent plasma, not intubated), and recent hospitalization for C. diff colitis (~2/7/21) presenting from [facility] after episode of vomiting and bloody bowel movement on 2/13.     unclear if this still c diff vs ischemic bowel  vs microperforation  pt seems to have a UTI as well  Pt was seen by surgery    would treat for c diff, UTI and bowel event  likely NOT acute covid  await antibodies, Will d/w infection prevention to see if we can d/c isolation    follow lactate  await cultures
I was physically present for the key portions of the evaluation and management (E/M) service provided.  I agree with the above history, physical, and plan which I have reviewed and edited where appropriate. Plan discussed with team.    ______________  Durga Bailey MD   of Geriatric and Palliative Medicine       Please page the following number for clinical matters between the hours of 9AM and 5PM   from Monday through Friday : [phone]    After 5PM and on weekends, please page: [phone]. The Geriatric and Palliative Medicine consult service has 24/7 coverage for medical recommendations, including for symptom management needs.

## 2021-02-26 NOTE — CONSULT NOTE ADULT - CONSULT REQUESTED BY NAME
Dr. Gutierrez
Dr Gutierrez
ED
Wilma Galicia
Patient's daughter, Batsheva Néstor
Dr. Gutierrez
MD Néstor/ Medicine team

## 2021-02-26 NOTE — PROGRESS NOTE ADULT - ASSESSMENT
89F w/ PMH of CAD s/p PCI, Chronic UTI's, MDD, HTN, HLD, [place] (hearing aids), dysphagia, COVID19 infection (2 months ago), and recent hospitalization for C. diff colitis (~2/7/21) presenting from [facility] after episode of vomiting and bloody bowel movement on 2/13. Found to have left sided colitis on imaging   Acute Right Below Knee DVT     #Colitis - given clinical picture likely 2/2 ischemic.  Per family wishes no aggressive measures or interventions/procedures.  DNR/DNI in place.   stool C diff negative x2 (2/14, 2/18)    #s/p RRT and fevers (now donwtrending) +UTI - Citrobacter koseri. Persistent fevers. CT colitis transverse colon to rectum.  benign abdominal exam and improved stooling (on Metamucil) without bleeding    COVID negative    RECS:  -Monitor BMs; replete lytes PRN  -ID following;  Plan for PO Vanco taper per ID and on Meropenem   -avoid hypotension  -Leukocytosis improving and normal lactate  -monitor clinical exam  -continue  Metamucil BID in attempt to bulk stool; diarrhea resolved  -PO Bacid BID  -PO diet per SLP recs    Discussed with Medicine attending    Please call over weekend prn with acute GI concerns   GI service : [phone]    Zain Paris PA-C    [facility]  [phone]  After hours and weekend coverage [phone]

## 2021-02-26 NOTE — PROGRESS NOTE ADULT - SUBJECTIVE AND OBJECTIVE BOX
Internal Medicine   Blake Josh | PGY-1    OVERNIGHT EVENTS: CT Abdomen/Pelvis completed overnight, preliminary read significant for proctocolitis. Fever to 100.5 overnight.      SUBJECTIVE:       MEDICATIONS  (STANDING):  ascorbic acid 500 milliGRAM(s) Oral daily  aspirin enteric coated 81 milliGRAM(s) Oral daily  bethanechol 10 milliGRAM(s) Oral three times a day  cholecalciferol 1000 Unit(s) Oral daily  cyanocobalamin 1000 MICROGram(s) Oral daily  heparin   Injectable 5000 Unit(s) SubCutaneous every 8 hours  lactobacillus acidophilus 1 Tablet(s) Oral two times a day  meropenem  IVPB 1000 milliGRAM(s) IV Intermittent every 12 hours  mirtazapine 15 milliGRAM(s) Oral at bedtime  Nephro-portia 1 Tablet(s) Oral daily  nystatin Powder 1 Application(s) Topical two times a day  potassium phosphate / sodium phosphate Powder (PHOS-NaK) 1 Packet(s) Oral three times a day with meals  psyllium Powder 1 Packet(s) Oral two times a day  sodium chloride 0.9%. 1000 milliLiter(s) (75 mL/Hr) IV Continuous <Continuous>  sodium chloride 0.9%. 1000 milliLiter(s) (75 mL/Hr) IV Continuous <Continuous>  vancomycin    Solution 125 milliGRAM(s) Oral every 6 hours    MEDICATIONS  (PRN):  acetaminophen   Tablet .. 650 milliGRAM(s) Oral every 12 hours PRN Temp greater or equal to 38C (100.4F), Mild Pain (1 - 3), Moderate Pain (4 - 6)  ondansetron    Tablet 4 milliGRAM(s) Oral every 8 hours PRN Nausea and/or Vomiting        T(F): 98.9 (02-26-21 @ 04:25), Max: 100.5 (02-26-21 @ 00:11)  HR: 95 (02-26-21 @ 04:25) (90 - 111)  BP: 100/64 (02-26-21 @ 04:25) (99/63 - 136/83)  BP(mean): --  RR: 20 (02-26-21 @ 04:25) (18 - 20)  SpO2: 98% (02-26-21 @ 04:25) (95% - 98%)      PHYSICAL EXAM:   Gen: Awake, NAD, minimally conversational  HEENT: NCAT, PERRL, EOMI, clear conjunctiva, no scleral icterus  Neck: Supple, no JVD, no LAD  CV: RRR, S1S2, no m/r/g  Resp: CTAB, normal respiratory effort  Abd: Soft, L sided TTP on soft and deep palpation, no rebound or guarding,   Ext: minimal R calf swelling compared to L, no clubbing or cyanosis  Neuro: minimally conversational, following verbal commands, nods/shakes head to questions   Skin: warm, perfused  TELEMETRY:    LABS:                        9.2    7.15  )-----------( 390      ( 26 Feb 2021 05:51 )             27.8     02-26    123<L>  |  90<L>  |  6<L>  ----------------------------<  84  3.7   |  21<L>  |  0.70    Ca    8.3<L>      26 Feb 2021 05:51  Phos  3.0     02-26  Mg     2.0     02-26    TPro  5.4<L>  /  Alb  2.4<L>  /  TBili  0.1<L>  /  DBili  x   /  AST  25  /  ALT  11  /  AlkPhos  57  02-26            Creatinine Trend: 0.70<--, 0.76<--, 0.82<--, 0.78<--, 0.87<--, 0.87<--  I&O's Summary    25 Feb 2021 07:01  -  26 Feb 2021 07:00  --------------------------------------------------------  IN: 1275 mL / OUT: 1850 mL / NET: -575 mL      BNP    RADIOLOGY & ADDITIONAL STUDIES:             Internal Medicine   Blake Josh | PGY-1    OVERNIGHT EVENTS: CT Abdomen/Pelvis completed overnight, preliminary read significant for proctocolitis. Fever to 100.5 overnight.    SUBJECTIVE: Patient was seen and examined at bedside this morning. Denies any pain or headaches, states that the abdominal pain has gotten better.    INTERVAL EVENTS: Colitis seen on CT with mild b/l hydronephrosis. Noted to be hyponatremic to 123.       MEDICATIONS  (STANDING):  ascorbic acid 500 milliGRAM(s) Oral daily  aspirin enteric coated 81 milliGRAM(s) Oral daily  bethanechol 10 milliGRAM(s) Oral three times a day  cholecalciferol 1000 Unit(s) Oral daily  cyanocobalamin 1000 MICROGram(s) Oral daily  heparin   Injectable 5000 Unit(s) SubCutaneous every 8 hours  lactobacillus acidophilus 1 Tablet(s) Oral two times a day  meropenem  IVPB 1000 milliGRAM(s) IV Intermittent every 12 hours  mirtazapine 15 milliGRAM(s) Oral at bedtime  Nephro-portia 1 Tablet(s) Oral daily  nystatin Powder 1 Application(s) Topical two times a day  potassium phosphate / sodium phosphate Powder (PHOS-NaK) 1 Packet(s) Oral three times a day with meals  psyllium Powder 1 Packet(s) Oral two times a day  sodium chloride 0.9%. 1000 milliLiter(s) (75 mL/Hr) IV Continuous <Continuous>  sodium chloride 0.9%. 1000 milliLiter(s) (75 mL/Hr) IV Continuous <Continuous>  vancomycin    Solution 125 milliGRAM(s) Oral every 6 hours    MEDICATIONS  (PRN):  acetaminophen   Tablet .. 650 milliGRAM(s) Oral every 12 hours PRN Temp greater or equal to 38C (100.4F), Mild Pain (1 - 3), Moderate Pain (4 - 6)  ondansetron    Tablet 4 milliGRAM(s) Oral every 8 hours PRN Nausea and/or Vomiting        T(F): 98.9 (02-26-21 @ 04:25), Max: 100.5 (02-26-21 @ 00:11)  HR: 95 (02-26-21 @ 04:25) (90 - 111)  BP: 100/64 (02-26-21 @ 04:25) (99/63 - 136/83)  BP(mean): --  RR: 20 (02-26-21 @ 04:25) (18 - 20)  SpO2: 98% (02-26-21 @ 04:25) (95% - 98%)      PHYSICAL EXAM:   Gen: Awake, NAD, minimally conversational  HEENT: NCAT, PERRL, EOMI, clear conjunctiva, no scleral icterus  Neck: Supple, no JVD, no LAD  CV: RRR, S1S2, no m/r/g  Resp: CTAB, normal respiratory effort  Abd: Soft, L sided TTP on soft and deep palpation, no rebound or guarding,   Ext: minimal R calf swelling compared to L, no clubbing or cyanosis  Neuro: minimally conversational, following verbal commands, nods/shakes head to questions   Skin: warm, perfused  TELEMETRY:    LABS:                        9.2    7.15  )-----------( 390      ( 26 Feb 2021 05:51 )             27.8     02-26    123<L>  |  90<L>  |  6<L>  ----------------------------<  84  3.7   |  21<L>  |  0.70    Ca    8.3<L>      26 Feb 2021 05:51  Phos  3.0     02-26  Mg     2.0     02-26    TPro  5.4<L>  /  Alb  2.4<L>  /  TBili  0.1<L>  /  DBili  x   /  AST  25  /  ALT  11  /  AlkPhos  57  02-26            Creatinine Trend: 0.70<--, 0.76<--, 0.82<--, 0.78<--, 0.87<--, 0.87<--  I&O's Summary    25 Feb 2021 07:01  -  26 Feb 2021 07:00  --------------------------------------------------------  IN: 1275 mL / OUT: 1850 mL / NET: -575 mL      BNP    RADIOLOGY & ADDITIONAL STUDIES:

## 2021-02-26 NOTE — PROGRESS NOTE ADULT - ASSESSMENT
89F w/ PMH of CAD s/p PCI, Chronic UTI's, MDD, HTN, HLD, [place] (hearing aids), dysphagia, prior COVID19 infection, and recent hospitalization for C. diff colitis (~2/7/21) presenting from rehab center for bloody bowel movement, likely LGIB secondary to infectious colitis vs. ischemic colitis.  Also found to be in septic shock, likely secondary to colitis iso recent C. diff.  COVID +, asymptomatic and without hypoxia. RRT 2/23 for hypotension, tachycardia with new afib and fever, currently on meropenem.

## 2021-02-26 NOTE — PROGRESS NOTE ADULT - PROBLEM SELECTOR PLAN 10
DVT ppx: SC heparin  Diet: dysphagia diet, lactose restricted; Hx of dysphagia  - dysphagia diet per speech/swallow with supplemental nutrition  Code: DNR/DNI, no pressors, no blood transfusions per HCP  Dispo: Rehab, COVID swab negative 2/21, 2/24

## 2021-02-26 NOTE — CONSULT NOTE ADULT - ASSESSMENT
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 Asymptomatic  - Labs notable for low BUN (6) which represent very poor protein intake (tea and toast physiology)  - Urine studies with Graciela 105 Uosm:  385   - Repeat BMP now  - Check Serum Osm cortisol level, Serum Urea  - If SNa low can start salt tabs 1 gm TID. If SNa drops lower than 123 will consider HTS  - Increase oral protein (solute intake) can add protein shakes  - Monitor SNa every 6-8 hours. Do not overcorrect 6-8 meq in 24 hours  - Fluid restrict 1L /day  - Daily urine electrolytes, urine osm and serum osm    If you have any questions, please feel free to contact me  Rowdy Martins  Nephrology Fellow  Pager: [phone] / [facility]: [phone]  (After 5pm or on weekends please page the on-call fellow)

## 2021-02-26 NOTE — PROGRESS NOTE ADULT - PROBLEM SELECTOR PLAN 1
RRT 2/23 for fever, tachycardia with a-fib and hypotension, continues to have fevers overnight, leukoycytosis now improving  - ordered CT C/A/P for assessment of new infection/worsening colitis   - continue meropenem 1g q12 hrs ; d/c fluconazole   - UA + for many bacteria, +LE, nitrites, leukocytosis   - UCx growing Citrobacter, pending final results and sensitivities   - hemodynamically stable; continue to monitor vitals q4hrs  - follow up blood/urine cultures and repeat COVID RRT 2/23 for fever, tachycardia with a-fib and hypotension, continues to have fevers overnight, leukoycytosis now improving  - ordered CT C/A/P for assessment of new infection/worsening colitis   - continue meropenem 1g q12 hrs   - CT abdomen/pelvis shows proctocolitis w/ new mild b/l hydronephrosis   - UA + for many bacteria, +LE, nitrites, leukocytosis   - UCx growing Citrobacter, sensitive to meropenem on final   - hemodynamically stable; continue to monitor vitals q4hrs  - follow up blood/urine cultures and repeat COVID

## 2021-02-26 NOTE — PROGRESS NOTE ADULT - PROBLEM SELECTOR PLAN 3
UCx now growing Citrobacter, now febrile and hypotensive   - pending sensitivities   - Pessary removed, cleaned, replaced by Urogynecologist Dr. Hernandez's associate UCx now growing Citrobacter, now febrile and hypotensive   - currently on meropenem  - follow ID recommendations   - Pessary removed, cleaned, replaced by Urogynecologist Dr. Hernandez's associate

## 2021-02-26 NOTE — CONSULT NOTE ADULT - CONSULT REASON
Abdominal pain/colitis
pancolitis
Pessary change
Hyponatremia
diarrhea ?recurrent C diff    Pt previously known to [facility]
sepsis
GOC

## 2021-02-26 NOTE — PROGRESS NOTE ADULT - PROBLEM SELECTOR PLAN 2
Pancolitis, most significant in descending and rectosigmoid colon. C diff negative x 2, less suspicion for recurrent C diff colitis. Most likely to be ischemic  - s/p Zosyn (2/14-19), Flagyl IV 500mg BID (2/14-19). s/p 6 day course  - decreased PO Vancomycin 125mg q6h and continue for 5 days (until 2/23) and c/w probiotics; continue taper 125 mg q6hrs x 7 days until 2/30   - follow up repeat CT scans Pancolitis, most significant in descending and rectosigmoid colon. C diff negative x 2, less suspicion for recurrent C diff colitis. Most likely to be ischemic  - repeat CT abdomen 2/25 showing proctocolitis   - s/p Zosyn (2/14-19), Flagyl IV 500mg BID (2/14-19). s/p 6 day course  - decreased PO Vancomycin 125mg q6h and continue for 5 days (until 2/23) and c/w probiotics; continue taper 125 mg q6hrs x 7 days until 2/30

## 2021-02-26 NOTE — CONSULT NOTE ADULT - CONSULT REQUESTED DATE/TIME
14-Feb-2021 06:19
14-Feb-2021 15:21
16-Feb-2021 12:45
26-Feb-2021 15:30
18-Feb-2021 12:05
14-Feb-2021 02:52
17-Feb-2021 15:07

## 2021-02-26 NOTE — PROGRESS NOTE ADULT - PROBLEM SELECTOR PLAN 4
New a-fib with RVR seen on EKG 2/23   - no anti-coagulation in setting of possible LGIB; discussed benefits/risk of anticoagulation at this time, patient's family amenable to starting if clinically necessary  - hemoglobin has been stable

## 2021-02-26 NOTE — PROGRESS NOTE ADULT - PROBLEM SELECTOR PLAN 7
R leg swelling  - Duplex + for R soleus DVT, below the knee  - no tx given DVT below the knee  - ordered new doppler studies, follow up results R leg swelling  - Duplex + for R soleus DVT, below the knee  - no tx given DVT below the knee  - repeat DVT studies show no progression of clot  - ordered new doppler studies, follow up results

## 2021-02-26 NOTE — PROGRESS NOTE ADULT - PROBLEM SELECTOR PLAN 8
Previous infection 2 months ago. Per ID, initial positive COVID PCR likely remnant of previous infection.  - Repeat COVID PCR neg on 2/18  - COVID negative 2/21, 2/24  - Sating well on RA

## 2021-02-26 NOTE — PROGRESS NOTE ADULT - ASSESSMENT
89F w/ PMH of CAD s/p PCI, Chronic UTI's, MDD, HTN, HLD, [place] (hearing aids), dysphagia, COVID19 infection (2 months ago, s/p dexa, remdesivir, convalescent plasma, not intubated), and recent hospitalization for C. diff colitis (~2/7/21) presenting from [facility] after episode of vomiting and bloody bowel movement on 2/13.     unclear if this still c diff vs ischemic bowel  vs microperforation vs other         pt was treated initially for c diff, ischemic colitis and possible UTI  urine only grew yeast  antibiotics were tapered and pt improved with hydration  tuesday  with acute decompensation  plaed back on antibioitcs  likely UTI  change meropenem to 1 gm Q 8   continue po vancomycin to 125 mg po q 6 hours    pt clinically improving  nephrology is addressing the hyponateramia  CT results noted     DVT- per team

## 2021-02-27 LAB
ANION GAP SERPL CALC-SCNC: 11 MMOL/L — SIGNIFICANT CHANGE UP (ref 5–17)
ANION GAP SERPL CALC-SCNC: 12 MMOL/L — SIGNIFICANT CHANGE UP (ref 5–17)
BUN SERPL-MCNC: 5 MG/DL — LOW (ref 7–23)
BUN SERPL-MCNC: 7 MG/DL — SIGNIFICANT CHANGE UP (ref 7–23)
CALCIUM SERPL-MCNC: 8.7 MG/DL — SIGNIFICANT CHANGE UP (ref 8.4–10.5)
CALCIUM SERPL-MCNC: 8.7 MG/DL — SIGNIFICANT CHANGE UP (ref 8.4–10.5)
CHLORIDE SERPL-SCNC: 90 MMOL/L — LOW (ref 96–108)
CHLORIDE SERPL-SCNC: 92 MMOL/L — LOW (ref 96–108)
CO2 SERPL-SCNC: 23 MMOL/L — SIGNIFICANT CHANGE UP (ref 22–31)
CO2 SERPL-SCNC: 25 MMOL/L — SIGNIFICANT CHANGE UP (ref 22–31)
CREAT SERPL-MCNC: 0.67 MG/DL — SIGNIFICANT CHANGE UP (ref 0.5–1.3)
CREAT SERPL-MCNC: 0.75 MG/DL — SIGNIFICANT CHANGE UP (ref 0.5–1.3)
GLUCOSE SERPL-MCNC: 144 MG/DL — HIGH (ref 70–99)
GLUCOSE SERPL-MCNC: 91 MG/DL — SIGNIFICANT CHANGE UP (ref 70–99)
HCT VFR BLD CALC: 29.3 % — LOW (ref 34.5–45)
HGB BLD-MCNC: 9.7 G/DL — LOW (ref 11.5–15.5)
MAGNESIUM SERPL-MCNC: 1.8 MG/DL — SIGNIFICANT CHANGE UP (ref 1.6–2.6)
MCHC RBC-ENTMCNC: 31.5 PG — SIGNIFICANT CHANGE UP (ref 27–34)
MCHC RBC-ENTMCNC: 33.1 GM/DL — SIGNIFICANT CHANGE UP (ref 32–36)
MCV RBC AUTO: 95.1 FL — SIGNIFICANT CHANGE UP (ref 80–100)
NRBC # BLD: 0 /100 WBCS — SIGNIFICANT CHANGE UP (ref 0–0)
OSMOLALITY SERPL: 264 MOSMOL/KG — LOW (ref 280–301)
PHOSPHATE SERPL-MCNC: 2.8 MG/DL — SIGNIFICANT CHANGE UP (ref 2.5–4.5)
PLATELET # BLD AUTO: 444 K/UL — HIGH (ref 150–400)
POTASSIUM SERPL-MCNC: 3.7 MMOL/L — SIGNIFICANT CHANGE UP (ref 3.5–5.3)
POTASSIUM SERPL-MCNC: 4.2 MMOL/L — SIGNIFICANT CHANGE UP (ref 3.5–5.3)
POTASSIUM SERPL-SCNC: 3.7 MMOL/L — SIGNIFICANT CHANGE UP (ref 3.5–5.3)
POTASSIUM SERPL-SCNC: 4.2 MMOL/L — SIGNIFICANT CHANGE UP (ref 3.5–5.3)
RBC # BLD: 3.08 M/UL — LOW (ref 3.8–5.2)
RBC # FLD: 14.6 % — HIGH (ref 10.3–14.5)
SODIUM SERPL-SCNC: 125 MMOL/L — LOW (ref 135–145)
SODIUM SERPL-SCNC: 128 MMOL/L — LOW (ref 135–145)
UUN UR-MCNC: 144 MG/DL — SIGNIFICANT CHANGE UP
WBC # BLD: 5.77 K/UL — SIGNIFICANT CHANGE UP (ref 3.8–10.5)
WBC # FLD AUTO: 5.77 K/UL — SIGNIFICANT CHANGE UP (ref 3.8–10.5)

## 2021-02-27 PROCEDURE — 99233 SBSQ HOSP IP/OBS HIGH 50: CPT | Mod: GC

## 2021-02-27 PROCEDURE — 99232 SBSQ HOSP IP/OBS MODERATE 35: CPT | Mod: GC

## 2021-02-27 RX ORDER — SODIUM CHLORIDE 9 MG/ML
2 INJECTION INTRAMUSCULAR; INTRAVENOUS; SUBCUTANEOUS THREE TIMES A DAY
Refills: 0 | Status: DISCONTINUED | OUTPATIENT
Start: 2021-02-27 | End: 2021-03-02

## 2021-02-27 RX ADMIN — Medication 125 MILLIGRAM(S): at 05:23

## 2021-02-27 RX ADMIN — Medication 10 MILLIGRAM(S): at 12:26

## 2021-02-27 RX ADMIN — Medication 125 MILLIGRAM(S): at 12:26

## 2021-02-27 RX ADMIN — MEROPENEM 100 MILLIGRAM(S): 1 INJECTION INTRAVENOUS at 17:47

## 2021-02-27 RX ADMIN — MEROPENEM 100 MILLIGRAM(S): 1 INJECTION INTRAVENOUS at 05:23

## 2021-02-27 RX ADMIN — PREGABALIN 1000 MICROGRAM(S): 225 CAPSULE ORAL at 12:26

## 2021-02-27 RX ADMIN — Medication 81 MILLIGRAM(S): at 12:26

## 2021-02-27 RX ADMIN — Medication 125 MILLIGRAM(S): at 17:47

## 2021-02-27 RX ADMIN — NYSTATIN CREAM 1 APPLICATION(S): 100000 CREAM TOPICAL at 05:22

## 2021-02-27 RX ADMIN — NYSTATIN CREAM 1 APPLICATION(S): 100000 CREAM TOPICAL at 17:47

## 2021-02-27 RX ADMIN — Medication 10 MILLIGRAM(S): at 21:59

## 2021-02-27 RX ADMIN — Medication 500 MILLIGRAM(S): at 12:26

## 2021-02-27 RX ADMIN — MIRTAZAPINE 15 MILLIGRAM(S): 45 TABLET, ORALLY DISINTEGRATING ORAL at 21:59

## 2021-02-27 RX ADMIN — Medication 1 PACKET(S): at 05:24

## 2021-02-27 RX ADMIN — Medication 1 TABLET(S): at 12:26

## 2021-02-27 RX ADMIN — Medication 10 MILLIGRAM(S): at 05:23

## 2021-02-27 RX ADMIN — SODIUM CHLORIDE 1 GRAM(S): 9 INJECTION INTRAMUSCULAR; INTRAVENOUS; SUBCUTANEOUS at 12:26

## 2021-02-27 RX ADMIN — Medication 1 PACKET(S): at 09:06

## 2021-02-27 RX ADMIN — HEPARIN SODIUM 5000 UNIT(S): 5000 INJECTION INTRAVENOUS; SUBCUTANEOUS at 05:23

## 2021-02-27 RX ADMIN — Medication 1 TABLET(S): at 17:47

## 2021-02-27 RX ADMIN — Medication 1 PACKET(S): at 12:27

## 2021-02-27 RX ADMIN — Medication 1 PACKET(S): at 17:47

## 2021-02-27 RX ADMIN — HEPARIN SODIUM 5000 UNIT(S): 5000 INJECTION INTRAVENOUS; SUBCUTANEOUS at 12:26

## 2021-02-27 RX ADMIN — SODIUM CHLORIDE 2 GRAM(S): 9 INJECTION INTRAMUSCULAR; INTRAVENOUS; SUBCUTANEOUS at 21:59

## 2021-02-27 RX ADMIN — HEPARIN SODIUM 5000 UNIT(S): 5000 INJECTION INTRAVENOUS; SUBCUTANEOUS at 21:59

## 2021-02-27 RX ADMIN — SODIUM CHLORIDE 1 GRAM(S): 9 INJECTION INTRAMUSCULAR; INTRAVENOUS; SUBCUTANEOUS at 05:23

## 2021-02-27 RX ADMIN — Medication 1000 UNIT(S): at 12:26

## 2021-02-27 RX ADMIN — Medication 1 TABLET(S): at 05:24

## 2021-02-27 NOTE — PROGRESS NOTE ADULT - SUBJECTIVE AND OBJECTIVE BOX
[facility] DIVISION OF KIDNEY DISEASES AND HYPERTENSION -- FOLLOW UP NOTE  --------------------------------------------------------------------------------  HPI: 89-year-old female with CAD, Chronic UTI's, MDD, HTN, HLD, [place] (hearing aids), dysphagia, COVID19 infection (2 months ago), was admitted to [facility] for colitis. Pt admitted LGIB likely secondary to infectious colitis vs. ischemic colitis. Course complicated by septic shock secondary to colitis, UTI with UCx with  candida/citrobacter. Pt now on Meropenem and Vancomycin. Nephrology team consulted for hyponatremia. On review of labs, pt with hyponatremia in the past with SNa: 128 on 7/6/20 which has resolved. On current admission, pt found to have JERONIMO with SCr peaked at 2.5 on 2/14 which resolved with IVF. Her SNa was 135 WNL on admission 2/13 which decreased to 130 on 2/21 and has been stable at 128-130 until 2/25/21. Pt received IVF NS yesterday 675 cc and SNa found to be low at 123 yesterday2/26/21. Pt. started on salt tabs. sNa today improved to 125.    Pt. evaluated at bedside, in no acute distress. Decreased PO intake as per discussion with RN.    PAST HISTORY  --------------------------------------------------------------------------------  No significant changes to PMH, PSH, FHx, SHx, unless otherwise noted    ALLERGIES & MEDICATIONS  --------------------------------------------------------------------------------  Allergies    flu vaccine (Anaphylaxis)  No Known Drug Allergies    Intolerances    lactose (Unknown)    Standing Inpatient Medications  ascorbic acid 500 milliGRAM(s) Oral daily  aspirin enteric coated 81 milliGRAM(s) Oral daily  bethanechol 10 milliGRAM(s) Oral three times a day  cholecalciferol 1000 Unit(s) Oral daily  cyanocobalamin 1000 MICROGram(s) Oral daily  heparin   Injectable 5000 Unit(s) SubCutaneous every 8 hours  lactobacillus acidophilus 1 Tablet(s) Oral two times a day  meropenem  IVPB 1000 milliGRAM(s) IV Intermittent every 12 hours  mirtazapine 15 milliGRAM(s) Oral at bedtime  Nephro-portia 1 Tablet(s) Oral daily  nystatin Powder 1 Application(s) Topical two times a day  potassium phosphate / sodium phosphate Powder (PHOS-NaK) 1 Packet(s) Oral three times a day with meals  psyllium Powder 1 Packet(s) Oral two times a day  sodium chloride 1 Gram(s) Oral three times a day  vancomycin    Solution 125 milliGRAM(s) Oral every 6 hours    REVIEW OF SYSTEMS  --------------------------------------------------------------------------------  Gen: + fatigue  Respiratory: No dyspnea  CV: No chest pain  GI: No abdominal pain  MSK: trace LE edema  Neuro: No dizziness  Heme: No bleeding    All other systems were reviewed and are negative, except as noted.    VITALS/PHYSICAL EXAM  --------------------------------------------------------------------------------  T(C): 36.9 (02-27-21 @ 11:43), Max: 37.6 (02-27-21 @ 04:24)  HR: 98 (02-27-21 @ 11:43) (89 - 105)  BP: 104/67 (02-27-21 @ 11:43) (104/64 - 132/78)  RR: 18 (02-27-21 @ 11:43) (18 - 19)  SpO2: 98% (02-27-21 @ 11:43) (98% - 99%)  Wt(kg): --    02-26-21 @ 07:01  -  02-27-21 @ 07:00  --------------------------------------------------------  IN: 840 mL / OUT: 900 mL / NET: -60 mL    Physical Exam:  	Gen: awake, NAD  	HEENT: MMM  	Pulm: good air entry B/L  	CV:  S1S2  	Abd: +BS, soft   	Ext: trace lower extremity edema  	Neuro: awake, lethargic  	Skin: Warm, without rashes    LABS/STUDIES  --------------------------------------------------------------------------------              9.7    5.77  >-----------<  444      [02-27-21 @ 10:35]              29.3     125  |  90  |  5   ----------------------------<  144      [02-27-21 @ 10:35]  3.7   |  23  |  0.67        Ca     8.7     [02-27-21 @ 10:35]      Mg     1.8     [02-27-21 @ 10:35]      Phos  2.8     [02-27-21 @ 10:35]    Serum Osmolality 264      [02-27-21 @ 10:35]    Creatinine Trend:  SCr 0.67 [02-27 @ 10:35]  SCr 0.65 [02-26 @ 22:24]  SCr 0.67 [02-26 @ 16:31]  SCr 0.70 [02-26 @ 05:51]  SCr 0.76 [02-25 @ 06:36]

## 2021-02-27 NOTE — PROGRESS NOTE ADULT - PROBLEM SELECTOR PLAN 2
Pancolitis, most significant in descending and rectosigmoid colon. C diff negative x 2, less suspicion for recurrent C diff colitis. Most likely to be ischemic  - repeat CT abdomen 2/25 showing proctocolitis   - s/p Zosyn (2/14-19), Flagyl IV 500mg BID (2/14-19). s/p 6 day course  - decreased PO Vancomycin 125mg q6h and continue for 5 days (until 2/23) and c/w probiotics; continue taper 125 mg q6hrs x 7 days until 2/30

## 2021-02-27 NOTE — PROGRESS NOTE ADULT - PROBLEM SELECTOR PLAN 3
UCx now growing Citrobacter, now febrile and hypotensive   - currently on meropenem  - follow ID recommendations   - Pessary removed, cleaned, replaced by Urogynecologist Dr. Hernandez's associate

## 2021-02-27 NOTE — PROGRESS NOTE ADULT - PROBLEM SELECTOR PLAN 1
Pt. with hypo-osmolar hyponatremia in the setting of poor PO intake and SIADH. sNa decreased to 123 yesterday, improved to 125 today with salt tabs. Increase salt tabs to 2 gm TID. Encourage PO (solute) intake i.e. Ensure. Pt. on fluid restricition <1L/day. Monitor sNa q 8 hours.    If any questions, please feel free to contact me  Michael Head   Nephrology Fellow  [phone]  (After 5 pm or on weekends please page the on-call fellow)

## 2021-02-27 NOTE — PROGRESS NOTE ADULT - PROBLEM SELECTOR PLAN 1
RRT 2/23 for fever, tachycardia with a-fib and hypotension, continues to have fevers overnight, leukoycytosis now improving  - ordered CT C/A/P for assessment of new infection/worsening colitis   - continue meropenem 1g q12 hrs   - CT abdomen/pelvis shows proctocolitis w/ new mild b/l hydronephrosis   - UA + for many bacteria, +LE, nitrites, leukocytosis   - UCx growing Citrobacter, sensitive to meropenem on final   - hemodynamically stable; continue to monitor vitals q4hrs  - follow up blood/urine cultures and repeat COVID RRT 2/23 for fever, tachycardia with a-fib and hypotension, leukoycytosis improved  - ordered CT C/A/P for assessment of new infection/worsening colitis   - continue meropenem 1g q12 hrs   - CT abdomen/pelvis shows proctocolitis w/ new mild b/l hydronephrosis   - UA + for many bacteria, +LE, nitrites, leukocytosis   - UCx growing Citrobacter, sensitive to meropenem on final   - hemodynamically stable; continue to monitor vitals q4hrs  - BCx 12/23 - NGTD

## 2021-02-27 NOTE — PROGRESS NOTE ADULT - ASSESSMENT
89F w/ PMH of CAD s/p PCI, Chronic UTI's, MDD, HTN, HLD, [place] (hearing aids), dysphagia, prior COVID19 infection, and recent hospitalization for C. diff colitis (~2/7/21) presenting from rehab center for bloody bowel movement, likely LGIB secondary to infectious colitis vs. ischemic colitis.  Also found to be in septic shock, likely secondary to colitis iso recent C. diff.  COVID +, asymptomatic and without hypoxia. RRT 2/23 for hypotension, tachycardia with new afib and fever, currently on meropenem.

## 2021-02-27 NOTE — PROGRESS NOTE ADULT - PROBLEM SELECTOR PLAN 7
R leg swelling  - Duplex + for R soleus DVT, below the knee  - no tx given DVT below the knee  - repeat DVT studies show no progression of clot  - ordered new doppler studies, follow up results

## 2021-02-27 NOTE — PROGRESS NOTE ADULT - SUBJECTIVE AND OBJECTIVE BOX
Internal Medicine   Blake Moreno | PGY-1    OVERNIGHT EVENTS: No acute overnight events. Patient was started on salt tabs yesterday, hyponatremia 2/2 to decreased protein intake likely.    SUBJECTIVE: Patient was seen and examined at bedside this morning. Continues to be minimally conversational. Denies any nausea/vomiting/diarrhea, headache, shortness of breath or chest pain.       MEDICATIONS  (STANDING):  ascorbic acid 500 milliGRAM(s) Oral daily  aspirin enteric coated 81 milliGRAM(s) Oral daily  bethanechol 10 milliGRAM(s) Oral three times a day  cholecalciferol 1000 Unit(s) Oral daily  cyanocobalamin 1000 MICROGram(s) Oral daily  heparin   Injectable 5000 Unit(s) SubCutaneous every 8 hours  lactobacillus acidophilus 1 Tablet(s) Oral two times a day  meropenem  IVPB 1000 milliGRAM(s) IV Intermittent every 12 hours  mirtazapine 15 milliGRAM(s) Oral at bedtime  Nephro-portia 1 Tablet(s) Oral daily  nystatin Powder 1 Application(s) Topical two times a day  potassium phosphate / sodium phosphate Powder (PHOS-NaK) 1 Packet(s) Oral three times a day with meals  psyllium Powder 1 Packet(s) Oral two times a day  sodium chloride 1 Gram(s) Oral three times a day  vancomycin    Solution 125 milliGRAM(s) Oral every 6 hours    MEDICATIONS  (PRN):  acetaminophen   Tablet .. 650 milliGRAM(s) Oral every 12 hours PRN Temp greater or equal to 38C (100.4F), Mild Pain (1 - 3), Moderate Pain (4 - 6)  ondansetron    Tablet 4 milliGRAM(s) Oral every 8 hours PRN Nausea and/or Vomiting        T(F): 99.6 (02-27-21 @ 04:24), Max: 99.6 (02-27-21 @ 04:24)  HR: 99 (02-27-21 @ 04:24) (90 - 105)  BP: 115/68 (02-27-21 @ 04:24) (108/66 - 132/78)  BP(mean): --  RR: 18 (02-27-21 @ 04:24) (18 - 19)  SpO2: 99% (02-27-21 @ 04:24) (99% - 99%)    PHYSICAL EXAM:   Gen: Awake, NAD, minimally conversational  HEENT: NCAT, PERRL, EOMI, clear conjunctiva, no scleral icterus  Neck: Supple, no JVD, no LAD  CV: RRR, S1S2, no m/r/g  Resp: CTAB, normal respiratory effort  Abd: Soft, L sided TTP on soft and deep palpation, no rebound or guarding,   Ext: minimal R calf swelling compared to L, no clubbing or cyanosis  Neuro: minimally conversational, following verbal commands, nods/shakes head to questions   Skin: warm, perfused    TELEMETRY:    LABS:                        9.2    7.15  )-----------( 390      ( 26 Feb 2021 05:51 )             27.8     02-26    125<L>  |  92<L>  |  6<L>  ----------------------------<  91  4.1   |  21<L>  |  0.65    Ca    8.4      26 Feb 2021 22:24  Phos  3.0     02-26  Mg     2.0     02-26    TPro  5.4<L>  /  Alb  2.4<L>  /  TBili  0.1<L>  /  DBili  x   /  AST  25  /  ALT  11  /  AlkPhos  57  02-26            Creatinine Trend: 0.65<--, 0.67<--, 0.70<--, 0.76<--, 0.82<--, 0.78<--  I&O's Summary    25 Feb 2021 07:01  -  26 Feb 2021 07:00  --------------------------------------------------------  IN: 1275 mL / OUT: 1850 mL / NET: -575 mL    26 Feb 2021 07:01  -  27 Feb 2021 06:58  --------------------------------------------------------  IN: 840 mL / OUT: 600 mL / NET: 240 mL      BNP    RADIOLOGY & ADDITIONAL STUDIES:             Internal Medicine   Blake Moreno | PGY-1    OVERNIGHT EVENTS: No acute overnight events, no fevers. Patient was started on salt tabs yesterday, hyponatremia 2/2 to decreased protein intake likely.     SUBJECTIVE: Patient was seen and examined at bedside this morning. Continues to be minimally conversational. Denies any nausea/vomiting/diarrhea, headache, shortness of breath or chest pain.       MEDICATIONS  (STANDING):  ascorbic acid 500 milliGRAM(s) Oral daily  aspirin enteric coated 81 milliGRAM(s) Oral daily  bethanechol 10 milliGRAM(s) Oral three times a day  cholecalciferol 1000 Unit(s) Oral daily  cyanocobalamin 1000 MICROGram(s) Oral daily  heparin   Injectable 5000 Unit(s) SubCutaneous every 8 hours  lactobacillus acidophilus 1 Tablet(s) Oral two times a day  meropenem  IVPB 1000 milliGRAM(s) IV Intermittent every 12 hours  mirtazapine 15 milliGRAM(s) Oral at bedtime  Nephro-portia 1 Tablet(s) Oral daily  nystatin Powder 1 Application(s) Topical two times a day  potassium phosphate / sodium phosphate Powder (PHOS-NaK) 1 Packet(s) Oral three times a day with meals  psyllium Powder 1 Packet(s) Oral two times a day  sodium chloride 1 Gram(s) Oral three times a day  vancomycin    Solution 125 milliGRAM(s) Oral every 6 hours    MEDICATIONS  (PRN):  acetaminophen   Tablet .. 650 milliGRAM(s) Oral every 12 hours PRN Temp greater or equal to 38C (100.4F), Mild Pain (1 - 3), Moderate Pain (4 - 6)  ondansetron    Tablet 4 milliGRAM(s) Oral every 8 hours PRN Nausea and/or Vomiting        T(F): 99.6 (02-27-21 @ 04:24), Max: 99.6 (02-27-21 @ 04:24)  HR: 99 (02-27-21 @ 04:24) (90 - 105)  BP: 115/68 (02-27-21 @ 04:24) (108/66 - 132/78)  BP(mean): --  RR: 18 (02-27-21 @ 04:24) (18 - 19)  SpO2: 99% (02-27-21 @ 04:24) (99% - 99%)    PHYSICAL EXAM:   Gen: Awake, NAD, minimally conversational  HEENT: NCAT, PERRL, EOMI, clear conjunctiva, no scleral icterus  Neck: Supple, no JVD, no LAD  CV: RRR, S1S2, no m/r/g  Resp: CTAB, normal respiratory effort  Abd: Soft, L sided TTP on soft and deep palpation, no rebound or guarding,   Ext: minimal R calf swelling compared to L, no clubbing or cyanosis  Neuro: minimally conversational, following verbal commands, nods/shakes head to questions   Skin: warm, perfused    TELEMETRY:    LABS:                        9.2    7.15  )-----------( 390      ( 26 Feb 2021 05:51 )             27.8     02-26    125<L>  |  92<L>  |  6<L>  ----------------------------<  91  4.1   |  21<L>  |  0.65    Ca    8.4      26 Feb 2021 22:24  Phos  3.0     02-26  Mg     2.0     02-26    TPro  5.4<L>  /  Alb  2.4<L>  /  TBili  0.1<L>  /  DBili  x   /  AST  25  /  ALT  11  /  AlkPhos  57  02-26            Creatinine Trend: 0.65<--, 0.67<--, 0.70<--, 0.76<--, 0.82<--, 0.78<--  I&O's Summary    25 Feb 2021 07:01  -  26 Feb 2021 07:00  --------------------------------------------------------  IN: 1275 mL / OUT: 1850 mL / NET: -575 mL    26 Feb 2021 07:01  -  27 Feb 2021 06:58  --------------------------------------------------------  IN: 840 mL / OUT: 600 mL / NET: 240 mL      BNP    RADIOLOGY & ADDITIONAL STUDIES:             Internal Medicine   Blake oJsh | PGY-1    OVERNIGHT EVENTS: No acute overnight events, no fevers. Patient was started on salt tabs yesterday, hyponatremia 2/2 to decreased protein intake likely.     SUBJECTIVE: Patient was seen and examined at bedside this morning. Continues to be minimally conversational. Denies any nausea/vomiting/diarrhea, headache, shortness of breath or chest pain.     INTERVAL EVENTS: Increased salt tab to 2mg TID    MEDICATIONS  (STANDING):  ascorbic acid 500 milliGRAM(s) Oral daily  aspirin enteric coated 81 milliGRAM(s) Oral daily  bethanechol 10 milliGRAM(s) Oral three times a day  cholecalciferol 1000 Unit(s) Oral daily  cyanocobalamin 1000 MICROGram(s) Oral daily  heparin   Injectable 5000 Unit(s) SubCutaneous every 8 hours  lactobacillus acidophilus 1 Tablet(s) Oral two times a day  meropenem  IVPB 1000 milliGRAM(s) IV Intermittent every 12 hours  mirtazapine 15 milliGRAM(s) Oral at bedtime  Nephro-portia 1 Tablet(s) Oral daily  nystatin Powder 1 Application(s) Topical two times a day  potassium phosphate / sodium phosphate Powder (PHOS-NaK) 1 Packet(s) Oral three times a day with meals  psyllium Powder 1 Packet(s) Oral two times a day  sodium chloride 1 Gram(s) Oral three times a day  vancomycin    Solution 125 milliGRAM(s) Oral every 6 hours    MEDICATIONS  (PRN):  acetaminophen   Tablet .. 650 milliGRAM(s) Oral every 12 hours PRN Temp greater or equal to 38C (100.4F), Mild Pain (1 - 3), Moderate Pain (4 - 6)  ondansetron    Tablet 4 milliGRAM(s) Oral every 8 hours PRN Nausea and/or Vomiting        T(F): 99.6 (02-27-21 @ 04:24), Max: 99.6 (02-27-21 @ 04:24)  HR: 99 (02-27-21 @ 04:24) (90 - 105)  BP: 115/68 (02-27-21 @ 04:24) (108/66 - 132/78)  BP(mean): --  RR: 18 (02-27-21 @ 04:24) (18 - 19)  SpO2: 99% (02-27-21 @ 04:24) (99% - 99%)    PHYSICAL EXAM:   Gen: Awake, NAD, minimally conversational  HEENT: NCAT, PERRL, EOMI, clear conjunctiva, no scleral icterus  Neck: Supple, no JVD, no LAD  CV: RRR, S1S2, no m/r/g  Resp: CTAB, normal respiratory effort  Abd: Soft, L sided TTP on soft and deep palpation, no rebound or guarding,   Ext: minimal R calf swelling compared to L, no clubbing or cyanosis  Neuro: minimally conversational, following verbal commands, nods/shakes head to questions   Skin: warm, perfused    TELEMETRY:    LABS:                        9.2    7.15  )-----------( 390      ( 26 Feb 2021 05:51 )             27.8     02-26    125<L>  |  92<L>  |  6<L>  ----------------------------<  91  4.1   |  21<L>  |  0.65    Ca    8.4      26 Feb 2021 22:24  Phos  3.0     02-26  Mg     2.0     02-26    TPro  5.4<L>  /  Alb  2.4<L>  /  TBili  0.1<L>  /  DBili  x   /  AST  25  /  ALT  11  /  AlkPhos  57  02-26            Creatinine Trend: 0.65<--, 0.67<--, 0.70<--, 0.76<--, 0.82<--, 0.78<--  I&O's Summary    25 Feb 2021 07:01  -  26 Feb 2021 07:00  --------------------------------------------------------  IN: 1275 mL / OUT: 1850 mL / NET: -575 mL    26 Feb 2021 07:01  -  27 Feb 2021 06:58  --------------------------------------------------------  IN: 840 mL / OUT: 600 mL / NET: 240 mL      BNP    RADIOLOGY & ADDITIONAL STUDIES:

## 2021-02-27 NOTE — PROGRESS NOTE ADULT - PROBLEM SELECTOR PLAN 5
Sodium today 128  - likely in setting of decreased PO intake vs SIADH  - monitor BMP and electrolytes  - urinelytes significant for intrinsic kidney azotemia 1.1% Sodium today 125, started salt tabs 1g TID 12/26 ; in setting of low BUN, likely "tea and toast" hyponatremia   - likely in setting of decreased PO intake vs SIADH  - monitor BMP and electrolytes  - urinelytes significant for intrinsic kidney azotemia 1.1%  - follow nephrology recommendations Sodium today 125, started salt tabs 1g TID 12/26 ; in setting of low BUN, likely "tea and toast" hyponatremia   - increased salt tabs to 2mg TID , continue to monitor BMP q8 hrs  - likely in setting of decreased PO intake vs SIADH  - monitor BMP and electrolytes  - urinelytes significant for intrinsic kidney azotemia 1.1%  - follow nephrology recommendations

## 2021-02-28 LAB
ANION GAP SERPL CALC-SCNC: 12 MMOL/L — SIGNIFICANT CHANGE UP (ref 5–17)
BUN SERPL-MCNC: 7 MG/DL — SIGNIFICANT CHANGE UP (ref 7–23)
CALCIUM SERPL-MCNC: 8.5 MG/DL — SIGNIFICANT CHANGE UP (ref 8.4–10.5)
CHLORIDE SERPL-SCNC: 95 MMOL/L — LOW (ref 96–108)
CO2 SERPL-SCNC: 23 MMOL/L — SIGNIFICANT CHANGE UP (ref 22–31)
CREAT SERPL-MCNC: 0.72 MG/DL — SIGNIFICANT CHANGE UP (ref 0.5–1.3)
CULTURE RESULTS: SIGNIFICANT CHANGE UP
CULTURE RESULTS: SIGNIFICANT CHANGE UP
GLUCOSE SERPL-MCNC: 82 MG/DL — SIGNIFICANT CHANGE UP (ref 70–99)
HCT VFR BLD CALC: 29.6 % — LOW (ref 34.5–45)
HGB BLD-MCNC: 9.5 G/DL — LOW (ref 11.5–15.5)
LACTATE SERPL-SCNC: 1.1 MMOL/L — SIGNIFICANT CHANGE UP (ref 0.7–2)
MAGNESIUM SERPL-MCNC: 1.7 MG/DL — SIGNIFICANT CHANGE UP (ref 1.6–2.6)
MCHC RBC-ENTMCNC: 31.1 PG — SIGNIFICANT CHANGE UP (ref 27–34)
MCHC RBC-ENTMCNC: 32.1 GM/DL — SIGNIFICANT CHANGE UP (ref 32–36)
MCV RBC AUTO: 97 FL — SIGNIFICANT CHANGE UP (ref 80–100)
NRBC # BLD: 0 /100 WBCS — SIGNIFICANT CHANGE UP (ref 0–0)
PHOSPHATE SERPL-MCNC: 3.1 MG/DL — SIGNIFICANT CHANGE UP (ref 2.5–4.5)
PLATELET # BLD AUTO: 449 K/UL — HIGH (ref 150–400)
POTASSIUM SERPL-MCNC: 4.2 MMOL/L — SIGNIFICANT CHANGE UP (ref 3.5–5.3)
POTASSIUM SERPL-SCNC: 4.2 MMOL/L — SIGNIFICANT CHANGE UP (ref 3.5–5.3)
RBC # BLD: 3.05 M/UL — LOW (ref 3.8–5.2)
RBC # FLD: 14.6 % — HIGH (ref 10.3–14.5)
SODIUM SERPL-SCNC: 130 MMOL/L — LOW (ref 135–145)
SPECIMEN SOURCE: SIGNIFICANT CHANGE UP
SPECIMEN SOURCE: SIGNIFICANT CHANGE UP
WBC # BLD: 7.83 K/UL — SIGNIFICANT CHANGE UP (ref 3.8–10.5)
WBC # FLD AUTO: 7.83 K/UL — SIGNIFICANT CHANGE UP (ref 3.8–10.5)

## 2021-02-28 PROCEDURE — 99233 SBSQ HOSP IP/OBS HIGH 50: CPT | Mod: GC

## 2021-02-28 PROCEDURE — 99232 SBSQ HOSP IP/OBS MODERATE 35: CPT

## 2021-02-28 RX ORDER — MAGNESIUM SULFATE 500 MG/ML
2 VIAL (ML) INJECTION ONCE
Refills: 0 | Status: COMPLETED | OUTPATIENT
Start: 2021-02-28 | End: 2021-02-28

## 2021-02-28 RX ADMIN — Medication 125 MILLIGRAM(S): at 00:29

## 2021-02-28 RX ADMIN — Medication 1 PACKET(S): at 08:54

## 2021-02-28 RX ADMIN — Medication 1 TABLET(S): at 17:17

## 2021-02-28 RX ADMIN — NYSTATIN CREAM 1 APPLICATION(S): 100000 CREAM TOPICAL at 17:18

## 2021-02-28 RX ADMIN — PREGABALIN 1000 MICROGRAM(S): 225 CAPSULE ORAL at 12:29

## 2021-02-28 RX ADMIN — HEPARIN SODIUM 5000 UNIT(S): 5000 INJECTION INTRAVENOUS; SUBCUTANEOUS at 05:48

## 2021-02-28 RX ADMIN — Medication 10 MILLIGRAM(S): at 05:48

## 2021-02-28 RX ADMIN — Medication 500 MILLIGRAM(S): at 12:29

## 2021-02-28 RX ADMIN — NYSTATIN CREAM 1 APPLICATION(S): 100000 CREAM TOPICAL at 05:49

## 2021-02-28 RX ADMIN — Medication 125 MILLIGRAM(S): at 22:07

## 2021-02-28 RX ADMIN — MIRTAZAPINE 15 MILLIGRAM(S): 45 TABLET, ORALLY DISINTEGRATING ORAL at 22:06

## 2021-02-28 RX ADMIN — MEROPENEM 100 MILLIGRAM(S): 1 INJECTION INTRAVENOUS at 17:17

## 2021-02-28 RX ADMIN — MEROPENEM 100 MILLIGRAM(S): 1 INJECTION INTRAVENOUS at 05:49

## 2021-02-28 RX ADMIN — Medication 1 TABLET(S): at 12:29

## 2021-02-28 RX ADMIN — HEPARIN SODIUM 5000 UNIT(S): 5000 INJECTION INTRAVENOUS; SUBCUTANEOUS at 12:30

## 2021-02-28 RX ADMIN — HEPARIN SODIUM 5000 UNIT(S): 5000 INJECTION INTRAVENOUS; SUBCUTANEOUS at 22:06

## 2021-02-28 RX ADMIN — Medication 81 MILLIGRAM(S): at 12:29

## 2021-02-28 RX ADMIN — SODIUM CHLORIDE 2 GRAM(S): 9 INJECTION INTRAMUSCULAR; INTRAVENOUS; SUBCUTANEOUS at 05:50

## 2021-02-28 RX ADMIN — Medication 1000 UNIT(S): at 12:29

## 2021-02-28 RX ADMIN — Medication 125 MILLIGRAM(S): at 05:50

## 2021-02-28 RX ADMIN — Medication 1 PACKET(S): at 17:17

## 2021-02-28 RX ADMIN — Medication 50 GRAM(S): at 09:26

## 2021-02-28 RX ADMIN — Medication 125 MILLIGRAM(S): at 12:30

## 2021-02-28 RX ADMIN — Medication 1 PACKET(S): at 12:30

## 2021-02-28 RX ADMIN — Medication 10 MILLIGRAM(S): at 22:06

## 2021-02-28 RX ADMIN — SODIUM CHLORIDE 2 GRAM(S): 9 INJECTION INTRAMUSCULAR; INTRAVENOUS; SUBCUTANEOUS at 22:06

## 2021-02-28 RX ADMIN — Medication 10 MILLIGRAM(S): at 12:29

## 2021-02-28 RX ADMIN — SODIUM CHLORIDE 2 GRAM(S): 9 INJECTION INTRAMUSCULAR; INTRAVENOUS; SUBCUTANEOUS at 12:29

## 2021-02-28 RX ADMIN — Medication 125 MILLIGRAM(S): at 17:17

## 2021-02-28 RX ADMIN — Medication 1 TABLET(S): at 05:49

## 2021-02-28 NOTE — PROGRESS NOTE ADULT - SUBJECTIVE AND OBJECTIVE BOX
[facility] DIVISION OF KIDNEY DISEASES AND HYPERTENSION -- FOLLOW UP NOTE  --------------------------------------------------------------------------------  HPI: 89-year-old female with CAD, Chronic UTI's, MDD, HTN, HLD, [place] (hearing aids), dysphagia, COVID19 infection (2 months ago), was admitted to [facility] for colitis. Pt admitted LGIB likely secondary to infectious colitis vs. ischemic colitis. Course complicated by septic shock secondary to colitis, UTI with UCx with  candida/citrobacter. Pt now on Meropenem and Vancomycin. Nephrology team consulted for hyponatremia. On review of labs, pt with hyponatremia in the past with SNa: 128 on 7/6/20 which has resolved. On current admission, pt found to have JERONIMO with SCr peaked at 2.5 on 2/14 which resolved with IVF. Her SNa was 135 WNL on admission 2/13 which decreased to 130 on 2/21 and has been stable at 128-130 until 2/25/21. sNa decreased to 123 on 2/26/21. Pt. started on salt tabs. sNa today improved to 130.    Pt. evaluated at bedside, in no acute distress. RN reports pt. eating more and is more awake.    PAST HISTORY  --------------------------------------------------------------------------------  No significant changes to PMH, PSH, FHx, SHx, unless otherwise noted    ALLERGIES & MEDICATIONS  --------------------------------------------------------------------------------  Allergies    flu vaccine (Anaphylaxis)  No Known Drug Allergies    Intolerances    lactose (Unknown)    Standing Inpatient Medications  ascorbic acid 500 milliGRAM(s) Oral daily  aspirin enteric coated 81 milliGRAM(s) Oral daily  bethanechol 10 milliGRAM(s) Oral three times a day  cholecalciferol 1000 Unit(s) Oral daily  cyanocobalamin 1000 MICROGram(s) Oral daily  heparin   Injectable 5000 Unit(s) SubCutaneous every 8 hours  lactobacillus acidophilus 1 Tablet(s) Oral two times a day  meropenem  IVPB 1000 milliGRAM(s) IV Intermittent every 12 hours  mirtazapine 15 milliGRAM(s) Oral at bedtime  Nephro-portia 1 Tablet(s) Oral daily  nystatin Powder 1 Application(s) Topical two times a day  potassium phosphate / sodium phosphate Powder (PHOS-NaK) 1 Packet(s) Oral three times a day with meals  psyllium Powder 1 Packet(s) Oral two times a day  sodium chloride 2 Gram(s) Oral three times a day  vancomycin    Solution 125 milliGRAM(s) Oral every 6 hours    REVIEW OF SYSTEMS  --------------------------------------------------------------------------------  Gen: + fatigue  Respiratory: No dyspnea  CV: No chest pain  GI: No abdominal pain  MSK: trace LE edema  Neuro: No dizziness  Heme: No bleeding    All other systems were reviewed and are negative, except as noted.    VITALS/PHYSICAL EXAM  --------------------------------------------------------------------------------  T(C): 36.5 (02-28-21 @ 05:07), Max: 36.9 (02-27-21 @ 11:43)  HR: 94 (02-28-21 @ 05:07) (94 - 102)  BP: 119/69 (02-28-21 @ 05:07) (104/67 - 122/70)  RR: 18 (02-28-21 @ 05:07) (18 - 18)  SpO2: 99% (02-28-21 @ 05:07) (98% - 100%)  Wt(kg): --    02-27-21 @ 07:01  -  02-28-21 @ 07:00  --------------------------------------------------------  IN: 80 mL / OUT: 950 mL / NET: -870 mL    Physical Exam:  	Gen: awake, NAD  	HEENT: MMM  	Pulm: good air entry B/L  	CV:  S1S2  	Abd: +BS, soft   	Ext: trace lower extremity edema  	Neuro: awake  	Skin: Warm, without rashes    LABS/STUDIES  --------------------------------------------------------------------------------              9.5    7.83  >-----------<  449      [02-28-21 @ 07:11]              29.6     130  |  95  |  7   ----------------------------<  82      [02-28-21 @ 07:10]  4.2   |  23  |  0.72        Ca     8.5     [02-28-21 @ 07:10]      Mg     1.7     [02-28-21 @ 07:10]      Phos  3.1     [02-28-21 @ 07:10]    Serum Osmolality 264      [02-27-21 @ 10:35]    Creatinine Trend:  SCr 0.72 [02-28 @ 07:10]  SCr 0.75 [02-27 @ 17:51]  SCr 0.67 [02-27 @ 10:35]  SCr 0.65 [02-26 @ 22:24]  SCr 0.67 [02-26 @ 16:31]

## 2021-02-28 NOTE — PROGRESS NOTE ADULT - PROBLEM SELECTOR PLAN 1
Pt. with hypo-osmolar hyponatremia in the setting of poor PO intake and SIADH. sNa decreased to 123 on 2/26/21. Sna improved to 130 today with salt tabs. Continue salt tabs to 2 gm TID. Encourage PO (solute) intake i.e. Ensure. Pt. on fluid restricition <1L/day. Monitor sNa daily.    If any questions, please feel free to contact me  Michael Head   Nephrology Fellow  [phone]  (After 5 pm or on weekends please page the on-call fellow) Pt. with hypo-osmolar hyponatremia in the setting of poor PO intake and SIADH. sNa decreased to 123 on 2/26/21. Sna improved to 130 today with salt tabs. Continue salt tabs to 2 gm TID. Encourage PO (solute) intake i.e. Ensure. Pt. on fluid restriction <1L/day. Monitor sNa daily.    If any questions, please feel free to contact me  Michael Head   Nephrology Fellow  [phone]  (After 5 pm or on weekends please page the on-call fellow)

## 2021-02-28 NOTE — PROGRESS NOTE ADULT - ASSESSMENT
89F w/ PMH of CAD s/p PCI, Chronic UTI's, MDD, HTN, HLD, [place] (hearing aids), dysphagia, prior COVID19 infection, and recent hospitalization for C. diff colitis (~2/7/21) presenting from rehab center for bloody bowel movement, likely LGIB secondary to infectious colitis vs. ischemic colitis.  Also found to be in septic shock, likely secondary to colitis iso recent C. diff.  COVID +, asymptomatic and without hypoxia. RRT 2/23 for hypotension, tachycardia with new afib and fever, currently on meropenem.

## 2021-02-28 NOTE — PROGRESS NOTE ADULT - SUBJECTIVE AND OBJECTIVE BOX
PROGRESS NOTE:     CONTACT INFO:   Hoang (Xiao) Stephanie   NS: [phone]/[facility]: [id number]  PGY-2    Patient is a 89y old  Female who presents with a chief complaint of GI Bleed, Vomiting (2021 12:29)      SUBJECTIVE / OVERNIGHT EVENTS:    ADDITIONAL REVIEW OF SYSTEMS:    MEDICATIONS  (STANDING):  ascorbic acid 500 milliGRAM(s) Oral daily  aspirin enteric coated 81 milliGRAM(s) Oral daily  bethanechol 10 milliGRAM(s) Oral three times a day  cholecalciferol 1000 Unit(s) Oral daily  cyanocobalamin 1000 MICROGram(s) Oral daily  heparin   Injectable 5000 Unit(s) SubCutaneous every 8 hours  lactobacillus acidophilus 1 Tablet(s) Oral two times a day  magnesium sulfate  IVPB 2 Gram(s) IV Intermittent once  meropenem  IVPB 1000 milliGRAM(s) IV Intermittent every 12 hours  mirtazapine 15 milliGRAM(s) Oral at bedtime  Nephro-portia 1 Tablet(s) Oral daily  nystatin Powder 1 Application(s) Topical two times a day  potassium phosphate / sodium phosphate Powder (PHOS-NaK) 1 Packet(s) Oral three times a day with meals  psyllium Powder 1 Packet(s) Oral two times a day  sodium chloride 2 Gram(s) Oral three times a day  vancomycin    Solution 125 milliGRAM(s) Oral every 6 hours    MEDICATIONS  (PRN):  acetaminophen   Tablet .. 650 milliGRAM(s) Oral every 12 hours PRN Temp greater or equal to 38C (100.4F), Mild Pain (1 - 3), Moderate Pain (4 - 6)  ondansetron    Tablet 4 milliGRAM(s) Oral every 8 hours PRN Nausea and/or Vomiting      CAPILLARY BLOOD GLUCOSE        I&O's Summary    2021 07:01  -  2021 07:00  --------------------------------------------------------  IN: 80 mL / OUT: 950 mL / NET: -870 mL        PHYSICAL EXAM:  Vital Signs Last 24 Hrs  T(C): 36.5 (2021 05:07), Max: 36.9 (2021 11:43)  T(F): 97.7 (2021 05:07), Max: 98.4 (2021 11:43)  HR: 94 (2021 05:07) (94 - 102)  BP: 119/69 (2021 05:07) (104/67 - 122/70)  BP(mean): --  RR: 18 (2021 05:07) (18 - 18)  SpO2: 99% (2021 05:07) (98% - 100%)    CONSTITUTIONAL: NAD, well-developed  RESPIRATORY: Normal respiratory effort; lungs are clear to auscultation bilaterally  CARDIOVASCULAR: Regular rate and rhythm, normal S1 and S2, no murmur/rub/gallop; No lower extremity edema; Peripheral pulses are 2+ bilaterally  ABDOMEN: Nontender to palpation, normoactive bowel sounds, no rebound/guarding; No hepatosplenomegaly  MUSCLOSKELETAL: no clubbing or cyanosis of digits; no joint swelling or tenderness to palpation  PSYCH: A+O to person, place, and time; affect appropriate    LABS:                        9.5    7.83  )-----------( 449      ( 2021 07:11 )             29.6     02-28    130<L>  |  95<L>  |  7   ----------------------------<  82  4.2   |  23  |  0.72    Ca    8.5      2021 07:10  Phos  3.1     -  Mg     1.7     -28            Urinalysis Basic - ( 2021 11:38 )    Color: Yellow / Appearance: Slightly Turbid / S.026 / pH: x  Gluc: x / Ketone: Trace  / Bili: Negative / Urobili: Negative   Blood: x / Protein: 30 mg/dL / Nitrite: Negative   Leuk Esterase: Large / RBC: 3 /hpf /  /HPF   Sq Epi: x / Non Sq Epi: 1 /hpf / Bacteria: Negative          RADIOLOGY & ADDITIONAL TESTS:  Results Reviewed:   Imaging Personally Reviewed:  Electrocardiogram Personally Reviewed:    COORDINATION OF CARE:  Care Discussed with Consultants/Other Providers [Y/N]:  Prior or Outpatient Records Reviewed [Y/N]:   PROGRESS NOTE:     CONTACT INFO:   Hoang (Xiao) Stephanie   NS: [phone]/[facility]: [id number]  PGY-2    Patient is a 89y old  Female who presents with a chief complaint of GI Bleed, Vomiting (2021 12:29)      SUBJECTIVE / OVERNIGHT EVENTS: afebrile. no acute overnight issues    ADDITIONAL REVIEW OF SYSTEMS: othereise neg    MEDICATIONS  (STANDING):  ascorbic acid 500 milliGRAM(s) Oral daily  aspirin enteric coated 81 milliGRAM(s) Oral daily  bethanechol 10 milliGRAM(s) Oral three times a day  cholecalciferol 1000 Unit(s) Oral daily  cyanocobalamin 1000 MICROGram(s) Oral daily  heparin   Injectable 5000 Unit(s) SubCutaneous every 8 hours  lactobacillus acidophilus 1 Tablet(s) Oral two times a day  magnesium sulfate  IVPB 2 Gram(s) IV Intermittent once  meropenem  IVPB 1000 milliGRAM(s) IV Intermittent every 12 hours  mirtazapine 15 milliGRAM(s) Oral at bedtime  Nephro-portia 1 Tablet(s) Oral daily  nystatin Powder 1 Application(s) Topical two times a day  potassium phosphate / sodium phosphate Powder (PHOS-NaK) 1 Packet(s) Oral three times a day with meals  psyllium Powder 1 Packet(s) Oral two times a day  sodium chloride 2 Gram(s) Oral three times a day  vancomycin    Solution 125 milliGRAM(s) Oral every 6 hours    MEDICATIONS  (PRN):  acetaminophen   Tablet .. 650 milliGRAM(s) Oral every 12 hours PRN Temp greater or equal to 38C (100.4F), Mild Pain (1 - 3), Moderate Pain (4 - 6)  ondansetron    Tablet 4 milliGRAM(s) Oral every 8 hours PRN Nausea and/or Vomiting      CAPILLARY BLOOD GLUCOSE        I&O's Summary    2021 07:01  -  2021 07:00  --------------------------------------------------------  IN: 80 mL / OUT: 950 mL / NET: -870 mL        PHYSICAL EXAM:  Vital Signs Last 24 Hrs  T(C): 36.5 (2021 05:07), Max: 36.9 (2021 11:43)  T(F): 97.7 (2021 05:07), Max: 98.4 (2021 11:43)  HR: 94 (2021 05:07) (94 - 102)  BP: 119/69 (2021 05:07) (104/67 - 122/70)  BP(mean): --  RR: 18 (2021 05:07) (18 - 18)  SpO2: 99% (2021 05:07) (98% - 100%)    CONSTITUTIONAL: NAD, well-developed  RESPIRATORY: Normal respiratory effort; lungs are clear to auscultation bilaterally  CARDIOVASCULAR: Regular rate and rhythm, normal S1 and S2, no murmur/rub/gallop; No lower extremity edema; Peripheral pulses are 2+ bilaterally  ABDOMEN: Nontender to palpation, normoactive bowel sounds, no rebound/guarding; No hepatosplenomegaly  MUSCLOSKELETAL: no clubbing or cyanosis of digits; no joint swelling or tenderness to palpation  PSYCH: A+O to person, place, and time; affect appropriate    LABS:                        9.5    7.83  )-----------( 449      ( 2021 07:11 )             29.6     02-28    130<L>  |  95<L>  |  7   ----------------------------<  82  4.2   |  23  |  0.72    Ca    8.5      2021 07:10  Phos  3.1       Mg     1.7                 Urinalysis Basic - ( 2021 11:38 )    Color: Yellow / Appearance: Slightly Turbid / S.026 / pH: x  Gluc: x / Ketone: Trace  / Bili: Negative / Urobili: Negative   Blood: x / Protein: 30 mg/dL / Nitrite: Negative   Leuk Esterase: Large / RBC: 3 /hpf /  /HPF   Sq Epi: x / Non Sq Epi: 1 /hpf / Bacteria: Negative          RADIOLOGY & ADDITIONAL TESTS:  Results Reviewed:   Imaging Personally Reviewed:  Electrocardiogram Personally Reviewed:    COORDINATION OF CARE:  Care Discussed with Consultants/Other Providers [Y/N]:  Prior or Outpatient Records Reviewed [Y/N]:   PROGRESS NOTE:     CONTACT INFO:   Hoang (Xiao) Stephanie   NS: [phone]/[facility]: [id number]  PGY-2    Patient is a 89y old  Female who presents with a chief complaint of GI Bleed, Vomiting (2021 12:29)      SUBJECTIVE / OVERNIGHT EVENTS: afebrile. no acute overnight issues    ADDITIONAL REVIEW OF SYSTEMS: othereise neg    MEDICATIONS  (STANDING):  ascorbic acid 500 milliGRAM(s) Oral daily  aspirin enteric coated 81 milliGRAM(s) Oral daily  bethanechol 10 milliGRAM(s) Oral three times a day  cholecalciferol 1000 Unit(s) Oral daily  cyanocobalamin 1000 MICROGram(s) Oral daily  heparin   Injectable 5000 Unit(s) SubCutaneous every 8 hours  lactobacillus acidophilus 1 Tablet(s) Oral two times a day  magnesium sulfate  IVPB 2 Gram(s) IV Intermittent once  meropenem  IVPB 1000 milliGRAM(s) IV Intermittent every 12 hours  mirtazapine 15 milliGRAM(s) Oral at bedtime  Nephro-portia 1 Tablet(s) Oral daily  nystatin Powder 1 Application(s) Topical two times a day  potassium phosphate / sodium phosphate Powder (PHOS-NaK) 1 Packet(s) Oral three times a day with meals  psyllium Powder 1 Packet(s) Oral two times a day  sodium chloride 2 Gram(s) Oral three times a day  vancomycin    Solution 125 milliGRAM(s) Oral every 6 hours    MEDICATIONS  (PRN):  acetaminophen   Tablet .. 650 milliGRAM(s) Oral every 12 hours PRN Temp greater or equal to 38C (100.4F), Mild Pain (1 - 3), Moderate Pain (4 - 6)  ondansetron    Tablet 4 milliGRAM(s) Oral every 8 hours PRN Nausea and/or Vomiting      CAPILLARY BLOOD GLUCOSE        I&O's Summary    2021 07:01  -  2021 07:00  --------------------------------------------------------  IN: 80 mL / OUT: 950 mL / NET: -870 mL        PHYSICAL EXAM:  Vital Signs Last 24 Hrs  T(C): 36.5 (2021 05:07), Max: 36.9 (2021 11:43)  T(F): 97.7 (2021 05:07), Max: 98.4 (2021 11:43)  HR: 94 (2021 05:07) (94 - 102)  BP: 119/69 (2021 05:07) (104/67 - 122/70)  BP(mean): --  RR: 18 (2021 05:07) (18 - 18)  SpO2: 99% (2021 05:07) (98% - 100%)    CONSTITUTIONAL: NAD, well-developed  RESPIRATORY: Normal respiratory effort; lungs are clear to auscultation bilaterally  CARDIOVASCULAR: Regular rate and rhythm, normal S1 and S2, no murmur/rub/gallop; No lower extremity edema  ABDOMEN: Nontender to palpation, normoactive bowel sounds, no rebound/guarding  MUSCLOSKELETAL: no clubbing or cyanosis of digits; no joint swelling or tenderness to palpation  PSYCH: unable to assess because patient intermittenly answers question    LABS:                        9.5    7.83  )-----------( 449      ( 2021 07:11 )             29.6     02-28    130<L>  |  95<L>  |  7   ----------------------------<  82  4.2   |  23  |  0.72    Ca    8.5      2021 07:10  Phos  3.1     -  Mg     1.7                 Urinalysis Basic - ( 2021 11:38 )    Color: Yellow / Appearance: Slightly Turbid / S.026 / pH: x  Gluc: x / Ketone: Trace  / Bili: Negative / Urobili: Negative   Blood: x / Protein: 30 mg/dL / Nitrite: Negative   Leuk Esterase: Large / RBC: 3 /hpf /  /HPF   Sq Epi: x / Non Sq Epi: 1 /hpf / Bacteria: Negative          RADIOLOGY & ADDITIONAL TESTS:  Results Reviewed:   Imaging Personally Reviewed:  Electrocardiogram Personally Reviewed:    COORDINATION OF CARE:  Care Discussed with Consultants/Other Providers [Y/N]:  Prior or Outpatient Records Reviewed [Y/N]:

## 2021-02-28 NOTE — PROGRESS NOTE ADULT - PROBLEM SELECTOR PLAN 5
Sodium today 125, started salt tabs 1g TID 12/26 ; in setting of low BUN, likely "tea and toast" hyponatremia   - increased salt tabs to 2mg TID , continue to monitor BMP q8 hrs  - likely in setting of decreased PO intake vs SIADH  - monitor BMP and electrolytes  - urinelytes significant for intrinsic kidney azotemia 1.1%  - follow nephrology recommendations Sodium today 125, started salt tabs 1g TID 12/26 ; in setting of low BUN, likely "tea and toast" hyponatremia   - salt tabs to 2mg TID , continue to monitor BMP qdaily  - likely in setting of decreased PO intake vs SIADH  - monitor BMP and electrolytes  - urinelytes significant for intrinsic kidney azotemia 1.1%  - follow nephrology recommendations

## 2021-02-28 NOTE — PROGRESS NOTE ADULT - PROBLEM SELECTOR PLAN 1
RRT 2/23 for fever, tachycardia with a-fib and hypotension, leukoycytosis improved  - ordered CT C/A/P for assessment of new infection/worsening colitis   - continue meropenem 1g q12 hrs   - CT abdomen/pelvis shows proctocolitis w/ new mild b/l hydronephrosis   - UA + for many bacteria, +LE, nitrites, leukocytosis   - UCx growing Citrobacter, sensitive to meropenem on final   - hemodynamically stable; continue to monitor vitals q4hrs  - BCx 12/23 - NGTD

## 2021-03-01 LAB
ANION GAP SERPL CALC-SCNC: 11 MMOL/L — SIGNIFICANT CHANGE UP (ref 5–17)
BASOPHILS # BLD AUTO: 0.02 K/UL — SIGNIFICANT CHANGE UP (ref 0–0.2)
BASOPHILS NFR BLD AUTO: 0.4 % — SIGNIFICANT CHANGE UP (ref 0–2)
BUN SERPL-MCNC: 7 MG/DL — SIGNIFICANT CHANGE UP (ref 7–23)
CALCIUM SERPL-MCNC: 8.4 MG/DL — SIGNIFICANT CHANGE UP (ref 8.4–10.5)
CHLORIDE SERPL-SCNC: 93 MMOL/L — LOW (ref 96–108)
CO2 SERPL-SCNC: 24 MMOL/L — SIGNIFICANT CHANGE UP (ref 22–31)
CREAT SERPL-MCNC: 0.68 MG/DL — SIGNIFICANT CHANGE UP (ref 0.5–1.3)
EOSINOPHIL # BLD AUTO: 0.12 K/UL — SIGNIFICANT CHANGE UP (ref 0–0.5)
EOSINOPHIL NFR BLD AUTO: 2.1 % — SIGNIFICANT CHANGE UP (ref 0–6)
GLUCOSE SERPL-MCNC: 86 MG/DL — SIGNIFICANT CHANGE UP (ref 70–99)
HCT VFR BLD CALC: 29.9 % — LOW (ref 34.5–45)
HGB BLD-MCNC: 9.6 G/DL — LOW (ref 11.5–15.5)
IMM GRANULOCYTES NFR BLD AUTO: 1.2 % — SIGNIFICANT CHANGE UP (ref 0–1.5)
LYMPHOCYTES # BLD AUTO: 2.05 K/UL — SIGNIFICANT CHANGE UP (ref 1–3.3)
LYMPHOCYTES # BLD AUTO: 36.5 % — SIGNIFICANT CHANGE UP (ref 13–44)
MAGNESIUM SERPL-MCNC: 2 MG/DL — SIGNIFICANT CHANGE UP (ref 1.6–2.6)
MCHC RBC-ENTMCNC: 31.3 PG — SIGNIFICANT CHANGE UP (ref 27–34)
MCHC RBC-ENTMCNC: 32.1 GM/DL — SIGNIFICANT CHANGE UP (ref 32–36)
MCV RBC AUTO: 97.4 FL — SIGNIFICANT CHANGE UP (ref 80–100)
MONOCYTES # BLD AUTO: 0.71 K/UL — SIGNIFICANT CHANGE UP (ref 0–0.9)
MONOCYTES NFR BLD AUTO: 12.6 % — SIGNIFICANT CHANGE UP (ref 2–14)
NEUTROPHILS # BLD AUTO: 2.65 K/UL — SIGNIFICANT CHANGE UP (ref 1.8–7.4)
NEUTROPHILS NFR BLD AUTO: 47.2 % — SIGNIFICANT CHANGE UP (ref 43–77)
NRBC # BLD: 0 /100 WBCS — SIGNIFICANT CHANGE UP (ref 0–0)
PHOSPHATE SERPL-MCNC: 2.9 MG/DL — SIGNIFICANT CHANGE UP (ref 2.5–4.5)
PLATELET # BLD AUTO: 451 K/UL — HIGH (ref 150–400)
POTASSIUM SERPL-MCNC: 4.1 MMOL/L — SIGNIFICANT CHANGE UP (ref 3.5–5.3)
POTASSIUM SERPL-SCNC: 4.1 MMOL/L — SIGNIFICANT CHANGE UP (ref 3.5–5.3)
RBC # BLD: 3.07 M/UL — LOW (ref 3.8–5.2)
RBC # FLD: 14.6 % — HIGH (ref 10.3–14.5)
SODIUM SERPL-SCNC: 128 MMOL/L — LOW (ref 135–145)
WBC # BLD: 5.62 K/UL — SIGNIFICANT CHANGE UP (ref 3.8–10.5)
WBC # FLD AUTO: 5.62 K/UL — SIGNIFICANT CHANGE UP (ref 3.8–10.5)

## 2021-03-01 PROCEDURE — 99232 SBSQ HOSP IP/OBS MODERATE 35: CPT | Mod: GC

## 2021-03-01 PROCEDURE — 99232 SBSQ HOSP IP/OBS MODERATE 35: CPT | Mod: CS

## 2021-03-01 PROCEDURE — 99233 SBSQ HOSP IP/OBS HIGH 50: CPT

## 2021-03-01 PROCEDURE — 99233 SBSQ HOSP IP/OBS HIGH 50: CPT | Mod: GC

## 2021-03-01 RX ADMIN — SODIUM CHLORIDE 2 GRAM(S): 9 INJECTION INTRAMUSCULAR; INTRAVENOUS; SUBCUTANEOUS at 12:22

## 2021-03-01 RX ADMIN — PREGABALIN 1000 MICROGRAM(S): 225 CAPSULE ORAL at 12:22

## 2021-03-01 RX ADMIN — Medication 1 PACKET(S): at 12:22

## 2021-03-01 RX ADMIN — Medication 500 MILLIGRAM(S): at 12:22

## 2021-03-01 RX ADMIN — Medication 10 MILLIGRAM(S): at 21:34

## 2021-03-01 RX ADMIN — HEPARIN SODIUM 5000 UNIT(S): 5000 INJECTION INTRAVENOUS; SUBCUTANEOUS at 12:22

## 2021-03-01 RX ADMIN — Medication 125 MILLIGRAM(S): at 12:22

## 2021-03-01 RX ADMIN — Medication 125 MILLIGRAM(S): at 17:08

## 2021-03-01 RX ADMIN — Medication 1 TABLET(S): at 05:54

## 2021-03-01 RX ADMIN — Medication 1000 UNIT(S): at 12:23

## 2021-03-01 RX ADMIN — Medication 1 PACKET(S): at 17:08

## 2021-03-01 RX ADMIN — NYSTATIN CREAM 1 APPLICATION(S): 100000 CREAM TOPICAL at 05:54

## 2021-03-01 RX ADMIN — SODIUM CHLORIDE 2 GRAM(S): 9 INJECTION INTRAMUSCULAR; INTRAVENOUS; SUBCUTANEOUS at 21:34

## 2021-03-01 RX ADMIN — Medication 1 TABLET(S): at 17:08

## 2021-03-01 RX ADMIN — Medication 1 TABLET(S): at 12:22

## 2021-03-01 RX ADMIN — Medication 10 MILLIGRAM(S): at 05:55

## 2021-03-01 RX ADMIN — MEROPENEM 100 MILLIGRAM(S): 1 INJECTION INTRAVENOUS at 05:54

## 2021-03-01 RX ADMIN — MIRTAZAPINE 15 MILLIGRAM(S): 45 TABLET, ORALLY DISINTEGRATING ORAL at 21:34

## 2021-03-01 RX ADMIN — Medication 10 MILLIGRAM(S): at 12:22

## 2021-03-01 RX ADMIN — SODIUM CHLORIDE 2 GRAM(S): 9 INJECTION INTRAMUSCULAR; INTRAVENOUS; SUBCUTANEOUS at 05:54

## 2021-03-01 RX ADMIN — HEPARIN SODIUM 5000 UNIT(S): 5000 INJECTION INTRAVENOUS; SUBCUTANEOUS at 21:34

## 2021-03-01 RX ADMIN — Medication 81 MILLIGRAM(S): at 12:22

## 2021-03-01 RX ADMIN — NYSTATIN CREAM 1 APPLICATION(S): 100000 CREAM TOPICAL at 17:08

## 2021-03-01 RX ADMIN — Medication 1 PACKET(S): at 08:29

## 2021-03-01 RX ADMIN — Medication 125 MILLIGRAM(S): at 05:55

## 2021-03-01 RX ADMIN — HEPARIN SODIUM 5000 UNIT(S): 5000 INJECTION INTRAVENOUS; SUBCUTANEOUS at 05:54

## 2021-03-01 NOTE — PROGRESS NOTE ADULT - PROBLEM SELECTOR PLAN 1
Pt. with hypo-osmolar hyponatremia in the setting of poor PO intake and SIADH. sNa decreased to 123 on 2/26/21. Sna improved to 130 on 2/28 with salt tabs. SNa stable at 128 today. Continue salt tabs to 2 gm TID, however if SNa drops further will increase to 3GM TID starting tonight. Encourage PO (solute) intake i.e. ENSURE. Pt. on fluid restriction <1L/day. Monitor sNa daily.    If you have any questions, please feel free to contact me  Alfredo Heath  Nephrology Fellow  [phone]  (After 5pm or on weekends please page the on-call fellow)

## 2021-03-01 NOTE — PROGRESS NOTE ADULT - SUBJECTIVE AND OBJECTIVE BOX
[facility] Division of Kidney Diseases & Hypertension  FOLLOW UP NOTE  [phone]--------------------------------------------------------------------------------    HPI: 89-year-old female with CAD, Chronic UTI's, MDD, HTN, HLD, [place] (hearing aids), dysphagia, COVID19 infection (2 months ago), was admitted to [facility] for colitis. Pt admitted LGIB likely secondary to infectious colitis vs. ischemic colitis. Course complicated by septic shock secondary to colitis, UTI with UCx with  candida/citrobacter. Pt now on Meropenem and Vancomycin. Nephrology team consulted for hyponatremia. On review of labs, pt with hyponatremia in the past with SNa: 128 on 7/6/20 which had resolved. On current admission, pt found to have JERONIMO with SCr peaked at 2.5 on 2/14 which resolved with IVF. Her SNa was 135 WNL on admission 2/13 which decreased to 130 on 2/21 and has been stable at 128-130 until 2/25/21. sNa decreased to 123 on 2/26/21. Pt. started on salt tabs. sNa today stable at 128.    Pt. evaluated at bedside, in no acute distress. RN reports pt. eating better and is more awake however at the time of my evaluation , pt. still unable to provide ROS with meal tray at bedside.    PAST HISTORY  --------------------------------------------------------------------------------  No significant changes to PMH, PSH, FHx, SHx, unless otherwise noted    ALLERGIES & MEDICATIONS  --------------------------------------------------------------------------------  Allergies    flu vaccine (Anaphylaxis)  No Known Drug Allergies    Intolerances    lactose (Unknown)    Standing Inpatient Medications  ascorbic acid 500 milliGRAM(s) Oral daily  aspirin enteric coated 81 milliGRAM(s) Oral daily  bethanechol 10 milliGRAM(s) Oral three times a day  cholecalciferol 1000 Unit(s) Oral daily  cyanocobalamin 1000 MICROGram(s) Oral daily  heparin   Injectable 5000 Unit(s) SubCutaneous every 8 hours  lactobacillus acidophilus 1 Tablet(s) Oral two times a day  mirtazapine 15 milliGRAM(s) Oral at bedtime  Nephro-portia 1 Tablet(s) Oral daily  nystatin Powder 1 Application(s) Topical two times a day  potassium phosphate / sodium phosphate Powder (PHOS-NaK) 1 Packet(s) Oral three times a day with meals  psyllium Powder 1 Packet(s) Oral two times a day  sodium chloride 2 Gram(s) Oral three times a day  vancomycin    Solution 125 milliGRAM(s) Oral every 6 hours    VITALS/PHYSICAL EXAM  --------------------------------------------------------------------------------  T(C): 36.7 (03-01-21 @ 16:50), Max: 37.2 (03-01-21 @ 05:40)  HR: 98 (03-01-21 @ 16:50) (95 - 99)  BP: 121/63 (03-01-21 @ 16:50) (97/62 - 122/73)  RR: 18 (03-01-21 @ 16:50) (18 - 18)  SpO2: 100% (03-01-21 @ 16:50) (95% - 100%)  Wt(kg): --    02-28-21 @ 07:01  -  03-01-21 @ 07:00  --------------------------------------------------------  IN: 690 mL / OUT: 800 mL / NET: -110 mL    03-01-21 @ 07:01  -  03-01-21 @ 19:25  --------------------------------------------------------  IN: 240 mL / OUT: 350 mL / NET: -110 mL    Physical Exam:              Gen: awake, NAD  	HEENT: Anicteric  	Pulm: good air entry B/L  	CV:  S1S2  	Abd: +BS, soft   	Ext: trace lower extremity edema  	Neuro: awake, unable to provide ROS  	Skin: Warm, without rashes    LABS/STUDIES  --------------------------------------------------------------------------------              9.6    5.62  >-----------<  451      [03-01-21 @ 07:05]              29.9     128  |  93  |  7   ----------------------------<  86      [03-01-21 @ 07:04]  4.1   |  24  |  0.68        Ca     8.4     [03-01-21 @ 07:04]      Mg     2.0     [03-01-21 @ 07:04]      Phos  2.9     [03-01-21 @ 07:04]    Creatinine Trend:  SCr 0.68 [03-01 @ 07:04]  SCr 0.72 [02-28 @ 07:10]  SCr 0.75 [02-27 @ 17:51]  SCr 0.67 [02-27 @ 10:35]  SCr 0.65 [02-26 @ 22:24]    Urinalysis - [02-26-21 @ 11:38]      Color Yellow / Appearance Slightly Turbid / SG 1.026 / pH 7.0      Gluc Negative / Ketone Trace  / Bili Negative / Urobili Negative       Blood Trace / Protein 30 mg/dL / Leuk Est Large / Nitrite Negative      RBC 3 /  / Hyaline 2 / Gran  / Sq Epi  / Non Sq Epi 1 / Bacteria Negative    Urine Creatinine 54      [02-26-21 @ 11:38]  Urine Sodium 80      [02-26-21 @ 19:44]  Urine Urea Nitrogen 144      [02-27-21 @ 01:06]  Urine Potassium 37      [02-26-21 @ 11:38]  Urine Chloride 77      [02-26-21 @ 11:38]  Urine Osmolality 265      [02-26-21 @ 19:44]

## 2021-03-01 NOTE — PROGRESS NOTE ADULT - ASSESSMENT
89F w/ PMH of CAD s/p PCI, Chronic UTI's, MDD, HTN, HLD, [place] (hearing aids), dysphagia, COVID19 infection (2 months ago, s/p dexa, remdesivir, convalescent plasma, not intubated), and recent hospitalization for C. diff colitis (~2/7/21) presenting from [facility] after episode of vomiting and bloody bowel movement on 2/13.       pt was treated initially for c diff, ischemic colitis and possible UTI  urine only grew yeast  antibiotics were tapered and pt improved with hydration  LAst tuesday  with acute decompensation  placed back on antibioitcs  likely UTI  meropenem - since 2/22 completed   continue po vancomycin to 125 mg po q 6 hours    pt clinically improving  nephrology is addressing the hyponateramia  CT results noted     DVT- per team

## 2021-03-01 NOTE — PROGRESS NOTE ADULT - ASSESSMENT
89F w/ PMH of CAD s/p PCI, Chronic UTI's, MDD, HTN, HLD, [place] (hearing aids), dysphagia, prior COVID19 infection, and recent hospitalization for C. diff colitis (~2/7/21) presenting from rehab center for bloody bowel movement, likely LGIB secondary to infectious colitis vs. ischemic colitis.  Also found to be in septic shock, likely secondary to colitis iso recent C. diff.  COVID +, asymptomatic and without hypoxia. RRT 2/23 for hypotension, tachycardia with new afib and fever, currently on meropenem.  89F w/ PMH of CAD s/p PCI, Chronic UTI's, MDD, HTN, HLD, [place] (hearing aids), dysphagia, prior COVID19 infection, and recent hospitalization for C. diff colitis (~2/7/21) presenting from rehab center for bloody bowel movement, likely LGIB secondary to infectious colitis vs. ischemic colitis.  Also found to be in septic shock, likely secondary to colitis iso recent C. diff.  COVID +, asymptomatic and without hypoxia. RRT 2/23 for hypotension, tachycardia with new afib and fever, currently on meropenem 2/23- .

## 2021-03-01 NOTE — PROGRESS NOTE ADULT - SUBJECTIVE AND OBJECTIVE BOX
Patient is a 89y old  Female who presented with a chief complaint of GI Bleed, Vomiting (01 Mar 2021 09:49)      INTERVAL HPI/OVERNIGHT EVENTS:  no abdominal pain, nausea, vomiting, fever or chills  no rectal bleeding or melena    average ~2 BM/24 hours reported    MEDICATIONS  (STANDING):  ascorbic acid 500 milliGRAM(s) Oral daily  aspirin enteric coated 81 milliGRAM(s) Oral daily  bethanechol 10 milliGRAM(s) Oral three times a day  cholecalciferol 1000 Unit(s) Oral daily  cyanocobalamin 1000 MICROGram(s) Oral daily  heparin   Injectable 5000 Unit(s) SubCutaneous every 8 hours  lactobacillus acidophilus 1 Tablet(s) Oral two times a day  meropenem  IVPB 1000 milliGRAM(s) IV Intermittent every 12 hours  mirtazapine 15 milliGRAM(s) Oral at bedtime  Nephro-portia 1 Tablet(s) Oral daily  nystatin Powder 1 Application(s) Topical two times a day  potassium phosphate / sodium phosphate Powder (PHOS-NaK) 1 Packet(s) Oral three times a day with meals  psyllium Powder 1 Packet(s) Oral two times a day  sodium chloride 2 Gram(s) Oral three times a day  vancomycin    Solution 125 milliGRAM(s) Oral every 6 hours    MEDICATIONS  (PRN):  acetaminophen   Tablet .. 650 milliGRAM(s) Oral every 12 hours PRN Temp greater or equal to 38C (100.4F), Mild Pain (1 - 3), Moderate Pain (4 - 6)  ondansetron    Tablet 4 milliGRAM(s) Oral every 8 hours PRN Nausea and/or Vomiting      Allergies  flu vaccine (Anaphylaxis)  No Known Drug Allergies    Intolerances  lactose (Unknown)      Review of Systems:  unable to obtain 2/2 dementia    Vital Signs Last 24 Hrs  T(C): 37.2 (01 Mar 2021 05:40), Max: 37.2 (01 Mar 2021 05:40)  T(F): 98.9 (01 Mar 2021 05:40), Max: 98.9 (01 Mar 2021 05:40)  HR: 97 (01 Mar 2021 09:21) (93 - 99)  BP: 116/72 (01 Mar 2021 09:21) (97/62 - 122/73)  BP(mean): --  RR: 18 (01 Mar 2021 05:40) (18 - 18)  SpO2: 98% (01 Mar 2021 05:40) (95% - 100%)    PHYSICAL EXAM:  Constitutional: elderly female. appears comfortable, non toxic appearing  Neck: No LAD, supple no JVD  Respiratory: decreased BS at bases, no rales or wheeze  Cardiovascular: S1 and S2, RRR  Gastrointestinal: obese soft  ND NT no rebound guarding or rigidity +harding  Extremities: neg clubbing, cyanosis tr b/l LE edema  Vascular: 2+ peripheral pulses  Neurological: oriented to self, pleasantly confused.  moves all extremities  Skin: No rashes      LABS:                        9.6    5.62  )-----------( 451      ( 01 Mar 2021 07:05 )             29.9     03-01    128<L>  |  93<L>  |  7   ----------------------------<  86  4.1   |  24  |  0.68    Ca    8.4      01 Mar 2021 07:04  Phos  2.9     03-01  Mg     2.0     03-01          RADIOLOGY & ADDITIONAL TESTS:

## 2021-03-01 NOTE — PROGRESS NOTE ADULT - ASSESSMENT
89F w/ PMH of CAD s/p PCI, Chronic UTI's, MDD, HTN, HLD, [place] (hearing aids), dysphagia, COVID19 infection (2 months ago), and recent hospitalization for C. diff colitis (~2/7/21) presenting from [facility] after episode of vomiting and bloody bowel movement on 2/13. Found to have left sided colitis on imaging   Acute Right Below Knee DVT     #Colitis - given clinical picture likely 2/2 ischemic.  Per family wishes no aggressive measures or interventions/procedures.  DNR/DNI in place.   stool C diff negative x2 (2/14, 2/18)    #s/p RRT and fevers (now resolved) +UTI - Citrobacter koseri with Persistent fevers. CT colitis transverse colon to rectum.  benign abdominal exam and improved stooling (on Metamucil) without bleeding; suspect chronic component of ischemic colitis    COVID negative    RECS:  -Monitor BMs; replete lytes PRN  -ID following;  Plan for PO Vanco taper per ID and Meropenem  -avoid hypotension  -monitor clinical exam  -continue  Metamucil BID in attempt to bulk stool  -PO Bacid BID  -PO diet per SLP recs      Zain Paris PA-C    [facility]  [phone]  After hours and weekend coverage [phone]

## 2021-03-01 NOTE — PROGRESS NOTE ADULT - SUBJECTIVE AND OBJECTIVE BOX
Patient is a 89y old  Female who presents with a chief complaint of GI Bleed, Vomiting (01 Mar 2021 07:14)    Being followed by ID for        Interval history:  still with diarrhea- watery twice a day  has a harding catheter  No other acute events      ROS:  No cough,SOB,CP  No N/V/D  No abd pain  No urinary complaints  No HA  No joint or limb pain  No other complaints    PAST MEDICAL & SURGICAL HISTORY:  COVID-19    Urinary tract infection    CAD in native artery    Dyslipidemia    Depression    Hyponatremia    Hypertension    History of repair of hip fracture      Allergies    flu vaccine (Anaphylaxis)  No Known Drug Allergies    Intolerances    lactose (Unknown)    Antimicrobials:    meropenem  IVPB 1000 milliGRAM(s) IV Intermittent every 12 hours  vancomycin    Solution 125 milliGRAM(s) Oral every 6 hours    MEDICATIONS  (STANDING):  ascorbic acid 500 milliGRAM(s) Oral daily  aspirin enteric coated 81 milliGRAM(s) Oral daily  bethanechol 10 milliGRAM(s) Oral three times a day  cholecalciferol 1000 Unit(s) Oral daily  cyanocobalamin 1000 MICROGram(s) Oral daily  heparin   Injectable 5000 Unit(s) SubCutaneous every 8 hours  lactobacillus acidophilus 1 Tablet(s) Oral two times a day  meropenem  IVPB 1000 milliGRAM(s) IV Intermittent every 12 hours  mirtazapine 15 milliGRAM(s) Oral at bedtime  Nephro-portia 1 Tablet(s) Oral daily  nystatin Powder 1 Application(s) Topical two times a day  potassium phosphate / sodium phosphate Powder (PHOS-NaK) 1 Packet(s) Oral three times a day with meals  psyllium Powder 1 Packet(s) Oral two times a day  sodium chloride 2 Gram(s) Oral three times a day  vancomycin    Solution 125 milliGRAM(s) Oral every 6 hours      Vital Signs Last 24 Hrs  T(C): 37.2 (03-01-21 @ 05:40), Max: 37.2 (03-01-21 @ 05:40)  T(F): 98.9 (03-01-21 @ 05:40), Max: 98.9 (03-01-21 @ 05:40)  HR: 97 (03-01-21 @ 09:21) (90 - 99)  BP: 116/72 (03-01-21 @ 09:21) (97/62 - 122/73)  BP(mean): --  RR: 18 (03-01-21 @ 05:40) (18 - 18)  SpO2: 98% (03-01-21 @ 05:40) (95% - 100%)    Physical Exam:    Constitutional well preserved,comfortable,pleasant    HEENT PERRLA EOMI,No pallor or icterus    No oral exudate or erythema    Neck supple no JVD or LN    Chest Good AE,CTA    CVS RRR S1 S2 WNl No murmur or rub or gallop    Abd soft BS normal No tenderness no masses    Ext No cyanosis clubbing or edema    IV site no erythema tenderness or discharge    Joints no swelling or LOM    CNS AAO X 3 no focal    Lab Data:                          9.6    5.62  )-----------( 451      ( 01 Mar 2021 07:05 )             29.9       03-01    128<L>  |  93<L>  |  7   ----------------------------<  86  4.1   |  24  |  0.68    Ca    8.4      01 Mar 2021 07:04  Phos  2.9     03-01  Mg     2.0     03-01            .Blood Blood-Peripheral  02-23-21   No Growth Final  --  --      .Urine Catheterized  02-23-21   >100,000 CFU/ml Citrobacter koseri  --  Citrobacter koseri  Culture - Urine (02.23.21 @ 03:19)    -  Amoxicillin/Clavulanic Acid: I 16/8    -  Amikacin: S <=16    -  Ampicillin: R >16 These ampicillin results predict results for amoxicillin    -  Ampicillin/Sulbactam: R >16/8 Enterobacter, Citrobacter, and Serratia may develop resistance during prolonged therapy (3-4 days)    -  Aztreonam: R >16    -  Cefazolin: R >16    -  Cefepime: R >16    -  Cefoxitin: S <=8    -  Ceftriaxone: R >32 Enterobacter, Citrobacter, and Serratia may develop resistance during prolonged therapy    -  Ertapenem: S <=0.5    -  Ciprofloxacin: S <=0.25    -  Gentamicin: S <=2    -  Imipenem: S <=1    -  Levofloxacin: S <=0.5    -  Meropenem: S <=1    -  Nitrofurantoin: S <=32 Should not be used to treat pyelonephritis    -  Piperacillin/Tazobactam: S 16    -  Tigecycline: S <=2    -  Tobramycin: S <=2    -  Trimethoprim/Sulfamethoxazole: R >2/38    Specimen Source: .Urine Catheterized    Culture Results:   >100,000 CFU/ml Citrobacter koseri    Organism Identification: Citrobacter koseri    Organism: Citrobacter koseri    Method Type: NEVA      Lactate, Blood (02.28.21 @ 07:11)    Lactate, Blood: 1.1 mmol/L      C. difficile GDH &amp; toxins A/B by EIA (02.18.21 @ 17:11)    Clostridium difficile GDH Toxins A&amp;B, EIA:   Negative    Clostridium difficile GDH Interpretation: Negative for toxigenic C. Difficile.  This specimen is negative for C.  Difficile glutamate dehydrogenase (GDH) antigen and negative for C.  Difficile Toxins A & B, by EIA.  GDH is a highly sensitive screening  marker for C. Difficile that is produced in large amounts by all C.  Difficile strains, both toxigenic and nontoxigenic.  This assay has not  been validated as a test of cure.  Repeat testing during the same episode  of diarrhea is of limited value and is discouraged.  The results of this  assay should always be interpreted in conjunction with pateint's clinical  history.          WBC Count: 5.62 (03-01-21 @ 07:05)  WBC Count: 7.83 (02-28-21 @ 07:11)  WBC Count: 5.77 (02-27-21 @ 10:35)  WBC Count: 7.15 (02-26-21 @ 05:51)  WBC Count: 6.03 (02-25-21 @ 06:36)  WBC Count: 6.73 (02-24-21 @ 09:35)  WBC Count: 11.26 (02-23-21 @ 10:22)  WBC Count: 13.20 (02-22-21 @ 23:38)             Patient is a 89y old  Female who presents with a chief complaint of GI Bleed, Vomiting (01 Mar 2021 07:14)    Being followed by ID for        Interval history:  still with diarrhea- watery twice a day  has a harding catheter  No other acute events      ROS:  No cough,SOB,CP  No N/V/D  No abd pain  No urinary complaints  No HA  No joint or limb pain  No other complaints    PAST MEDICAL & SURGICAL HISTORY:  COVID-19    Urinary tract infection    CAD in native artery    Dyslipidemia    Depression    Hyponatremia    Hypertension    History of repair of hip fracture      Allergies    flu vaccine (Anaphylaxis)  No Known Drug Allergies    Intolerances    lactose (Unknown)    Antimicrobials:    meropenem  IVPB 1000 milliGRAM(s) IV Intermittent every 12 hours  vancomycin    Solution 125 milliGRAM(s) Oral every 6 hours    MEDICATIONS  (STANDING):  ascorbic acid 500 milliGRAM(s) Oral daily  aspirin enteric coated 81 milliGRAM(s) Oral daily  bethanechol 10 milliGRAM(s) Oral three times a day  cholecalciferol 1000 Unit(s) Oral daily  cyanocobalamin 1000 MICROGram(s) Oral daily  heparin   Injectable 5000 Unit(s) SubCutaneous every 8 hours  lactobacillus acidophilus 1 Tablet(s) Oral two times a day  meropenem  IVPB 1000 milliGRAM(s) IV Intermittent every 12 hours  mirtazapine 15 milliGRAM(s) Oral at bedtime  Nephro-portia 1 Tablet(s) Oral daily  nystatin Powder 1 Application(s) Topical two times a day  potassium phosphate / sodium phosphate Powder (PHOS-NaK) 1 Packet(s) Oral three times a day with meals  psyllium Powder 1 Packet(s) Oral two times a day  sodium chloride 2 Gram(s) Oral three times a day  vancomycin    Solution 125 milliGRAM(s) Oral every 6 hours      Vital Signs Last 24 Hrs  T(C): 37.2 (03-01-21 @ 05:40), Max: 37.2 (03-01-21 @ 05:40)  T(F): 98.9 (03-01-21 @ 05:40), Max: 98.9 (03-01-21 @ 05:40)  HR: 97 (03-01-21 @ 09:21) (90 - 99)  BP: 116/72 (03-01-21 @ 09:21) (97/62 - 122/73)  BP(mean): --  RR: 18 (03-01-21 @ 05:40) (18 - 18)  SpO2: 98% (03-01-21 @ 05:40) (95% - 100%)    Physical Exam:    Constitutional resting quietly    HEENT PERRLA EOMI,No pallor or icterus    No oral exudate or erythema    Neck supple no JVD or LN    Chest Good AE,CTA    CVS  S1 S2     Abd soft BS normal No tenderness     Ext No cyanosis clubbing or edema    IV site no erythema tenderness or discharge        Lab Data:                          9.6    5.62  )-----------( 451      ( 01 Mar 2021 07:05 )             29.9       03-01    128<L>  |  93<L>  |  7   ----------------------------<  86  4.1   |  24  |  0.68    Ca    8.4      01 Mar 2021 07:04  Phos  2.9     03-01  Mg     2.0     03-01            .Blood Blood-Peripheral  02-23-21   No Growth Final  --  --      .Urine Catheterized  02-23-21   >100,000 CFU/ml Citrobacter koseri  --  Citrobacter koseri  Culture - Urine (02.23.21 @ 03:19)    -  Amoxicillin/Clavulanic Acid: I 16/8    -  Amikacin: S <=16    -  Ampicillin: R >16 These ampicillin results predict results for amoxicillin    -  Ampicillin/Sulbactam: R >16/8 Enterobacter, Citrobacter, and Serratia may develop resistance during prolonged therapy (3-4 days)    -  Aztreonam: R >16    -  Cefazolin: R >16    -  Cefepime: R >16    -  Cefoxitin: S <=8    -  Ceftriaxone: R >32 Enterobacter, Citrobacter, and Serratia may develop resistance during prolonged therapy    -  Ertapenem: S <=0.5    -  Ciprofloxacin: S <=0.25    -  Gentamicin: S <=2    -  Imipenem: S <=1    -  Levofloxacin: S <=0.5    -  Meropenem: S <=1    -  Nitrofurantoin: S <=32 Should not be used to treat pyelonephritis    -  Piperacillin/Tazobactam: S 16    -  Tigecycline: S <=2    -  Tobramycin: S <=2    -  Trimethoprim/Sulfamethoxazole: R >2/38    Specimen Source: .Urine Catheterized    Culture Results:   >100,000 CFU/ml Citrobacter koseri    Organism Identification: Citrobacter koseri    Organism: Citrobacter koseri    Method Type: NEVA      Lactate, Blood (02.28.21 @ 07:11)    Lactate, Blood: 1.1 mmol/L      C. difficile GDH &amp; toxins A/B by EIA (02.18.21 @ 17:11)    Clostridium difficile GDH Toxins A&amp;B, EIA:   Negative    Clostridium difficile GDH Interpretation: Negative for toxigenic C. Difficile.  This specimen is negative for C.  Difficile glutamate dehydrogenase (GDH) antigen and negative for C.  Difficile Toxins A & B, by EIA.  GDH is a highly sensitive screening  marker for C. Difficile that is produced in large amounts by all C.  Difficile strains, both toxigenic and nontoxigenic.  This assay has not  been validated as a test of cure.  Repeat testing during the same episode  of diarrhea is of limited value and is discouraged.  The results of this  assay should always be interpreted in conjunction with pateint's clinical  history.          WBC Count: 5.62 (03-01-21 @ 07:05)  WBC Count: 7.83 (02-28-21 @ 07:11)  WBC Count: 5.77 (02-27-21 @ 10:35)  WBC Count: 7.15 (02-26-21 @ 05:51)  WBC Count: 6.03 (02-25-21 @ 06:36)  WBC Count: 6.73 (02-24-21 @ 09:35)  WBC Count: 11.26 (02-23-21 @ 10:22)  WBC Count: 13.20 (02-22-21 @ 23:38)

## 2021-03-01 NOTE — PROGRESS NOTE ADULT - PROBLEM SELECTOR PLAN 2
Pancolitis, most significant in descending and rectosigmoid colon. C diff negative x 2, less suspicion for recurrent C diff colitis. Most likely to be ischemic  - repeat CT abdomen 2/25 showing proctocolitis   - s/p Zosyn (2/14-19), Flagyl IV 500mg BID (2/14-19). s/p 6 day course  - decreased PO Vancomycin 125mg q6h and continue for 5 days (until 2/23) and c/w probiotics; continue taper 125 mg q6hrs x 7 days until 2/30 Pancolitis, most significant in descending and rectosigmoid colon. C diff negative x 2, less suspicion for recurrent C diff colitis. Most likely to be ischemic  - repeat CT abdomen 2/25 showing proctocolitis   - s/p Zosyn (2/14-19), Flagyl IV 500mg BID (2/14-19). s/p 6 day course  - decreased PO Vancomycin 125mg q6h and continue for 5 days (until 2/23) and c/w probiotics; continue taper 125 mg q6hrs x 7 days until 3/2 Pancolitis, most significant in descending and rectosigmoid colon. C diff negative x 2, less suspicion for recurrent C diff colitis. Most likely to be ischemic  - repeat CT abdomen 2/25 showing proctocolitis   - s/p Zosyn (2/14-19), Flagyl IV 500mg BID (2/14-19). s/p 6 day course  - decreased PO Vancomycin 125mg q6h and continue for 5 days (until 2/23) and c/w probiotics; continue taper 125 mg q6hrs x 7 days until 3/2, will transition tomorrow

## 2021-03-01 NOTE — PROGRESS NOTE ADULT - PROBLEM SELECTOR PLAN 5
Started salt tabs 1g TID 12/26, increased to 2g ; in setting of low BUN, likely "tea and toast" hyponatremia   - continue to monitor salt tabs to 2mg TID , to monitor BMP qdaily  - likely in setting of decreased PO intake vs SIADH  - monitor BMP and electrolytes  - urinelytes significant for intrinsic kidney azotemia 1.1%  - follow nephrology recommendations

## 2021-03-02 LAB
ANION GAP SERPL CALC-SCNC: 12 MMOL/L — SIGNIFICANT CHANGE UP (ref 5–17)
ANION GAP SERPL CALC-SCNC: 9 MMOL/L — SIGNIFICANT CHANGE UP (ref 5–17)
BASOPHILS # BLD AUTO: 0.05 K/UL — SIGNIFICANT CHANGE UP (ref 0–0.2)
BASOPHILS NFR BLD AUTO: 0.7 % — SIGNIFICANT CHANGE UP (ref 0–2)
BUN SERPL-MCNC: 7 MG/DL — SIGNIFICANT CHANGE UP (ref 7–23)
BUN SERPL-MCNC: 8 MG/DL — SIGNIFICANT CHANGE UP (ref 7–23)
CALCIUM SERPL-MCNC: 8.7 MG/DL — SIGNIFICANT CHANGE UP (ref 8.4–10.5)
CALCIUM SERPL-MCNC: 9.2 MG/DL — SIGNIFICANT CHANGE UP (ref 8.4–10.5)
CHLORIDE SERPL-SCNC: 95 MMOL/L — LOW (ref 96–108)
CHLORIDE SERPL-SCNC: 95 MMOL/L — LOW (ref 96–108)
CO2 SERPL-SCNC: 24 MMOL/L — SIGNIFICANT CHANGE UP (ref 22–31)
CO2 SERPL-SCNC: 25 MMOL/L — SIGNIFICANT CHANGE UP (ref 22–31)
CREAT SERPL-MCNC: 0.66 MG/DL — SIGNIFICANT CHANGE UP (ref 0.5–1.3)
CREAT SERPL-MCNC: 0.7 MG/DL — SIGNIFICANT CHANGE UP (ref 0.5–1.3)
EOSINOPHIL # BLD AUTO: 0.14 K/UL — SIGNIFICANT CHANGE UP (ref 0–0.5)
EOSINOPHIL NFR BLD AUTO: 1.9 % — SIGNIFICANT CHANGE UP (ref 0–6)
GLUCOSE SERPL-MCNC: 87 MG/DL — SIGNIFICANT CHANGE UP (ref 70–99)
GLUCOSE SERPL-MCNC: 96 MG/DL — SIGNIFICANT CHANGE UP (ref 70–99)
HCT VFR BLD CALC: 32.2 % — LOW (ref 34.5–45)
HGB BLD-MCNC: 10.6 G/DL — LOW (ref 11.5–15.5)
IMM GRANULOCYTES NFR BLD AUTO: 2 % — HIGH (ref 0–1.5)
LACTATE SERPL-SCNC: 1 MMOL/L — SIGNIFICANT CHANGE UP (ref 0.7–2)
LYMPHOCYTES # BLD AUTO: 2.86 K/UL — SIGNIFICANT CHANGE UP (ref 1–3.3)
LYMPHOCYTES # BLD AUTO: 38.4 % — SIGNIFICANT CHANGE UP (ref 13–44)
MAGNESIUM SERPL-MCNC: 2 MG/DL — SIGNIFICANT CHANGE UP (ref 1.6–2.6)
MCHC RBC-ENTMCNC: 32.3 PG — SIGNIFICANT CHANGE UP (ref 27–34)
MCHC RBC-ENTMCNC: 32.9 GM/DL — SIGNIFICANT CHANGE UP (ref 32–36)
MCV RBC AUTO: 98.2 FL — SIGNIFICANT CHANGE UP (ref 80–100)
MONOCYTES # BLD AUTO: 0.99 K/UL — HIGH (ref 0–0.9)
MONOCYTES NFR BLD AUTO: 13.3 % — SIGNIFICANT CHANGE UP (ref 2–14)
NEUTROPHILS # BLD AUTO: 3.25 K/UL — SIGNIFICANT CHANGE UP (ref 1.8–7.4)
NEUTROPHILS NFR BLD AUTO: 43.7 % — SIGNIFICANT CHANGE UP (ref 43–77)
NRBC # BLD: 0 /100 WBCS — SIGNIFICANT CHANGE UP (ref 0–0)
PHOSPHATE SERPL-MCNC: 3.4 MG/DL — SIGNIFICANT CHANGE UP (ref 2.5–4.5)
PLATELET # BLD AUTO: 405 K/UL — HIGH (ref 150–400)
POTASSIUM SERPL-MCNC: 4.4 MMOL/L — SIGNIFICANT CHANGE UP (ref 3.5–5.3)
POTASSIUM SERPL-MCNC: 4.5 MMOL/L — SIGNIFICANT CHANGE UP (ref 3.5–5.3)
POTASSIUM SERPL-SCNC: 4.4 MMOL/L — SIGNIFICANT CHANGE UP (ref 3.5–5.3)
POTASSIUM SERPL-SCNC: 4.5 MMOL/L — SIGNIFICANT CHANGE UP (ref 3.5–5.3)
RBC # BLD: 3.28 M/UL — LOW (ref 3.8–5.2)
RBC # FLD: 14.6 % — HIGH (ref 10.3–14.5)
SODIUM SERPL-SCNC: 129 MMOL/L — LOW (ref 135–145)
SODIUM SERPL-SCNC: 131 MMOL/L — LOW (ref 135–145)
WBC # BLD: 7.44 K/UL — SIGNIFICANT CHANGE UP (ref 3.8–10.5)
WBC # FLD AUTO: 7.44 K/UL — SIGNIFICANT CHANGE UP (ref 3.8–10.5)

## 2021-03-02 PROCEDURE — 99232 SBSQ HOSP IP/OBS MODERATE 35: CPT | Mod: GC

## 2021-03-02 PROCEDURE — 99232 SBSQ HOSP IP/OBS MODERATE 35: CPT | Mod: CS

## 2021-03-02 RX ORDER — VANCOMYCIN HCL 1 G
125 VIAL (EA) INTRAVENOUS EVERY 12 HOURS
Refills: 0 | Status: DISCONTINUED | OUTPATIENT
Start: 2021-03-02 | End: 2021-03-03

## 2021-03-02 RX ORDER — SODIUM CHLORIDE 9 MG/ML
3 INJECTION INTRAMUSCULAR; INTRAVENOUS; SUBCUTANEOUS THREE TIMES A DAY
Refills: 0 | Status: DISCONTINUED | OUTPATIENT
Start: 2021-03-02 | End: 2021-03-03

## 2021-03-02 RX ADMIN — Medication 1 PACKET(S): at 05:50

## 2021-03-02 RX ADMIN — Medication 500 MILLIGRAM(S): at 09:51

## 2021-03-02 RX ADMIN — SODIUM CHLORIDE 3 GRAM(S): 9 INJECTION INTRAMUSCULAR; INTRAVENOUS; SUBCUTANEOUS at 21:47

## 2021-03-02 RX ADMIN — SODIUM CHLORIDE 3 GRAM(S): 9 INJECTION INTRAMUSCULAR; INTRAVENOUS; SUBCUTANEOUS at 14:55

## 2021-03-02 RX ADMIN — Medication 10 MILLIGRAM(S): at 21:47

## 2021-03-02 RX ADMIN — HEPARIN SODIUM 5000 UNIT(S): 5000 INJECTION INTRAVENOUS; SUBCUTANEOUS at 14:55

## 2021-03-02 RX ADMIN — Medication 1 PACKET(S): at 09:51

## 2021-03-02 RX ADMIN — Medication 125 MILLIGRAM(S): at 17:59

## 2021-03-02 RX ADMIN — Medication 1 PACKET(S): at 17:59

## 2021-03-02 RX ADMIN — PREGABALIN 1000 MICROGRAM(S): 225 CAPSULE ORAL at 09:53

## 2021-03-02 RX ADMIN — HEPARIN SODIUM 5000 UNIT(S): 5000 INJECTION INTRAVENOUS; SUBCUTANEOUS at 05:50

## 2021-03-02 RX ADMIN — Medication 1 TABLET(S): at 17:59

## 2021-03-02 RX ADMIN — Medication 10 MILLIGRAM(S): at 14:54

## 2021-03-02 RX ADMIN — Medication 1 TABLET(S): at 14:54

## 2021-03-02 RX ADMIN — MIRTAZAPINE 15 MILLIGRAM(S): 45 TABLET, ORALLY DISINTEGRATING ORAL at 21:47

## 2021-03-02 RX ADMIN — HEPARIN SODIUM 5000 UNIT(S): 5000 INJECTION INTRAVENOUS; SUBCUTANEOUS at 21:47

## 2021-03-02 RX ADMIN — Medication 125 MILLIGRAM(S): at 05:50

## 2021-03-02 RX ADMIN — Medication 81 MILLIGRAM(S): at 09:51

## 2021-03-02 RX ADMIN — NYSTATIN CREAM 1 APPLICATION(S): 100000 CREAM TOPICAL at 05:50

## 2021-03-02 RX ADMIN — Medication 125 MILLIGRAM(S): at 00:30

## 2021-03-02 RX ADMIN — Medication 1 PACKET(S): at 18:00

## 2021-03-02 RX ADMIN — NYSTATIN CREAM 1 APPLICATION(S): 100000 CREAM TOPICAL at 17:59

## 2021-03-02 RX ADMIN — SODIUM CHLORIDE 2 GRAM(S): 9 INJECTION INTRAMUSCULAR; INTRAVENOUS; SUBCUTANEOUS at 05:50

## 2021-03-02 RX ADMIN — Medication 1 PACKET(S): at 14:54

## 2021-03-02 RX ADMIN — Medication 1000 UNIT(S): at 15:01

## 2021-03-02 RX ADMIN — Medication 1 TABLET(S): at 05:50

## 2021-03-02 RX ADMIN — Medication 10 MILLIGRAM(S): at 05:50

## 2021-03-02 NOTE — PROGRESS NOTE ADULT - PROBLEM SELECTOR PLAN 3
UCx now growing Citrobacter, now febrile and hypotensive   - follow ID recommendations, monitor fever curve   - Pessary removed, cleaned, replaced by Urogynecologist Dr. Hernandez's associate

## 2021-03-02 NOTE — PROGRESS NOTE ADULT - PROBLEM SELECTOR PLAN 1
Pt. with hypo-osmolar hyponatremia in the setting of poor PO intake and SIADH. sNa decreased to 123 on 2/26/21. Sna improved to 130 on 2/28 with salt tabs. SNa stable at 129 today. Continue salt tabs at 3 gm TID. Encourage PO (solute) intake. Pt. on fluid restriction <1L/day, minimize INs and switch gtts/ABx to isotonic based saline if necessary. Monitor sNa daily.    If you have any questions, please feel free to contact me  Alfredo Heath  Nephrology Fellow  [phone]  (After 5pm or on weekends please page the on-call fellow)

## 2021-03-02 NOTE — PROGRESS NOTE ADULT - SUBJECTIVE AND OBJECTIVE BOX
Patient is a 89y old  Female who presents with a chief complaint of GI Bleed, Vomiting (02 Mar 2021 17:07)    Being followed by ID for        Interval history:  pt remains stable on po vancomycin only  breathing stable  no abdominal pain  has about 2 BM/24 hours  No other acute events        PAST MEDICAL & SURGICAL HISTORY:  COVID-19    Urinary tract infection    CAD in native artery    Dyslipidemia    Depression    Hyponatremia    Hypertension    History of repair of hip fracture      Allergies    flu vaccine (Anaphylaxis)  No Known Drug Allergies    Intolerances    lactose (Unknown)    Antimicrobials:    vancomycin    Solution 125 milliGRAM(s) Oral every 12 hours    MEDICATIONS  (STANDING):  ascorbic acid 500 milliGRAM(s) Oral daily  aspirin enteric coated 81 milliGRAM(s) Oral daily  bethanechol 10 milliGRAM(s) Oral three times a day  cholecalciferol 1000 Unit(s) Oral daily  cyanocobalamin 1000 MICROGram(s) Oral daily  heparin   Injectable 5000 Unit(s) SubCutaneous every 8 hours  lactobacillus acidophilus 1 Tablet(s) Oral two times a day  mirtazapine 15 milliGRAM(s) Oral at bedtime  Nephro-portia 1 Tablet(s) Oral daily  nystatin Powder 1 Application(s) Topical two times a day  potassium phosphate / sodium phosphate Powder (PHOS-NaK) 1 Packet(s) Oral three times a day with meals  psyllium Powder 1 Packet(s) Oral two times a day  sodium chloride 3 Gram(s) Oral three times a day  vancomycin    Solution 125 milliGRAM(s) Oral every 12 hours      Vital Signs Last 24 Hrs  T(C): 37.3 (03-02-21 @ 16:20), Max: 37.3 (03-02-21 @ 16:20)  T(F): 99.1 (03-02-21 @ 16:20), Max: 99.1 (03-02-21 @ 16:20)  HR: 116 (03-02-21 @ 16:20) (94 - 116)  BP: 148/92 (03-02-21 @ 16:20) (102/60 - 148/92)  BP(mean): --  RR: 19 (03-02-21 @ 16:20) (18 - 19)  SpO2: 95% (03-02-21 @ 16:20) (94% - 100%)    Physical Exam:    Constitutional well preserved,comfortable,pleasant    HEENT PERRLA EOMI,No pallor or icterus    No oral exudate or erythema    Neck supple no JVD or LN    Chest Good AE,CTA    CVS  S1 S2 murmur    Abd soft BS normal No tenderness     Ext No cyanosis clubbing or edema    IV site no erythema tenderness or discharge      Lab Data:                          10.6   7.44  )-----------( 405      ( 02 Mar 2021 07:14 )             32.2       03-02    129<L>  |  95<L>  |  7   ----------------------------<  87  4.4   |  25  |  0.66    Ca    8.7      02 Mar 2021 07:13  Phos  3.4     03-02  Mg     2.0     03-02      WBC Count: 7.44 (03-02-21 @ 07:14)  WBC Count: 5.62 (03-01-21 @ 07:05)  WBC Count: 7.83 (02-28-21 @ 07:11)  WBC Count: 5.77 (02-27-21 @ 10:35)  WBC Count: 7.15 (02-26-21 @ 05:51)  WBC Count: 6.03 (02-25-21 @ 06:36)  WBC Count: 6.73 (02-24-21 @ 09:35)        C. difficile GDH &amp; toxins A/B by EIA (02.18.21 @ 17:11)    Clostridium difficile GDH Toxins A&amp;B, EIA:   Negative    Clostridium difficile GDH Interpretation: Negative for toxigenic C. Difficile.  This specimen is negative for C.  Difficile glutamate dehydrogenase (GDH) antigen and negative for C.  Difficile Toxins A & B, by EIA.  GDH is a highly sensitive screening  marker for C. Difficile that is produced in large amounts by all C.  Difficile strains, both toxigenic and nontoxigenic.  This assay has not  been validated as a test of cure.  Repeat testing during the same episode  of diarrhea is of limited value and is discouraged.  The results of this  assay should always be interpreted in conjunction with pateint's clinical  history.

## 2021-03-02 NOTE — PROGRESS NOTE ADULT - ASSESSMENT
89F w/ PMH of CAD s/p PCI, Chronic UTI's, MDD, HTN, HLD, [place] (hearing aids), dysphagia, prior COVID19 infection, and recent hospitalization for C. diff colitis (~2/7/21) presenting from rehab center for bloody bowel movement, likely LGIB secondary to infectious colitis vs. ischemic colitis.  Also found to be in septic shock, likely secondary to colitis iso recent C. diff.  COVID +, asymptomatic and without hypoxia. RRT 2/23 for hypotension, tachycardia with new afib and fever, currently on meropenem 2/23-3/1.

## 2021-03-02 NOTE — PROGRESS NOTE ADULT - SUBJECTIVE AND OBJECTIVE BOX
Internal Medicine   Blake Josh | PGY-1    OVERNIGHT EVENTS: No acute overnight events. Discontinued meropenem yesterday, completed 7 day course, will begin further taper of PO vancomycin today for recurrent c.diff. No fevers overnight.     SUBJECTIVE: Patient was seen and examined at bedside this morning. Denies any nausea/vomiting/diarrhea, headache, shortness of breath or chest pain.     MEDICATIONS  (STANDING):  ascorbic acid 500 milliGRAM(s) Oral daily  aspirin enteric coated 81 milliGRAM(s) Oral daily  bethanechol 10 milliGRAM(s) Oral three times a day  cholecalciferol 1000 Unit(s) Oral daily  cyanocobalamin 1000 MICROGram(s) Oral daily  heparin   Injectable 5000 Unit(s) SubCutaneous every 8 hours  lactobacillus acidophilus 1 Tablet(s) Oral two times a day  mirtazapine 15 milliGRAM(s) Oral at bedtime  Nephro-portia 1 Tablet(s) Oral daily  nystatin Powder 1 Application(s) Topical two times a day  potassium phosphate / sodium phosphate Powder (PHOS-NaK) 1 Packet(s) Oral three times a day with meals  psyllium Powder 1 Packet(s) Oral two times a day  sodium chloride 2 Gram(s) Oral three times a day  vancomycin    Solution 125 milliGRAM(s) Oral every 6 hours    MEDICATIONS  (PRN):  acetaminophen   Tablet .. 650 milliGRAM(s) Oral every 12 hours PRN Temp greater or equal to 38C (100.4F), Mild Pain (1 - 3), Moderate Pain (4 - 6)  ondansetron    Tablet 4 milliGRAM(s) Oral every 8 hours PRN Nausea and/or Vomiting        T(F): 98.6 (03-02-21 @ 00:54), Max: 99 (03-01-21 @ 20:57)  HR: 99 (03-02-21 @ 00:54) (97 - 99)  BP: 102/60 (03-02-21 @ 00:54) (102/60 - 121/63)  BP(mean): --  RR: 18 (03-02-21 @ 00:54) (18 - 18)  SpO2: 100% (03-02-21 @ 00:54) (99% - 100%)    PHYSICAL EXAM:   Gen: Awake, NAD, minimally conversational  HEENT: NCAT, PERRL, EOMI, clear conjunctiva, no scleral icterus  Neck: Supple, no JVD, no LAD  CV: RRR, S1S2, no m/r/g  Resp: CTAB, normal respiratory effort  Abd: Soft, L sided TTP on soft and deep palpation, now improving  no rebound or guarding,   Ext: minimal R calf swelling compared to L, no clubbing or cyanosis ,continues to endorse pain on palpation of shins b/l  Neuro: minimally conversational, following verbal commands, nods/shakes head to questions   Skin: warm, perfused, does not appear dry/edematous   TELEMETRY:    LABS:                        9.6    5.62  )-----------( 451      ( 01 Mar 2021 07:05 )             29.9     03-01    128<L>  |  93<L>  |  7   ----------------------------<  86  4.1   |  24  |  0.68    Ca    8.4      01 Mar 2021 07:04  Phos  2.9     03-01  Mg     2.0     03-01              Creatinine Trend: 0.68<--, 0.72<--, 0.75<--, 0.67<--, 0.65<--, 0.67<--  I&O's Summary    01 Mar 2021 07:01  -  02 Mar 2021 07:00  --------------------------------------------------------  IN: 240 mL / OUT: 575 mL / NET: -335 mL      BNP    RADIOLOGY & ADDITIONAL STUDIES:

## 2021-03-02 NOTE — PROGRESS NOTE ADULT - PROBLEM SELECTOR PLAN 1
RRT 2/23 for fever, tachycardia with a-fib and hypotension, leukoycytosis improved  - ordered CT C/A/P for assessment of new infection/worsening colitis   - completed 7 day course of meropenem 1g q12 hrs (2/23 - 3/2)  - CT abdomen/pelvis shows proctocolitis w/ new mild b/l hydronephrosis   - UA + for many bacteria, +LE, nitrites, leukocytosis   - UCx growing Citrobacter, sensitive to meropenem   - hemodynamically stable; continue to monitor vitals q4hrs  - BCx 12/23 - NGTD

## 2021-03-02 NOTE — PROGRESS NOTE ADULT - ASSESSMENT
89F w/ PMH of CAD s/p PCI, Chronic UTI's, MDD, HTN, HLD, [place] (hearing aids), dysphagia, COVID19 infection (2 months ago), and recent hospitalization for C. diff colitis (~2/7/21) presenting from [facility] after episode of vomiting and bloody bowel movement on 2/13. Found to have left sided colitis on imaging   Acute Right Below Knee DVT     #Colitis - given clinical picture likely 2/2 ischemic.  Per family wishes no aggressive measures or interventions/procedures.  DNR/DNI in place.   stool C diff negative x2 (2/14, 2/18)    #s/p RRT and fevers (now resolved) +UTI - Citrobacter koseri with Persistent fevers. CT colitis transverse colon to rectum.  benign abdominal exam and improved stooling (on Metamucil) without bleeding; suspect chronic component of ischemic colitis    COVID negative    RECS:  -Monitor BMs; replete lytes PRN  -ID following;  Plan for PO Vanco taper per ID and Meropenem  -avoid hypotension  -monitor clinical exam  -continue  Metamucil BID in attempt to bulk stool  -PO Bacid BID  -PO diet per SLP recs

## 2021-03-02 NOTE — PROGRESS NOTE ADULT - PROBLEM SELECTOR PLAN 2
Pancolitis, most significant in descending and rectosigmoid colon. C diff negative x 2, less suspicion for recurrent C diff colitis. Most likely to be ischemic  - repeat CT abdomen 2/25 showing proctocolitis   - s/p Zosyn (2/14-19), Flagyl IV 500mg BID (2/14-19). s/p 6 day course  - decreased PO Vancomycin 125mg q6h and continue for 5 days (until 2/23) and c/w probiotics; continue taper 125 mg q6hrs x 7 days until 3/2, will transition

## 2021-03-02 NOTE — PROGRESS NOTE ADULT - ASSESSMENT
89F w/ PMH of CAD s/p PCI, Chronic UTI's, MDD, HTN, HLD, [place] (hearing aids), dysphagia, COVID19 infection (2 months ago, s/p dexa, remdesivir, convalescent plasma, not intubated), and recent hospitalization for C. diff colitis (~2/7/21) presenting from [facility] after episode of vomiting and bloody bowel movement on 2/13.       pt was treated initially for c diff, ischemic colitis and possible UTI  urine only grew yeast  antibiotics were tapered and pt improved with hydration  LAst tuesday  with acute decompensation  placed back on antibioitcs  likely was a UTI  meropenem - since 2/22 completed   continue po vancomycin - can start to taper    pt clinically improving  nephrology is addressing the hyponateramia  CT results noted     DVT- per team  stable off abs  family does not want aggressive managment of the possible ischemic colitis

## 2021-03-02 NOTE — PROGRESS NOTE ADULT - SUBJECTIVE AND OBJECTIVE BOX
[facility] Division of Kidney Diseases & Hypertension  FOLLOW UP NOTE  [phone]--------------------------------------------------------------------------------    HPI: 89-year-old female with CAD, Chronic UTI's, MDD, HTN, HLD, [place] (hearing aids), dysphagia, COVID19 infection (2 months ago), was admitted to [facility] for colitis. Pt admitted LGIB likely secondary to infectious colitis vs. ischemic colitis. Course complicated by septic shock secondary to colitis, UTI with UCx with  candida/citrobacter. Pt now on Meropenem and Vancomycin. Nephrology team consulted for hyponatremia. On review of labs, pt with hyponatremia in the past with SNa: 128 on 7/6/20 which had resolved. On current admission, pt found to have JERONIMO with SCr peaked at 2.5 on 2/14 which resolved with IVF. Her SNa was 135 WNL on admission 2/13 which decreased to 130 on 2/21 and has been stable at 128-130 until 2/25/21. sNa decreased to 123 on 2/26/21. Pt. started on salt tabs. sNa today stable at 129.    Pt. evaluated at bedside, in no acute distress. Pt more awake today, as per RN appetite slightly better.    PAST HISTORY  --------------------------------------------------------------------------------  No significant changes to PMH, PSH, FHx, SHx, unless otherwise noted    ALLERGIES & MEDICATIONS  --------------------------------------------------------------------------------  Allergies    flu vaccine (Anaphylaxis)  No Known Drug Allergies    Intolerances    lactose (Unknown)    Standing Inpatient Medications  ascorbic acid 500 milliGRAM(s) Oral daily  aspirin enteric coated 81 milliGRAM(s) Oral daily  bethanechol 10 milliGRAM(s) Oral three times a day  cholecalciferol 1000 Unit(s) Oral daily  cyanocobalamin 1000 MICROGram(s) Oral daily  heparin   Injectable 5000 Unit(s) SubCutaneous every 8 hours  lactobacillus acidophilus 1 Tablet(s) Oral two times a day  mirtazapine 15 milliGRAM(s) Oral at bedtime  Nephro-portia 1 Tablet(s) Oral daily  nystatin Powder 1 Application(s) Topical two times a day  potassium phosphate / sodium phosphate Powder (PHOS-NaK) 1 Packet(s) Oral three times a day with meals  psyllium Powder 1 Packet(s) Oral two times a day  sodium chloride 3 Gram(s) Oral three times a day  vancomycin    Solution 125 milliGRAM(s) Oral every 12 hours    VITALS/PHYSICAL EXAM  --------------------------------------------------------------------------------  T(C): 37.3 (03-02-21 @ 16:20), Max: 37.3 (03-02-21 @ 16:20)  HR: 116 (03-02-21 @ 16:20) (94 - 116)  BP: 148/92 (03-02-21 @ 16:20) (102/60 - 148/92)  RR: 19 (03-02-21 @ 16:20) (18 - 19)  SpO2: 95% (03-02-21 @ 16:20) (94% - 100%)  Wt(kg): --    03-01-21 @ 07:01  -  03-02-21 @ 07:00  --------------------------------------------------------  IN: 240 mL / OUT: 575 mL / NET: -335 mL    03-02-21 @ 07:01  -  03-02-21 @ 17:08  --------------------------------------------------------  IN: 240 mL / OUT: 400 mL / NET: -160 mL    Physical Exam:              Gen: awake, NAD  	HEENT: Anicteric  	Pulm: good air entry B/L  	CV:  S1S2  	Abd: +BS, soft   	Ext: trace lower extremity edema  	Neuro: awake,  	Skin: Warm, without rashes    LABS/STUDIES  --------------------------------------------------------------------------------              10.6   7.44  >-----------<  405      [03-02-21 @ 07:14]              32.2     129  |  95  |  7   ----------------------------<  87      [03-02-21 @ 07:13]  4.4   |  25  |  0.66        Ca     8.7     [03-02-21 @ 07:13]      Mg     2.0     [03-02-21 @ 07:13]      Phos  3.4     [03-02-21 @ 07:13]    Creatinine Trend:  SCr 0.66 [03-02 @ 07:13]  SCr 0.68 [03-01 @ 07:04]  SCr 0.72 [02-28 @ 07:10]  SCr 0.75 [02-27 @ 17:51]  SCr 0.67 [02-27 @ 10:35]    Urinalysis - [02-26-21 @ 11:38]      Color Yellow / Appearance Slightly Turbid / SG 1.026 / pH 7.0      Gluc Negative / Ketone Trace  / Bili Negative / Urobili Negative       Blood Trace / Protein 30 mg/dL / Leuk Est Large / Nitrite Negative      RBC 3 /  / Hyaline 2 / Gran  / Sq Epi  / Non Sq Epi 1 / Bacteria Negative    Urine Creatinine 54      [02-26-21 @ 11:38]  Urine Sodium 80      [02-26-21 @ 19:44]  Urine Urea Nitrogen 144      [02-27-21 @ 01:06]  Urine Potassium 37      [02-26-21 @ 11:38]  Urine Chloride 77      [02-26-21 @ 11:38]  Urine Osmolality 265      [02-26-21 @ 19:44]

## 2021-03-02 NOTE — PROGRESS NOTE ADULT - SUBJECTIVE AND OBJECTIVE BOX
Patient is a 89y old  Female who presented with a chief complaint of GI Bleed, Vomiting (01 Mar 2021 09:49)    INTERVAL HPI/OVERNIGHT EVENTS:  no abdominal pain, nausea, vomiting, fever or chills  no rectal bleeding or melena    average ~2 BM/24 hours reported    MEDICATIONS  (STANDING):  ascorbic acid 500 milliGRAM(s) Oral daily  aspirin enteric coated 81 milliGRAM(s) Oral daily  bethanechol 10 milliGRAM(s) Oral three times a day  cholecalciferol 1000 Unit(s) Oral daily  cyanocobalamin 1000 MICROGram(s) Oral daily  heparin   Injectable 5000 Unit(s) SubCutaneous every 8 hours  lactobacillus acidophilus 1 Tablet(s) Oral two times a day  meropenem  IVPB 1000 milliGRAM(s) IV Intermittent every 12 hours  mirtazapine 15 milliGRAM(s) Oral at bedtime  Nephro-portia 1 Tablet(s) Oral daily  nystatin Powder 1 Application(s) Topical two times a day  potassium phosphate / sodium phosphate Powder (PHOS-NaK) 1 Packet(s) Oral three times a day with meals  psyllium Powder 1 Packet(s) Oral two times a day  sodium chloride 2 Gram(s) Oral three times a day  vancomycin    Solution 125 milliGRAM(s) Oral every 6 hours    MEDICATIONS  (PRN):  acetaminophen   Tablet .. 650 milliGRAM(s) Oral every 12 hours PRN Temp greater or equal to 38C (100.4F), Mild Pain (1 - 3), Moderate Pain (4 - 6)  ondansetron    Tablet 4 milliGRAM(s) Oral every 8 hours PRN Nausea and/or Vomiting      Allergies  flu vaccine (Anaphylaxis)  No Known Drug Allergies    Intolerances  lactose (Unknown)      Review of Systems:  unable to obtain 2/2 dementia    T(F): 98.6 (03-02-21 @ 00:54), Max: 99 (03-01-21 @ 20:57)  HR: 99 (03-02-21 @ 00:54) (98 - 99)  BP: 102/60 (03-02-21 @ 00:54) (102/60 - 121/63)  RR: 18 (03-02-21 @ 00:54) (18 - 18)  SpO2: 100% (03-02-21 @ 00:54) (99% - 100%)  Wt(kg): --  ,   I&O's Summary    01 Mar 2021 07:01  -  02 Mar 2021 07:00  --------------------------------------------------------  IN: 240 mL / OUT: 575 mL / NET: -335 mL    PHYSICAL EXAM:  Constitutional: elderly female. appears comfortable, non toxic appearing  Neck: No LAD, supple no JVD  Respiratory: decreased BS at bases, no rales or wheeze  Cardiovascular: S1 and S2, RRR  Gastrointestinal: obese soft  ND NT no rebound guarding or rigidity +harding  Extremities: neg clubbing, cyanosis tr b/l LE edema  Vascular: 2+ peripheral pulses  Neurological: oriented to self, pleasantly confused.  moves all extremities  Skin: No rashes    LABS:                      10.6   7.44  )-----------( 405      ( 02 Mar 2021 07:14 )             32.2                 03-02    129<L>  |  95<L>  |  7   ----------------------------<  87  4.4   |  25  |  0.66    Ca    8.7      02 Mar 2021 07:13  Phos  3.4     03-02  Mg     2.0     03-02                        9.6    5.62  )-----------( 451      ( 01 Mar 2021 07:05 )             29.9     03-01    128<L>  |  93<L>  |  7   ----------------------------<  86  4.1   |  24  |  0.68    Ca    8.4      01 Mar 2021 07:04  Phos  2.9     03-01  Mg     2.0     03-01    RADIOLOGY & ADDITIONAL TESTS:

## 2021-03-03 ENCOUNTER — TRANSCRIPTION ENCOUNTER (OUTPATIENT)
Age: 86
End: 2021-03-03

## 2021-03-03 VITALS
SYSTOLIC BLOOD PRESSURE: 103 MMHG | DIASTOLIC BLOOD PRESSURE: 59 MMHG | HEART RATE: 114 BPM | TEMPERATURE: 100 F | RESPIRATION RATE: 18 BRPM | OXYGEN SATURATION: 99 %

## 2021-03-03 LAB
ANION GAP SERPL CALC-SCNC: 10 MMOL/L — SIGNIFICANT CHANGE UP (ref 5–17)
BASOPHILS # BLD AUTO: 0.07 K/UL — SIGNIFICANT CHANGE UP (ref 0–0.2)
BASOPHILS NFR BLD AUTO: 0.8 % — SIGNIFICANT CHANGE UP (ref 0–2)
BUN SERPL-MCNC: 7 MG/DL — SIGNIFICANT CHANGE UP (ref 7–23)
CALCIUM SERPL-MCNC: 9.1 MG/DL — SIGNIFICANT CHANGE UP (ref 8.4–10.5)
CHLORIDE SERPL-SCNC: 95 MMOL/L — LOW (ref 96–108)
CO2 SERPL-SCNC: 26 MMOL/L — SIGNIFICANT CHANGE UP (ref 22–31)
CREAT SERPL-MCNC: 0.72 MG/DL — SIGNIFICANT CHANGE UP (ref 0.5–1.3)
EOSINOPHIL # BLD AUTO: 0.11 K/UL — SIGNIFICANT CHANGE UP (ref 0–0.5)
EOSINOPHIL NFR BLD AUTO: 1.3 % — SIGNIFICANT CHANGE UP (ref 0–6)
GLUCOSE SERPL-MCNC: 93 MG/DL — SIGNIFICANT CHANGE UP (ref 70–99)
HCT VFR BLD CALC: 31.5 % — LOW (ref 34.5–45)
HGB BLD-MCNC: 10.2 G/DL — LOW (ref 11.5–15.5)
IMM GRANULOCYTES NFR BLD AUTO: 1.4 % — SIGNIFICANT CHANGE UP (ref 0–1.5)
LYMPHOCYTES # BLD AUTO: 3.4 K/UL — HIGH (ref 1–3.3)
LYMPHOCYTES # BLD AUTO: 39.3 % — SIGNIFICANT CHANGE UP (ref 13–44)
MAGNESIUM SERPL-MCNC: 1.8 MG/DL — SIGNIFICANT CHANGE UP (ref 1.6–2.6)
MCHC RBC-ENTMCNC: 31.7 PG — SIGNIFICANT CHANGE UP (ref 27–34)
MCHC RBC-ENTMCNC: 32.4 GM/DL — SIGNIFICANT CHANGE UP (ref 32–36)
MCV RBC AUTO: 97.8 FL — SIGNIFICANT CHANGE UP (ref 80–100)
MONOCYTES # BLD AUTO: 1.11 K/UL — HIGH (ref 0–0.9)
MONOCYTES NFR BLD AUTO: 12.8 % — SIGNIFICANT CHANGE UP (ref 2–14)
NEUTROPHILS # BLD AUTO: 3.85 K/UL — SIGNIFICANT CHANGE UP (ref 1.8–7.4)
NEUTROPHILS NFR BLD AUTO: 44.4 % — SIGNIFICANT CHANGE UP (ref 43–77)
NRBC # BLD: 0 /100 WBCS — SIGNIFICANT CHANGE UP (ref 0–0)
PHOSPHATE SERPL-MCNC: 3.5 MG/DL — SIGNIFICANT CHANGE UP (ref 2.5–4.5)
PLATELET # BLD AUTO: 390 K/UL — SIGNIFICANT CHANGE UP (ref 150–400)
POTASSIUM SERPL-MCNC: 4.3 MMOL/L — SIGNIFICANT CHANGE UP (ref 3.5–5.3)
POTASSIUM SERPL-SCNC: 4.3 MMOL/L — SIGNIFICANT CHANGE UP (ref 3.5–5.3)
RBC # BLD: 3.22 M/UL — LOW (ref 3.8–5.2)
RBC # FLD: 14.6 % — HIGH (ref 10.3–14.5)
SARS-COV-2 RNA SPEC QL NAA+PROBE: SIGNIFICANT CHANGE UP
SODIUM SERPL-SCNC: 131 MMOL/L — LOW (ref 135–145)
WBC # BLD: 8.66 K/UL — SIGNIFICANT CHANGE UP (ref 3.8–10.5)
WBC # FLD AUTO: 8.66 K/UL — SIGNIFICANT CHANGE UP (ref 3.8–10.5)

## 2021-03-03 PROCEDURE — 99233 SBSQ HOSP IP/OBS HIGH 50: CPT

## 2021-03-03 PROCEDURE — 99239 HOSP IP/OBS DSCHRG MGMT >30: CPT

## 2021-03-03 RX ORDER — MAGNESIUM SULFATE 500 MG/ML
2 VIAL (ML) INJECTION ONCE
Refills: 0 | Status: COMPLETED | OUTPATIENT
Start: 2021-03-03 | End: 2021-03-03

## 2021-03-03 RX ORDER — VANCOMYCIN HCL 1 G
1 VIAL (EA) INTRAVENOUS
Qty: 100 | Refills: 0
Start: 2021-03-03 | End: 2021-03-09

## 2021-03-03 RX ORDER — MUPIROCIN 20 MG/G
1 OINTMENT TOPICAL
Qty: 0 | Refills: 0 | DISCHARGE
Start: 2021-03-03

## 2021-03-03 RX ORDER — MUPIROCIN 20 MG/G
1 OINTMENT TOPICAL
Refills: 0 | Status: DISCONTINUED | OUTPATIENT
Start: 2021-03-03 | End: 2021-03-03

## 2021-03-03 RX ORDER — HEPARIN SODIUM 5000 [USP'U]/ML
0 INJECTION INTRAVENOUS; SUBCUTANEOUS
Qty: 0 | Refills: 0 | DISCHARGE
Start: 2021-03-03

## 2021-03-03 RX ORDER — PSYLLIUM SEED (WITH DEXTROSE)
0 POWDER (GRAM) ORAL
Qty: 0 | Refills: 0 | DISCHARGE
Start: 2021-03-03

## 2021-03-03 RX ORDER — SODIUM,POTASSIUM PHOSPHATES 278-250MG
1 POWDER IN PACKET (EA) ORAL
Qty: 0 | Refills: 0 | DISCHARGE
Start: 2021-03-03

## 2021-03-03 RX ORDER — NYSTATIN CREAM 100000 [USP'U]/G
1 CREAM TOPICAL
Qty: 0 | Refills: 0 | DISCHARGE
Start: 2021-03-03

## 2021-03-03 RX ORDER — SODIUM CHLORIDE 9 MG/ML
3 INJECTION INTRAMUSCULAR; INTRAVENOUS; SUBCUTANEOUS
Qty: 0 | Refills: 0 | DISCHARGE
Start: 2021-03-03

## 2021-03-03 RX ORDER — ISOSORBIDE MONONITRATE 60 MG/1
1 TABLET, EXTENDED RELEASE ORAL
Qty: 0 | Refills: 0 | DISCHARGE

## 2021-03-03 RX ORDER — ASCORBIC ACID 60 MG
1 TABLET,CHEWABLE ORAL
Qty: 0 | Refills: 0 | DISCHARGE
Start: 2021-03-03

## 2021-03-03 RX ADMIN — SODIUM CHLORIDE 3 GRAM(S): 9 INJECTION INTRAMUSCULAR; INTRAVENOUS; SUBCUTANEOUS at 15:35

## 2021-03-03 RX ADMIN — Medication 1 PACKET(S): at 13:25

## 2021-03-03 RX ADMIN — Medication 1 TABLET(S): at 11:48

## 2021-03-03 RX ADMIN — Medication 1 PACKET(S): at 06:08

## 2021-03-03 RX ADMIN — MUPIROCIN 1 APPLICATION(S): 20 OINTMENT TOPICAL at 17:51

## 2021-03-03 RX ADMIN — Medication 1 PACKET(S): at 17:52

## 2021-03-03 RX ADMIN — Medication 1 PACKET(S): at 11:46

## 2021-03-03 RX ADMIN — Medication 1 TABLET(S): at 17:52

## 2021-03-03 RX ADMIN — NYSTATIN CREAM 1 APPLICATION(S): 100000 CREAM TOPICAL at 17:51

## 2021-03-03 RX ADMIN — Medication 125 MILLIGRAM(S): at 06:08

## 2021-03-03 RX ADMIN — Medication 1 TABLET(S): at 06:08

## 2021-03-03 RX ADMIN — Medication 10 MILLIGRAM(S): at 06:08

## 2021-03-03 RX ADMIN — Medication 125 MILLIGRAM(S): at 17:52

## 2021-03-03 RX ADMIN — Medication 1000 UNIT(S): at 11:48

## 2021-03-03 RX ADMIN — HEPARIN SODIUM 5000 UNIT(S): 5000 INJECTION INTRAVENOUS; SUBCUTANEOUS at 06:08

## 2021-03-03 RX ADMIN — Medication 81 MILLIGRAM(S): at 11:47

## 2021-03-03 RX ADMIN — HEPARIN SODIUM 5000 UNIT(S): 5000 INJECTION INTRAVENOUS; SUBCUTANEOUS at 15:35

## 2021-03-03 RX ADMIN — Medication 50 GRAM(S): at 11:46

## 2021-03-03 RX ADMIN — NYSTATIN CREAM 1 APPLICATION(S): 100000 CREAM TOPICAL at 11:46

## 2021-03-03 RX ADMIN — PREGABALIN 1000 MICROGRAM(S): 225 CAPSULE ORAL at 11:49

## 2021-03-03 RX ADMIN — Medication 500 MILLIGRAM(S): at 11:49

## 2021-03-03 RX ADMIN — SODIUM CHLORIDE 3 GRAM(S): 9 INJECTION INTRAMUSCULAR; INTRAVENOUS; SUBCUTANEOUS at 06:08

## 2021-03-03 RX ADMIN — Medication 10 MILLIGRAM(S): at 15:35

## 2021-03-03 NOTE — PROGRESS NOTE ADULT - PROVIDER SPECIALTY LIST ADULT
Gastroenterology
Infectious Disease
Internal Medicine
Gastroenterology
Infectious Disease
Gastroenterology
Infectious Disease
Gastroenterology
Infectious Disease
Infectious Disease
Nephrology
Internal Medicine
Nephrology
Internal Medicine
Internal Medicine
Nephrology
Nephrology
Internal Medicine
Internal Medicine
Palliative Care
Palliative Care
Internal Medicine

## 2021-03-03 NOTE — PROGRESS NOTE ADULT - ASSESSMENT
89F w/ PMH of CAD s/p PCI, Chronic UTI's, MDD, HTN, HLD, [place] (hearing aids), dysphagia, COVID19 infection (2 months ago), and recent hospitalization for C. diff colitis (~2/7/21) presenting from [facility] after episode of vomiting and bloody bowel movement on 2/13. Found to have left sided colitis on imaging   Acute Right Below Knee DVT     #Colitis - given clinical picture likely 2/2 ischemic.  Per family wishes no aggressive measures or interventions/procedures.  DNR/DNI in place.   stool C diff negative x2 (2/14, 2/18)    #s/p RRT and fevers (now resolved) +UTI - Citrobacter koseri with Persistent fevers. CT colitis transverse colon to rectum.  benign abdominal exam and improved stooling (on Metamucil) without bleeding; suspect chronic component of ischemic colitis    COVID negative    RECS:  -Monitor BMs; replete lytes PRN  -ID following; completed Meropenem,  Plan for PO Vanco taper per ID   -avoid hypotension  -monitor clinical exam  -continue  Metamucil BID in attempt to bulk stool  -PO Bacid BID  -PO diet per SLP recs    No GI objection to d/c planning    Zain Paris PA-C    [facility]  [phone]  After hours and weekend coverage [phone]

## 2021-03-03 NOTE — PROGRESS NOTE ADULT - NSHPATTENDINGPLANDISCUSS_GEN_ALL_CORE
case discussed and plan of management with Dr Palm
medical team
GI
medical resident
GI
medical resident
JUVENTINO Paris
JUVENTINO Paris
med team
med team
Dr. Brown
Dr. Moreno
Dr. Moreno
Dr. Galindo
Dr Fofana and Dr Al
Dr. Galindo

## 2021-03-03 NOTE — PROGRESS NOTE ADULT - ATTENDING COMMENTS
Gianna Valerio MD, FACP, FACG, AGAF  [facility]  [phone]     After hours and weekend coverage GI service : [phone]
Kika Ventura M.D. ,   Pager [phone]     after 5PM/ weekends [phone]
Kika Ventura M.D. ,   Pager [phone]     after 5PM/ weekends [phone]
Kika Ventura M.D. ,   Pager [phone]     after 5PM/ weekends [phone]
Kika Ventura M.D. ,   Pager [phone]     after 5PM/ weekends [phone]
Kika Ventura M.D. ,   Pager [phone]     after 5PM/ weekends [phone]
Kika Ventura M.D. ,   Pager [phone]     after 5PM/ weekends [phone]    ID will follow the patient PRN. Please recontact ID if we can be of further assistance . [phone]
Kika Ventura M.D. ,   Pager [phone]     after 5PM/ weekends [phone]      I will be away  tomorrow. My associates will be covering. Please call [phone] with acute issues, questions.
Gianna Valerio MD, FACP, FACG, AGAF  [facility]  [phone]     After hours and weekend coverage GI service : [phone]
Kika Ventura M.D. ,   Pager [phone]     after 5PM/ weekends [phone]    ID service will be covering over the weekend. Please call for acute issues or questions. [phone]
Kika Ventura M.D. ,   Pager [phone]     after 5PM/ weekends [phone]      I will be away tomorrow. My associates will be covering. Please call [phone] with acute issues, questions.
Kika Ventura M.D. ,   Pager [phone]     after 5PM/ weekends [phone]      ID service will be covering over the weekend. Please call for acute issues or questions. [phone]
colitis,no active bleeding, probable ischemic, conservative management per family    plan; continue management as above
I have seen, examined and discussed patient with JUVENTINO Pittman. I agree with above assessment and plan.    [facility]
Marko Gerardo 
colitis, probable ischemic, no colonoscopy per family,pt dnr/dni, clinically improved    plan; management as above.
I have seen this patient with the fellow and agree with their assessment and plan. I was physically present for significant portions of the evaluation and management (E/M) service provided.  I agree with the above history, physical, and plan which I have reviewed and edited where appropriate.    Na stable  Monitor    Jose Manuel Hernandez MD  Cell   Pager   Office 
I have seen this patient with the fellow and agree with their assessment and plan. In addition, cont Na tabs  make sure no abx are in D5W, if so change to saline based bags    Jose Manuel Hernandez MD  Cell   Pager   Office 
No new complaints  1.  Hyponatremia--fluid restriction, increased NaCl to 3g TID.  Given Uosm close to isotonic fluid restriction IF compliant should be sufficient
No new complaints  1.  Hyponatremia--salt tbs, high osmolar load (protein) and fluid restriction.  Urine output NOT c/w 1L fluid restriction
I was physically present for the key portions of the evaluation and management (E/M) service provided.  I agree with the above history, physical, and plan which I have reviewed and edited where appropriate. Plan discussed with team.    ______________  Durga Bailey MD   of Geriatric and Palliative Medicine  [facility]     Please page the following number for clinical matters between the hours of 9AM and 5PM   from Monday through Friday : [phone]    After 5PM and on weekends, please page: [phone]. The Geriatric and Palliative Medicine consult service has 24/7 coverage for medical recommendations, including for symptom management needs.
I was physically present for the key portions of the evaluation and management (E/M) service provided.  I agree with the above history, physical, and plan which I have reviewed and edited where appropriate. Plan discussed with team.    ______________  Durga Bailey MD   of Geriatric and Palliative Medicine  [facility]     Please page the following number for clinical matters between the hours of 9AM and 5PM   from Monday through Friday : [phone]    After 5PM and on weekends, please page: [phone]. The Geriatric and Palliative Medicine consult service has 24/7 coverage for medical recommendations, including for symptom management needs.
Sepsis secondary to colitis- possibly persistent Cdiff and possible UTI  BP good today- still tachycardic with still elevated but downtrending lactate  Would increase IVF for today and monitor lactate  Await ID follow up  Continue with Zosyn, IV flagyl and po vanco  Ucx pending  JERONIMO improving  No further signs of GIB- H/H stable- d/c PPI
Colitis likely ischemic- repeat cdiff negative  Improving with IVF- decreased WBC and lactate and cr  D/c IV flagyl and Zosyn- decrease PO vanco to 125 Q6- case discussed with HEMALATHA Ventura  JERONIMO resolving  discharge planning
Slight increase in WBC today.  in LLQ on exam.   Ischemic colitis overall stable, on Vanc Po empirically  Loera placed overnight  Cr improved  ESTUARDO on d/c
Hospitalist- Oneil Calzada MD  Pager: [phone]  If no response or off-hours, page [phone]  -------------------------------------  I personally performed the E/M service provided and supervised key portions of the service furnished by the resident/fellow. I agree with the history, physical and assessment/plan as stated above, with the following additional remarks:  #colitis- cdif vs. ischemic- stable. Continue po vanco taper, plan for dc to rehab pending repeat covid swab.    total discharge time: 40 minutes.
afebrile now after persistent fevers last week. suspect sec to UTI/colitis  Completed meropenem as per Dr. Ventura  cont with PO vanco taper as per ID.   Hyponatremia improved with salt tabs. fluid restrict  below knee DVT: repeat last week without propagation. will cont DVT PPx    d/c planning to rehab
afebrile now after persistent fevers last week. suspect sec to UTI/colitis  Cont with meropenem (renally dosed)   cont with PO vanco taper as per ID.   TOV this week   Hyponatremia improved with salt tabs
afebrile now after persistent fevers last week. suspect sec to UTI/colitis  Cont with meropenem (renally dosed) duration will d/w ID  cont with PO vanco taper as per ID.   TOV this week if remains in hospital longer.   Hyponatremia improved with salt tabs.  below knee DVT: repeat last week without propagation. will cont DVT PPx    d/c planning to rehab pending duration of IV abx
Remains hyponatremic. To increase salt tabs to 2g.   c/w fluid restriction.   Trend BMP  c/w mino for UTI.
Pt much improved today  Colitis- either ischemic vs infectious  Lactate improving  JERONIMO improving  Will decrease IVF and monitor  Continue with antibiotics  Pessary change today
UTI/sepsis: still with fever overnight but lower. UCx with >100k Citrobacter. on meropenem 2/22. WBC stable.  BlCx neg. COVID PCR neg. CT c/a/p showing persistent colitis without abscess, mild hydro. will f/u ID.   Colitis: suspect ischemic in etiology. C.diff x2 neg. conservative management as per family wishes.  PO vanco as per ID  Hyponatremia: lower to 123 today.  will ask for renal eval. Urine Na elevated. will start hypertonic saline and repeat labs  JERONIMO: resolved.  likely in setting of diarrhea and poor po intake.   Below knee DVT: R soleal DVT repeat Duplex without propagation. can montior . PPx dose   urinary retention: harding in place. likely will need until improved ambulation.    plan for rehab once medically stable.  DNR/DNI
UTI/sepsis: again febrile overnight. CXR without new infiltrate. fluconazole added overnight for previously seen candida although seems less likely the cause of recent events. UCx with >100k Citrobacter. on meropenem 2/22. WBC downtrending still.  BlCx neg. check COVID PCR   Colitis: suspect ischemic in etiology. C.diff x2 neg. conservative management as per family wishes.  PO vanco as per ID  Hyponatremia: low but stable. overall clinical picture concerning for poor po.  previously improved with IVF. will monitor fluid status closely .  JERONIMO: resolved.  likely in setting of diarrhea and poor po intake.   Below knee DVT: R soleal DVT noted about 1 week ago. doubt sole cause for fever. Given limited mobility concerning for propagation. will repeat LE   duplex.   urinary retention: harding in place. likely will need until improved ambulation.    plan for rehab once medically stable.  DNR/DNI
UTI/sepsis: persistent fevers over last 3 days overnight. UCx with >100k Citrobacter. on meropenem 2/22. WBC downtrending still.  BlCx neg. COVID PCR neg. ?etiology of fever. will get CT C/A/P to eval. ID and GI eval appreciated  Colitis: suspect ischemic in etiology. C.diff x2 neg. conservative management as per family wishes.  PO vanco as per ID  Hyponatremia: low but stable. overall clinical picture concerning for poor po.  previously improved with IVF. will monitor fluid status closely .  JERONIMO: resolved.  likely in setting of diarrhea and poor po intake.   Below knee DVT: R soleal DVT noted about 1 week ago. doubt sole cause for fever. Given limited mobility concerning for propagation. will repeat LE   duplex.   urinary retention: harding in place. likely will need until improved ambulation.    plan for rehab once medically stable.  DNR/DNI
89F w/ PMH of CAD s/p PCI, Chronic UTI's, MDD, HTN, HLD, [place] (hearing aids), dysphagia, COVID19 infection (2 months ago), and recent hospitalization for C. diff colitis (~2/7/21) presenting from [facility] after episode of vomiting and bloody bowel movement on 2/13, admitted with Severe sepsis, JERONIMO with ATN, Anion-gap metabolic acidosis due to lactic acidosis, Metabolic encephalopathy, Acute blood loss anemia due to presumed lower GI bleeding in the setting of Colitis concerning for infectious vs ischemic etiology, seen by surgery, GI, ID, currently on broad spectrum abx including zosyn and cdiff treatment despite negative testing given recent infection, lactate downtrending, hypotension improved, creatinine downtrending.  No endoscopic workup at this time per GI.  Palliative care c/s pending as patient with multiple recent admissions in setting of advanced illness with declining functional status.
Colitis: suspect ischemic in etiology. C.diff x2 neg. conservative management as per family wishes. further PO vanco will await ID rec.   Hyponatremia: Na downtrending. overall clinical picture concerning for poor po. Will give trial of IVF today and repeat.   UTI: s/p zosyn. now off abx.   JERONIMO: resolved.  likely in setting of diarrhea and poor po intake.   Below knee DVT: will need repeat DVT study in 2 weeks. cont with prophylaxis.   urinary retention: harding in place. likely will need until improved ambulation.
 on exam, with increase in leukocytosis although there may be a component of hemoconcentration as all cell lines increased. Is afebrile  Continue Vanc PO as per ID recommendations  Monitor CBC
UTI/sepsis: overnight event noted. RRT with hypotension, fever, tachycardia. started on emperic meropenem, UA+. await for cultures. HD more stable.   Colitis: suspect ischemic in etiology. C.diff x2 neg. conservative management as per family wishes.  PO vanco taper as per ID  Hyponatremia: Na improved. overall clinical picture concerning for poor po.   trial of IVF and monitor  JERONIMO: resolved.  likely in setting of diarrhea and poor po intake.   Below knee DVT: will need repeat DVT study in 2 weeks. cont with prophylaxis.   urinary retention: harding in place. likely will need until improved ambulation.    plan for rehab once medically stable.  DNR/DNI
Pancolitis- still with abdominal pain on deep palpation on exam. Decreased diarrhea- no further blood. Lactate still elevated. Concern for ishcemic colitis. Appreciate GI- continue with conservative management at [place]  Continue with treatment for Cdiff  Below the knee DVT- serial dopplers

## 2021-03-03 NOTE — PROGRESS NOTE ADULT - SUBJECTIVE AND OBJECTIVE BOX
Patient is a 89y old  Female who presented with a chief complaint of GI Bleed, Vomiting (03 Mar 2021 06:52)      INTERVAL HPI/OVERNIGHT EVENTS:  appetite good  no bleeding  no abdominal pain  no fever or chills  stools brown - varying consistency between loose and "mushy"; overall frequency has improved    MEDICATIONS  (STANDING):  ascorbic acid 500 milliGRAM(s) Oral daily  aspirin enteric coated 81 milliGRAM(s) Oral daily  bethanechol 10 milliGRAM(s) Oral three times a day  cholecalciferol 1000 Unit(s) Oral daily  cyanocobalamin 1000 MICROGram(s) Oral daily  heparin   Injectable 5000 Unit(s) SubCutaneous every 8 hours  lactobacillus acidophilus 1 Tablet(s) Oral two times a day  magnesium sulfate  IVPB 2 Gram(s) IV Intermittent once  mirtazapine 15 milliGRAM(s) Oral at bedtime  Nephro-portia 1 Tablet(s) Oral daily  nystatin Powder 1 Application(s) Topical two times a day  potassium phosphate / sodium phosphate Powder (PHOS-NaK) 1 Packet(s) Oral three times a day with meals  psyllium Powder 1 Packet(s) Oral two times a day  sodium chloride 3 Gram(s) Oral three times a day  vancomycin    Solution 125 milliGRAM(s) Oral every 12 hours    MEDICATIONS  (PRN):  acetaminophen   Tablet .. 650 milliGRAM(s) Oral every 12 hours PRN Temp greater or equal to 38C (100.4F), Mild Pain (1 - 3), Moderate Pain (4 - 6)  ondansetron    Tablet 4 milliGRAM(s) Oral every 8 hours PRN Nausea and/or Vomiting      Allergies  flu vaccine (Anaphylaxis)  No Known Drug Allergies    Intolerances  lactose (Unknown)      Review of Systems:  unable to obtain from pt    Vital Signs Last 24 Hrs  T(C): 37.5 (03 Mar 2021 05:02), Max: 37.6 (03 Mar 2021 00:26)  T(F): 99.5 (03 Mar 2021 05:02), Max: 99.6 (03 Mar 2021 00:26)  HR: 94 (03 Mar 2021 05:02) (94 - 116)  BP: 110/67 (03 Mar 2021 05:02) (102/64 - 148/92)  BP(mean): --  RR: 18 (03 Mar 2021 05:02) (18 - 19)  SpO2: 96% (03 Mar 2021 05:02) (94% - 96%)    PHYSICAL EXAM:  Constitutional: elderly female. appears comfortable, non toxic appearing  Neck: No LAD, supple no JVD  Respiratory: decreased BS at bases, no rales or wheeze  Cardiovascular: S1 and S2, RRR  Gastrointestinal: obese soft  ND NT no rebound guarding or rigidity +harding  Extremities: neg clubbing, cyanosis tr b/l LE edema  Vascular: 2+ peripheral pulses  Neurological: oriented to self, pleasantly confused.  moves all extremities  Skin: No rashes    LABS:                        10.2   8.66  )-----------( 390      ( 03 Mar 2021 06:15 )             31.5     03-03    131<L>  |  95<L>  |  7   ----------------------------<  93  4.3   |  26  |  0.72    Ca    9.1      03 Mar 2021 06:15  Phos  3.5     03-03  Mg     1.8     03-03      RADIOLOGY & ADDITIONAL TESTS:

## 2021-03-03 NOTE — PROGRESS NOTE ADULT - ASSESSMENT
89F w/ PMH of CAD s/p PCI, Chronic UTI's, MDD, HTN, HLD, [place] (hearing aids), dysphagia, prior COVID19 infection, and recent hospitalization for C. diff colitis (~2/7/21) presenting from rehab center for bloody bowel movement, likely LGIB secondary to infectious colitis vs. ischemic colitis.  Also found to be in septic shock, likely secondary to colitis iso recent C. diff.  COVID +, asymptomatic and without hypoxia. RRT 2/23 for hypotension, tachycardia with new afib and fever, completed meropenem, now on vancomycin taper.

## 2021-03-03 NOTE — DISCHARGE NOTE NURSING/CASE MANAGEMENT/SOCIAL WORK - NSDCFUADDAPPT_GEN_ALL_CORE_FT
Please follow up with the gastroenterologist at Dr. Hale's practice and with your urogynecologist Dr. Hernandez.  Please follow up with your primary care doctor.

## 2021-03-03 NOTE — DISCHARGE NOTE NURSING/CASE MANAGEMENT/SOCIAL WORK - PATIENT PORTAL LINK FT
You can access the FollowMyHealth Patient Portal offered by [facility] by registering at the following website: [email or web address]. By joining [facility]’s FollowMyHealth portal, you will also be able to view your health information using other applications (apps) compatible with our system.

## 2021-03-03 NOTE — PROGRESS NOTE ADULT - REASON FOR ADMISSION
GI Bleed, Vomiting

## 2021-03-16 PROCEDURE — 82435 ASSAY OF BLOOD CHLORIDE: CPT

## 2021-03-16 PROCEDURE — 96375 TX/PRO/DX INJ NEW DRUG ADDON: CPT

## 2021-03-16 PROCEDURE — 85027 COMPLETE CBC AUTOMATED: CPT

## 2021-03-16 PROCEDURE — 85730 THROMBOPLASTIN TIME PARTIAL: CPT

## 2021-03-16 PROCEDURE — 85014 HEMATOCRIT: CPT

## 2021-03-16 PROCEDURE — 87449 NOS EACH ORGANISM AG IA: CPT

## 2021-03-16 PROCEDURE — 83935 ASSAY OF URINE OSMOLALITY: CPT

## 2021-03-16 PROCEDURE — 80048 BASIC METABOLIC PNL TOTAL CA: CPT

## 2021-03-16 PROCEDURE — U0005: CPT

## 2021-03-16 PROCEDURE — 83930 ASSAY OF BLOOD OSMOLALITY: CPT

## 2021-03-16 PROCEDURE — 84300 ASSAY OF URINE SODIUM: CPT

## 2021-03-16 PROCEDURE — 96374 THER/PROPH/DIAG INJ IV PUSH: CPT | Mod: XU

## 2021-03-16 PROCEDURE — 80053 COMPREHEN METABOLIC PANEL: CPT

## 2021-03-16 PROCEDURE — 87507 IADNA-DNA/RNA PROBE TQ 12-25: CPT

## 2021-03-16 PROCEDURE — 71045 X-RAY EXAM CHEST 1 VIEW: CPT

## 2021-03-16 PROCEDURE — 71260 CT THORAX DX C+: CPT

## 2021-03-16 PROCEDURE — 82533 TOTAL CORTISOL: CPT

## 2021-03-16 PROCEDURE — 74177 CT ABD & PELVIS W/CONTRAST: CPT

## 2021-03-16 PROCEDURE — 97530 THERAPEUTIC ACTIVITIES: CPT

## 2021-03-16 PROCEDURE — 84100 ASSAY OF PHOSPHORUS: CPT

## 2021-03-16 PROCEDURE — 86900 BLOOD TYPING SEROLOGIC ABO: CPT

## 2021-03-16 PROCEDURE — 97110 THERAPEUTIC EXERCISES: CPT

## 2021-03-16 PROCEDURE — 84295 ASSAY OF SERUM SODIUM: CPT

## 2021-03-16 PROCEDURE — 81001 URINALYSIS AUTO W/SCOPE: CPT

## 2021-03-16 PROCEDURE — U0003: CPT

## 2021-03-16 PROCEDURE — 85610 PROTHROMBIN TIME: CPT

## 2021-03-16 PROCEDURE — 82947 ASSAY GLUCOSE BLOOD QUANT: CPT

## 2021-03-16 PROCEDURE — 84540 ASSAY OF URINE/UREA-N: CPT

## 2021-03-16 PROCEDURE — 84132 ASSAY OF SERUM POTASSIUM: CPT

## 2021-03-16 PROCEDURE — 74018 RADEX ABDOMEN 1 VIEW: CPT

## 2021-03-16 PROCEDURE — 76770 US EXAM ABDO BACK WALL COMP: CPT

## 2021-03-16 PROCEDURE — 97161 PT EVAL LOW COMPLEX 20 MIN: CPT

## 2021-03-16 PROCEDURE — 86850 RBC ANTIBODY SCREEN: CPT

## 2021-03-16 PROCEDURE — 99285 EMERGENCY DEPT VISIT HI MDM: CPT | Mod: 25

## 2021-03-16 PROCEDURE — 87186 SC STD MICRODIL/AGAR DIL: CPT

## 2021-03-16 PROCEDURE — 87077 CULTURE AEROBIC IDENTIFY: CPT

## 2021-03-16 PROCEDURE — 87040 BLOOD CULTURE FOR BACTERIA: CPT

## 2021-03-16 PROCEDURE — 82272 OCCULT BLD FECES 1-3 TESTS: CPT

## 2021-03-16 PROCEDURE — 82570 ASSAY OF URINE CREATININE: CPT

## 2021-03-16 PROCEDURE — 86769 SARS-COV-2 COVID-19 ANTIBODY: CPT

## 2021-03-16 PROCEDURE — 93005 ELECTROCARDIOGRAM TRACING: CPT

## 2021-03-16 PROCEDURE — 87324 CLOSTRIDIUM AG IA: CPT

## 2021-03-16 PROCEDURE — 87086 URINE CULTURE/COLONY COUNT: CPT

## 2021-03-16 PROCEDURE — 83605 ASSAY OF LACTIC ACID: CPT

## 2021-03-16 PROCEDURE — 82436 ASSAY OF URINE CHLORIDE: CPT

## 2021-03-16 PROCEDURE — 93970 EXTREMITY STUDY: CPT

## 2021-03-16 PROCEDURE — 82565 ASSAY OF CREATININE: CPT

## 2021-03-16 PROCEDURE — 84133 ASSAY OF URINE POTASSIUM: CPT

## 2021-03-16 PROCEDURE — 85025 COMPLETE CBC W/AUTO DIFF WBC: CPT

## 2021-03-16 PROCEDURE — 97116 GAIT TRAINING THERAPY: CPT

## 2021-03-16 PROCEDURE — 83735 ASSAY OF MAGNESIUM: CPT

## 2021-03-16 PROCEDURE — 82962 GLUCOSE BLOOD TEST: CPT

## 2021-03-16 PROCEDURE — 92610 EVALUATE SWALLOWING FUNCTION: CPT

## 2021-03-16 PROCEDURE — 86901 BLOOD TYPING SEROLOGIC RH(D): CPT

## 2021-03-16 PROCEDURE — 82803 BLOOD GASES ANY COMBINATION: CPT

## 2021-03-16 PROCEDURE — 82330 ASSAY OF CALCIUM: CPT

## 2021-03-16 PROCEDURE — 85018 HEMOGLOBIN: CPT

## 2021-03-16 PROCEDURE — 83036 HEMOGLOBIN GLYCOSYLATED A1C: CPT

## 2021-03-16 PROCEDURE — 84484 ASSAY OF TROPONIN QUANT: CPT

## 2022-01-08 NOTE — PROCEDURE NOTE - NSURITECHNIQUE_GU_A_CORE
Proper hand hygiene was performed/Sterile gloves were worn for the duration of the procedure/A sterile drape was used to cover all adjacent areas/The catheter was appropriately lubricated/The catheter was secured with a securement device (e.g. StatLock)/The urinary drainage system is closed at the end of the procedure/The collection bag is below the level of the patient and urinary bladder/All applicable medical record documentation is completed pt c/o having dizziness  ,nausea , blurry vision starting yesterday. pt states she had a sore throat earlier in the week. denies PMH

## 2022-01-31 NOTE — PROGRESS NOTE ADULT - SUBJECTIVE AND OBJECTIVE BOX
Internal Medicine   Blake Moreno | PGY-1    OVERNIGHT EVENTS: No acute overnight events. Vancomycin taper started yesterday.    SUBJECTIVE: Patient was seen and examined at bedside this morning. Denies any nausea/vomiting/diarrhea, headache, shortness of breath or chest pain.       MEDICATIONS  (STANDING):  ascorbic acid 500 milliGRAM(s) Oral daily  aspirin enteric coated 81 milliGRAM(s) Oral daily  bethanechol 10 milliGRAM(s) Oral three times a day  cholecalciferol 1000 Unit(s) Oral daily  cyanocobalamin 1000 MICROGram(s) Oral daily  heparin   Injectable 5000 Unit(s) SubCutaneous every 8 hours  lactobacillus acidophilus 1 Tablet(s) Oral two times a day  mirtazapine 15 milliGRAM(s) Oral at bedtime  Nephro-portia 1 Tablet(s) Oral daily  nystatin Powder 1 Application(s) Topical two times a day  potassium phosphate / sodium phosphate Powder (PHOS-NaK) 1 Packet(s) Oral three times a day with meals  psyllium Powder 1 Packet(s) Oral two times a day  sodium chloride 3 Gram(s) Oral three times a day  vancomycin    Solution 125 milliGRAM(s) Oral every 12 hours    MEDICATIONS  (PRN):  acetaminophen   Tablet .. 650 milliGRAM(s) Oral every 12 hours PRN Temp greater or equal to 38C (100.4F), Mild Pain (1 - 3), Moderate Pain (4 - 6)  ondansetron    Tablet 4 milliGRAM(s) Oral every 8 hours PRN Nausea and/or Vomiting        T(F): 99.5 (03-03-21 @ 05:02), Max: 99.6 (03-03-21 @ 00:26)  HR: 94 (03-03-21 @ 05:02) (94 - 116)  BP: 110/67 (03-03-21 @ 05:02) (102/64 - 148/92)  BP(mean): --  RR: 18 (03-03-21 @ 05:02) (18 - 19)  SpO2: 96% (03-03-21 @ 05:02) (94% - 96%)    PHYSICAL EXAM:   Gen: Awake, NAD, minimally conversational  HEENT: NCAT, PERRL, EOMI, clear conjunctiva, no scleral icterus  Neck: Supple, no JVD, no LAD  CV: RRR, S1S2, no m/r/g  Resp: CTAB, normal respiratory effort  Abd: Soft, L sided TTP on soft and deep palpation, now improving  no rebound or guarding,   Ext: minimal R calf swelling compared to L, no clubbing or cyanosis ,continues to endorse pain on palpation of shins b/l  Neuro: minimally conversational, following verbal commands, nods/shakes head to questions   Skin: warm, perfused, does not appear dry/edematous   TELEMETRY:    LABS:                        10.2   8.66  )-----------( 390      ( 03 Mar 2021 06:15 )             31.5     03-02    131<L>  |  95<L>  |  8   ----------------------------<  96  4.5   |  24  |  0.70    Ca    9.2      02 Mar 2021 21:46  Phos  3.4     03-02  Mg     2.0     03-02              Creatinine Trend: 0.70<--, 0.66<--, 0.68<--, 0.72<--, 0.75<--, 0.67<--  I&O's Summary    01 Mar 2021 07:01  -  02 Mar 2021 07:00  --------------------------------------------------------  IN: 240 mL / OUT: 575 mL / NET: -335 mL    02 Mar 2021 07:01  -  03 Mar 2021 06:52  --------------------------------------------------------  IN: 440 mL / OUT: 750 mL / NET: -310 mL      BNP    RADIOLOGY & ADDITIONAL STUDIES:             Internal Medicine   Blake Moreno | PGY-1    OVERNIGHT EVENTS: No acute overnight events. Vancomycin taper started yesterday.    SUBJECTIVE: Patient was seen and examined at bedside this morning. Denies any nausea/vomiting/diarrhea, headache, shortness of breath or chest pain. Patient appears more conversational, states that she is okay and asking when she can go home.       MEDICATIONS  (STANDING):  ascorbic acid 500 milliGRAM(s) Oral daily  aspirin enteric coated 81 milliGRAM(s) Oral daily  bethanechol 10 milliGRAM(s) Oral three times a day  cholecalciferol 1000 Unit(s) Oral daily  cyanocobalamin 1000 MICROGram(s) Oral daily  heparin   Injectable 5000 Unit(s) SubCutaneous every 8 hours  lactobacillus acidophilus 1 Tablet(s) Oral two times a day  mirtazapine 15 milliGRAM(s) Oral at bedtime  Nephro-portia 1 Tablet(s) Oral daily  nystatin Powder 1 Application(s) Topical two times a day  potassium phosphate / sodium phosphate Powder (PHOS-NaK) 1 Packet(s) Oral three times a day with meals  psyllium Powder 1 Packet(s) Oral two times a day  sodium chloride 3 Gram(s) Oral three times a day  vancomycin    Solution 125 milliGRAM(s) Oral every 12 hours    MEDICATIONS  (PRN):  acetaminophen   Tablet .. 650 milliGRAM(s) Oral every 12 hours PRN Temp greater or equal to 38C (100.4F), Mild Pain (1 - 3), Moderate Pain (4 - 6)  ondansetron    Tablet 4 milliGRAM(s) Oral every 8 hours PRN Nausea and/or Vomiting        T(F): 99.5 (03-03-21 @ 05:02), Max: 99.6 (03-03-21 @ 00:26)  HR: 94 (03-03-21 @ 05:02) (94 - 116)  BP: 110/67 (03-03-21 @ 05:02) (102/64 - 148/92)  BP(mean): --  RR: 18 (03-03-21 @ 05:02) (18 - 19)  SpO2: 96% (03-03-21 @ 05:02) (94% - 96%)    PHYSICAL EXAM:   Gen: Awake, NAD, minimally conversational  HEENT: NCAT, PERRL, EOMI, clear conjunctiva, no scleral icterus  Neck: Supple, no JVD, no LAD  CV: RRR, S1S2, no m/r/g  Resp: CTAB, normal respiratory effort  Abd: Soft, L sided TTP on soft and deep palpation, now improving  no rebound or guarding,   Ext: minimal R calf swelling compared to L, no clubbing or cyanosis ,continues to endorse pain on palpation of shins b/l  Neuro: minimally conversational, following verbal commands, nods/shakes head to questions   Skin: warm, perfused, does not appear dry/edematous   TELEMETRY:    LABS:                        10.2   8.66  )-----------( 390      ( 03 Mar 2021 06:15 )             31.5     03-02    131<L>  |  95<L>  |  8   ----------------------------<  96  4.5   |  24  |  0.70    Ca    9.2      02 Mar 2021 21:46  Phos  3.4     03-02  Mg     2.0     03-02              Creatinine Trend: 0.70<--, 0.66<--, 0.68<--, 0.72<--, 0.75<--, 0.67<--  I&O's Summary    01 Mar 2021 07:01  -  02 Mar 2021 07:00  --------------------------------------------------------  IN: 240 mL / OUT: 575 mL / NET: -335 mL    02 Mar 2021 07:01  -  03 Mar 2021 06:52  --------------------------------------------------------  IN: 440 mL / OUT: 750 mL / NET: -310 mL      BNP    RADIOLOGY & ADDITIONAL STUDIES:             Left LE/nonweight-bearing

## 2022-05-18 NOTE — H&P ADULT - PROBLEM/PLAN-10
[facility]  Follow up note  Burt Spann 58 y o  female MRN: [id number]        Problems    1  Mixed hyperlipidemia  POCT ECG    Lipid panel   2  Benign essential hypertension  Basic metabolic panel       Impression:     tachycardia/fatigue    occurred after her COVID shots  Noticed this on her Apple watch surrounding exercises well   No complaints lately   ZIO patch revealed minimal episodes of SVT without associated symptoms  Hypertension    uncontrolled diastolic blood pressure today   hypothyroidism   had significant hypothyroidism with a TSH of 17 in 2019, repeat TSH this year was 2  Hyperlipidemia    ASCVD risk is low   She has mild coronary calcium with a score 125 a few years ago   LDL is 131   She prefers lifestyle modification of her medical therapy at this time    Plan:     cholesterol is fine in the presence of mildly elevated coronary calcium with an LDL of 131 eager to continue lifestyle modification over medical therapy   Blood pressure slightly elevated, especially diastolic, she does report increased stress recently  Discussed the need to try to find options for de stressing, monitoring her blood pressure at home of the next month and reporting to me, any continued hypertensive diastolic blood pressures should be treated   adding amlodipine verses combining HCTZ into her losartan would be options  HPI:   Burt Spann is a 58y o  year old female without  Premature family history of CAD, hypertension, mild hypercholesterolemia, hypothyroidism,  With previous complaints of rapid heart rates up to 180 on her Apple watch, ZIO Patch did prove some episodes of SVT, this is probably associated with some stress, she has a hypertensive diastolic blood pressure today, but does not have a poor diet per her account, although she does say stress is increased with work  She recently got back from [facility] and joint 2 weeks there, she has been [place]    She does not complain of palpitations much at this time  LDL is 131, she prefers no medical therapy, she does have mild coronary calcium      Review of Systems   Constitutional: Negative for appetite change, diaphoresis, fatigue and fever  Respiratory: Negative for chest tightness, shortness of breath and wheezing  Cardiovascular: Negative for chest pain, palpitations and leg swelling  Gastrointestinal: Negative for abdominal pain and blood in stool  Musculoskeletal: Negative for arthralgias and joint swelling  Skin: Negative for rash  Neurological: Negative for dizziness, syncope and light-headedness  History reviewed  No pertinent past medical history  Social History     Substance and Sexual Activity   Alcohol Use Yes     Social History     Substance and Sexual Activity   Drug Use Never     Social History     Tobacco Use   Smoking Status Never Smoker   Smokeless Tobacco Never Used       Allergies:   Allergies   Allergen Reactions    Oxycodone-Acetaminophen Nausea Only     (Percocet) nauseated, no pain relief    Sulfamethoxazole-Trimethoprim Hives       Medications:     Current Outpatient Medications:     Cholecalciferol 1000 units tablet, Take 2 tablets by mouth daily, Disp: , Rfl:     cyanocobalamin (VITAMIN B-12) 500 mcg tablet, Take 500 mcg by mouth daily, Disp: , Rfl:     levothyroxine 50 mcg tablet, 25 mcg, Disp: , Rfl:     losartan (COZAAR) 100 MG tablet, Take 50 mg by mouth daily, Disp: , Rfl:     Potassium 99 MG TABS, Take 1 tablet by mouth daily, Disp: , Rfl:     Pyridoxine HCl (VITAMIN B-6) 100 MG TABS, Take 1 tablet by mouth daily, Disp: , Rfl:     Red Yeast Rice 600 MG CAPS, Take 1 capsule by mouth daily, Disp: , Rfl:     selenium 200 mcg, Take 200 mcg by mouth daily, Disp: , Rfl:     ascorbic acid (VITAMIN C) 1000 MG tablet, Take 1,000 mg by mouth daily, Disp: , Rfl:     CALCIUM-MAGNESIUM PO, Take 3 tablets by mouth daily, Disp: , Rfl:     Cholecalciferol 1000 units tablet, Take 2 tablets by mouth daily, Disp: , Rfl:     cyanocobalamin (VITAMIN B-12) 500 mcg tablet, Take 500 mcg by mouth daily, Disp: , Rfl:     Grape Seed Extract 60 MG CAPS, Take 1 capsule by mouth daily, Disp: , Rfl:     IODINE-VITAMIN A PO, , Disp: , Rfl:     Potassium 99 MG TABS, Take 1 tablet by mouth daily, Disp: , Rfl:     Pyridoxine HCl (VITAMIN B-6) 100 MG TABS, Take 1 tablet by mouth daily, Disp: , Rfl:     selenium 200 mcg, Take 200 mcg by mouth daily, Disp: , Rfl:       Vitals:    05/18/22 0834   BP: 130/92   Pulse: 74     Weight (last 2 days)     Date/Time Weight    05/18/22 0834 81 2 (179)        Physical Exam  Constitutional:       General: She is not in acute distress  Appearance: She is not diaphoretic  HENT:      Head: Normocephalic and atraumatic  Eyes:      General: No scleral icterus  Conjunctiva/sclera: Conjunctivae normal    Neck:      Vascular: No JVD  Cardiovascular:      Rate and Rhythm: Normal rate and regular rhythm  Heart sounds: Normal heart sounds  No murmur heard  Pulmonary:      Effort: Pulmonary effort is normal  No respiratory distress  Breath sounds: Normal breath sounds  No wheezing, rhonchi or rales  Musculoskeletal:         General: No tenderness  Cervical back: Normal range of motion  Right lower leg: Normal  No edema  Left lower leg: Normal  No edema  Skin:     General: Skin is warm and dry  Laboratory Studies:     all laboratory studies personally reviewed    Cardiac testing:     EKG reviewed personally:    11/19-normal sinus rhythm, normal EKG  Results for orders placed or performed in visit on 05/18/22   POCT ECG    Impression      Normal sinus rhythm, normal EKG          stress test  [facility] 2016-normal stress echo      Bhupinder Blackwell MD    Portions of the record may have been created with voice recognition software    Occasional wrong word or "sound a like" substitutions may have occurred due to the inherent limitations of voice recognition software  Read the chart carefully and recognize, using context, where substitutions have occurred  DISPLAY PLAN FREE TEXT

## 2022-07-07 NOTE — CONSULT NOTE ADULT - SUBJECTIVE AND OBJECTIVE BOX
----- Message from Bart Nelson sent at 7/7/2022 10:40 AM CDT -----  Contact: Sister/Ita  Type:  Sooner Appointment Request    Caller is requesting a sooner appointment.  Caller declined first available appointment listed below.  Caller will not accept being placed on the waitlist and is requesting a message be sent to doctor.    Name of Caller:  /Ita  When is the first available appointment?  12/19  Symptoms:  Legs swelling, turning color, kidney issues  Best Call Back Number:  [phone]  Additional Information:         Patient is a 89y old  Female who presents with a chief complaint of GI Bleed, Vomiting (14 Feb 2021 07:19)    HPI:  89F w/ PMH of CAD s/p PCI, Chronic UTI's, MDD, HTN, HLD, [place] (hearing aids), dysphagia, COVID19 infection (2 months ago), and recent hospitalization for C. diff colitis (~2/7/21) [facility] after episode of vomiting and bloody bowel movement on 2/13.  HPI obtained from chart and from daughter (HCP) as patient does not respond verbally. Staff also reported patient appeared confused on 2/13, when normally more alert and conversive.  Upon EMS arrival, SBP 70s and received 1L IVF.  Per daughter, Patient was hospitalized around 2/7/21 for C. diff colitis at [facility] and given IV vancomycin x 6 days, then transitioned to PO vancomycin upon discharge to rehab (last dose was 2/10/21).  Per daughter, no prior history of GI bleed.  Regarding her prior COVID19 infection, daughter reports she was diagnosed with "mild" case of COVID and hospitalized ~ 2 months ago and was treated with remdesivir, dexamethasone, convalescent plasma, and AC. Did not require ventilation. She tested negative twice prior to discharge to rehab at the end of January.    In ED: Initial BP was 90s/40s, received 500mL NS x2. BP now 100/70s. Hypothermic 95.3 rectally, then later febrile to 101, s/p 1g of IV tylenol.  HR 50s -> 120s, now 110s. RR 19-32, sating % on RA. S/p Pantoprazole 80mg IVP, Zosyn x1, Metronidazole 500mg IV x1, Ondansetron 4mg. Rectal exam normal colored stool with small amount of watery blood. (14 Feb 2021 07:19)       prior hospital charts reviewed [V]  primary team notes reviewed [V]  other consultant notes reviewed [V]    PAST MEDICAL & SURGICAL HISTORY:  COVID-19    Urinary tract infection    CAD in native artery    Dyslipidemia    Depression    Hyponatremia    Hypertension    History of repair of hip fracture        SOCIAL HISTORY:    , living in [facility] prior to recent hospitalizations, coming from rehab  No tobacco hx    FAMILY HISTORY:  Unable to obtain      Allergies  flu vaccine (Anaphylaxis)  No Known Drug Allergies      ANTIMICROBIALS:  metroNIDAZOLE  IVPB 500 every 8 hours  piperacillin/tazobactam IVPB.. 3.375 every 12 hours  vancomycin    Solution 125 every 6 hours      ANTIMICROBIALS (past 90 days):  MEDICATIONS  (STANDING):  metroNIDAZOLE  IVPB   100 mL/Hr IV Intermittent (02-14-21 @ 14:25)    metroNIDAZOLE  IVPB   100 mL/Hr IV Intermittent (02-14-21 @ 02:47)    piperacillin/tazobactam IVPB..   25 mL/Hr IV Intermittent (02-14-21 @ 09:41)    piperacillin/tazobactam IVPB...   200 mL/Hr IV Intermittent (02-14-21 @ 01:49)    vancomycin    Solution   125 milliGRAM(s) Oral (02-14-21 @ 14:26)        OTHER MEDS:   MEDICATIONS  (STANDING):  acetaminophen   Tablet .. 650 every 12 hours PRN  bethanechol 10 three times a day  mirtazapine 15 at bedtime  ondansetron    Tablet 4 every 8 hours PRN  pantoprazole  Injectable 40 two times a day      REVIEW OF SYSTEMS  [ V ] ROS unobtainable because:  Unable to obtain ROS due to cognitive impairment from acute illness  [  ] All other systems negative except as noted below:	    Constitutional:  [ ] fever [ ] chills  [ ] weight loss  [ ] weakness  Skin:  [ ] rash [ ] phlebitis	  Eyes: [ ] icterus [ ] pain  [ ] discharge	  ENMT: [ ] sore throat  [ ] thrush [ ] ulcers [ ] exudates  Respiratory: [ ] dyspnea [ ] hemoptysis [ ] cough [ ] sputum	  Cardiovascular:  [ ] chest pain [ ] palpitations [ ] edema	  Gastrointestinal:  [ ] nausea [ ] vomiting [ ] diarrhea [ ] constipation [ ] pain	  Genitourinary:  [ ] dysuria [ ] frequency [ ] hematuria [ ] discharge [ ] flank pain  [ ] incontinence  Musculoskeletal:  [ ] myalgias [ ] arthralgias [ ] arthritis  [ ] back pain  Neurological:  [ ] headache [ ] seizures  [ ] confusion/altered mental status  Psychiatric:  [ ] anxiety [ ] depression	  Hematology/Lymphatics:  [ ] lymphadenopathy  Endocrine:  [ ] adrenal [ ] thyroid  Allergic/Immunologic:	 [ ] transplant [ ] seasonal    VITALS:  Vital Signs Last 24 Hrs  T(F): 98 (02-14-21 @ 13:00), Max: 101 (02-14-21 @ 05:16)    Vital Signs Last 24 Hrs  HR: 116 (02-14-21 @ 14:35) (56 - 120)  BP: 91/60 (02-14-21 @ 14:35) (70/50 - 129/89)  RR: 19 (02-14-21 @ 14:35)  SpO2: 98% (02-14-21 @ 14:35) (95% - 100%)  Wt(kg): --    EXAM:  GA: NAD  HEENT: oral cavity no lesion  CV: nl S1/S2, no RMG  Lungs: CTAB  Abd: BS+, soft, nontender, no rebounding pain  Ext: no edema  Skin:  IV: no phlebitis    Labs:                        12.6   32.04 )-----------( 313      ( 14 Feb 2021 13:51 )             40.3     02-14    135  |  99  |  29<H>  ----------------------------<  143<H>  4.5   |  18<L>  |  2.59<H>    Ca    9.3      14 Feb 2021 13:59  Phos  3.9     02-14  Mg     1.9     02-14    TPro  6.5  /  Alb  3.3  /  TBili  0.4  /  DBili  x   /  AST  23  /  ALT  10  /  AlkPhos  75  02-14      WBC Trend:  WBC Count: 32.04 (02-14-21 @ 13:51)  WBC Count: 26.64 (02-14-21 @ 11:11)  WBC Count: 24.73 (02-13-21 @ 22:45)  WBC Count: 16.80 (02-13-21 @ 20:15)    Auto Neutrophil #: 22.06 K/uL (02-13-21 @ 22:45)  Auto Neutrophil #: 13.96 K/uL (02-13-21 @ 20:15)  Auto Neutrophil #: 5.03 K/uL (07-16-20 @ 10:36)      Creatine Trend:  Creatinine, Serum: 2.59 (02-14)  Creatinine, Serum: 2.48 (02-14)  Creatinine, Serum: 1.70 (02-13)  Creatinine, Serum: 1.25 (02-13)      Liver Biochemical Testing Trend:  Alanine Aminotransferase (ALT/SGPT): 10 (02-14)  Alanine Aminotransferase (ALT/SGPT): 11 (02-13)  Aspartate Aminotransferase (AST/SGOT): 23 (02-14-21 @ 13:59)  Aspartate Aminotransferase (AST/SGOT): 59 (02-13-21 @ 20:16)  Bilirubin Total, Serum: 0.4 (02-14)  Bilirubin Total, Serum: 0.3 (02-13)      Urinalysis Basic - ( 14 Feb 2021 13:51 )  Color: Dark Yellow / Appearance: Slightly Turbid / SG: >1.050 / pH: x  Gluc: x / Ketone: Negative  / Bili: Small / Urobili: 3 mg/dL   Blood: x / Protein: 100 / Nitrite: Positive   Leuk Esterase: Large / RBC: 9 /hpf /  /HPF   Sq Epi: x / Non Sq Epi: 6 /hpf / Bacteria: Few      MICROBIOLOGY:    Culture - Urine (collected 16 Jul 2020 13:02)  Source: .Urine CL CATCH [facility]  Final Report:    >100,000 CFU/ml Escherichia coli ESBL  Organism: Escherichia coli ESBL  Organism: Escherichia coli ESBL    Sensitivities:      -  Amikacin: S <=16      -  Amoxicillin/Clavulanic Acid: S <=8/4      -  Ampicillin: R >16 These ampicillin results predict results for amoxicillin      -  Ampicillin/Sulbactam: R 8/4 Enterobacter, Citrobacter, and Serratia may develop resistance during prolonged therapy (3-4 days)      -  Aztreonam: R >16      -  Cefazolin: R >16 (MIC_CL_[id number]) For uncomplicated UTI with K. pneumoniae, E. coli, or P. mirablis: NEVA <=16 is sensitive and NEVA >=32 is resistant. This also predicts results for oral agents cefaclor, cefdinir, cefpodoxime, cefprozil, cefuroxime axetil, cephalexin and locarbef for uncomplicated UTI. Note that some isolates may be susceptible to these agents while testing resistant to cefazolin.      -  Cefepime: R >16      -  Cefoxitin: S <=8      -  Ceftriaxone: R >32 Enterobacter, Citrobacter, and Serratia may develop resistance during prolonged therapy      -  Ciprofloxacin: S <=0.25      -  Ertapenem: S <=0.5      -  Gentamicin: S <=2      -  Imipenem: S <=1      -  Levofloxacin: S <=0.5      -  Meropenem: S <=1      -  Nitrofurantoin: S <=32 Should not be used to treat pyelonephritis      -  Piperacillin/Tazobactam: R <=8      -  Tigecycline: S <=2      -  Tobramycin: S <=2      -  Trimethoprim/Sulfamethoxazole: R >2/38      Method Type: NEVA        OTHER TESTS:  COVID-19 PCR: Detected (02-13-21 @ 20:15)    Blood Gas Venous - Lactate: 5.2 (02-14 @ 13:50)  Blood Gas Venous - Lactate: 5.2 (02-14 @ 13:50)  Blood Gas Venous - Lactate: 5.6 (02-14 @ 11:09)  Blood Gas Venous - Lactate: 4.3 (02-14 @ 01:01)    A1C with Estimated Average Glucose Result: 5.2 % (02-14-21 @ 13:28)      RADIOLOGY:  imaging below personally reviewed    < from: Xray Chest 1 View- PORTABLE-Routine (Xray Chest 1 View- PORTABLE-Routine .) (02.14.21 @ 14:03) >  The included lungs are clear.  < end of copied text >    < from: CT Abdomen and Pelvis w/ IV Cont (02.13.21 @ 22:34) >  IMPRESSION:    1. No evidence of active GI bleed noting that portion of the distal rectum is not included in the field-of-view and cannot be evaluated.    2. Suspected diffuse colonic wall thickening, most pronounced in the descending and rectosigmoid colon, suspicious for colitis.    3. Focus of air within the bladder. Recommend correlation for recent interpretation.    < end of copied text >   Patient is a 89y old  Female who presents with a chief complaint of GI Bleed, Vomiting (14 Feb 2021 07:19)    HPI:  89F w/ PMH of CAD s/p PCI, Chronic UTI's, MDD, HTN, HLD, [place] (hearing aids), dysphagia, COVID19 infection (2 months ago), and recent hospitalization for C. diff colitis (~2/7/21) [facility] episode of vomiting and bloody bowel movement on 2/13.  HPI obtained from chart and from daughter (HCP) as patient does not respond verbally. Staff also reported patient appeared confused on 2/13, when normally more alert and conversive.  Upon EMS arrival, SBP 70s and received 1L IVF.  Per daughter, Patient was hospitalized around 2/7/21 for C. diff colitis at [facility] and given IV vancomycin x 6 days, then transitioned to PO vancomycin upon discharge to rehab (last dose was 2/10/21).  Per daughter, no prior history of GI bleed.  Regarding her prior COVID19 infection, daughter reports she was diagnosed with "mild" case of COVID and hospitalized ~ 2 months ago and was treated with remdesivir, dexamethasone, convalescent plasma, and AC. Did not require ventilation. She tested negative twice prior to discharge to rehab at the end of January.    In ED: Initial BP was 90s/40s, received 500mL NS x2. BP now 100/70s. Hypothermic 95.3 rectally, then later febrile to 101, s/p 1g of IV tylenol.  HR 50s -> 120s, now 110s. RR 19-32, sating % on RA. S/p Pantoprazole 80mg IVP, Zosyn x1, Metronidazole 500mg IV x1, Ondansetron 4mg. Rectal exam normal colored stool with small amount of watery blood. (14 Feb 2021 07:19)       prior hospital charts reviewed [V]  primary team notes reviewed [V]  other consultant notes reviewed [V]    PAST MEDICAL & SURGICAL HISTORY:  COVID-19    Urinary tract infection    CAD in native artery    Dyslipidemia    Depression    Hyponatremia    Hypertension    History of repair of hip fracture        SOCIAL HISTORY:    , living in [facility] prior to recent hospitalizations, coming from rehab  No tobacco hx    FAMILY HISTORY:  Unable to obtain      Allergies  flu vaccine (Anaphylaxis)  No Known Drug Allergies      ANTIMICROBIALS:  metroNIDAZOLE  IVPB 500 every 8 hours  piperacillin/tazobactam IVPB.. 3.375 every 12 hours  vancomycin    Solution 125 every 6 hours      ANTIMICROBIALS (past 90 days):  MEDICATIONS  (STANDING):  metroNIDAZOLE  IVPB   100 mL/Hr IV Intermittent (02-14-21 @ 14:25)    metroNIDAZOLE  IVPB   100 mL/Hr IV Intermittent (02-14-21 @ 02:47)    piperacillin/tazobactam IVPB..   25 mL/Hr IV Intermittent (02-14-21 @ 09:41)    piperacillin/tazobactam IVPB...   200 mL/Hr IV Intermittent (02-14-21 @ 01:49)    vancomycin    Solution   125 milliGRAM(s) Oral (02-14-21 @ 14:26)        OTHER MEDS:   MEDICATIONS  (STANDING):  acetaminophen   Tablet .. 650 every 12 hours PRN  bethanechol 10 three times a day  mirtazapine 15 at bedtime  ondansetron    Tablet 4 every 8 hours PRN  pantoprazole  Injectable 40 two times a day      REVIEW OF SYSTEMS  [ V ] ROS unobtainable because:  Unable to obtain ROS due to cognitive impairment from acute illness  [  ] All other systems negative except as noted below:	    Constitutional:  [ ] fever [ ] chills  [ ] weight loss  [ ] weakness  Skin:  [ ] rash [ ] phlebitis	  Eyes: [ ] icterus [ ] pain  [ ] discharge	  ENMT: [ ] sore throat  [ ] thrush [ ] ulcers [ ] exudates  Respiratory: [ ] dyspnea [ ] hemoptysis [ ] cough [ ] sputum	  Cardiovascular:  [ ] chest pain [ ] palpitations [ ] edema	  Gastrointestinal:  [ ] nausea [ ] vomiting [ ] diarrhea [ ] constipation [ ] pain	  Genitourinary:  [ ] dysuria [ ] frequency [ ] hematuria [ ] discharge [ ] flank pain  [ ] incontinence  Musculoskeletal:  [ ] myalgias [ ] arthralgias [ ] arthritis  [ ] back pain  Neurological:  [ ] headache [ ] seizures  [ ] confusion/altered mental status  Psychiatric:  [ ] anxiety [ ] depression	  Hematology/Lymphatics:  [ ] lymphadenopathy  Endocrine:  [ ] adrenal [ ] thyroid  Allergic/Immunologic:	 [ ] transplant [ ] seasonal    VITALS:  Vital Signs Last 24 Hrs  T(F): 98 (02-14-21 @ 13:00), Max: 101 (02-14-21 @ 05:16)    Vital Signs Last 24 Hrs  HR: 116 (02-14-21 @ 14:35) (56 - 120)  BP: 91/60 (02-14-21 @ 14:35) (70/50 - 129/89)  RR: 19 (02-14-21 @ 14:35)  SpO2: 98% (02-14-21 @ 14:35) (95% - 100%)  Wt(kg): --    EXAM:  GA: NAD  HEENT: oral cavity no lesion  CV: nl S1/S2, no RMG  Lungs: CTAB  Abd: BS+, soft, nontender, no rebounding pain  Ext: no edema  Skin:  IV: no phlebitis    Labs:                        12.6   32.04 )-----------( 313      ( 14 Feb 2021 13:51 )             40.3     02-14    135  |  99  |  29<H>  ----------------------------<  143<H>  4.5   |  18<L>  |  2.59<H>    Ca    9.3      14 Feb 2021 13:59  Phos  3.9     02-14  Mg     1.9     02-14    TPro  6.5  /  Alb  3.3  /  TBili  0.4  /  DBili  x   /  AST  23  /  ALT  10  /  AlkPhos  75  02-14      WBC Trend:  WBC Count: 32.04 (02-14-21 @ 13:51)  WBC Count: 26.64 (02-14-21 @ 11:11)  WBC Count: 24.73 (02-13-21 @ 22:45)  WBC Count: 16.80 (02-13-21 @ 20:15)    Auto Neutrophil #: 22.06 K/uL (02-13-21 @ 22:45)  Auto Neutrophil #: 13.96 K/uL (02-13-21 @ 20:15)  Auto Neutrophil #: 5.03 K/uL (07-16-20 @ 10:36)      Creatine Trend:  Creatinine, Serum: 2.59 (02-14)  Creatinine, Serum: 2.48 (02-14)  Creatinine, Serum: 1.70 (02-13)  Creatinine, Serum: 1.25 (02-13)      Liver Biochemical Testing Trend:  Alanine Aminotransferase (ALT/SGPT): 10 (02-14)  Alanine Aminotransferase (ALT/SGPT): 11 (02-13)  Aspartate Aminotransferase (AST/SGOT): 23 (02-14-21 @ 13:59)  Aspartate Aminotransferase (AST/SGOT): 59 (02-13-21 @ 20:16)  Bilirubin Total, Serum: 0.4 (02-14)  Bilirubin Total, Serum: 0.3 (02-13)      Urinalysis Basic - ( 14 Feb 2021 13:51 )  Color: Dark Yellow / Appearance: Slightly Turbid / SG: >1.050 / pH: x  Gluc: x / Ketone: Negative  / Bili: Small / Urobili: 3 mg/dL   Blood: x / Protein: 100 / Nitrite: Positive   Leuk Esterase: Large / RBC: 9 /hpf /  /HPF   Sq Epi: x / Non Sq Epi: 6 /hpf / Bacteria: Few      MICROBIOLOGY:    Clostridium difficile GDH Toxins A&amp;B, EIA:   Negative (02.14.21 @ 12:35)      Culture - Urine (collected 16 Jul 2020 13:02)  Source: .Urine CL CATCH [facility]  Final Report:    >100,000 CFU/ml Escherichia coli ESBL  Organism: Escherichia coli ESBL  Organism: Escherichia coli ESBL    Sensitivities:      -  Amikacin: S <=16      -  Amoxicillin/Clavulanic Acid: S <=8/4      -  Ampicillin: R >16 These ampicillin results predict results for amoxicillin      -  Ampicillin/Sulbactam: R 8/4 Enterobacter, Citrobacter, and Serratia may develop resistance during prolonged therapy (3-4 days)      -  Aztreonam: R >16      -  Cefazolin: R >16 (MIC_CL_COM_ENTERIC_CEFAZU) For uncomplicated UTI with K. pneumoniae, E. coli, or P. mirablis: NEVA <=16 is sensitive and NEVA >=32 is resistant. This also predicts results for oral agents cefaclor, cefdinir, cefpodoxime, cefprozil, cefuroxime axetil, cephalexin and locarbef for uncomplicated UTI. Note that some isolates may be susceptible to these agents while testing resistant to cefazolin.      -  Cefepime: R >16      -  Cefoxitin: S <=8      -  Ceftriaxone: R >32 Enterobacter, Citrobacter, and Serratia may develop resistance during prolonged therapy      -  Ciprofloxacin: S <=0.25      -  Ertapenem: S <=0.5      -  Gentamicin: S <=2      -  Imipenem: S <=1      -  Levofloxacin: S <=0.5      -  Meropenem: S <=1      -  Nitrofurantoin: S <=32 Should not be used to treat pyelonephritis      -  Piperacillin/Tazobactam: R <=8      -  Tigecycline: S <=2      -  Tobramycin: S <=2      -  Trimethoprim/Sulfamethoxazole: R >2/38      Method Type: NEVA        OTHER TESTS:  COVID-19 PCR: Detected (02-13-21 @ 20:15)    Blood Gas Venous - Lactate: 5.2 (02-14 @ 13:50)  Blood Gas Venous - Lactate: 5.2 (02-14 @ 13:50)  Blood Gas Venous - Lactate: 5.6 (02-14 @ 11:09)  Blood Gas Venous - Lactate: 4.3 (02-14 @ 01:01)    A1C with Estimated Average Glucose Result: 5.2 % (02-14-21 @ 13:28)      RADIOLOGY:  imaging below personally reviewed    < from: Xray Chest 1 View- PORTABLE-Routine (Xray Chest 1 View- PORTABLE-Routine .) (02.14.21 @ 14:03) >  The included lungs are clear.  < end of copied text >    < from: CT Abdomen and Pelvis w/ IV Cont (02.13.21 @ 22:34) >  IMPRESSION:    1. No evidence of active GI bleed noting that portion of the distal rectum is not included in the field-of-view and cannot be evaluated.    2. Suspected diffuse colonic wall thickening, most pronounced in the descending and rectosigmoid colon, suspicious for colitis.    3. Focus of air within the bladder. Recommend correlation for recent interpretation.    < end of copied text >   Patient is a 89y old  Female who presents with a chief complaint of GI Bleed, Vomiting (14 Feb 2021 07:19)    HPI:  89F w/ PMH of CAD s/p PCI, Chronic UTI's, MDD, HTN, HLD, [place] (hearing aids), dysphagia, COVID19 infection (2 months ago), and recent hospitalization for C. diff colitis (~2/7/21) [facility] after episode of vomiting and bloody bowel movement on 2/13.  HPI obtained from chart and from daughter (HCP) as patient does not respond verbally. Staff also reported patient appeared confused on 2/13, when normally more alert and conversive.  Upon EMS arrival, SBP 70s and received 1L IVF.  Per daughter, Patient was hospitalized around 2/7/21 for C. diff colitis at [facility] and given IV vancomycin x 6 days, then transitioned to PO vancomycin upon discharge to rehab (last dose was 2/10/21).  Per daughter, no prior history of GI bleed.  Regarding her prior COVID19 infection, daughter reports she was diagnosed with "mild" case of COVID and hospitalized ~ 2 months ago and was treated with remdesivir, dexamethasone, convalescent plasma, and AC. Did not require ventilation. She tested negative twice prior to discharge to rehab at the end of January.    In ED: Initial BP was 90s/40s, received 500mL NS x2. BP now 100/70s. Hypothermic 95.3 rectally, then later febrile to 101, s/p 1g of IV tylenol.  HR 50s -> 120s, now 110s. RR 19-32, sating % on RA. S/p Pantoprazole 80mg IVP, Zosyn x1, Metronidazole 500mg IV x1, Ondansetron 4mg. Rectal exam normal colored stool with small amount of watery blood. (14 Feb 2021 07:19)     ID consulted for colitis and sepsis.  Pt seems to have severe cognitive impairment so unable to get more hx.  She seems to be stable, able to look around.    prior hospital charts reviewed [V]  primary team notes reviewed [V]  other consultant notes reviewed [V]    PAST MEDICAL & SURGICAL HISTORY:  COVID-19    Urinary tract infection    CAD in native artery    Dyslipidemia    Depression    Hyponatremia    Hypertension    History of repair of hip fracture        SOCIAL HISTORY:    , living in [facility] prior to recent hospitalizations, coming from rehab  No tobacco hx    FAMILY HISTORY:  Unable to obtain      Allergies  flu vaccine (Anaphylaxis)  No Known Drug Allergies      ANTIMICROBIALS:  metroNIDAZOLE  IVPB 500 every 8 hours  piperacillin/tazobactam IVPB.. 3.375 every 12 hours  vancomycin    Solution 125 every 6 hours      ANTIMICROBIALS (past 90 days):  MEDICATIONS  (STANDING):  metroNIDAZOLE  IVPB   100 mL/Hr IV Intermittent (02-14-21 @ 14:25)    metroNIDAZOLE  IVPB   100 mL/Hr IV Intermittent (02-14-21 @ 02:47)    piperacillin/tazobactam IVPB..   25 mL/Hr IV Intermittent (02-14-21 @ 09:41)    piperacillin/tazobactam IVPB...   200 mL/Hr IV Intermittent (02-14-21 @ 01:49)    vancomycin    Solution   125 milliGRAM(s) Oral (02-14-21 @ 14:26)        OTHER MEDS:   MEDICATIONS  (STANDING):  acetaminophen   Tablet .. 650 every 12 hours PRN  bethanechol 10 three times a day  mirtazapine 15 at bedtime  ondansetron    Tablet 4 every 8 hours PRN  pantoprazole  Injectable 40 two times a day      REVIEW OF SYSTEMS  [ V ] ROS unobtainable because:  Unable to obtain ROS due to cognitive impairment from acute illness  [  ] All other systems negative except as noted below:	    Constitutional:  [ ] fever [ ] chills  [ ] weight loss  [ ] weakness  Skin:  [ ] rash [ ] phlebitis	  Eyes: [ ] icterus [ ] pain  [ ] discharge	  ENMT: [ ] sore throat  [ ] thrush [ ] ulcers [ ] exudates  Respiratory: [ ] dyspnea [ ] hemoptysis [ ] cough [ ] sputum	  Cardiovascular:  [ ] chest pain [ ] palpitations [ ] edema	  Gastrointestinal:  [ ] nausea [ ] vomiting [ ] diarrhea [ ] constipation [ ] pain	  Genitourinary:  [ ] dysuria [ ] frequency [ ] hematuria [ ] discharge [ ] flank pain  [ ] incontinence  Musculoskeletal:  [ ] myalgias [ ] arthralgias [ ] arthritis  [ ] back pain  Neurological:  [ ] headache [ ] seizures  [ ] confusion/altered mental status  Psychiatric:  [ ] anxiety [ ] depression	  Hematology/Lymphatics:  [ ] lymphadenopathy  Endocrine:  [ ] adrenal [ ] thyroid  Allergic/Immunologic:	 [ ] transplant [ ] seasonal    VITALS:  Vital Signs Last 24 Hrs  T(F): 98 (02-14-21 @ 13:00), Max: 101 (02-14-21 @ 05:16)    Vital Signs Last 24 Hrs  HR: 116 (02-14-21 @ 14:35) (56 - 120)  BP: 91/60 (02-14-21 @ 14:35) (70/50 - 129/89)  RR: 19 (02-14-21 @ 14:35)  SpO2: 98% (02-14-21 @ 14:35) (95% - 100%)  Wt(kg): --    EXAM:  GA: NAD, on NG tube on the right nose, looking around  HEENT: oral cavity no lesion  CV: nl S1/S2, no RMG  Lungs: CTAB  Abd: BS+, soft, tenderness when pressed, but not distended  Ext: no edema  Skin: no rash  IV: no phlebitis    Labs:                        12.6   32.04 )-----------( 313      ( 14 Feb 2021 13:51 )             40.3     02-14    135  |  99  |  29<H>  ----------------------------<  143<H>  4.5   |  18<L>  |  2.59<H>    Ca    9.3      14 Feb 2021 13:59  Phos  3.9     02-14  Mg     1.9     02-14    TPro  6.5  /  Alb  3.3  /  TBili  0.4  /  DBili  x   /  AST  23  /  ALT  10  /  AlkPhos  75  02-14      WBC Trend:  WBC Count: 32.04 (02-14-21 @ 13:51)  WBC Count: 26.64 (02-14-21 @ 11:11)  WBC Count: 24.73 (02-13-21 @ 22:45)  WBC Count: 16.80 (02-13-21 @ 20:15)    Auto Neutrophil #: 22.06 K/uL (02-13-21 @ 22:45)  Auto Neutrophil #: 13.96 K/uL (02-13-21 @ 20:15)  Auto Neutrophil #: 5.03 K/uL (07-16-20 @ 10:36)      Creatine Trend:  Creatinine, Serum: 2.59 (02-14)  Creatinine, Serum: 2.48 (02-14)  Creatinine, Serum: 1.70 (02-13)  Creatinine, Serum: 1.25 (02-13)      Liver Biochemical Testing Trend:  Alanine Aminotransferase (ALT/SGPT): 10 (02-14)  Alanine Aminotransferase (ALT/SGPT): 11 (02-13)  Aspartate Aminotransferase (AST/SGOT): 23 (02-14-21 @ 13:59)  Aspartate Aminotransferase (AST/SGOT): 59 (02-13-21 @ 20:16)  Bilirubin Total, Serum: 0.4 (02-14)  Bilirubin Total, Serum: 0.3 (02-13)      Urinalysis Basic - ( 14 Feb 2021 13:51 )  Color: Dark Yellow / Appearance: Slightly Turbid / SG: >1.050 / pH: x  Gluc: x / Ketone: Negative  / Bili: Small / Urobili: 3 mg/dL   Blood: x / Protein: 100 / Nitrite: Positive   Leuk Esterase: Large / RBC: 9 /hpf /  /HPF   Sq Epi: x / Non Sq Epi: 6 /hpf / Bacteria: Few      MICROBIOLOGY:    Clostridium difficile GDH Toxins A&amp;B, EIA:   Negative (02.14.21 @ 12:35)      Culture - Urine (collected 16 Jul 2020 13:02)  Source: .Urine CL [facility]  Final Report:    >100,000 CFU/ml Escherichia coli ESBL  Organism: Escherichia coli ESBL  Organism: Escherichia coli ESBL    Sensitivities:      -  Amikacin: S <=16      -  Amoxicillin/Clavulanic Acid: S <=8/4      -  Ampicillin: R >16 These ampicillin results predict results for amoxicillin      -  Ampicillin/Sulbactam: R 8/4 Enterobacter, Citrobacter, and Serratia may develop resistance during prolonged therapy (3-4 days)      -  Aztreonam: R >16      -  Cefazolin: R >16 ([email or web address]) For uncomplicated UTI with K. pneumoniae, E. coli, or P. mirablis: NEVA <=16 is sensitive and NEVA >=32 is resistant. This also predicts results for oral agents cefaclor, cefdinir, cefpodoxime, cefprozil, cefuroxime axetil, cephalexin and locarbef for uncomplicated UTI. Note that some isolates may be susceptible to these agents while testing resistant to cefazolin.      -  Cefepime: R >16      -  Cefoxitin: S <=8      -  Ceftriaxone: R >32 Enterobacter, Citrobacter, and Serratia may develop resistance during prolonged therapy      -  Ciprofloxacin: S <=0.25      -  Ertapenem: S <=0.5      -  Gentamicin: S <=2      -  Imipenem: S <=1      -  Levofloxacin: S <=0.5      -  Meropenem: S <=1      -  Nitrofurantoin: S <=32 Should not be used to treat pyelonephritis      -  Piperacillin/Tazobactam: R <=8      -  Tigecycline: S <=2      -  Tobramycin: S <=2      -  Trimethoprim/Sulfamethoxazole: R >2/38      Method Type: NEVA        OTHER TESTS:  COVID-19 PCR: Detected (02-13-21 @ 20:15)    Blood Gas Venous - Lactate: 5.2 (02-14 @ 13:50)  Blood Gas Venous - Lactate: 5.2 (02-14 @ 13:50)  Blood Gas Venous - Lactate: 5.6 (02-14 @ 11:09)  Blood Gas Venous - Lactate: 4.3 (02-14 @ 01:01)    A1C with Estimated Average Glucose Result: 5.2 % (02-14-21 @ 13:28)      RADIOLOGY:  imaging below personally reviewed    < from: Xray Chest 1 View- PORTABLE-Routine (Xray Chest 1 View- PORTABLE-Routine .) (02.14.21 @ 14:03) >  The included lungs are clear.  < end of copied text >    < from: CT Abdomen and Pelvis w/ IV Cont (02.13.21 @ 22:34) >  IMPRESSION:    1. No evidence of active GI bleed noting that portion of the distal rectum is not included in the field-of-view and cannot be evaluated.    2. Suspected diffuse colonic wall thickening, most pronounced in the descending and rectosigmoid colon, suspicious for colitis.    3. Focus of air within the bladder. Recommend correlation for recent interpretation.    < end of copied text >

## 2023-06-09 NOTE — PROGRESS NOTE ADULT - PROBLEM SELECTOR PROBLEM 5
Deep vein thrombosis (DVT) of distal vein of right lower extremity, unspecified chronicity There are no Wet Read(s) to document. Dysphagia